# Patient Record
Sex: FEMALE | Race: WHITE | Employment: FULL TIME | URBAN - METROPOLITAN AREA
[De-identification: names, ages, dates, MRNs, and addresses within clinical notes are randomized per-mention and may not be internally consistent; named-entity substitution may affect disease eponyms.]

---

## 2017-05-15 RX ORDER — OXAPROZIN 600 MG/1
600 TABLET, FILM COATED ORAL DAILY
Qty: 90 TAB | Refills: 2 | Status: SHIPPED | OUTPATIENT
Start: 2017-05-15 | End: 2020-10-12 | Stop reason: ALTCHOICE

## 2017-05-15 NOTE — TELEPHONE ENCOUNTER
Insurance requires a minimum fill for 90 days. If appropriate, please sign pended medication order. If not, please notify me. Last Visit: 11/28/2016 with MD Margie Fiore    Next Appointment: noted to f/u in one month   Previous Refill Encounters: 09/29/2016 per MD Margie Fiore #[id number] with 3 refills     Requested Prescriptions     Pending Prescriptions Disp Refills    oxaprozin (DAYPRO) 600 mg tablet 90 Tab 2     Sig: Take 1 Tab by mouth daily.

## 2017-06-01 ENCOUNTER — TELEPHONE (OUTPATIENT)
Dept: ORTHOPEDIC SURGERY | Facility: CLINIC | Age: 52
End: 2017-06-01

## 2017-06-01 DIAGNOSIS — M17.0 BILATERAL PRIMARY OSTEOARTHRITIS OF KNEE: Primary | ICD-10-CM

## 2017-06-14 ENCOUNTER — OFFICE VISIT (OUTPATIENT)
Dept: ORTHOPEDIC SURGERY | Facility: CLINIC | Age: 52
End: 2017-06-14

## 2017-06-14 VITALS
DIASTOLIC BLOOD PRESSURE: 73 MMHG | SYSTOLIC BLOOD PRESSURE: 131 MMHG | WEIGHT: 234 LBS | BODY MASS INDEX: 40.17 KG/M2 | TEMPERATURE: 98 F | HEART RATE: 70 BPM

## 2017-06-14 DIAGNOSIS — M17.0 PRIMARY OSTEOARTHRITIS OF BOTH KNEES: Primary | ICD-10-CM

## 2017-06-14 RX ORDER — HYALURONATE SODIUM 10 MG/ML
2 SYRINGE (ML) INTRAARTICULAR ONCE
Qty: 2 ML | Refills: 0
Start: 2017-06-14 | End: 2017-06-14

## 2017-06-14 NOTE — PROGRESS NOTES
Patient: Stalin Patel                MRN: [id number]       SSN: [id number]  YOB: 1965        AGE: 45 y.o. SEX: female  There is no height or weight on file to calculate BMI. PCP: Marquis Daniel MD  06/14/17    Chief Complaint   Patient presents with    Knee Pain     Hima       HISTORY:  Stalin Patel is a 45 y.o. female who is seen for bilateral knee pain and bilateral Euflexxa injections. PROCEDURE: After timeout and under sterile conditions, patients bilateral knees were injected with 2 cc of Euflexxa. [facility] - [facility]  OFFICE PROCEDURE PROGRESS NOTE        Chart reviewed for the following:   Rossy Bell MD, have reviewed the History, Physical and updated the Allergic reactions for [address] [place] [address] performed immediately prior to start of procedure:   Rossy Bell MD, have performed the following reviews on Stalin Patel prior to the start of the procedure:            * Patient was identified by name and date of birth   * Agreement on procedure being performed was verified  * Risks and Benefits explained to the patient  * Procedure site verified and marked as necessary  * Patient was positioned for comfort  * Consent was signed and verified     Time: 10:19 AM        Date of procedure: 6/14/2017    Procedure performed by: Easton Zaidi MD    Provider assisted by: None     Patient assisted by: self    How tolerated by patient: tolerated the procedure well with no complications    Comments: none    PHYSICAL EXAM: Knees appear normal. No swelling, redness, or increased warmth. Sensation, circulation, ROM, and motor strength are normal and equal for B/L knees. PLAN: Pt has previously x-ray documented arthritis of the bilateral knees I will see her back in one week for her next bilateral knees Euflexxa injection.       Scribed by Richard Robles, as dictated by Easton Zaidi MD.

## 2017-06-21 ENCOUNTER — OFFICE VISIT (OUTPATIENT)
Dept: ORTHOPEDIC SURGERY | Facility: CLINIC | Age: 52
End: 2017-06-21

## 2017-06-21 VITALS
BODY MASS INDEX: 40.26 KG/M2 | HEIGHT: 64 IN | HEART RATE: 66 BPM | TEMPERATURE: 98 F | DIASTOLIC BLOOD PRESSURE: 60 MMHG | SYSTOLIC BLOOD PRESSURE: 135 MMHG | WEIGHT: 235.8 LBS

## 2017-06-21 DIAGNOSIS — M17.0 PRIMARY OSTEOARTHRITIS OF BOTH KNEES: Primary | ICD-10-CM

## 2017-06-21 RX ORDER — HYALURONATE SODIUM 10 MG/ML
2 SYRINGE (ML) INTRAARTICULAR ONCE
Qty: 2 ML | Refills: 0
Start: 2017-06-21 | End: 2017-06-21

## 2017-06-21 RX ORDER — TRAMADOL HYDROCHLORIDE 50 MG/1
50 TABLET ORAL
Qty: 60 TAB | Refills: 3 | Status: SHIPPED | OUTPATIENT
Start: 2017-06-21 | End: 2020-01-13

## 2017-06-21 RX ORDER — OXAPROZIN 600 MG/1
600 TABLET, FILM COATED ORAL 2 TIMES DAILY WITH MEALS
Qty: 60 TAB | Refills: 3 | Status: SHIPPED | OUTPATIENT
Start: 2017-06-21 | End: 2017-10-30 | Stop reason: SDUPTHER

## 2017-06-21 NOTE — PROGRESS NOTES
Patient: Sulaiman Gomes                MRN: [id number]       SSN: [id number]  YOB: 1965        AGE: 45 y.o. SEX: female  Body mass index is 40.47 kg/(m^2). PCP: Sam Akhtar MD  06/21/17    Chief Complaint   Patient presents with    Knee Pain     Hima       HISTORY:  Sulaiman Gomes is a 45 y.o. female who is seen for bilateral knee pain and her second Euflexxa injection. PROCEDURE: After timeout and under sterile conditions, patients bilateral knees were injected with 2 cc of Euflexxa. [facility] - [facility]  OFFICE PROCEDURE PROGRESS NOTE        Chart reviewed for the following:   Carlo Hernandez MD, have reviewed the History, Physical and updated the Allergic reactions for [address] [place] [address] performed immediately prior to start of procedure:   Carlo Hernandez MD, have performed the following reviews on Sulaiman Gomes prior to the start of the procedure:            * Patient was identified by name and date of birth   * Agreement on procedure being performed was verified  * Risks and Benefits explained to the patient  * Procedure site verified and marked as necessary  * Patient was positioned for comfort  * Consent was signed and verified     Time: 7:39 AM        Date of procedure: 6/21/2017    Procedure performed by: Haven Miller MD    Provider assisted by: None     Patient assisted by: self    How tolerated by patient: tolerated the procedure well with no complications    Comments: none    PHYSICAL EXAM: Knees appear normal. No swelling, redness, or increased warmth. Sensation, circulation, ROM, and motor strength are normal and equal for B/L knees. PLAN: Pt has previously x-ray documented arthritis of the bilateral knees. I will see her back in one week for her next bilateral knee Euflexxa injection.       Scribed by Poornima Camarena, as dictated by Haven Miller MD.

## 2017-06-21 NOTE — MR AVS SNAPSHOT
Visit Information Date & Time Provider Department Dept. Phone Encounter #  
 6/21/2017  7:30 AM Emeka Bhatt MD [facility] - [place] [id number] Your Appointments 6/28/2017  8:30 AM  
Follow Up with Emeka Bhatt MD  
[address] [facility], [address] [facility] [place]  [address] [facility]) Appt Note: Euflexxa-sulma knees-1/3  
 [address] [facility], [address] [facility] [address]  
[phone]  
  
   
 [address] [facility], [address] [place] [address] [address] [address] Upcoming Health Maintenance Date Due Hepatitis C Screening 1965 DTaP/Tdap/Td series (1 - Tdap) 9/12/1985 PAP AKA CERVICAL CYTOLOGY 9/12/1985 BREAST CANCER SCRN MAMMOGRAM 9/12/2014 FOBT Q 1 YEAR AGE 50-75 9/12/2014 INFLUENZA AGE 9 TO ADULT 8/1/2017 Allergies as of 6/21/2017  Review Complete On: 6/21/2017 By: Emeka Bhatt MD  
  
 Severity Noted Reaction Type Reactions Calcium  09/29/2016    Other (comments) Egg  06/21/2017    Swelling Milk Containing Products  09/29/2016    Other (comments) Red Dye  06/21/2017    Swelling Sulfa (Sulfonamide Antibiotics)  09/29/2016    Other (comments) Current Immunizations  Never Reviewed No immunizations on file. Not reviewed this visit You Were Diagnosed With   
  
 Codes Comments Primary osteoarthritis of both knees    -  Primary ICD-10-CM: M17.0 ICD-9-CM: 715.16 Vitals BP Pulse Temp Height(growth percentile) Weight(growth percentile) BMI  
 135/60 66 98 °F (36.7 °C) (Oral) 5' 4\" (1.626 m) 235 lb 12.8 oz (107 kg) 40.47 kg/m2 Smoking Status Never Smoker BMI and BSA Data Body Mass Index Body Surface Area 40.47 kg/m 2 2.2 m 2 Preferred Pharmacy Pharmacy Name Phone [facility] [address] [facility], [facility] AT [facility] [phone] Your Updated Medication List  
  
   
 This list is accurate as of: 6/21/17  7:50 AM.  Always use your most recent med list.  
  
  
  
  
 azelastine 137 mcg (0.1 %) nasal spray Commonly known as:  ASTELIN  
INT 2 SPRAYS IEN BID  
  
 budesonide 0.5 mg/2 mL Nbsp Commonly known as:  PULMICORT  
500 mcg by Nebulization route.  
  
 ergocalciferol 50,000 unit capsule Commonly known as:  ERGOCALCIFEROL TK 1 C PO Q WEEK LEXAPRO 20 mg tablet Generic drug:  escitalopram oxalate Take 20 mg by mouth daily. loratadine 10 mg tablet Commonly known as:  Mac Maria A Take 10 mg by mouth two (2) times a day. MUCINEX 600 mg ER tablet Generic drug:  guaiFENesin ER Take 600 mg by mouth daily. NexIUM 20 mg capsule Generic drug:  esomeprazole Take 40 mg by mouth two (2) times a day. OTHER [address] * oxaprozin 600 mg tablet Commonly known as:  Revonda Joseph Take 1 Tab by mouth daily. * oxaprozin 600 mg tablet Commonly known as:  Revonda Joseph Take 1 Tab by mouth two (2) times daily (with meals). QNASL 80 mcg/actuation Hfaa Generic drug:  beclomethasone dipropionate INHALE 1 PUFF IEN BID  
  
 SINGULAIR 10 mg tablet Generic drug:  montelukast  
Take 10 mg by mouth daily. sodium hyaluronate 10 mg/mL Syrg injection Commonly known as:  SUPARTZ FX/HYALGAN/GENIVSC 2 mL by Intra artICUlar route once for 1 dose. SYMBYAX 3-25 mg per capsule Generic drug:  OLANZapine-FLUoxetine Take 1 Cap by mouth every evening. * traMADol 50 mg tablet Commonly known as:  ULTRAM  
Take 1 Tab by mouth every six (6) hours as needed for Pain. Max Daily Amount: 200 mg.  
  
 * traMADol 50 mg tablet Commonly known as:  ULTRAM  
Take 1 Tab by mouth every six (6) hours as needed for Pain. Max Daily Amount: 200 mg.  
  
 * Notice: This list has 4 medication(s) that are the same as other medications prescribed for you.  Read the directions carefully, and ask your doctor or other care provider to review them with you. Prescriptions Printed Refills  
 oxaprozin (DAYPRO) 600 mg tablet 3 Sig: Take 1 Tab by mouth two (2) times daily (with meals). Class: Print Route: Oral  
 traMADol (ULTRAM) 50 mg tablet 3 Sig: Take 1 Tab by mouth every six (6) hours as needed for Pain. Max Daily Amount: 200 mg. Class: Print Route: Oral  
  
We Performed the Following EUFLEXXA INJECTION PER DOSE [ [facility]] SD DRAIN/INJECT LARGE JOINT/BURSA [id number] CPT(R)] Introducing [facility]! Jazlyn Lovell introduces [employer] patient portal. Now you can access parts of your medical record, email your doctor's office, and request medication refills online. 1. In your internet browser, go to [email or web address] 2. Click on the First Time User? Click Here link in the Sign In box. You will see the New Member Sign Up page. 3. Enter your [employer] Access Code exactly as it appears below. You will not need to use this code after youve completed the sign-up process. If you do not sign up before the expiration date, you must request a new code. · [employer] Access Code: [email or web address] Expires: 9/19/2017  7:50 AM 
 
4. Enter the last four digits of your Social Security Number (xxxx) and Date of Birth (mm/dd/yyyy) as indicated and click Submit. You will be taken to the next sign-up page. 5. Create a PictureMenut ID. This will be your [employer] login ID and cannot be changed, so think of one that is secure and easy to remember. 6. Create a [employer] password. You can change your password at any time. 7. Enter your Password Reset Question and Answer. This can be used at a later time if you forget your password. 8. Enter your e-mail address. You will receive e-mail notification when new information is available in [address]. 9. Click Sign Up. You can now view and download portions of your medical record. 10. Click the Download Summary menu link to download a portable copy of your medical information. If you have questions, please visit the Frequently Asked Questions section of the DecideQuick website. Remember, [employer] is NOT to be used for urgent needs. For medical emergencies, dial 911. Now available from your iPhone and Android! Please provide this summary of care documentation to your next provider. Your primary care clinician is listed as [employer]. If you have any questions after today's visit, please call [phone].

## 2017-06-28 ENCOUNTER — OFFICE VISIT (OUTPATIENT)
Dept: ORTHOPEDIC SURGERY | Facility: CLINIC | Age: 52
End: 2017-06-28

## 2017-06-28 VITALS
TEMPERATURE: 97.5 F | WEIGHT: 233 LBS | HEART RATE: 65 BPM | DIASTOLIC BLOOD PRESSURE: 74 MMHG | BODY MASS INDEX: 41.29 KG/M2 | SYSTOLIC BLOOD PRESSURE: 123 MMHG | HEIGHT: 63 IN

## 2017-06-28 DIAGNOSIS — M17.0 PRIMARY OSTEOARTHRITIS OF BOTH KNEES: Primary | ICD-10-CM

## 2017-06-28 RX ORDER — HYALURONATE SODIUM 10 MG/ML
2 SYRINGE (ML) INTRAARTICULAR ONCE
Qty: 2 ML | Refills: 0
Start: 2017-06-28 | End: 2017-06-28

## 2017-06-28 NOTE — PROGRESS NOTES
Patient: Bernardino Villalpando                MRN: [id number]       SSN: [id number]  YOB: 1965        AGE: 45 y.o. SEX: female  There is no height or weight on file to calculate BMI. PCP: Genet Velasco MD  06/28/17    No chief complaint on file. HISTORY:  Bernardino Villalpando is a 45 y.o. female who is seen for bilateral knee pain and her third Euflexxa injection. Ada Malik PROCEDURE: After timeout and under sterile conditions, patients bilateral knees were injected with 2 cc of Euflexxa. [facility]  OFFICE PROCEDURE PROGRESS NOTE        Chart reviewed for the following:   Saint Sparrow, MD, have reviewed the History, Physical and updated the Allergic reactions for [facility] performed immediately prior to start of procedure:   Saint Sparrow, MD, have performed the following reviews on Bernardino Villalpando prior to the start of the procedure:            * Patient was identified by name and date of birth   * Agreement on procedure being performed was verified  * Risks and Benefits explained to the patient  * Procedure site verified and marked as necessary  * Patient was positioned for comfort  * Consent was signed and verified     Time: 8:20 AM        Date of procedure: 6/28/2017    Procedure performed by: Tamara Mclaughlin MD    Provider assisted by: None     Patient assisted by: self    How tolerated by patient: tolerated the procedure well with no complications    Comments: none    PHYSICAL EXAM: Knees appear normal. No swelling, redness, or increased warmth. Sensation, circulation, ROM, and motor strength are normal and equal for B/L knees. PLAN: Pt has previously x-ray documented arthritis of the bilateral knees. I will see her back prn.       Scribed by Jyoti Sargent, as dictated by Tamara Mclaughlin MD.

## 2017-10-31 RX ORDER — OXAPROZIN 600 MG/1
600 TABLET, FILM COATED ORAL 2 TIMES DAILY WITH MEALS
Qty: 60 TAB | Refills: 3 | Status: SHIPPED | OUTPATIENT
Start: 2017-10-31 | End: 2017-12-06 | Stop reason: SDUPTHER

## 2017-12-06 ENCOUNTER — OFFICE VISIT (OUTPATIENT)
Dept: ORTHOPEDIC SURGERY | Age: 52
End: 2017-12-06

## 2017-12-06 VITALS
DIASTOLIC BLOOD PRESSURE: 80 MMHG | SYSTOLIC BLOOD PRESSURE: 138 MMHG | HEART RATE: 65 BPM | TEMPERATURE: 96.5 F | OXYGEN SATURATION: 98 % | BODY MASS INDEX: 41.82 KG/M2 | HEIGHT: 63 IN | WEIGHT: 236 LBS

## 2017-12-06 DIAGNOSIS — M79.672 LEFT FOOT PAIN: ICD-10-CM

## 2017-12-06 DIAGNOSIS — M72.2 PLANTAR FASCIITIS OF LEFT FOOT: ICD-10-CM

## 2017-12-06 DIAGNOSIS — M72.2 PLANTAR FASCIITIS OF LEFT FOOT: Primary | ICD-10-CM

## 2017-12-06 PROBLEM — E66.01 OBESITY, MORBID (HCC): Status: ACTIVE | Noted: 2017-12-06

## 2017-12-06 RX ORDER — OXAPROZIN 600 MG/1
600 TABLET, FILM COATED ORAL 2 TIMES DAILY WITH MEALS
Qty: 60 TAB | Refills: 3 | Status: SHIPPED | OUTPATIENT
Start: 2017-12-06 | End: 2017-12-06 | Stop reason: SDUPTHER

## 2017-12-06 NOTE — PROCEDURES
DIAGNOSTIC STUDIES:  X-rays of the left foot, three views, AP, lateral, and oblique, [address], today:  These films reveal:    1. Calcific bone spur, mild, at the insertion point of the Achilles tendon. 2. The ankle, subtalar, transverse tarsal joints, and navicular cuneiform joints are well maintained. 3. There is no acute fracture, subluxation, or dislocation. 4. No osteolytic or osteoblastic lesions are seen on these three views of the left foot.

## 2017-12-06 NOTE — MR AVS SNAPSHOT
Visit Information Date & Time Provider Department Dept. Phone Encounter #  
 12/6/2017  2:40 PM Tamir Gagnon, [facility] [phone] [id number] Upcoming Health Maintenance Date Due Hepatitis C Screening 1965 DTaP/Tdap/Td series (1 - Tdap) 9/12/1985 PAP AKA CERVICAL CYTOLOGY 9/12/1985 BREAST CANCER SCRN MAMMOGRAM 9/12/2014 FOBT Q 1 YEAR AGE 50-75 9/12/2014 Influenza Age 5 to Adult 8/1/2017 Allergies as of 12/6/2017  Review Complete On: 12/6/2017 By: Tamir Gagnon MD  
  
 Severity Noted Reaction Type Reactions Calcium  09/29/2016    Other (comments) Egg  06/21/2017    Swelling Milk Containing Products  09/29/2016    Other (comments) Red Dye  06/21/2017    Swelling Sulfa (Sulfonamide Antibiotics)  09/29/2016    Other (comments) Current Immunizations  Never Reviewed No immunizations on file. Not reviewed this visit You Were Diagnosed With   
  
 Codes Comments Plantar fasciitis of left foot    -  Primary ICD-10-CM: M72.2 ICD-9-CM: 728.71 Left foot pain     ICD-10-CM: A23.233 ICD-9-CM: 729.5 Vitals BP Pulse Temp Height(growth percentile) Weight(growth percentile) SpO2  
 138/80 65 96.5 °F (35.8 °C) (Oral) 5' 3\" (1.6 m) 236 lb (107 kg) 98% BMI Smoking Status 41.81 kg/m2 Never Smoker BMI and BSA Data Body Mass Index Body Surface Area  
 41.81 kg/m 2 2.18 m 2 Preferred Pharmacy Pharmacy Name Phone [facility] [address] [facility] [phone] Your Updated Medication List  
  
   
This list is accurate as of: 12/6/17  3:50 PM.  Always use your most recent med list.  
  
  
  
  
 azelastine 137 mcg (0.1 %) nasal spray Commonly known as:  ASTELIN  
INT 2 SPRAYS IEN BID  
  
 budesonide 0.5 mg/2 mL Nbsp Commonly known as:  PULMICORT  
 500 mcg by Nebulization route.  
  
 ergocalciferol 50,000 unit capsule Commonly known as:  ERGOCALCIFEROL TK 1 C PO Q WEEK LEXAPRO 20 mg tablet Generic drug:  escitalopram oxalate Take 20 mg by mouth daily. loratadine 10 mg tablet Commonly known as:  Rosan Norman Take 10 mg by mouth two (2) times a day. MUCINEX 600 mg ER tablet Generic drug:  guaiFENesin ER Take 600 mg by mouth daily. NexIUM 20 mg capsule Generic drug:  esomeprazole Take 40 mg by mouth two (2) times a day. OTHER [facility] * oxaprozin 600 mg tablet Commonly known as:  Carson Rumble Take 1 Tab by mouth daily. * oxaprozin 600 mg tablet Commonly known as:  Carson Rumble Take 1 Tab by mouth two (2) times daily (with meals). QNASL 80 mcg/actuation Hfaa Generic drug:  beclomethasone dipropionate INHALE 1 PUFF IEN BID  
  
 SINGULAIR 10 mg tablet Generic drug:  montelukast  
Take 10 mg by mouth daily. SYMBYAX 3-25 mg per capsule Generic drug:  OLANZapine-FLUoxetine Take 1 Cap by mouth every evening. * traMADol 50 mg tablet Commonly known as:  ULTRAM  
Take 1 Tab by mouth every six (6) hours as needed for Pain. Max Daily Amount: 200 mg.  
  
 * traMADol 50 mg tablet Commonly known as:  ULTRAM  
Take 1 Tab by mouth every six (6) hours as needed for Pain. Max Daily Amount: 200 mg.  
  
 * Notice: This list has 4 medication(s) that are the same as other medications prescribed for you. Read the directions carefully, and ask your doctor or other care provider to review them with you. Prescriptions Sent to Pharmacy Refills  
 oxaprozin (DAYPRO) 600 mg tablet 3 Sig: Take 1 Tab by mouth two (2) times daily (with meals). Class: Normal  
 Pharmacy: [facility] [address] [place] [address] [address]  #: [phone] Route: Oral  
  
We Performed the Following AMB POC XRAY, FOOT; COMPLETE, 3+ VIEW [12757 CPT(R)] AMB SUPPLY ORDER [[id number] Custom] Comments:  
 Viscoelastic Spenco Medial Arch Support REFERRAL TO PHYSICAL THERAPY [AEC55 Custom] Comments:  
 Evaluation and treatment of left plantar fasciitis. Please treat two to three times a week for four weeks. Please utilize the following: low frequency US, massages, graston technique, and stretching. Referral Information Referral ID Referred By Referred To  
  
 [id number] LIVHILDA QUACH [address] [facility] VIEW   
   [address] [facility] [address] [place], [address] [place] [address] Phone: [phone] Fax: [phone] Visits Status Start Date End Date 1 New Request 12/6/17 12/6/18 If your referral has a status of pending review or denied, additional information will be sent to support the outcome of this decision. Patient Instructions Please follow up in 4 weeks. You are advised to contact us if your condition worsens. You have been provided with an order for durable medical equipment that you may  at an outside facility as our office does not carry the equipment you need. You may pick it up at any medical supply company you like. Listed below are a few different locations for your convenience: [facility] [address] [facility], [address] [place] [address] Street Phone: [phone] [address] [place], [address] [place] [address] Phone: [phone] [place] [address] Phone: [phone] [place]:  
[address] [place], [address] [place]: 
[address] Jacque Friedman [address] [place], [address] [place]: 
[address] [facility], [address] Foot Pain: Care Instructions Your Care Instructions Foot injuries that cause pain and swelling are fairly common.  Almost all sports or home repair projects can cause a misstep that ends up as foot pain. Normal wear and tear, especially as you get older, also can cause foot pain. Most minor foot injuries will heal on their own, and home treatment is usually all you need to do. If you have a severe injury, you may need tests and treatment. Follow-up care is a key part of your treatment and safety. Be sure to make and go to all appointments, and call your doctor if you are having problems. It's also a good idea to know your test results and keep a list of the medicines you take. How can you care for yourself at home? · Take pain medicines exactly as directed. ¨ If the doctor gave you a prescription medicine for pain, take it as prescribed. ¨ If you are not taking a prescription pain medicine, ask your doctor if you can take an over-the-counter medicine. · Rest and protect your foot. Take a break from any activity that may cause pain. · Put ice or a cold pack on your foot for 10 to 20 minutes at a time. Put a thin cloth between the ice and your skin. · Prop up the sore foot on a pillow when you ice it or anytime you sit or lie down during the next 3 days. Try to keep it above the level of your heart. This will help reduce swelling. · Your doctor may recommend that you wrap your foot with an elastic bandage. Keep your foot wrapped for as long as your doctor advises. · If your doctor recommends crutches, use them as directed. · Wear roomy footwear. · As soon as pain and swelling end, begin gentle exercises of your foot. Your doctor can tell you which exercises will help. When should you call for help? Call 911 anytime you think you may need emergency care. For example, call if: 
? · Your foot turns pale, white, blue, or cold. ?Call your doctor now or seek immediate medical care if: 
? · You cannot move or stand on your foot. ? · Your foot looks twisted or out of its normal position. ? · Your foot is not stable when you step down. ? · You have signs of infection, such as: ¨ Increased pain, swelling, warmth, or redness. ¨ Red streaks leading from the sore area. ¨ Pus draining from a place on your foot. ¨ A fever. ? · Your foot is numb or tingly. ? Watch closely for changes in your health, and be sure to contact your doctor if: 
? · You do not get better as expected. ? · You have bruises from an injury that last longer than 2 weeks. Where can you learn more? Go to http://damián-solitario.info/. Enter S626 in the search box to learn more about \"Foot Pain: Care Instructions. \" Current as of: March 21, 2017 Content Version: 11.4 © 9820-2044 [employer]uck[employer]. Care instructions adapted under license by [employer] (which disclaims liability or warranty for this information). If you have questions about a medical condition or this instruction, always ask your healthcare professional. [place] [address] any warranty or liability for your use of this information. Plantar Fasciitis: Exercises Your Care Instructions Here are some examples of typical rehabilitation exercises for your condition. Start each exercise slowly. Ease off the exercise if you start to have pain. Your doctor or physical therapist will tell you when you can start these exercises and which ones will work best for you. How to do the exercises Towel stretch 1. Sit with your legs extended and knees straight. 2. Place a towel around your foot just under the toes. 3. Hold each end of the towel in each hand, with your hands above your knees. 4. Pull back with the towel so that your foot stretches toward you. 5. Hold the position for at least 15 to 30 seconds. 6. Repeat 2 to 4 times a session, up to 5 sessions a day. Calf stretch This exercise stretches the muscles at the back of the lower leg (the calf) and the Achilles tendon. Do this exercise 3 or 4 times a day, 5 days a week. 1. Stand facing a wall with your hands on the wall at about eye level. Put the leg you want to stretch about a step behind your other leg. 2. Keeping your back heel on the floor, bend your front knee until you feel a stretch in the back leg. 3. Hold the stretch for 15 to 30 seconds. Repeat 2 to 4 times. Plantar fascia and calf stretch Stretching the plantar fascia and calf muscles can increase flexibility and decrease heel pain. You can do this exercise several times each day and before and after activity. 1. Stand on a step as shown above. Be sure to hold on to the banister. 2. Slowly let your heels down over the edge of the step as you relax your calf muscles. You should feel a gentle stretch across the bottom of your foot and up the back of your leg to your knee. 3. Hold the stretch about 15 to 30 seconds, and then tighten your calf muscle a little to bring your heel back up to the level of the step. Repeat 2 to 4 times. Towel curls Make this exercise more challenging by placing a weighted object, such as a soup can, on the other end of the towel. 1. While sitting, place your foot on a towel on the floor and scrunch the towel toward you with your toes. 2. Then, also using your toes, push the towel away from you. [place] pickups 1. Put marbles on the floor next to a cup. 
2. Using your toes, try to lift the marbles up from the floor and put them in the cup. Follow-up care is a key part of your treatment and safety. Be sure to make and go to all appointments, and call your doctor if you are having problems. It's also a good idea to know your test results and keep a list of the medicines you take. Where can you learn more? Go to http://damián-solitario.info/. Annette Currie in the search box to learn more about \"Plantar Fasciitis: Exercises. \" Current as of: March 21, 2017 Content Version: 11.4 © 5817-1341 Healthwise, Incorporated.  Care instructions adapted under license by [address] (which disclaims liability or warranty for this information). If you have questions about a medical condition or this instruction, always ask your healthcare professional. [address] any warranty or liability for your use of this information. Introducing [facility] & HEALTH SERVICES! [facility] introduces Adsame patient portal. Now you can access parts of your medical record, email your doctor's office, and request medication refills online. 1. In your internet browser, go to [email or web address] 2. Click on the First Time User? Click Here link in the Sign In box. You will see the New Member Sign Up page. 3. Enter your Adsame Access Code exactly as it appears below. You will not need to use this code after youve completed the sign-up process. If you do not sign up before the expiration date, you must request a new code. · Adsame Access Code: [email or web address] Expires: 3/6/2018  3:48 PM 
 
4. Enter the last four digits of your Social Security Number (xxxx) and Date of Birth (mm/dd/yyyy) as indicated and click Submit. You will be taken to the next sign-up page. 5. Create a Adsame ID. This will be your Adsame login ID and cannot be changed, so think of one that is secure and easy to remember. 6. Create a Adsame password. You can change your password at any time. 7. Enter your Password Reset Question and Answer. This can be used at a later time if you forget your password. 8. Enter your e-mail address. You will receive e-mail notification when new information is available in [address] 9. Click Sign Up. You can now view and download portions of your medical record. 10. Click the Download Summary menu link to download a portable copy of your medical information. If you have questions, please visit the Frequently Asked Questions section of the Adsame website.  Remember, Adsame is NOT to be used for urgent needs. For medical emergencies, dial 911. Now available from your iPhone and Android! Please provide this summary of care documentation to your next provider. Your primary care clinician is listed as Gutierrez Ochoa. If you have any questions after today's visit, please call [phone].

## 2017-12-06 NOTE — PROGRESS NOTES
AMBULATORY PROGRESS NOTE      Patient: Sidra Gutierrez             MRN: [id number]     SSN: [id number] Body mass index is 41.81 kg/(m^2). YOB: 1965     AGE: 47 y.o. EX: female    PCP: More Tubbs MD    IMPRESSION/DIAGNOSIS AND TREATMENT PLAN     DIAGNOSES  1. Plantar fasciitis of left foot    2. Left foot pain        Orders Placed This Encounter    AMB SUPPLY ORDER    [07652] Foot Min 3V    REFERRAL TO PHYSICAL THERAPY    oxaprozin (DAYPRO) 600 mg tablet      Sidra Gutierrez understands her diagnoses and the proposed plan. Plan:    1) DME Order: Viscoelastic Spenco Medial Arch Support  2) Referral for Physical Therapy: Low frequency US, massages, Graston technique, and stretching. 3) Daypro 600 m PO BID PRN; 60 tablets, 3 refills. RTO - 4 weeks    HPI AND EXAMINATION     Sidra Gutierrez IS A 47 y.o. female who presents to my outpatient office complaining of left foot pain. Patient states that 2 months ago she started to wear a plastic arch support and since then she has noticed pain along her left heel. She notes having start-up pain that does not subside and the pain worsens throughout the day. Denies falls, twists, or trauma. She typically wears TXU [place]ow on a daily basis. Additionally, the reports having plantar fasciitis on her right foot that she notes is a dissimilar pain in comparison to what she has experienced on her left foot. She had a right foot fracture that was treated with physical therapy for ROM exercises and she notes it helped considerably. Sidra Gutierrez is alert/oriented (name, location, time) and follows commands well. she  is in no acute distress and her affect and mood are appropriate. Left ANKLE and FOOT       Gait: normal  Tenderness: mild tenderness to medial cord of plantar fascia. Cutaneous: No rashes, skin patches, wounds, or abrasions to the lower legs           Warm and Normal color.  No regions of expressible drainage. Medial Border of Tibia Region: absent           Skin color, texture, turgor normal. Normal.  Joint Motion: ROM Ankle:Normal , Hindfoot: (ST,TN,CC Normal}, Forefoot toes:Normal  Neurologic Exam: Neuro: Motor: normal 5/5 strength in all tested muscle groups and Sensory : no sensory deficits noted. Contractures: Gastrocnemius or Achilles Contractures absent  Joint / Tendon Stability: No Ankle or Subtalar instability or joint laxity. No peroneal sublux ability or dislocation  Alignment:  Normal Foot Alignment and  Flexible  Vascular: Normal Pulses/ NL Capillary refill, No evidence of DVT seen on physical exam.   No calf swelling, no tenderness to calf muscles. Lymphatic:  No Evidence of Lymphedema. CHART REVIEW     Past Medical History:   Diagnosis Date    Asthma      Current Outpatient Prescriptions   Medication Sig    oxaprozin (DAYPRO) 600 mg tablet Take 1 Tab by mouth two (2) times daily (with meals).  oxaprozin (DAYPRO) 600 mg tablet Take 1 Tab by mouth daily.  OTHER Jacob Rhino Salt Packets    azelastine (ASTELIN) 137 mcg (0.1 %) nasal spray INT 2 SPRAYS IEN BID    ergocalciferol (ERGOCALCIFEROL) 50,000 unit capsule TK 1 C PO Q WEEK    guaiFENesin ER (MUCINEX) 600 mg ER tablet Take 600 mg by mouth daily.  budesonide (PULMICORT) 0.5 mg/2 mL nbsp 500 mcg by Nebulization route.  loratadine (CLARITIN) 10 mg tablet Take 10 mg by mouth two (2) times a day.  esomeprazole (NEXIUM) 20 mg capsule Take 40 mg by mouth two (2) times a day.  escitalopram oxalate (LEXAPRO) 20 mg tablet Take 20 mg by mouth daily.  montelukast (SINGULAIR) 10 mg tablet Take 10 mg by mouth daily.  traMADol (ULTRAM) 50 mg tablet Take 1 Tab by mouth every six (6) hours as needed for Pain. Max Daily Amount: 200 mg.    QNASL 80 mcg/actuation HFAA INHALE 1 PUFF IEN BID    traMADol (ULTRAM) 50 mg tablet Take 1 Tab by mouth every six (6) hours as needed for Pain.  Max Daily Amount: 200 mg.    OLANZapine-FLUoxetine (SYMBYAX) 3-25 mg per capsule Take 1 Cap by mouth every evening. No current facility-administered medications for this visit. Allergies   Allergen Reactions    Calcium Other (comments)    Egg Swelling    Milk Containing Products Other (comments)    Red Dye Swelling    Sulfa (Sulfonamide Antibiotics) Other (comments)     Past Surgical History:   Procedure Laterality Date    HX HYSTERECTOMY      HX PELVIC LAPAROSCOPY      HX TONSILLECTOMY       Social History     Occupational History    Not on file. Social History Main Topics    Smoking status: Never Smoker    Smokeless tobacco: Never Used    Alcohol use No    Drug use: No    Sexual activity: Not on file     Family History   Problem Relation Age of Onset    Family history unknown: Yes    Hypertension Mother     Hypertension Father     Heart Disease Father     Stroke Father        REVIEW OF SYSTEMS : 12/6/2017  ALL BELOW ARE Negative except : SEE HPI       Constitutional: Negative for fever, chills and weight loss. Neg Weigh Loss  Cardiovascular: Negative for chest pain, claudication and leg swelling. SOB, TALAVERA   Gastrointestinal: Negative for  pain, N/V/D/C, Blood in stool or urine,dysuria, hematuria,        Incontinence, pelvic pain  Musculoskeletal: see HPI. Neurological: Negative for dizziness and weakness. Negative for headaches,Visual Changes, Confusion, Seizures,   Psychiatric/Behavioral: Negative for depression, memory loss and substance abuse. Extremities:  Negative for  hair changes, rash or skin lesion changes. Hematologic: Negative for Bleeding problems, bruising, pallor or swollen lymph nodes.   Peripheral Vascular: No calf pain, vascular vein tenderness to calf pain              No calf throbbing, posterior knee throbbing pain    DIAGNOSTIC IMAGING      Dictation on: 12/06/2017  3:21 PM by: Blair Stone [[id number]]       Written by Jaquelin Centeno, as dictated by Trey Molina MD. I, , Trey Molina MD, confirm that all documentation is accurate.

## 2017-12-06 NOTE — PATIENT INSTRUCTIONS
Please follow up in 4 weeks. You are advised to contact us if your condition worsens. You have been provided with an order for durable medical equipment that you may  at an outside facility as our office does not carry the equipment you need. You may pick it up at any medical supply company you like. Listed below are a few different locations for your convenience:    [address]  [facility], [address]  Phone: [phone]    [address] [place], [address] [facility]  Phone: [phone] [facility]st[facility]  Phone: [phone]  [place]:   [address] [facility]  [place], [address] [facility]:  [address], [facility]  [place]:  [facility],[address] [place], [address]      Foot Pain: Care Instructions  Your Care Instructions  Foot injuries that cause pain and swelling are fairly common. Almost all sports or home repair projects can cause a misstep that ends up as foot pain. Normal wear and tear, especially as you get older, also can cause foot pain. Most minor foot injuries will heal on their own, and home treatment is usually all you need to do. If you have a severe injury, you may need tests and treatment. Follow-up care is a key part of your treatment and safety. Be sure to make and go to all appointments, and call your doctor if you are having problems. It's also a good idea to know your test results and keep a list of the medicines you take. How can you care for yourself at home? · Take pain medicines exactly as directed. ¨ If the doctor gave you a prescription medicine for pain, take it as prescribed. ¨ If you are not taking a prescription pain medicine, ask your doctor if you can take an over-the-counter medicine. · Rest and protect your foot. Take a break from any activity that may cause pain. · Put ice or a cold pack on your foot for 10 to 20 minutes at a time.  Put a thin cloth between the ice and your skin. · Prop up the sore foot on a pillow when you ice it or anytime you sit or lie down during the next 3 days. Try to keep it above the level of your heart. This will help reduce swelling. · Your doctor may recommend that you wrap your foot with an elastic bandage. Keep your foot wrapped for as long as your doctor advises. · If your doctor recommends crutches, use them as directed. · Wear roomy footwear. · As soon as pain and swelling end, begin gentle exercises of your foot. Your doctor can tell you which exercises will help. When should you call for help? Call 911 anytime you think you may need emergency care. For example, call if:  ? · Your foot turns pale, white, blue, or cold. ?Call your doctor now or seek immediate medical care if:  ? · You cannot move or stand on your foot. ? · Your foot looks twisted or out of its normal position. ? · Your foot is not stable when you step down. ? · You have signs of infection, such as:  ¨ Increased pain, swelling, warmth, or redness. ¨ Red streaks leading from the sore area. ¨ Pus draining from a place on your foot. ¨ A fever. ? · Your foot is numb or tingly. ? Watch closely for changes in your health, and be sure to contact your doctor if:  ? · You do not get better as expected. ? · You have bruises from an injury that last longer than 2 weeks. Where can you learn more? Go to http://damián-solitario.info/. Enter D571 in the search box to learn more about \"Foot Pain: Care Instructions. \"  Current as of: March 21, 2017  Content Version: 11.4  © 1171-5561 [employer]. Care instructions adapted under license by [employer] (which disclaims liability or warranty for this information).  If you have questions about a medical condition or this instruction, always ask your healthcare professional. [address] any warranty or liability for your use of this information. Plantar Fasciitis: Exercises  Your Care Instructions  Here are some examples of typical rehabilitation exercises for your condition. Start each exercise slowly. Ease off the exercise if you start to have pain. Your doctor or physical therapist will tell you when you can start these exercises and which ones will work best for you. How to do the exercises  Towel stretch    1. Sit with your legs extended and knees straight. 2. Place a towel around your foot just under the toes. 3. Hold each end of the towel in each hand, with your hands above your knees. 4. Pull back with the towel so that your foot stretches toward you. 5. Hold the position for at least 15 to 30 seconds. 6. Repeat 2 to 4 times a session, up to 5 sessions a day. Calf stretch    This exercise stretches the muscles at the back of the lower leg (the calf) and the Achilles tendon. Do this exercise 3 or 4 times a day, 5 days a week. 1. Stand facing a wall with your hands on the wall at about eye level. Put the leg you want to stretch about a step behind your other leg. 2. Keeping your back heel on the floor, bend your front knee until you feel a stretch in the back leg. 3. Hold the stretch for 15 to 30 seconds. Repeat 2 to 4 times. Plantar fascia and calf stretch    Stretching the plantar fascia and calf muscles can increase flexibility and decrease heel pain. You can do this exercise several times each day and before and after activity. 1. Stand on a step as shown above. Be sure to hold on to the banister. 2. Slowly let your heels down over the edge of the step as you relax your calf muscles. You should feel a gentle stretch across the bottom of your foot and up the back of your leg to your knee. 3. Hold the stretch about 15 to 30 seconds, and then tighten your calf muscle a little to bring your heel back up to the level of the step. Repeat 2 to 4 times.   Towel curls    Make this exercise more challenging by placing a weighted object, such as a soup can, on the other end of the towel. 1. While sitting, place your foot on a towel on the floor and scrunch the towel toward you with your toes. 2. Then, also using your toes, push the towel away from you. [place] pickups    1. Put marbles on the floor next to a cup.  2. Using your toes, try to lift the marbles up from the floor and put them in the cup. Follow-up care is a key part of your treatment and safety. Be sure to make and go to all appointments, and call your doctor if you are having problems. It's also a good idea to know your test results and keep a list of the medicines you take. Where can you learn more? Go to http://damián-solitario.info/. Anatoly Mcgraw in the search box to learn more about \"Plantar Fasciitis: Exercises. \"  Current as of: March 21, 2017  Content Version: 11.4  © 2233-2360 [employer]. Care instructions adapted under license by [employer] (which disclaims liability or warranty for this information). If you have questions about a medical condition or this instruction, always ask your healthcare professional. [address] any warranty or liability for your use of this information.

## 2017-12-07 RX ORDER — OXAPROZIN 600 MG/1
TABLET, FILM COATED ORAL
Qty: 180 TAB | Refills: 3 | Status: SHIPPED | OUTPATIENT
Start: 2017-12-07 | End: 2020-03-02 | Stop reason: SDUPTHER

## 2017-12-08 ENCOUNTER — TELEPHONE (OUTPATIENT)
Dept: ORTHOPEDIC SURGERY | Age: 52
End: 2017-12-08

## 2017-12-08 NOTE — LETTER
NOTIFICATION RETURN TO WORK / SCHOOL 
 
12/8/2017 1:43 PM 
 
Ms. Sidra Gutierrez [facility] [phone] [id number] To Whom It May Concern: 
 
Sidra Gutierrez is currently under the care of [address] [place] [address] Cam Perez. She will return to work on 12-7-17 with the following restrictions:   
 Please allow patient to wear tennis shoes while at work. If there are questions or concerns please have the patient contact our office.  
 
 
 
Sincerely, 
 
 
Bella Martinez MD

## 2017-12-08 NOTE — TELEPHONE ENCOUNTER
Patient saw Dr. Gilford Jesus 12-06-17. She forgot to ask for a note stating that she needs to wear her tennis shoes at work. This can be faxed at her employer at   [phone].   Patient can be reached at [phone]

## 2017-12-08 NOTE — TELEPHONE ENCOUNTER
Okay to provide work note for patient to wear sneakers at work.     Clement Brewster PA-C  12/8/2017   1:37 PM

## 2017-12-11 ENCOUNTER — HOSPITAL ENCOUNTER (OUTPATIENT)
Dept: PHYSICAL THERAPY | Age: 52
Discharge: HOME | End: 2017-12-11
Payer: COMMERCIAL

## 2017-12-11 PROCEDURE — 97162 PT EVAL MOD COMPLEX 30 MIN: CPT

## 2017-12-11 PROCEDURE — 97110 THERAPEUTIC EXERCISES: CPT

## 2017-12-11 NOTE — PROGRESS NOTES
[facility]  [address]  [place], [address]  [phone] fax    Plan of Care/ Statement of Necessity for Physical Therapy Services    Patient name: Indra Syed Start of Care: 2017   Referral source: Anuel Lopez MD : 1965    Medical Diagnosis: Plantar fascial fibromatosis [M72.2]   Onset Date:3 months    Treatment Diagnosis: L plantarfasciitis   Prior Hospitalization: see medical history Provider#: [id number]   Medications: Verified on Patient summary List    Comorbidities: Asthma, arthritis, osteoporosis   Prior Level of Function: Pt I with ADLs void of foot pain     The Plan of Care and following information is based on the information from the initial evaluation. Assessment/ key information: Pt is a 46 y/o F presenting with L posteromedial arch pain consistent with plantarfasciitis. She reports starting a daily walking program with her co-workers, ~1 mile/day, in September seemingly coinciding with onset of heel pain. No TTP noted today but pain is reproduced with standing following table examination. Slight calcaneal eversion noted on L in standing, good static balance noted overall. Pt does express gastroc tightness with HR today. She would benefit from PT to improve flexibility and mechanics of L foot progressing to stability work to improve ADL performance.     Evaluation Complexity History HIGH Complexity :3+ comorbidities / personal factors will impact the outcome/ POC ; Examination MEDIUM Complexity : 3 Standardized tests and measures addressing body structure, function, activity limitation and / or participation in recreation  ;Presentation LOW Complexity : Stable, uncomplicated  ;Clinical Decision Making MEDIUM Complexity : FOTO score of 26-74  Overall Complexity Rating: MEDIUM  Problem List: pain affecting function, decrease strength, impaired gait/ balance, decrease ADL/ functional abilitiies, decrease activity tolerance and decrease flexibility/ joint mobility   Treatment Plan may include any combination of the following: Therapeutic exercise, Therapeutic activities, Neuromuscular re-education, Physical agent/modality, Gait/balance training, Manual therapy, Patient education, Self Care training and Functional mobility training  Patient / Family readiness to learn indicated by: asking questions and trying to perform skills  Persons(s) to be included in education: patient (P)  Barriers to Learning/Limitations: None  Patient Goal (s): Take the plantarfasciitis away  Patient Self Reported Health Status: good  Rehabilitation Potential: good    Short Term Goals: To be accomplished in 1 weeks:  1. Pt will be I and compliant with HEP to promote self management of condition. Long Term Goals: To be accomplished in 4 weeks:  1. Pt will perform 15 SL HR on L without pain or gastroc cramping to improve mobility. 2. Pt will demonstrate ability to attain closed chain subtalar neutral to improve future stress with functional tasks. 3. Pt will report no morning heel pain to improve ease of daily tasks and quality of life. 4. Pt will initiate regimented walking program to safely return to exercise without overuse strain. Frequency / Duration: Patient to be seen 2 times per week for 4 weeks. Patient/ Caregiver education and instruction: Diagnosis, prognosis, self care, activity modification and exercises   [x]  Plan of care has been reviewed with PTA    Katie Fatima DPT, CMTPT 12/11/2017 5:35 PM    ________________________________________________________________________    I certify that the above Therapy Services are being furnished while the patient is under my care. I agree with the treatment plan and certify that this therapy is necessary.     [de-identified] Signature:____________________  Date:____________Time: _________    Please sign and return to [facility] [address] [place] [address] & [facility]  [address] [place] [address] [address]  [phone] fax

## 2017-12-11 NOTE — PROGRESS NOTES
PT DAILY TREATMENT NOTE     Patient Name: Amparo Males  Date:2017  : 1965  [x]  Patient  Verified  Payor: Trinity Lugo / [facility] [place] [facility] Product Type: Commerical /    In time:4:08  Out time:4:49  Total Treatment Time (min): 41  Visit #: 1 of 8    Treatment Area: Plantar fascial fibromatosis [M72.2]    SUBJECTIVE  Pain Level (0-10 scale): 3  Any medication changes, allergies to medications, adverse drug reactions, diagnosis change, or new procedure performed?: [x] No    [] Yes (see summary sheet for update)  Subjective functional status/changes:   [] No changes reported  Pt reports pain upon standing in the morning and after sitting for periods    OBJECTIVE    23 min [x]Eval                  []Re-Eval       18 min Therapeutic Exercise:  [] See flow sheet :   Rationale: increase strength, improve coordination and flexibility to improve the patients ability to perform daily tasks and ADLs        With   [] TE   [] TA   [] neuro   [] other: Patient Education: [x] Review HEP    [] Progressed/Changed HEP based on:   [] positioning   [] body mechanics   [] transfers   [] heat/ice application    [] other:      Other Objective/Functional Measures: slight calcaneal EV on L     Pain Level (0-10 scale) post treatment: 1    ASSESSMENT/Changes in Function: see POC    Patient will continue to benefit from skilled PT services to modify and progress therapeutic interventions, address functional mobility deficits, address strength deficits, analyze and address soft tissue restrictions, analyze and cue movement patterns, analyze and modify body mechanics/ergonomics and address imbalance/dizziness to attain remaining goals. []  See Plan of Care  []  See progress note/recertification  []  See Discharge Summary         Progress towards goals / Updated goals:  Short Term Goals: To be accomplished in 1 weeks:  1. Pt will be I and compliant with HEP to promote self management of condition.   Long Term Goals: To be accomplished in 4 weeks:  1. Pt will perform 15 SL HR on L without pain or gastroc cramping to improve mobility. 2. Pt will demonstrate ability to attain closed chain subtalar neutral to improve future stress with functional tasks. 3. Pt will report no morning heel pain to improve ease of daily tasks and quality of life. 4. Pt will initiate regimented walking program to safely return to exercise without overuse strain.     PLAN  []  Upgrade activities as tolerated     [x]  Continue plan of care  []  Update interventions per flow sheet       []  Discharge due to:_  []  Other:_      Seda Prescott, DPT, CMTPT 12/11/2017  5:59 PM    Future Appointments  Date Time Provider Alpesh Romo   12/19/2017 4:30 PM Galo Mendoza PTA [facility]   12/20/2017 4:00 PM Galo Mendoza, PTA [facility]   12/27/2017 4:30 PM Galo Mendoza, PTA [facility]   12/29/2017 5:00 PM Leny Baig, PT [facility]   1/2/2018 4:30 PM Galo Mendoza, PTA [facility]   1/4/2018 4:30 PM Rosibel Patel, PT [facility]   1/9/2018 4:30 PM Galo Mendoza PTA [facility]   1/11/2018 4:30 PM Rosibel Patel, PT [facility]   1/15/2018 1:00 PM MD Omaira Fisher [address]

## 2017-12-19 ENCOUNTER — HOSPITAL ENCOUNTER (OUTPATIENT)
Dept: PHYSICAL THERAPY | Age: 52
Discharge: HOME | End: 2017-12-19
Payer: COMMERCIAL

## 2017-12-19 PROCEDURE — 97140 MANUAL THERAPY 1/> REGIONS: CPT

## 2017-12-19 PROCEDURE — 97112 NEUROMUSCULAR REEDUCATION: CPT

## 2017-12-19 PROCEDURE — 97110 THERAPEUTIC EXERCISES: CPT

## 2017-12-19 NOTE — PROGRESS NOTES
PT DAILY TREATMENT NOTE     Patient Name: Clifton Milan  Date:2017  : 1965  [x]  Patient  Verified  Payor: Angelia Soto / Plan: [facility] PT / Product Type: Commerical /    In time:4:30  Out time:5:10  Total Treatment Time (min): 40  Visit #: 2 of 8    Treatment Area: Plantar fascial fibromatosis [M72.2]    SUBJECTIVE  Pain Level (0-10 scale): 3/10  Any medication changes, allergies to medications, adverse drug reactions, diagnosis change, or new procedure performed?: [x] No    [] Yes (see summary sheet for update)  Subjective functional status/changes:   [x] No changes reported    OBJECTIVE    22 min Therapeutic Exercise:  [x] See flow sheet :   Rationale: increase ROM and increase strength to improve the patients ability to perform ADL's. 10 min Neuromuscular Re-education:  [x]  See flow sheet :   Rationale: increase strength, improve balance and increase proprioception  to improve the patients ability to perform functional activities. 8 min Manual Therapy:  Graston technique to (L) gastroc, soleus, achilles tendon and plantar fascia. Rationale: decrease pain, increase ROM, increase tissue extensibility and decrease trigger points to improve activity tolerance. With   [x] TE   [] TA   [] neuro   [] other: Patient Education: [x] Review HEP    [] Progressed/Changed HEP based on:   [] positioning   [] body mechanics   [] transfers   [] heat/ice application    [] other:      Other Objective/Functional Measures: Initiated exercises per flow sheet. Pain Level (0-10 scale) post treatment: 0/10    ASSESSMENT/Changes in Function: First F/U visit. Pt reported a decrease in tension and pain post-treatment. Pt given orange t-band for home use.     Patient will continue to benefit from skilled PT services to modify and progress therapeutic interventions, address functional mobility deficits, address ROM deficits, address strength deficits, analyze and address soft tissue restrictions and analyze and modify body mechanics/ergonomics to attain remaining goals. []  See Plan of Care  []  See progress note/recertification  []  See Discharge Summary         Progress towards goals / Updated goals:  Short Term Goals: To be accomplished in 1 weeks:  1. Pt will be I and compliant with HEP to promote self management of condition. - Pt reports HEP compliance. 12/19/2017  Long Term Goals: To be accomplished in 4 weeks:  1. Pt will perform 15 SL HR on L without pain or gastroc cramping to improve mobility. 2. Pt will demonstrate ability to attain closed chain subtalar neutral to improve future stress with functional tasks. 3. Pt will report no morning heel pain to improve ease of daily tasks and quality of life. 4. Pt will initiate regimented walking program to safely return to exercise without overuse strain.     PLAN  []  Upgrade activities as tolerated     [x]  Continue plan of care  []  Update interventions per flow sheet       []  Discharge due to:_  []  Other:_      Alysia Glance, PTA 12/19/2017  4:43 PM    Future Appointments  Date Time Provider Alpesh Romo   12/20/2017 4:00 PM [facility]   12/27/2017 4:30 PM Papillion Glance[facility]   12/29/2017 5:00 PM [facility]e Sierra[facility]   1/2/2018 4:30 PM Papillion Glance, [facility]   1/4/2018 4:30 PM [facility] Push[facility]   1/9/2018 4:30 PM Papillion Glance[facility]   1/11/2018 4:30 PM [facility] Push[facility]   1/15/2018 1:00 PM MD Gina Jackson

## 2017-12-20 ENCOUNTER — HOSPITAL ENCOUNTER (OUTPATIENT)
Dept: PHYSICAL THERAPY | Age: 52
Discharge: HOME | End: 2017-12-20
Payer: COMMERCIAL

## 2017-12-20 PROCEDURE — 97112 NEUROMUSCULAR REEDUCATION: CPT

## 2017-12-20 PROCEDURE — 97110 THERAPEUTIC EXERCISES: CPT

## 2017-12-20 PROCEDURE — 97140 MANUAL THERAPY 1/> REGIONS: CPT

## 2017-12-20 NOTE — PROGRESS NOTES
PT DAILY TREATMENT NOTE     Patient Name: Viry Mc  Date:2017  : 1965  [x]  Patient  Verified   Payor: Katie Maurer / [place]: [place] [employer] PT / Product Type: Commerical /    In time:4:03  Out time:4:32  Total Treatment Time (min): 29  Visit #: 3 of 8    Treatment Area: Plantar fascial fibromatosis [M72.2]    SUBJECTIVE  Pain Level (0-10 scale): 3/10  Any medication changes, allergies to medications, adverse drug reactions, diagnosis change, or new procedure performed?: [x] No    [] Yes (see summary sheet for update)  Subjective functional status/changes:   [] No changes reported  \"Sore last night but I felt better this morning. \"    OBJECTIVE    13 min Therapeutic Exercise:  [x] See flow sheet :   Rationale: increase ROM and increase strength to improve the patients ability to perform ADL's.    8 min Neuromuscular Re-education:  [x]  See flow sheet :   Rationale: improve balance and increase proprioception  to improve the patients ability to perform functional activities. 8 min Manual Therapy:  (L) Talocrural joint mobs, ankle PROM, gastroc and soleus stretch. Metatarsal mobs, DTM (L) plantar fascia. Rationale: decrease pain, increase ROM, increase tissue extensibility and decrease trigger points to improve activity tolerance. With   [x] TE   [] TA   [] neuro   [] other: Patient Education: [x] Review HEP    [] Progressed/Changed HEP based on:   [] positioning   [] body mechanics   [] transfers   [] heat/ice application    [] other:      Other Objective/Functional Measures: Able to perform (L) SLS without LOB. Pain Level (0-10 scale) post treatment: 0/10    ASSESSMENT/Changes in Function: TrP's noted in (L) plantar fascia, decreased pressure applied during manual secondary to hypersensitivity. Denied pain post-treatment. Encouraged pt to perform tennis ball rolls underneath plantar fascia.     Patient will continue to benefit from skilled PT services to modify and progress therapeutic interventions, address functional mobility deficits, address ROM deficits, address strength deficits, analyze and address soft tissue restrictions and analyze and modify body mechanics/ergonomics to attain remaining goals. [x]  See Plan of Care  []  See progress note/recertification  []  See Discharge Summary         Progress towards goals / Updated goals:  Short Term Goals: To be accomplished in 1 weeks:  1. Pt will be I and compliant with HEP to promote self management of condition. - Pt reports HEP compliance. 12/19/2017  Long Term Goals: To be accomplished in 4 weeks:  1. Pt will perform 15 SL HR on L without pain or gastroc cramping to improve mobility. 2. Pt will demonstrate ability to attain closed chain subtalar neutral to improve future stress with functional tasks. 3. Pt will report no morning heel pain to improve ease of daily tasks and quality of life. 4. Pt will initiate regimented walking program to safely return to exercise without overuse strain.     PLAN  []  Upgrade activities as tolerated     [x]  Continue plan of care  []  Update interventions per flow sheet       []  Discharge due to:_  []  Other:_      Andrae Hung PTA 12/20/2017  3:59 PM    Future Appointments  Date Time Provider Alpesh Romo   12/20/2017 4:00 PM Andrae Hung PTA [facility]   12/27/2017 4:30 PM Andrae Hung, PTA [facility]   12/29/2017 5:00 PM Abdi Moralez, PT [facility]   1/2/2018 4:30 PM Andrae Hung PTA [facility]   1/4/2018 4:30 PM Ana Maria Magaña, PT [facility]   1/9/2018 4:30 PM Andrae Hung PTA [facility]   1/11/2018 4:30 PM Ana Maria Magaña, PT [facility]   1/15/2018 1:00 PM Chapo Snow MD [place] [id number]

## 2017-12-27 ENCOUNTER — HOSPITAL ENCOUNTER (OUTPATIENT)
Dept: PHYSICAL THERAPY | Age: 52
Discharge: HOME | End: 2017-12-27
Payer: COMMERCIAL

## 2017-12-27 PROCEDURE — 97112 NEUROMUSCULAR REEDUCATION: CPT

## 2017-12-27 PROCEDURE — 97110 THERAPEUTIC EXERCISES: CPT

## 2017-12-27 PROCEDURE — 97140 MANUAL THERAPY 1/> REGIONS: CPT

## 2017-12-27 NOTE — PROGRESS NOTES
PT DAILY TREATMENT NOTE     Patient Name: Hanh Comes  Date:2017  : 1965  [x]  Patient  Verified  Payor: Jerry Matthews / [facility] [place] [facility] Product Type: Commerical /    In time:4:30  Out time:5:10  Total Treatment Time (min): 40  Visit #: 4 of 8    Treatment Area: Plantar fascial fibromatosis [M72.2]    SUBJECTIVE  Pain Level (0-10 scale): 2/10  Any medication changes, allergies to medications, adverse drug reactions, diagnosis change, or new procedure performed?: [x] No    [] Yes (see summary sheet for update)  Subjective functional status/changes:   [] No changes reported  \"I still feel tight in the morning but it looses up and I can tell a difference during the day. \"    OBJECTIVE    24 min Therapeutic Exercise:  [x] See flow sheet :   Rationale: increase ROM and increase strength to improve the patients ability to perform ADL's.     8 min Neuromuscular Re-education:  [x]  See flow sheet :   Rationale: increase strength and increase proprioception  to improve the patients ability to perform functional activities. 8 min Manual Therapy:  Graston technique to (L) Gastroc, soleus, achilles tendon, plantar fascia. Manual gastroc and soleus stretch. Rationale: decrease pain, increase ROM, increase tissue extensibility and decrease trigger points to improve activity tolerance. With   [x] TE   [] TA   [] neuro   [] other: Patient Education: [x] Review HEP    [] Progressed/Changed HEP based on:   [] positioning   [] body mechanics   [] transfers   [] heat/ice application    [] other:      Other Objective/Functional Measures: No changes in there ex. Pain Level (0-10 scale) post treatment: 0/10    ASSESSMENT/Changes in Function: Pt reported a decrease in pain level post-treatment.  TrP's in (L) gastroc however pt expressed less discomfort associated with manual.    Patient will continue to benefit from skilled PT services to modify and progress therapeutic interventions, address functional mobility deficits, address ROM deficits, address strength deficits, analyze and address soft tissue restrictions and analyze and modify body mechanics/ergonomics to attain remaining goals. [x]  See Plan of Care  []  See progress note/recertification  []  See Discharge Summary         Progress towards goals / Updated goals:  Short Term Goals: To be accomplished in 1 weeks:  1. Pt will be I and compliant with HEP to promote self management of condition. - Pt reports HEP compliance. 12/19/2017  Long Term Goals: To be accomplished in 4 weeks:  1. Pt will perform 15 SL HR on L without pain or gastroc cramping to improve mobility. 2. Pt will demonstrate ability to attain closed chain subtalar neutral to improve future stress with functional tasks. 3. Pt will report no morning heel pain to improve ease of daily tasks and quality of life. - Continues to have tightness and pain however pt reports symptoms are improving. 12/27/2017  4. Pt will initiate regimented walking program to safely return to exercise without overuse strain.     PLAN  []  Upgrade activities as tolerated     [x]  Continue plan of care  []  Update interventions per flow sheet       []  Discharge due to:_  []  Other:_      Marco Antonio Part, PTA 12/27/2017  4:11 PM    Future Appointments  Date Time Provider Alpesh Romo   12/27/2017 4:30 PM [facility]   12/29/2017 5:00 PM [facility]   1/2/2018 4:30 PM Marco Antonio Part, PTA [facility]   1/4/2018 4:30 PM Palma Taylor, PT [facility]   1/9/2018 4:30 PM Marco Antonio Part, PTA [facility]   1/11/2018 4:30 PM Palma aTylor, PT [facility]   1/15/2018 1:00 PM MD Omaira Dick 68

## 2017-12-29 ENCOUNTER — DOCUMENTATION ONLY (OUTPATIENT)
Dept: ORTHOPEDIC SURGERY | Age: 52
End: 2017-12-29

## 2017-12-29 ENCOUNTER — HOSPITAL ENCOUNTER (OUTPATIENT)
Dept: PHYSICAL THERAPY | Age: 52
Discharge: HOME | End: 2017-12-29
Payer: COMMERCIAL

## 2017-12-29 PROCEDURE — 97110 THERAPEUTIC EXERCISES: CPT

## 2017-12-29 PROCEDURE — 97112 NEUROMUSCULAR REEDUCATION: CPT

## 2017-12-29 PROCEDURE — 97140 MANUAL THERAPY 1/> REGIONS: CPT

## 2017-12-29 NOTE — PROGRESS NOTES
Patient dropped off paperwork regarding her restriction of wearing tennis shoes at work. Please call for  when complete.

## 2017-12-29 NOTE — PROGRESS NOTES
PT DAILY TREATMENT NOTE 3-16    Patient Name: Milla Torres  Date:2017  : 1965  [x]  Patient  Verified  Payor: Brooks Plasencia / Plan: [place] [employer]  [facility] / Product Type: Commerical /    In time:4:30  Out time:5:03  Total Treatment Time (min): 33  Visit #: 5 of 8    Treatment Area: Plantar fascial fibromatosis [M72.2]    SUBJECTIVE  Pain Level (0-10 scale): 2  Any medication changes, allergies to medications, adverse drug reactions, diagnosis change, or new procedure performed?: [x] No    [] Yes (see summary sheet for update)  Subjective functional status/changes:   [] No changes reported  Patient reports 99% improvement since [facility]. \"It doesn't hurt the way it used to. It feels a lot better. \"    OBJECTIVE  17 min Therapeutic Exercise:  [x] See flow sheet :   Rationale: increase ROM, increase strength and improve coordination to improve the patients ability to increase ease with ADLs    8 min Neuromuscular Re-education:  [x]  See flow sheet :   Rationale: increase strength, improve coordination and increase proprioception  to improve the patients ability to perform functional activities with less pain     8 min Manual Therapy:  DTM to left gastroc and along Achilles tendon   Rationale: decrease pain, increase ROM and increase tissue extensibility to ease ADL tolerance           With   [] TE   [] TA   [] neuro   [] other: Patient Education: [x] Review HEP    [] Progressed/Changed HEP based on:   [] positioning   [] body mechanics   [] transfers   [] heat/ice application    [] other:      Other Objective/Functional Measures:   Tendency to pronate with SLS     Pain Level (0-10 scale) post treatment: 0    ASSESSMENT/Changes in Function:   Patient requests to be D/C next visit secondary to high co-pay.      Patient will continue to benefit from skilled PT services to modify and progress therapeutic interventions, address functional mobility deficits, address ROM deficits, address strength deficits, analyze and address soft tissue restrictions, analyze and cue movement patterns, analyze and modify body mechanics/ergonomics and assess and modify postural abnormalities to attain remaining goals. []  See Plan of Care  []  See progress note/recertification  []  See Discharge Summary         Progress towards goals / Updated goals:  Short Term Goals: To be accomplished in 1 weeks:  1. Pt will be I and compliant with HEP to promote self management of condition. - Pt reports HEP compliance. 12/19/2017  Long Term Goals: To be accomplished in 4 weeks:  1. Pt will perform 15 SL HR on L without pain or gastroc cramping to improve mobility. -progressing, able to perform x12 SL HR void of pain and denies gastroc cramping (12/29/2017)  2. Pt will demonstrate ability to attain closed chain subtalar neutral to improve future stress with functional tasks. not met, tendency to pronate (12/29/2017)  3. Pt will report no morning heel pain to improve ease of daily tasks and quality of life. - Continues to have tightness and pain however pt reports symptoms are improving. 12/27/2017  4.  Pt will initiate regimented walking program to safely return to exercise without overuse strain.-not yet initiated (12/29/2017)    PLAN  []  Upgrade activities as tolerated     []  Continue plan of care  []  Update interventions per flow sheet       [x]  Discharge due to: patient request secondary to copay significantly increasing at start of new year  []  Other:Carlos Gonzalez 12/29/2017  4:42 PM    Future Appointments  Date Time Provider Alpesh Romo   1/2/2018 4:30 PM [facility]   [facility]/4/2018 4:30 PM [facility]   1/9/2018 4:30 PM Dandy Bain PTA [facility]   1/11/2018 4:30 PM Diana Sanchez, PT [facility]   1/15/2018 1:00 PM MD Gina Tse

## 2018-01-02 ENCOUNTER — APPOINTMENT (OUTPATIENT)
Dept: PHYSICAL THERAPY | Age: 53
End: 2018-01-02
Payer: COMMERCIAL

## 2018-01-04 ENCOUNTER — HOSPITAL ENCOUNTER (OUTPATIENT)
Dept: PHYSICAL THERAPY | Age: 53
End: 2018-01-04
Payer: COMMERCIAL

## 2018-01-09 ENCOUNTER — HOSPITAL ENCOUNTER (OUTPATIENT)
Dept: PHYSICAL THERAPY | Age: 53
Discharge: HOME | End: 2018-01-09
Payer: COMMERCIAL

## 2018-01-09 PROCEDURE — 97110 THERAPEUTIC EXERCISES: CPT

## 2018-01-09 PROCEDURE — 97140 MANUAL THERAPY 1/> REGIONS: CPT
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
## 2018-01-11 ENCOUNTER — APPOINTMENT (OUTPATIENT)
Dept: PHYSICAL THERAPY | Age: 53
End: 2018-01-11
Payer: COMMERCIAL

## 2018-01-15 ENCOUNTER — OFFICE VISIT (OUTPATIENT)
Dept: ORTHOPEDIC SURGERY | Age: 53
End: 2018-01-15

## 2018-01-15 ENCOUNTER — HOSPITAL ENCOUNTER (OUTPATIENT)
Dept: LAB | Age: 53
Discharge: HOME | End: 2018-01-15

## 2018-01-15 VITALS
HEIGHT: 63 IN | OXYGEN SATURATION: 97 % | TEMPERATURE: 96.2 F | SYSTOLIC BLOOD PRESSURE: 144 MMHG | WEIGHT: 236 LBS | DIASTOLIC BLOOD PRESSURE: 84 MMHG | HEART RATE: 52 BPM | BODY MASS INDEX: 41.82 KG/M2

## 2018-01-15 DIAGNOSIS — M17.0 PRIMARY OSTEOARTHRITIS OF BOTH KNEES: ICD-10-CM

## 2018-01-15 DIAGNOSIS — M72.2 PLANTAR FASCIITIS OF LEFT FOOT: ICD-10-CM

## 2018-01-15 DIAGNOSIS — M17.0 PRIMARY OSTEOARTHRITIS OF BOTH KNEES: Primary | ICD-10-CM

## 2018-01-15 LAB — SENTARA SPECIMEN COL,SENBCF: NORMAL

## 2018-01-15 PROCEDURE — 99001 SPECIMEN HANDLING PT-LAB: CPT | Performed by: ORTHOPAEDIC SURGERY

## 2018-01-15 RX ORDER — ERGOCALCIFEROL 1.25 MG/1
50000 CAPSULE ORAL
Qty: 8 CAP | Refills: 1 | Status: SHIPPED | OUTPATIENT
Start: 2018-01-15 | End: 2018-04-24 | Stop reason: SDUPTHER

## 2018-01-15 NOTE — PROGRESS NOTES
AMBULATORY PROGRESS NOTE      Patient: Alyssa Morillo             MRN: [id number]     SSN: [id number] Body mass index is 41.81 kg/(m^2). YOB: 1965     AGE: 47 y.o. EX: female    PCP: Melody Vee MD       IMPRESSION/DIAGNOSIS AND TREATMENT PLAN      DIAGNOSES  1. Primary osteoarthritis of both knees    2. Plantar fasciitis of left foot        Orders Placed This Encounter    PROCEDURE AUTHORIZATION TO     [50596] Knee 4V    [77771] Knee 4V    VITAMIN D, 25 HYROXY PANEL    ergocalciferol (ERGOCALCIFEROL) 50,000 unit capsule      Alyssa Morillo understands her diagnoses and the proposed plan. My plan is to see her once we get the Euflexxa injections approved for each knee. PLAN //  HPI AND EXAMINATION      Alyssa Morillo IS A 47 y.o. female who presents to my outpatient office for evaluation of:     My impression is:    Bilateral knee OA, right greater than left, medial compartment OA, and bilateral patellofemoral joint OA. Recommendation is for a Euflexxa injection to each knee. We will seek to get approval for this from her insurance company. Plantar fasciitis left hindfoot, improved, doing much better. She is currently doing a home exercise program.     INTERVAL HISTORY:  The patient is a 67-year-old female who has a history of left plantar fascia. She has been doing a home exercise stretching program, as well as formal physical therapy using Graston technique, and she has improved quite significantly. She only reports having some mild discomfort to the left plantar medial fascia origin at times. Otherwise, she is doing much better. She has seen Dr. Caterina Keller in the past for Euflexxa injections in her knees. She states she had been receiving Euflexxa injections in her knees since 2013, every six months on average, and it has been helping her standing longer, walking longer, and function better.   She had x-rays, today, confirming bilateral medial compartment knee OA, right worse than left, with bilateral patellofemoral joint OA as well. Rob Nolen is alert/oriented (name, location, time) and follows commands well. she  is in no acute distress and her affect and mood are appropriate. Left ANKLE and FOOT       Gait: normal  Tenderness: no LEFT PLANTAR FASCIA REGION   Cutaneous: No rashes, skin patches, wounds, or abrasions to the lower legs           Warm and Normal color. No regions of expressible drainage. Medial Border of Tibia Region: absent           Skin color, texture, turgor normal. Normal.  Joint Motion: ROM Ankle:Normal , Hindfoot: (ST,TN,CC Normal}, Forefoot toes:Normal  Neurologic Exam: Neuro: Motor: normal 5/5 strength in all tested muscle groups and no muscle wasting or atrophy and Sensory : normal light touch sensation. Neuro: Negative bilateral Straight leg raise (seated position)   no Tinel's at PM NV Bundle Tarsal Canal   no Proximal Tarsal Tunnel Tenderness    no Distal Tarsal Tunnel Tenderness    See Musculoskeletal section for pertinent individual extremity examination    No abnormal hand/wrist, foot/ankle, or facial/neck tremors. Contractures: Gastrocnemius or Achilles Contractures absent  Joint / Tendon Stability: No Ankle or Subtalar instability or joint laxity. No peroneal sublux ability or dislocation  Alignment:  Normal Foot Alignment   and  Flexible  Vascular: Normal Pulses/ NL Capillary refill, No evidence of DVT seen on physical exam.   No calf swelling, no tenderness to calf muscles. Lymphatic:  No Evidence of Lymphedema. OBJECTIVE EXAMINATION     Visit Vitals    /84    Pulse (!) 52    Temp 96.2 °F (35.7 °C) (Oral)    Ht 5' 3\" (1.6 m)    Wt 236 lb (107 kg)    SpO2 97%    BMI 41.81 kg/m2       Appearance: Alert, well appearing and pleasant patient who is in no distress, oriented to person, place/time, and who follows commands.  This patient is accompanied in the       office by her  self. Psychiatric: Affect and mood are appropriate. Cardiovascular/Peripheral Vascular: Normal Pulses to each hand and foot  Knees:  both            Gait: normal        Knees:  right         Cutaneous: Skin intact, no abrasions, blisters, wounds, erythema       Effusion: Is not present       Crepitus:  mild PF joint crepitus     Tenderness:Medial joint line        Alignment of Knee: mild VARUS KNEE when standing       ROM: diminished range with pain       Fullness or swelling: None to popliteal fossa region,         Stability: No instability to anterior, posterior, varus, valgus stress testing       Thrust:   No varus thrust with gait       Contractures: No Achilles or Gastrocnemius Contractures. Calf tenderness: Absent for calf or gastrocnemius muscle regions            Soft, supple, non tender, non taut lower extremity compartments  Extremities:   No embolic phenomena to the toes          No significant edema to the foot and or toes. Edema is not present to distal 1/3 tib/fib or ankle regions. Lower extremities are warm and appear well perfused    DVT: No evidence of DVT seen on examination at this time     No calf swelling, no tenderness to calf muscles  Lymphatic:  No Evidence of Lymphedema  Vascular: Medial Border of Tibia Region: Edema is not present        Pulses: Dorsalis Pedis &  Posterior Tibial Pulses : Palpable yes        Varicosities Lower Limbs :   None noted . Neuro: Negative bilateral Straight leg raise (seated position)    See Musculoskeletal section for pertinent individual extremity examination    No abnormal hand/wrist, foot/ankle, or facial/neck tremors. CHART REVIEW      Past Medical History:   Diagnosis Date    Asthma      Current Outpatient Prescriptions   Medication Sig    ergocalciferol (ERGOCALCIFEROL) 50,000 unit capsule Take 1 Cap by mouth every seven (7) days.  oxaprozin (DAYPRO) 600 mg tablet Take 1 Tab by mouth daily.     OTHER Jacob Rhino Salt Packets    azelastine (ASTELIN) 137 mcg (0.1 %) nasal spray INT 2 SPRAYS IEN BID    QNASL 80 mcg/actuation HFAA INHALE 1 PUFF IEN BID    ergocalciferol (ERGOCALCIFEROL) 50,000 unit capsule TK 1 C PO Q WEEK    guaiFENesin ER (MUCINEX) 600 mg ER tablet Take 600 mg by mouth daily.  budesonide (PULMICORT) 0.5 mg/2 mL nbsp 500 mcg by Nebulization route.  OLANZapine-FLUoxetine (SYMBYAX) 3-25 mg per capsule Take 1 Cap by mouth every evening.  loratadine (CLARITIN) 10 mg tablet Take 10 mg by mouth two (2) times a day.  esomeprazole (NEXIUM) 20 mg capsule Take 40 mg by mouth two (2) times a day.  escitalopram oxalate (LEXAPRO) 20 mg tablet Take 20 mg by mouth daily.  montelukast (SINGULAIR) 10 mg tablet Take 10 mg by mouth daily.  oxaprozin (DAYPRO) 600 mg tablet TAKE 1 TABLET BY MOUTH TWICE DAILY WITH MEALS    traMADol (ULTRAM) 50 mg tablet Take 1 Tab by mouth every six (6) hours as needed for Pain. Max Daily Amount: 200 mg.  traMADol (ULTRAM) 50 mg tablet Take 1 Tab by mouth every six (6) hours as needed for Pain. Max Daily Amount: 200 mg. No current facility-administered medications for this visit. Allergies   Allergen Reactions    Calcium Other (comments)    Egg Swelling    Milk Containing Products Other (comments)    Red Dye Swelling    Sulfa (Sulfonamide Antibiotics) Other (comments)     Past Surgical History:   Procedure Laterality Date    HX HYSTERECTOMY      HX PELVIC LAPAROSCOPY      HX TONSILLECTOMY       Social History     Occupational History    Not on file. Social History Main Topics    Smoking status: Never Smoker    Smokeless tobacco: Never Used    Alcohol use No    Drug use: No    Sexual activity: Not on file     Family History   Problem Relation Age of Onset    Family history unknown:  Yes    Hypertension Mother     Hypertension Father     Heart Disease Father     Stroke Father         REVIEW OF SYSTEMS : 1/15/2018  ALL BELOW ARE Negative except : SEE HPI      Constitutional: Negative for fever, chills and weight loss. Neg Weight Loss  Cardiovascular: Negative for chest pain, claudication and leg swelling. SOB, TALAVERA   Gastrointestinal/Urological: Negative for pain, N/V/D/C, Blood in stool or urine,dysuria,  Hematuria, Incontinence  Endocrine: See HPI  Skin: see HPI  Musculoskeletal: see HPI. Neurological: Negative for dizziness and weakness, headaches,Visual Changes or   Confusion, or Seizures,   Psychiatric/Behavioral: Negative for depression, memory loss and substance abuse. Extremities:  Negative for hair changes, rash or skin lesion changes. Hematologic: Negative for Bleeding problems, bruising, pallor or swollen lymph nodes. Peripheral Vascular: No calf pain, vascular vein tenderness to calf pain              No calf throbbing, posterior knee throbbing pain     DIAGNOSTIC IMAGING        Dictation on: 01/15/2018  1:49 PM by: Buster López [[id number]]          Please see above section of this report. I have personally reviewed the results of the above study. The interpretation of this study is my professional opinion.       Mary Anne Pavon MD  1/15/2018  2:00 PM

## 2018-01-15 NOTE — LETTER
NOTIFICATION RETURN TO WORK / SCHOOL 
 
1/15/2018 1:59 PM 
 
Ms. Tatum Reza [facility] [id number] To Whom It May Concern: 
 
Tatum Reza is currently under the care of [address] [place] [address] Cam Perez. Please allow patient to wear tennis shoes as needed. This will be a permanent restrictions, she is under our care for bilat knee OA and left plantar fasciitis. If there are questions or concerns please have the patient contact our office.  
 
 
 
Sincerely, 
 
 
Radha Brooks MD

## 2018-01-15 NOTE — MR AVS SNAPSHOT
[address] [place] [address], [address] 706 [facility] 
[phone] Patient: Sachin Munguia MRN: [id number] :1965 Visit Information Date & Time Provider Department Dept. Phone Encounter #  
 1/15/2018  1:00 PM Susy Henning, 27 [facility] [phone] Upcoming Health Maintenance Date Due Hepatitis C Screening 1965 DTaP/Tdap/Td series (1 - Tdap) 1985 PAP AKA CERVICAL CYTOLOGY 1985 BREAST CANCER SCRN MAMMOGRAM 2014 FOBT Q 1 YEAR AGE 50-75 2014 Influenza Age 5 to Adult 2017 Allergies as of 1/15/2018  Review Complete On: 1/15/2018 By: Susy Henning MD  
  
 Severity Noted Reaction Type Reactions Calcium  2016    Other (comments) Egg  2017    Swelling Milk Containing Products  2016    Other (comments) Red Dye  2017    Swelling Sulfa (Sulfonamide Antibiotics)  2016    Other (comments) Current Immunizations  Never Reviewed No immunizations on file. Not reviewed this visit You Were Diagnosed With   
  
 Codes Comments Primary osteoarthritis of both knees    -  Primary ICD-10-CM: M17.0 ICD-9-CM: 715.16 Plantar fasciitis of left foot     ICD-10-CM: M72.2 ICD-9-CM: 728.71 Vitals BP Pulse Temp Height(growth percentile) Weight(growth percentile) SpO2  
 144/84 (!) 52 96.2 °F (35.7 °C) (Oral) 5' 3\" (1.6 m) 236 lb (107 kg) 97% BMI Smoking Status 41.81 kg/m2 Never Smoker BMI and BSA Data Body Mass Index Body Surface Area  
 41.81 kg/m 2 2.18 m 2 Preferred Pharmacy Pharmacy Name Phone [facility] [address] [facility] [phone] Your Updated Medication List  
  
   
This list is accurate as of: 1/15/18  1:55 PM.  Always use your most recent med list.  
  
 azelastine 137 mcg (0.1 %) nasal spray Commonly known as:  ASTELIN  
INT 2 SPRAYS IEN BID  
  
 budesonide 0.5 mg/2 mL Nbsp Commonly known as:  PULMICORT  
500 mcg by Nebulization route.  
  
 ergocalciferol 50,000 unit capsule Commonly known as:  ERGOCALCIFEROL TK 1 C PO Q WEEK LEXAPRO 20 mg tablet Generic drug:  escitalopram oxalate Take 20 mg by mouth daily. loratadine 10 mg tablet Commonly known as:  Penelope Basilio Take 10 mg by mouth two (2) times a day. MUCINEX 600 mg ER tablet Generic drug:  guaiFENesin ER Take 600 mg by mouth daily. NexIUM 20 mg capsule Generic drug:  esomeprazole Take 40 mg by mouth two (2) times a day. OTHER [address] * oxaprozin 600 mg tablet Commonly known as:  Yarelis Heys Take 1 Tab by mouth daily. * oxaprozin 600 mg tablet Commonly known as:  Yarelis Heys TAKE 1 TABLET BY MOUTH TWICE DAILY WITH MEALS QNASL 80 mcg/actuation [facility]faa Generic drug:  beclomethasone dipropionate INHALE 1 PUFF IEN BID  
  
 SINGULAIR 10 mg tablet Generic drug:  montelukast  
Take 10 mg by mouth daily. SYMBYAX 3-25 mg per capsule Generic drug:  OLANZapine-FLUoxetine Take 1 Cap by mouth every evening. * traMADol 50 mg tablet Commonly known as:  ULTRAM  
Take 1 Tab by mouth every six (6) hours as needed for Pain. Max Daily Amount: 200 mg.  
  
 * traMADol 50 mg tablet Commonly known as:  ULTRAM  
Take 1 Tab by mouth every six (6) hours as needed for Pain. Max Daily Amount: 200 mg.  
  
 * Notice: This list has 4 medication(s) that are the same as other medications prescribed for you. Read the directions carefully, and ask your doctor or other care provider to review them with you. We Performed the Following AMB POC XRAY, KNEE; COMPLETE, 4+ VIEW [67740 CPT(R)] AMB POC XRAY, KNEE; COMPLETE, 4+ VIEW [10683 CPT(R)] Patient Instructions Authorization for bilat knee Euflexxa has been entered OA bilat knee (moderate) right worse than left Follow up with Dr. Heaven Poole once authorization is approved for Euflexxa injections X-rays have been reviewed and discussed with patient today Hyaluronic Acid (By injection) Hyaluronic Acid (hic-rq-sfl-ON-ate AS-id) Treats severe pain in your knee due to osteoarthritis. Brand Name(s): Euflexxa, Gel-One, GelSyn-3, GenVisc 850, Hyalgan, Hymovis, Monovisc, Orthovisc, Supartz FX, Visco-3 There may be other brand names for this medicine. When This Medicine Should Not Be Used: This medicine is not right for everyone. You should not receive it if you had an allergic reaction to hyaluronic acid or if you have a bleeding disorder. How to Use This Medicine:  
Injectable · Your doctor will tell you how many injections you will need. This medicine is injected into your knee joint. · A nurse or other health provider will give you this medicine. Drugs and Foods to Avoid: Ask your doctor or pharmacist before using any other medicine, including over-the-counter medicines, vitamins, and herbal products. Warnings While Using This Medicine: · Tell your doctor if you are pregnant or breastfeeding, or if you have any allergies, including to birds, feathers, or eggs. · Rest your knee for 48 hours after you receive an injection. Do not do strenuous, weightbearing activities, such as jogging or tennis. Avoid activities that keep you standing for longer than 1 hour. Possible Side Effects While Using This Medicine:  
Call your doctor right away if you notice any of these side effects: · Allergic reaction: Itching or hives, swelling in your face or hands, swelling or tingling in your mouth or throat, chest tightness, trouble breathing If you notice these less serious side effects, talk with your doctor: · Mild increase in pain or swelling in your knee · Pain, redness, or swelling where the medicine is injected If you notice other side effects that you think are caused by this medicine, tell your doctor. Call your doctor for medical advice about side effects. You may report side effects to FDA at 1-605-NKP-8017 © 2017 [facility] Information is for End User's use only and may not be sold, redistributed or otherwise used for commercial purposes. The above information is an  only. It is not intended as medical advice for individual conditions or treatments. Talk to your doctor, nurse or pharmacist before following any medical regimen to see if it is safe and effective for you. Joint Injections: Care Instructions Your Care Instructions Joint injections are shots into a joint, such as the knee. They may be used to put in medicines, such as pain relievers. Or they can be used to take out fluid. Sometimes the fluid is tested in a lab. This can help find the cause of a joint problem. A corticosteroid, or steroid, shot is used to reduce inflammation in tendons or joints. It is often used to treat problems such as arthritis, tendinitis, and bursitis. Steroids can be injected directly into a painful, inflamed joint. They can also help reduce inflammation of a bursa. A bursa is a sac of fluid. It cushions and lubricates areas where tendons, ligaments, skin, muscles, or bones rub against each other. A steroid shot can sometimes help with short-term pain relief when other treatments haven't worked. If steroid shots help, pain may improve for weeks or months. Follow-up care is a key part of your treatment and safety. Be sure to make and go to all appointments, and call your doctor if you are having problems. It's also a good idea to know your test results and keep a list of the medicines you take. How can you care for yourself at home? · Put ice or a cold pack on the area for 10 to 20 minutes at a time.  Put a thin cloth between the ice and your skin. · Take anti-inflammatory medicines to reduce pain, swelling, or inflammation. These include ibuprofen (Advil, Motrin) and naproxen (Aleve). Read and follow all instructions on the label. · Avoid strenuous activities for several days, especially those that put stress on the area where you got the shot. · If you have dressings over the area, keep them clean and dry. You may remove them when your doctor tells you to. When should you call for help? Call your doctor now or seek immediate medical care if: 
? · You have signs of infection, such as: 
¨ Increased pain, swelling, warmth, or redness. ¨ Red streaks leading from the site. ¨ Pus draining from the site. ¨ A fever. ? Watch closely for changes in your health, and be sure to contact your doctor if you have any problems. Where can you learn more? Go to http://damián-solitario.info/. Enter N616 in the search box to learn more about \"Joint Injections: Care Instructions. \" Current as of: March 21, 2017 Content Version: 11.4 © 1593-5594 [employer]. Care instructions adapted under license by [employer] (which disclaims liability or warranty for this information). If you have questions about a medical condition or this instruction, always ask your healthcare professional. [address] any warranty or liability for your use of this information. Introducing \[facility]\"" & HEALTH SERVICES! [facility] introduces Joppel patient portal. Now you can access parts of your medical record, email your doctor's office, and request medication refills online. 1. In your internet browser, go to [email or web address] 2. Click on the First Time User? Click Here link in the Sign In box. You will see the New Member Sign Up page. 3. Enter your Joppel Access Code exactly as it appears below.  You will not need to use this code after youve completed the sign-up process. If you do not sign up before the expiration date, you must request a new code. · YoungCurrent Access Code: [email or web address] Expires: 3/6/2018  3:48 PM 
 
4. Enter the last four digits of your Social Security Number (xxxx) and Date of Birth (mm/dd/yyyy) as indicated and click Submit. You will be taken to the next sign-up page. 5. Create a YoungCurrent ID. This will be your YoungCurrent login ID and cannot be changed, so think of one that is secure and easy to remember. 6. Create a YoungCurrent password. You can change your password at any time. 7. Enter your Password Reset Question and Answer. This can be used at a later time if you forget your password. 8. Enter your e-mail address. You will receive e-mail notification when new information is available in [address] 9. Click Sign Up. You can now view and download portions of your medical record. 10. Click the Download Summary menu link to download a portable copy of your medical information. If you have questions, please visit the Frequently Asked Questions section of the YoungCurrent website. Remember, YoungCurrent is NOT to be used for urgent needs. For medical emergencies, dial 911. Now available from your iPhone and Android! Please provide this summary of care documentation to your next provider. Your primary care clinician is listed as Maricel Linda. If you have any questions after today's visit, please call [phone].

## 2018-01-15 NOTE — PATIENT INSTRUCTIONS
Authorization for bilat knee Euflexxa has been entered  OA bilat knee (moderate) right worse than left  Follow up with Dr. Regis Sigala once authorization is approved for Euflexxa injections  X-rays have been reviewed and discussed with patient today  Hyaluronic Acid (By injection)   Hyaluronic Acid (fkc-ss-wkd-ON-ate AS-id)  Treats severe pain in your knee due to osteoarthritis. Brand Name(s): Euflexxa, Gel-One, GelSyn-3, GenVisc 850, Hyalgan, Hymovis, Monovisc, Orthovisc, Supartz FX, Visco-3   There may be other brand names for this medicine. When This Medicine Should Not Be Used: This medicine is not right for everyone. You should not receive it if you had an allergic reaction to hyaluronic acid or if you have a bleeding disorder. How to Use This Medicine:   Injectable  · Your doctor will tell you how many injections you will need. This medicine is injected into your knee joint. · A nurse or other health provider will give you this medicine. Drugs and Foods to Avoid:      Ask your doctor or pharmacist before using any other medicine, including over-the-counter medicines, vitamins, and herbal products. Warnings While Using This Medicine:   · Tell your doctor if you are pregnant or breastfeeding, or if you have any allergies, including to birds, feathers, or eggs. · Rest your knee for 48 hours after you receive an injection. Do not do strenuous, weightbearing activities, such as jogging or tennis. Avoid activities that keep you standing for longer than 1 hour.   Possible Side Effects While Using This Medicine:   Call your doctor right away if you notice any of these side effects:  · Allergic reaction: Itching or hives, swelling in your face or hands, swelling or tingling in your mouth or throat, chest tightness, trouble breathing  If you notice these less serious side effects, talk with your doctor:   · Mild increase in pain or swelling in your knee  · Pain, redness, or swelling where the medicine is injected  If you notice other side effects that you think are caused by this medicine, tell your doctor. Call your doctor for medical advice about side effects. You may report side effects to FDA at 2-264-QOM-8576  © 2017 [facility] Information is for End User's use only and may not be sold, redistributed or otherwise used for commercial purposes. The above information is an  only. It is not intended as medical advice for individual conditions or treatments. Talk to your doctor, nurse or pharmacist before following any medical regimen to see if it is safe and effective for you. Joint Injections: Care Instructions  Your Care Instructions  Joint injections are shots into a joint, such as the knee. They may be used to put in medicines, such as pain relievers. Or they can be used to take out fluid. Sometimes the fluid is tested in a lab. This can help find the cause of a joint problem. A corticosteroid, or steroid, shot is used to reduce inflammation in tendons or joints. It is often used to treat problems such as arthritis, tendinitis, and bursitis. Steroids can be injected directly into a painful, inflamed joint. They can also help reduce inflammation of a bursa. A bursa is a sac of fluid. It cushions and lubricates areas where tendons, ligaments, skin, muscles, or bones rub against each other. A steroid shot can sometimes help with short-term pain relief when other treatments haven't worked. If steroid shots help, pain may improve for weeks or months. Follow-up care is a key part of your treatment and safety. Be sure to make and go to all appointments, and call your doctor if you are having problems. It's also a good idea to know your test results and keep a list of the medicines you take. How can you care for yourself at home? · Put ice or a cold pack on the area for 10 to 20 minutes at a time. Put a thin cloth between the ice and your skin.   · Take anti-inflammatory medicines to reduce pain, swelling, or inflammation. These include ibuprofen (Advil, Motrin) and naproxen (Aleve). Read and follow all instructions on the label. · Avoid strenuous activities for several days, especially those that put stress on the area where you got the shot. · If you have dressings over the area, keep them clean and dry. You may remove them when your doctor tells you to. When should you call for help? Call your doctor now or seek immediate medical care if:  ? · You have signs of infection, such as:  ¨ Increased pain, swelling, warmth, or redness. ¨ Red streaks leading from the site. ¨ Pus draining from the site. ¨ A fever. ? Watch closely for changes in your health, and be sure to contact your doctor if you have any problems. Where can you learn more? Go to http://damián-solitario.info/. Enter N616 in the search box to learn more about \"Joint Injections: Care Instructions. \"  Current as of: March 21, 2017  Content Version: 11.4  © 3286-9894 [employer]. Care instructions adapted under license by [employer] (which disclaims liability or warranty for this information). If you have questions about a medical condition or this instruction, always ask your healthcare professional. [address] any warranty or liability for your use of this information.

## 2018-01-15 NOTE — PROCEDURES
DIAGNOSTIC STUDIES:  X-rays of the right knees, today, three views, AP, lateral, and patellofemoral views with comparison left knee films, standing, weightbearing films. These films reveal there is some narrowing of the medial compartment of the right knee. Otherwise, I see no osteolytic or osteoblastic lesions seen to the right knee. The tunnel view shows medial joint space narrowing. The sunrise view shows some osteoarthritic changes to the lateral facet of the patellofemoral joint articulation. The left knee films show some OA to the patellofemoral joint articulation. There is a small, metal artifact seen to the dorsal superior part of the left knee, extraarticular to the left knee. There is medial joint space narrowing to the left knee. Otherwise, I see no osteolytic or osteoblastic lesions to the left knee. There is some lateral patellofemoral joint space narrowing. ASSESSMENT: Bilateral medial compartment OA, right and left, with bilateral patellofemoral joint OA changes, mild joint space narrowing, and possible artifact to the dorsal external surface the left knee to the superior portion of the patella.

## 2018-01-16 LAB — 25(OH)D3 SERPL-MCNC: 21.2 NG/ML (ref 32–100)

## 2018-01-16 RX ORDER — ERGOCALCIFEROL 1.25 MG/1
CAPSULE ORAL
Qty: 12 CAP | Refills: 1 | Status: SHIPPED | OUTPATIENT
Start: 2018-01-16 | End: 2020-10-12

## 2018-01-22 ENCOUNTER — TELEPHONE (OUTPATIENT)
Dept: ORTHOPEDIC SURGERY | Age: 53
End: 2018-01-22

## 2018-01-22 ENCOUNTER — DOCUMENTATION ONLY (OUTPATIENT)
Dept: ORTHOPEDIC SURGERY | Age: 53
End: 2018-01-22

## 2018-01-22 NOTE — TELEPHONE ENCOUNTER
PATIENT CALLED FOR DR. PECK OR A NURSE. PATIENT SAID SHE HAD DROPPED OFF SOME PAPER WORK FOR DR. PECK TO FILL OUT ON 12/29/2018 AT THE [facility] LOCATION. THE PAPERWORK WAS IN REGARDS TO THE RESTRICTIONS OF WEARING TENNIS SHOES TO WORK. PATIENT SAID THAT IF THE PAPERWORK IS NOT FILLED OUT AND FAXED TO HER JOB THAT SHE WILL GET IN TROUBLE. PATIENT SAID THE FAX NUMBER IS ON THE PAPER WORK. PATIENT IS REQUESTING A CALL BACK AS SOON AS POSSIBLE IN REGARDS TO THIS. PATIENT CELL# [phone]. [facility] [phone].

## 2018-01-31 ENCOUNTER — OFFICE VISIT (OUTPATIENT)
Dept: ORTHOPEDIC SURGERY | Age: 53
End: 2018-01-31

## 2018-01-31 VITALS
OXYGEN SATURATION: 97 % | DIASTOLIC BLOOD PRESSURE: 77 MMHG | HEART RATE: 60 BPM | SYSTOLIC BLOOD PRESSURE: 135 MMHG | WEIGHT: 238 LBS | TEMPERATURE: 97.4 F | HEIGHT: 63 IN | BODY MASS INDEX: 42.17 KG/M2

## 2018-01-31 DIAGNOSIS — M17.0 BILATERAL PRIMARY OSTEOARTHRITIS OF KNEE: Primary | ICD-10-CM

## 2018-01-31 RX ORDER — HYALURONATE SODIUM 10 MG/ML
2 SYRINGE (ML) INTRAARTICULAR ONCE
Qty: 2 ML | Refills: 0
Start: 2018-01-31 | End: 2018-01-31

## 2018-01-31 NOTE — PROGRESS NOTES
AMBULATORY PROGRESS NOTE      Patient: Alyssa Morillo             MRN: [id number]     SSN: [id number] Body mass index is 42.16 kg/(m^2). YOB: 1965     AGE: 47 y.o. EX: female    PCP: Melody Vee MD    IMPRESSION/DIAGNOSIS AND TREATMENT PLAN     DIAGNOSES  1. Bilateral primary osteoarthritis of knee        Orders Placed This Encounter    SD DRAIN/INJECT LARGE JOINT/BURSA - 92936    EUFLEXXA INJECTION PER DOSE    SD DRAIN/INJECT LARGE JOINT/BURSA - 20385    EUFLEXXA INJECTION PER DOSE    sodium hyaluronate (SUPARTZ FX/HYALGAN/GENIVSC) 10 mg/mL syrg injection    sodium hyaluronate (SUPARTZ FX/HYALGAN/GENIVSC) 10 mg/mL syrg injection      Alyssa Morillo understands her diagnoses and the proposed plan. Plan:    1) Euflexxa #2 for Bilateral Knees upon next OV. RTO - Follow up in 1 week for Euflexxa #2     HPI AND EXAMINATION     Alyssa Morillo IS A 47 y.o. female who presents to my outpatient office for follow up of primary OA of both knees. At last visit, I processed the authorization for Euflexxa injections, ordered lab tests for Vitamin D, and prescribed Vitamin D capsules. Patient arrives at the office for bilateral knee Euflexxa injections. She reports pain and discomfort on ambulation and prolonged standing. She will return to the office in 1 week for the second bilateral knee Euflexxa injections. Left ANKLE and FOOT        Gait: normal  Tenderness: no LEFT PLANTAR FASCIA REGION   Cutaneous: No rashes, skin patches, wounds, or abrasions to the lower legs                                Warm and Normal color. No regions of expressible drainage. Medial Border of Tibia Region: absent                                Skin color, texture, turgor normal. Normal.  Joint Motion: ROM Ankle:Normal , Hindfoot: (ST,TN,CC Normal}, Forefoot toes:Normal  Neurologic Exam: Neuro:  Motor: normal 5/5 strength in all tested muscle groups and no muscle wasting or atrophy and Sensory : normal light touch sensation. Neuro: Negative bilateral Straight leg raise (seated position)                        no Tinel's at PM NV Bundle Tarsal Canal                        no Proximal Tarsal Tunnel Tenderness                        no Distal Tarsal Tunnel Tenderness                         See Musculoskeletal section for pertinent individual extremity examination                         No abnormal hand/wrist, foot/ankle, or facial/neck tremors.     Contractures: Gastrocnemius or Achilles Contractures absent  Joint / Tendon Stability: No Ankle or Subtalar instability or joint laxity. No peroneal sublux ability or dislocation  Alignment:  Normal Foot Alignment   and  Flexible  Vascular: Normal Pulses/ NL Capillary refill, No evidence of DVT seen on physical exam.                        No calf swelling, no tenderness to calf muscles. Lymphatic:  No Evidence of Lymphedema. Knees:  Bilateral       Cutaneous: Skin intact, no abrasions, blisters, wounds, erythema       Effusion: Is not present       Crepitus:  mild PF joint crepitus       Tenderness: Tenderness: Medial joint line        Alignment of Knee: neutral when standing       ROM: diminished range of motion       Fullness or swelling: None to popliteal fossa region,         Stability: No instability to anterior, posterior, varus, valgus stress testing       Thrust:   No varus thrust with gait       Neuro:  yes, Extensor mechanism is intact    CHART REVIEW     Past Medical History:   Diagnosis Date    Asthma      Current Outpatient Prescriptions   Medication Sig    sodium hyaluronate (SUPARTZ FX/HYALGAN/GENIVSC) 10 mg/mL syrg injection 2 mL by Intra artICUlar route once for 1 dose.  sodium hyaluronate (SUPARTZ FX/HYALGAN/GENIVSC) 10 mg/mL syrg injection 2 mL by Intra artICUlar route once for 1 dose.     ergocalciferol (ERGOCALCIFEROL) 50,000 unit capsule TAKE 1 CAPSULE BY MOUTH EVERY 7 DAYS    oxaprozin (DAYPRO) 600 mg tablet TAKE 1 TABLET BY MOUTH TWICE DAILY WITH MEALS    traMADol (ULTRAM) 50 mg tablet Take 1 Tab by mouth every six (6) hours as needed for Pain. Max Daily Amount: 200 mg.    oxaprozin (DAYPRO) 600 mg tablet Take 1 Tab by mouth daily.  OTHER Jacob Rhino Salt Packets    azelastine (ASTELIN) 137 mcg (0.1 %) nasal spray INT 2 SPRAYS IEN BID    QNASL 80 mcg/actuation HFAA INHALE 1 PUFF IEN BID    ergocalciferol (ERGOCALCIFEROL) 50,000 unit capsule TK 1 C PO Q WEEK    guaiFENesin ER (MUCINEX) 600 mg ER tablet Take 600 mg by mouth daily.  traMADol (ULTRAM) 50 mg tablet Take 1 Tab by mouth every six (6) hours as needed for Pain. Max Daily Amount: 200 mg.  budesonide (PULMICORT) 0.5 mg/2 mL nbsp 500 mcg by Nebulization route.  OLANZapine-FLUoxetine (SYMBYAX) 3-25 mg per capsule Take 1 Cap by mouth every evening.  loratadine (CLARITIN) 10 mg tablet Take 10 mg by mouth two (2) times a day.  esomeprazole (NEXIUM) 20 mg capsule Take 40 mg by mouth two (2) times a day.  escitalopram oxalate (LEXAPRO) 20 mg tablet Take 20 mg by mouth daily.  montelukast (SINGULAIR) 10 mg tablet Take 10 mg by mouth daily. No current facility-administered medications for this visit. Allergies   Allergen Reactions    Calcium Other (comments)    Egg Swelling    Milk Containing Products Other (comments)    Red Dye Swelling    Sulfa (Sulfonamide Antibiotics) Other (comments)     Past Surgical History:   Procedure Laterality Date    HX HYSTERECTOMY      HX PELVIC LAPAROSCOPY      HX TONSILLECTOMY       Social History     Occupational History    Not on file. Social History Main Topics    Smoking status: Never Smoker    Smokeless tobacco: Never Used    Alcohol use No    Drug use: No    Sexual activity: Not on file     Family History   Problem Relation Age of Onset    Family history unknown:  Yes    Hypertension Mother     Hypertension Father     Heart Disease Father     Stroke Father        REVIEW OF SYSTEMS : 1/31/2018  ALL BELOW ARE Negative except : SEE HPI       Constitutional: Negative for fever, chills and weight loss. Neg Weigh Loss  Cardiovascular: Negative for chest pain, claudication and leg swelling. SOB, TALAVERA   Gastrointestinal: Negative for  pain, N/V/D/C, Blood in stool or urine,dysuria, hematuria,        Incontinence, pelvic pain  Musculoskeletal: see HPI. Neurological: Negative for dizziness and weakness. Negative for headaches,Visual Changes, Confusion, Seizures,   Psychiatric/Behavioral: Negative for depression, memory loss and substance abuse. Extremities:  Negative for  hair changes, rash or skin lesion changes. Hematologic: Negative for Bleeding problems, bruising, pallor or swollen lymph nodes. Peripheral Vascular: No calf pain, vascular vein tenderness to calf pain              No calf throbbing, posterior knee throbbing pain    DIAGNOSTIC IMAGING      No notes on file        [address] [place], THE FOLLOWING IS TRUE:    Nazanin Guthrie has a Diagnosis of osteoarthritis and has documentation that the pain interferes with functional activities. Nazanin Guthrie has documentation of failure to respond  adequately to at least 3 months of conservative therapy:which (Activity modification, home exercise, protective weight bearing, and various analgesics. NAME is unable to tolerate conservative therapy (prolonged NSAID use) because of adverse effects of NSAID's (GI irritation, gastric/colonic irritation. Thus visco supplementation is requested as an alternative treatment for knee OA. Nazanin Guthrie , is here for the 1st Euflexxa injection into both knee. Nazanin Guthrie denies any redness, fevers, shakes, chills, or any reaction from the prior Euflexxa injection. This patient is accompanied in the office by her uncle and her self.  A formal time out was conducted by me informing Kanwal Carroll of the risks and benefits of the infection. The injection was performed 1/31/2018 3:51 PM with sterile technique. The both knee is cleansed with alcohol and then sterilized with Betadine, the medial aspect of the both knee. both  knee was then injected with Euflexxa. she tolerated the procedure well. Band-Aids were applied. The risks of Euflexxa include bleeding, infection, and reactive synovitis. The pain assessment score PRIOR/AFTER to the injection: 4 /10 // 4 /10 pain severity, mild intensity, throbbing Pain quality. Written by Clifford Saenz, as dictated by Luis Eduardo Vaughn MD. IDr., Luis Eduardo Vaughn MD, confirm that all documentation is accurate.

## 2018-01-31 NOTE — PATIENT INSTRUCTIONS
Please follow up in 1 week for Euflexxa #2. You are advised to contact us if your condition worsens. Knee Arthritis: Exercises  Your Care Instructions  Here are some examples of exercises for knee arthritis. Start each exercise slowly. Ease off the exercise if you start to have pain. Your doctor or physical therapist will tell you when you can start these exercises and which ones will work best for you. How to do the exercises  Knee flexion with heel slide    1. Lie on your back with your knees bent. 2. Slide your heel back by bending your affected knee as far as you can. Then hook your other foot around your ankle to help pull your heel even farther back. 3. Hold for about 6 seconds, then rest for up to 10 seconds. 4. Repeat 8 to 12 times. 5. Switch legs and repeat steps 1 through 4, even if only one knee is sore. Quad sets    1. Sit with your affected leg straight and supported on the floor or a firm bed. Place a small, rolled-up towel under your knee. Your other leg should be bent, with that foot flat on the floor. 2. Tighten the thigh muscles of your affected leg by pressing the back of your knee down into the towel. 3. Hold for about 6 seconds, then rest for up to 10 seconds. 4. Repeat 8 to 12 times. 5. Switch legs and repeat steps 1 through 4, even if only one knee is sore. Straight-leg raises to the front    1. Lie on your back with your good knee bent so that your foot rests flat on the floor. Your affected leg should be straight. Make sure that your low back has a normal curve. You should be able to slip your hand in between the floor and the small of your back, with your palm touching the floor and your back touching the back of your hand. 2. Tighten the thigh muscles in your affected leg by pressing the back of your knee flat down to the floor. Hold your knee straight.   3. Keeping the thigh muscles tight and your leg straight, lift your affected leg up so that your heel is about 12 inches off the floor. Hold for about 6 seconds, then lower slowly. 4. Relax for up to 10 seconds between repetitions. 5. Repeat 8 to 12 times. 6. Switch legs and repeat steps 1 through 5, even if only one knee is sore. Active knee flexion    1. Lie on your stomach with your knees straight. If your kneecap is uncomfortable, roll up a washcloth and put it under your leg just above your kneecap. 2. Lift the foot of your affected leg by bending the knee so that you bring the foot up toward your buttock. If this motion hurts, try it without bending your knee quite as far. This may help you avoid any painful motion. 3. Slowly move your leg up and down. 4. Repeat 8 to 12 times. 5. Switch legs and repeat steps 1 through 4, even if only one knee is sore. Quadriceps stretch (facedown)    1. Lie flat on your stomach, and rest your face on the floor. 2. Wrap a towel or belt strap around the lower part of your affected leg. Then use the towel or belt strap to slowly pull your heel toward your buttock until you feel a stretch. 3. Hold for about 15 to 30 seconds, then relax your leg against the towel or belt strap. 4. Repeat 2 to 4 times. 5. Switch legs and repeat steps 1 through 4, even if only one knee is sore. Stationary exercise bike    1. If you do not have a stationary exercise bike at home, you can find one to ride at your local health club or community center. 2. Adjust the height of the bike seat so that your knee is slightly bent when your leg is extended downward. If your knee hurts when the pedal reaches the top, you can raise the seat so that your knee does not bend as much. 3. Start slowly. At first, try to do 5 to 10 minutes of cycling with little to no resistance. Then increase your time and the resistance bit by bit until you can do 20 to 30 minutes without pain. 4. If you start to have pain, rest your knee until your pain gets back to the level that is normal for you.  Or cycle for less time or with less effort. Follow-up care is a key part of your treatment and safety. Be sure to make and go to all appointments, and call your doctor if you are having problems. It's also a good idea to know your test results and keep a list of the medicines you take. Where can you learn more? Go to http://damián-solitario.info/. Enter C159 in the search box to learn more about \"Knee Arthritis: Exercises. \"  Current as of: March 21, 2017  Content Version: 11.4  © 4258-0516 [employer]. Care instructions adapted under license by [email or web address] (which disclaims liability or warranty for this information). If you have questions about a medical condition or this instruction, always ask your healthcare professional. [address] any warranty or liability for your use of this information.

## 2018-01-31 NOTE — MR AVS SNAPSHOT
[address], [address] 706 [facility] 
[phone] Patient: Mauri Corado MRN: [id number] :1965 Visit Information Date & Time Provider Department Dept. Phone Encounter #  
 2018  4:00 PM Dayana Zelaya, 27 [facility] [phone] [id number] Your Appointments 2018  4:00 PM  
Follow Up with Dayana Zelaya MD  
[address] [place] [address], [address] [facility] ([facility]) Appt Note: EUFLEXXA # 1, [address], [address] 706 [facility]  
[phone] 27 Idania Burnette  
  
    
 2018  4:00 PM  
Follow Up with Dayana Zelaya MD  
[facility] ([facility]) Appt Note: EUFLEXXA # 2, [address], [address] 706 [facility]  
[phone] 2018  4:00 PM  
Follow Up with Dayana Zelaya MD  
[address] [place] [address], [address] and [facility] ([facility]) Appt Note: EUFLEXXA # 3, [address], [address] 706 [facility]  
[phone] Upcoming Health Maintenance Date Due Hepatitis C Screening 1965 DTaP/Tdap/Td series (1 - Tdap) 1985 PAP AKA CERVICAL CYTOLOGY 1985 BREAST CANCER SCRN MAMMOGRAM 2014 FOBT Q 1 YEAR AGE 50-75 2014 Influenza Age 5 to Adult 2017 Allergies as of 2018  Review Complete On: 2018 By: Peterson Elizabeth Severity Noted Reaction Type Reactions Calcium  2016    Other (comments) Egg  2017    Swelling Milk Containing Products  2016    Other (comments) Red Dye  2017    Swelling Sulfa (Sulfonamide Antibiotics)  2016    Other (comments) Current Immunizations  Never Reviewed No immunizations on file. Not reviewed this visit You Were Diagnosed With   
  
 Codes Comments Bilateral primary osteoarthritis of knee    -  Primary ICD-10-CM: M17.0 ICD-9-CM: 715.16 Vitals BP Pulse Temp Height(growth percentile) Weight(growth percentile) SpO2  
 135/77 (BP 1 Location: Left arm, BP Patient Position: Sitting) 60 97.4 °F (36.3 °C) 5' 3\" (1.6 m) 238 lb (108 kg) 97% BMI Smoking Status 42.16 kg/m2 Never Smoker BMI and BSA Data Body Mass Index Body Surface Area  
 42.16 kg/m 2 2.19 m 2 Preferred Pharmacy Pharmacy Name Phone [facility] [address] [place] [address], [facility] [phone] Your Updated Medication List  
  
   
This list is accurate as of: 1/31/18  3:40 PM.  Always use your most recent med list.  
  
  
  
  
 azelastine 137 mcg (0.1 %) nasal spray Commonly known as:  ASTELIN  
INT 2 SPRAYS IEN BID  
  
 budesonide 0.5 mg/2 mL Nbsp Commonly known as:  PULMICORT  
500 mcg by Nebulization route. * ergocalciferol 50,000 unit capsule Commonly known as:  ERGOCALCIFEROL TK 1 C PO Q WEEK * ergocalciferol 50,000 unit capsule Commonly known as:  ERGOCALCIFEROL  
TAKE 1 CAPSULE BY MOUTH EVERY 7 DAYS  
  
 LEXAPRO 20 mg tablet Generic drug:  escitalopram oxalate Take 20 mg by mouth daily. loratadine 10 mg tablet Commonly known as:  Love Silveira Take 10 mg by mouth two (2) times a day. MUCINEX 600 mg ER tablet Generic drug:  guaiFENesin ER Take 600 mg by mouth daily. NexIUM 20 mg capsule Generic drug:  esomeprazole Take 40 mg by mouth two (2) times a day. OTHER [address] * oxaprozin 600 mg tablet Commonly known as:  Ressie Dinning Take 1 Tab by mouth daily. * oxaprozin 600 mg tablet Commonly known as:  Ressie Dinning TAKE 1 TABLET BY MOUTH TWICE DAILY WITH MEALS QNASL 80 mcg/actuation Hfaa Generic drug:  beclomethasone dipropionate INHALE 1 PUFF IEN BID  
  
 SINGULAIR 10 mg tablet Generic drug:  montelukast  
Take 10 mg by mouth daily. SYMBYAX 3-25 mg per capsule Generic drug:  OLANZapine-FLUoxetine Take 1 Cap by mouth every evening. * traMADol 50 mg tablet Commonly known as:  ULTRAM  
Take 1 Tab by mouth every six (6) hours as needed for Pain. Max Daily Amount: 200 mg.  
  
 * traMADol 50 mg tablet Commonly known as:  ULTRAM  
Take 1 Tab by mouth every six (6) hours as needed for Pain. Max Daily Amount: 200 mg.  
  
 * Notice: This list has 6 medication(s) that are the same as other medications prescribed for you. Read the directions carefully, and ask your doctor or other care provider to review them with you. Patient Instructions Please follow up in 1 week for Euflexxa #2. You are advised to contact us if your condition worsens. Knee Arthritis: Exercises Your Care Instructions Here are some examples of exercises for knee arthritis. Start each exercise slowly. Ease off the exercise if you start to have pain. Your doctor or physical therapist will tell you when you can start these exercises and which ones will work best for you. How to do the exercises Knee flexion with heel slide 1. Lie on your back with your knees bent. 2. Slide your heel back by bending your affected knee as far as you can. Then hook your other foot around your ankle to help pull your heel even farther back. 3. Hold for about 6 seconds, then rest for up to 10 seconds. 4. Repeat 8 to 12 times. 5. Switch legs and repeat steps 1 through 4, even if only one knee is sore. [facility] 1. Sit with your affected leg straight and supported on the floor or a firm bed. Place a small, rolled-up towel under your knee. Your other leg should be bent, with that foot flat on the floor. 2. Tighten the thigh muscles of your affected leg by pressing the back of your knee down into the towel. 3. Hold for about 6 seconds, then rest for up to 10 seconds. 4. Repeat 8 to 12 times. 5. Switch legs and repeat steps 1 through 4, even if only one knee is sore. Straight-leg raises to the front 1. Lie on your back with your good knee bent so that your foot rests flat on the floor. Your affected leg should be straight. Make sure that your low back has a normal curve. You should be able to slip your hand in between the floor and the small of your back, with your palm touching the floor and your back touching the back of your hand. 2. Tighten the thigh muscles in your affected leg by pressing the back of your knee flat down to the floor. Hold your knee straight. 3. Keeping the thigh muscles tight and your leg straight, lift your affected leg up so that your heel is about 12 inches off the floor. Hold for about 6 seconds, then lower slowly. 4. Relax for up to 10 seconds between repetitions. 5. Repeat 8 to 12 times. 6. Switch legs and repeat steps 1 through 5, even if only one knee is sore. Active knee flexion 1. Lie on your stomach with your knees straight. If your kneecap is uncomfortable, roll up a washcloth and put it under your leg just above your kneecap. 2. Lift the foot of your affected leg by bending the knee so that you bring the foot up toward your buttock. If this motion hurts, try it without bending your knee quite as far. This may help you avoid any painful motion. 3. Slowly move your leg up and down. 4. Repeat 8 to 12 times. 5. Switch legs and repeat steps 1 through 4, even if only one knee is sore. Quadriceps stretch (facedown) 1. Lie flat on your stomach, and rest your face on the floor. 2. Wrap a towel or belt strap around the lower part of your affected leg. Then use the towel or belt strap to slowly pull your heel toward your buttock until you feel a stretch. 3. Hold for about 15 to 30 seconds, then relax your leg against the towel or belt strap. 4. Repeat 2 to 4 times. 5. Switch legs and repeat steps 1 through 4, even if only one knee is sore. Stationary exercise bike 1. If you do not have a stationary exercise bike at home, you can find one to ride at your local health club or community center. 2. Adjust the height of the bike seat so that your knee is slightly bent when your leg is extended downward. If your knee hurts when the pedal reaches the top, you can raise the seat so that your knee does not bend as much. 3. Start slowly. At first, try to do 5 to 10 minutes of cycling with little to no resistance. Then increase your time and the resistance bit by bit until you can do 20 to 30 minutes without pain. 4. If you start to have pain, rest your knee until your pain gets back to the level that is normal for you. Or cycle for less time or with less effort. Follow-up care is a key part of your treatment and safety. Be sure to make and go to all appointments, and call your doctor if you are having problems. It's also a good idea to know your test results and keep a list of the medicines you take. Where can you learn more? Go to http://damián-solitario.info/. Enter C159 in the search box to learn more about \"Knee Arthritis: Exercises. \" Current as of: March 21, 2017 Content Version: 11.4 © 1412-8075 Healthwise, Incorporated. Care instructions adapted under license by [employer] (which disclaims liability or warranty for this information). If you have questions about a medical condition or this instruction, always ask your healthcare professional. [place] [address] any warranty or liability for your use of this information. Introducing [facility]! Monalisa Sullivan introduces The [employer]ux patient portal. Now you can access parts of your medical record, email your doctor's office, and request medication refills online. 1. In your internet browser, go to [email or web address] 2. Click on the First Time User? Click Here link in the Sign In box. You will see the New Member Sign Up page. 3. Enter your Perf[employer]ix Access Code exactly as it appears below. You will not need to use this code after youve completed the sign-up process. If you do not sign up before the expiration date, you must request a new code. · Per[employer] Access Code: [email or web address] Expires: 3/6/2018  3:48 PM 
 
4. Enter the last four digits of your Social Security Number (xxxx) and Date of Birth (mm/dd/yyyy) as indicated and click Submit. You will be taken to the next sign-up page. 5. Create a Perfusix ID. This will be your Perfusix login ID and cannot be changed, so think of one that is secure and easy to remember. 6. Create a Perf[employer]ix password. You can change your password at any time. 7. Enter your Password Reset Question and Answer. This can be used at a later time if you forget your password. 8. Enter your e-mail address. You will receive e-mail notification when new information is available in [address] 9. Click Sign Up. You can now view and download portions of your medical record. 10. Click the Download Summary menu link to download a portable copy of your medical information. If you have questions, please visit the Frequently Asked Questions section of the Per[employer]ix website. Remember, Per[employer]ix is NOT to be used for urgent needs. For medical emergencies, dial 911. Now available from your iPhone and Android! Please provide this summary of care documentation to your next provider. Your primary care clinician is listed as Jim Ryan. If you have any questions after today's visit, please call [phone].

## 2018-02-07 ENCOUNTER — OFFICE VISIT (OUTPATIENT)
Dept: ORTHOPEDIC SURGERY | Age: 53
End: 2018-02-07

## 2018-02-07 VITALS
OXYGEN SATURATION: 97 % | HEART RATE: 72 BPM | TEMPERATURE: 96.7 F | HEIGHT: 63 IN | SYSTOLIC BLOOD PRESSURE: 139 MMHG | DIASTOLIC BLOOD PRESSURE: 73 MMHG | WEIGHT: 237 LBS | BODY MASS INDEX: 41.99 KG/M2

## 2018-02-07 DIAGNOSIS — M17.0 PRIMARY OSTEOARTHRITIS OF BOTH KNEES: Primary | ICD-10-CM

## 2018-02-07 RX ORDER — HYALURONATE SODIUM 10 MG/ML
2 SYRINGE (ML) INTRAARTICULAR ONCE
Qty: 2 ML | Refills: 0
Start: 2018-02-07 | End: 2018-02-07

## 2018-02-07 NOTE — PROGRESS NOTES
AMBULATORY PROGRESS NOTE      Patient: Jose Hurst             MRN: [id number]     SSN: [id number] Body mass index is 41.98 kg/(m^2). YOB: 1965     AGE: 47 y.o. EX: female    PCP: Roland Greco MD    IMPRESSION/DIAGNOSIS AND TREATMENT PLAN     DIAGNOSES  1. Primary osteoarthritis of both knees        Orders Placed This Encounter    AK DRAIN/INJECT LARGE JOINT/BURSA - 20610    EUFLEXXA INJECTION PER DOSE    sodium hyaluronate (SUPARTZ FX/HYALGAN/GENIVSC) 10 mg/mL syrg injection      Jose Hurst understands her diagnoses and the proposed plan. My plan is to see her in one week for her third and final Euflexxa injection to each knee in this series. Plan:    1) Euflexxa #2 Injection    RTO - 1 week for Euflexxa #3. HPI AND EXAMINATION     Jose Hurst IS A 47 y.o. female who presents to my outpatient office for follow up of primary OA of both knees. At last visit, I injected Euflexxa #1 for bilateral knees. Patient reports that she has been doing well since the first Euflexxa injection. She arrives today for the second Euflexxa injection. She notes the knees were sore but otherwise she has not been in pain. Denies any warmth, reddened hue, or swelling to the bilateral knees.      Knees:  Bilateral       Cutaneous: Skin intact, no abrasions, blisters, wounds, erythema       Effusion: Is not present       Crepitus:  mild PF joint crepitus       Tenderness: Tenderness: Medial joint line        Alignment of Knee: neutral when standing       ROM: diminished range of motion       Fullness or swelling: None to popliteal fossa region,         Stability: No instability to anterior, posterior, varus, valgus stress testing       Thrust:   No varus thrust with gait       Neuro:  yes, Extensor mechanism is intact    CHART REVIEW     Past Medical History:   Diagnosis Date    Asthma      Current Outpatient Prescriptions   Medication Sig    sodium hyaluronate (SUPARTZ FX/HYALGAN/GENIVSC) 10 mg/mL syrg injection 2 mL by Intra artICUlar route once for 1 dose.  ergocalciferol (ERGOCALCIFEROL) 50,000 unit capsule TAKE 1 CAPSULE BY MOUTH EVERY 7 DAYS    oxaprozin (DAYPRO) 600 mg tablet Take 1 Tab by mouth daily.  OTHER Jacob Rhino Salt Packets    azelastine (ASTELIN) 137 mcg (0.1 %) nasal spray INT 2 SPRAYS IEN BID    QNASL 80 mcg/actuation HFAA INHALE 1 PUFF IEN BID    guaiFENesin ER (MUCINEX) 600 mg ER tablet Take 600 mg by mouth daily.  traMADol (ULTRAM) 50 mg tablet Take 1 Tab by mouth every six (6) hours as needed for Pain. Max Daily Amount: 200 mg.  budesonide (PULMICORT) 0.5 mg/2 mL nbsp 500 mcg by Nebulization route.  OLANZapine-FLUoxetine (SYMBYAX) 3-25 mg per capsule Take 1 Cap by mouth every evening.  loratadine (CLARITIN) 10 mg tablet Take 10 mg by mouth two (2) times a day.  esomeprazole (NEXIUM) 20 mg capsule Take 40 mg by mouth two (2) times a day.  escitalopram oxalate (LEXAPRO) 20 mg tablet Take 20 mg by mouth daily.  montelukast (SINGULAIR) 10 mg tablet Take 10 mg by mouth daily.  oxaprozin (DAYPRO) 600 mg tablet TAKE 1 TABLET BY MOUTH TWICE DAILY WITH MEALS    traMADol (ULTRAM) 50 mg tablet Take 1 Tab by mouth every six (6) hours as needed for Pain. Max Daily Amount: 200 mg.  ergocalciferol (ERGOCALCIFEROL) 50,000 unit capsule TK 1 C PO Q WEEK     No current facility-administered medications for this visit. Allergies   Allergen Reactions    Calcium Other (comments)    Egg Swelling    Milk Containing Products Other (comments)    Red Dye Swelling    Sulfa (Sulfonamide Antibiotics) Other (comments)     Past Surgical History:   Procedure Laterality Date    HX HYSTERECTOMY      HX PELVIC LAPAROSCOPY      HX TONSILLECTOMY       Social History     Occupational History    Not on file.      Social History Main Topics    Smoking status: Never Smoker    Smokeless tobacco: Never Used    Alcohol use No    Drug use: No    Sexual activity: Not on file     Family History   Problem Relation Age of Onset    Family history unknown: Yes    Hypertension Mother     Hypertension Father     Heart Disease Father     Stroke Father        REVIEW OF SYSTEMS : 2/7/2018  ALL BELOW ARE Negative except : SEE HPI       Constitutional: Negative for fever, chills and weight loss. Neg Weigh Loss  Cardiovascular: Negative for chest pain, claudication and leg swelling. SOB, TALAVERA   Gastrointestinal: Negative for  pain, N/V/D/C, Blood in stool or urine,dysuria, hematuria,        Incontinence, pelvic pain  Musculoskeletal: see HPI. Neurological: Negative for dizziness and weakness. Negative for headaches,Visual Changes, Confusion, Seizures,   Psychiatric/Behavioral: Negative for depression, memory loss and substance abuse. Extremities:  Negative for  hair changes, rash or skin lesion changes. Hematologic: Negative for Bleeding problems, bruising, pallor or swollen lymph nodes. Peripheral Vascular: No calf pain, vascular vein tenderness to calf pain              No calf throbbing, posterior knee throbbing pain    DIAGNOSTIC IMAGING        PROCEDURE     FOR Estephanie Craig, THE FOLLOWING IS TRUE:    Estephanie Craig has a Diagnosis of osteoarthritis and has documentation that the pain interferes with functional activities. Estephanie Craig has documentation of failure to respond  adequately to at least 3 months of conservative therapy:which (Activity modification, home exercise, protective weight bearing, and various analgesics. NAME is unable to tolerate conservative therapy (prolonged NSAID use) because of adverse effects of NSAIDs (GI irritation, gastric/colonic irritation. Thus visco supplementation is requested as an alternative treatment for knee OA. Estephanie Craig , is here for the 2nd Euflexxa injection into both knee. Estephanie Craig denies any redness, fevers, shakes, chills, or any reaction from the prior Euflexxa injection.  This patient is accompanied in the office by herself  . A formal time out was conducted by me informing Rojas Howard of the risks and benefits of the infection. The injection was performed 2/7/2018 8:14 AM with sterile technique. The both knee is cleansed with alcohol and then sterilized with Betadine, the medial aspect of the both knee. both  knee was then injected with Euflexxa. she tolerated the procedure well. Band-Aids were applied. The risks of Euflexxa include bleeding, infection, and reactive synovitis. After the Euflexxa was placed first in her left knee, she did well with this. The Euflexxa was then placed in her right knee. Upon placing the Euflexxa in her right knee, she had experienced quite a bit of pain with a single injection of the right knee. The medicine was instilled easily into the right knee. After the injection, I did have her lie flat on her back to rest to relax. She states she felt a little queasy. She did not throw up and did not pass out. The queasy sensation lasted about one minute or so. Otherwise, she is doing well and lying flat on her back at this current time. The plan will be to sit her up for another 10 minutes and make sure she is safe to drive and safe to go to work. The pain assessment score PRIOR/AFTER to the injection: 0 /10 // 0 /10 pain severity,   intensity, no  Pain quality. Written by Romi Carrera, as dictated by Tarik Mirza MD. IDr. Reagan Beam, MD, confirm that all documentation is accurate.

## 2018-02-07 NOTE — PATIENT INSTRUCTIONS
Please follow up in 1 week for Euflexxa #3. You are advised to contact us if your condition worsens. Knee Arthritis: Care Instructions  Your Care Instructions    Knee arthritis is a breakdown of the cartilage that cushions your knee joint. When the cartilage wears down, your bones rub against each other. This causes pain and stiffness. Knee arthritis tends to get worse with time. Treatment for knee arthritis involves reducing pain, making the leg muscles stronger, and staying at a healthy body weight. The treatment usually does not improve the health of the cartilage, but it can reduce pain and improve how well your knee works. You can take simple measures to protect your knee joints, ease your pain, and help you stay active. Follow-up care is a key part of your treatment and safety. Be sure to make and go to all appointments, and call your doctor if you are having problems. It's also a good idea to know your test results and keep a list of the medicines you take. How can you care for yourself at home? · Know that knee arthritis will cause more pain on some days than on others. · Stay at a healthy weight. Lose weight if you are overweight. When you stand up, the pressure on your knees from every pound of body weight is multiplied four times. So if you lose 10 pounds, you will reduce the pressure on your knees by 40 pounds. · Talk to your doctor or physical therapist about exercises that will help ease joint pain. ¨ Stretch to help prevent stiffness and to prevent injury before you exercise. You may enjoy gentle forms of yoga to help keep your knee joints and muscles flexible. ¨ Walk instead of jog. ¨ Ride a bike. This makes your thigh muscles stronger and takes pressure off your knee. ¨ Wear well-fitting and comfortable shoes. ¨ Exercise in chest-deep water. This can help you exercise longer with less pain. ¨ Avoid exercises that include squatting or kneeling.  They can put a lot of strain on your knees.  ¨ Talk to your doctor to make sure that the exercise you do is not making the arthritis worse. · Do not sit for long periods of time. Try to walk once in a while to keep your knee from getting stiff. · Ask your doctor or physical therapist whether shoe inserts may reduce your arthritis pain. · If you can afford it, get new athletic shoes at least every year. This can help reduce the strain on your knees. · Use a device to help you do everyday activities. ¨ A cane or walking stick can help you keep your balance when you walk. Hold the cane or walking stick in the hand opposite the painful knee. ¨ If you feel like you may fall when you walk, try using crutches or a front-wheeled walker. These can prevent falls that could cause more damage to your knee. ¨ A knee brace may help keep your knee stable and prevent pain. ¨ You also can use other things to make life easier, such as a higher toilet seat and handrails in the bathtub or shower. · Take pain medicines exactly as directed. ¨ Do not wait until you are in severe pain. You will get better results if you take it sooner. ¨ If you are not taking a prescription pain medicine, take an over-the-counter medicine such as acetaminophen (Tylenol), ibuprofen (Advil, Motrin), or naproxen (Aleve). Read and follow all instructions on the label. ¨ Do not take two or more pain medicines at the same time unless the doctor told you to. Many pain medicines have acetaminophen, which is Tylenol. Too much acetaminophen (Tylenol) can be harmful. ¨ Tell your doctor if you take a blood thinner, have diabetes, or have allergies to shellfish. · Ask your doctor if you might benefit from a shot of steroid medicine into your knee. This may provide pain relief for several months. · Many people take the supplements glucosamine and chondroitin for osteoarthritis. Some people feel they help, but the medical research does not show that they work.  Talk to your doctor before you take these supplements. When should you call for help? Call your doctor now or seek immediate medical care if:  ? · You have sudden swelling, warmth, or pain in your knee. ? · You have knee pain and a fever or rash. ? · You have such bad pain that you cannot use your knee. ? Watch closely for changes in your health, and be sure to contact your doctor if you have any problems. Where can you learn more? Go to http://damián-solitario.info/. Enter F457 in the search box to learn more about \"Knee Arthritis: Care Instructions. \"  Current as of: October 31, 2016  Content Version: 11.4  © 6179-8974 [employer]. Care instructions adapted under license by [employer] (which disclaims liability or warranty for this information). If you have questions about a medical condition or this instruction, always ask your healthcare professional. [address] any warranty or liability for your use of this information.

## 2018-02-07 NOTE — MR AVS SNAPSHOT
[address], [address] [facility] 
[phone] Patient: Pietro Huerta MRN: [id number] :1965 Visit Information Date & Time Provider Department Dept. Phone Encounter #  
 2018  8:10 AM Guy Jalloh MD [facility] [phone] [id number] Your Appointments 2018  4:00 PM  
Follow Up with Guy Jalloh MD  
[address] [facility], [address] and [facility] [place] ([address]) Appt Note: EUFLEXXA # 3, [address], [address] [facility]  
[phone] [address] [facility], [facility] [address] Upcoming Health Maintenance Date Due Hepatitis C Screening 1965 DTaP/Tdap/Td series (1 - Tdap) 1985 PAP AKA CERVICAL CYTOLOGY 1985 BREAST CANCER SCRN MAMMOGRAM 2014 FOBT Q 1 YEAR AGE 50-75 2014 Influenza Age 5 to Adult 2017 Allergies as of 2018  Review Complete On: 2018 By: Maura Davies LPN Severity Noted Reaction Type Reactions Calcium  2016    Other (comments) Egg  2017    Swelling Milk Containing Products  2016    Other (comments) Red Dye  2017    Swelling Sulfa (Sulfonamide Antibiotics)  2016    Other (comments) Current Immunizations  Never Reviewed No immunizations on file. Not reviewed this visit You Were Diagnosed With   
  
 Codes Comments Primary osteoarthritis of both knees    -  Primary ICD-10-CM: M17.0 ICD-9-CM: 715.16 Vitals BP Pulse Temp Height(growth percentile) Weight(growth percentile) SpO2  
 139/73 72 96.7 °F (35.9 °C) (Oral) 5' 3\" (1.6 m) 237 lb (107.5 kg) 97% BMI Smoking Status 41.98 kg/m2 Never Smoker BMI and BSA Data Body Mass Index Body Surface Area 41.98 kg/m 2 2.19 m 2 Preferred Pharmacy Pharmacy Name Phone [facility] [address] [facility], [facility] AT [facility] [phone] Your Updated Medication List  
  
   
This list is accurate as of: 2/7/18  8:17 AM.  Always use your most recent med list.  
  
  
  
  
 azelastine 137 mcg (0.1 %) nasal spray Commonly known as:  ASTELIN  
INT 2 SPRAYS IEN BID  
  
 budesonide 0.5 mg/2 mL Nbsp Commonly known as:  PULMICORT  
500 mcg by Nebulization route. * ergocalciferol 50,000 unit capsule Commonly known as:  ERGOCALCIFEROL TK 1 C PO Q WEEK * ergocalciferol 50,000 unit capsule Commonly known as:  ERGOCALCIFEROL  
TAKE 1 CAPSULE BY MOUTH EVERY 7 DAYS  
  
 LEXAPRO 20 mg tablet Generic drug:  escitalopram oxalate Take 20 mg by mouth daily. loratadine 10 mg tablet Commonly known as:  Adilson Jorge Take 10 mg by mouth two (2) times a day. MUCINEX 600 mg ER tablet Generic drug:  guaiFENesin ER Take 600 mg by mouth daily. NexIUM 20 mg capsule Generic drug:  esomeprazole Take 40 mg by mouth two (2) times a day. OTHER [address] * oxaprozin 600 mg tablet Commonly known as:  Earlyne Chris Take 1 Tab by mouth daily. * oxaprozin 600 mg tablet Commonly known as:  Earlyne Chris TAKE 1 TABLET BY MOUTH TWICE DAILY WITH MEALS QNASL 80 mcg/actuation [facility] Generic drug:  beclomethasone dipropionate INHALE 1 PUFF IEN BID  
  
 SINGULAIR 10 mg tablet Generic drug:  montelukast  
Take 10 mg by mouth daily. sodium hyaluronate 10 mg/mL Syrg injection Commonly known as:  SUPARTZ FX/HYALGAN/GENIVSC 2 mL by Intra artICUlar route once for 1 dose. SYMBYAX 3-25 mg per capsule Generic drug:  OLANZapine-FLUoxetine Take 1 Cap by mouth every evening. * traMADol 50 mg tablet Commonly known as:  ULTRAM  
Take 1 Tab by mouth every six (6) hours as needed for Pain. Max Daily Amount: 200 mg.  
  
 * traMADol 50 mg tablet Commonly known as:  ULTRAM  
Take 1 Tab by mouth every six (6) hours as needed for Pain. Max Daily Amount: 200 mg.  
  
 * Notice: This list has 6 medication(s) that are the same as other medications prescribed for you. Read the directions carefully, and ask your doctor or other care provider to review them with you. We Performed the Following EUFLEXXA INJECTION PER DOSE [ HCPCS] OK DRAIN/INJECT LARGE JOINT/BURSA [id number] CPT(R)] Introducing [facility]! [facility] introduces [employer] patient portal. Now you can access parts of your medical record, email your doctor's office, and request medication refills online. 1. In your internet browser, go to [email or web address] 2. Click on the First Time User? Click Here link in the Sign In box. You will see the New Member Sign Up page. 3. Enter your [employer] Access Code exactly as it appears below. You will not need to use this code after youve completed the sign-up process. If you do not sign up before the expiration date, you must request a new code. · [employer] Access Code: [email or web address] Expires: 3/6/2018  3:48 PM 
 
4. Enter the last four digits of your Social Security Number (xxxx) and Date of Birth (mm/dd/yyyy) as indicated and click Submit. You will be taken to the next sign-up page. 5. Create a [employer] ID. This will be your [employer] login ID and cannot be changed, so think of one that is secure and easy to remember. 6. Create a [employer] password. You can change your password at any time. 7. Enter your Password Reset Question and Answer. This can be used at a later time if you forget your password. 8. Enter your e-mail address. You will receive e-mail notification when new information is available in [address] 9. Click Sign Up. You can now view and download portions of your medical record. 10. Click the Download Summary menu link to download a portable copy of your medical information. If you have questions, please visit the Frequently Asked Questions section of the [employer] website. Remember, [employer] is NOT to be used for urgent needs. For medical emergencies, dial 911. Now available from your iPhone and Android! Please provide this summary of care documentation to your next provider. Your primary care clinician is listed as Christine Davenport. If you have any questions after today's visit, please call [phone].

## 2018-02-14 ENCOUNTER — OFFICE VISIT (OUTPATIENT)
Dept: ORTHOPEDIC SURGERY | Age: 53
End: 2018-02-14

## 2018-02-14 VITALS
BODY MASS INDEX: 41.99 KG/M2 | RESPIRATION RATE: 16 BRPM | DIASTOLIC BLOOD PRESSURE: 66 MMHG | WEIGHT: 237 LBS | OXYGEN SATURATION: 95 % | TEMPERATURE: 95 F | HEART RATE: 64 BPM | HEIGHT: 63 IN | SYSTOLIC BLOOD PRESSURE: 132 MMHG

## 2018-02-14 DIAGNOSIS — M17.0 PRIMARY OSTEOARTHRITIS OF BOTH KNEES: Primary | ICD-10-CM

## 2018-02-14 RX ORDER — HYALURONATE SODIUM 10 MG/ML
2 SYRINGE (ML) INTRAARTICULAR ONCE
Qty: 2 ML | Refills: 0
Start: 2018-02-14 | End: 2018-02-19

## 2018-02-14 NOTE — PATIENT INSTRUCTIONS
Please follow up in 2 months. You are advised to contact us if your condition worsens. Knee Arthritis: Exercises  Your Care Instructions  Here are some examples of exercises for knee arthritis. Start each exercise slowly. Ease off the exercise if you start to have pain. Your doctor or physical therapist will tell you when you can start these exercises and which ones will work best for you. How to do the exercises  Knee flexion with heel slide    1. Lie on your back with your knees bent. 2. Slide your heel back by bending your affected knee as far as you can. Then hook your other foot around your ankle to help pull your heel even farther back. 3. Hold for about 6 seconds, then rest for up to 10 seconds. 4. Repeat 8 to 12 times. 5. Switch legs and repeat steps 1 through 4, even if only one knee is sore. Quad sets    1. Sit with your affected leg straight and supported on the floor or a firm bed. Place a small, rolled-up towel under your knee. Your other leg should be bent, with that foot flat on the floor. 2. Tighten the thigh muscles of your affected leg by pressing the back of your knee down into the towel. 3. Hold for about 6 seconds, then rest for up to 10 seconds. 4. Repeat 8 to 12 times. 5. Switch legs and repeat steps 1 through 4, even if only one knee is sore. Straight-leg raises to the front    1. Lie on your back with your good knee bent so that your foot rests flat on the floor. Your affected leg should be straight. Make sure that your low back has a normal curve. You should be able to slip your hand in between the floor and the small of your back, with your palm touching the floor and your back touching the back of your hand. 2. Tighten the thigh muscles in your affected leg by pressing the back of your knee flat down to the floor. Hold your knee straight.   3. Keeping the thigh muscles tight and your leg straight, lift your affected leg up so that your heel is about 12 inches off the floor. Hold for about 6 seconds, then lower slowly. 4. Relax for up to 10 seconds between repetitions. 5. Repeat 8 to 12 times. 6. Switch legs and repeat steps 1 through 5, even if only one knee is sore. Active knee flexion    1. Lie on your stomach with your knees straight. If your kneecap is uncomfortable, roll up a washcloth and put it under your leg just above your kneecap. 2. Lift the foot of your affected leg by bending the knee so that you bring the foot up toward your buttock. If this motion hurts, try it without bending your knee quite as far. This may help you avoid any painful motion. 3. Slowly move your leg up and down. 4. Repeat 8 to 12 times. 5. Switch legs and repeat steps 1 through 4, even if only one knee is sore. Quadriceps stretch (facedown)    1. Lie flat on your stomach, and rest your face on the floor. 2. Wrap a towel or belt strap around the lower part of your affected leg. Then use the towel or belt strap to slowly pull your heel toward your buttock until you feel a stretch. 3. Hold for about 15 to 30 seconds, then relax your leg against the towel or belt strap. 4. Repeat 2 to 4 times. 5. Switch legs and repeat steps 1 through 4, even if only one knee is sore. Stationary exercise bike    1. If you do not have a stationary exercise bike at home, you can find one to ride at your local health club or community center. 2. Adjust the height of the bike seat so that your knee is slightly bent when your leg is extended downward. If your knee hurts when the pedal reaches the top, you can raise the seat so that your knee does not bend as much. 3. Start slowly. At first, try to do 5 to 10 minutes of cycling with little to no resistance. Then increase your time and the resistance bit by bit until you can do 20 to 30 minutes without pain. 4. If you start to have pain, rest your knee until your pain gets back to the level that is normal for you.  Or cycle for less time or with less effort. Follow-up care is a key part of your treatment and safety. Be sure to make and go to all appointments, and call your doctor if you are having problems. It's also a good idea to know your test results and keep a list of the medicines you take. Where can you learn more? Go to http://damián-solitario.info/. Enter C159 in the search box to learn more about \"Knee Arthritis: Exercises. \"  Current as of: March 21, 2017  Content Version: 11.4  © 2391-1394 [email or web address]. Care instructions adapted under license by [employer] (which disclaims liability or warranty for this information). If you have questions about a medical condition or this instruction, always ask your healthcare professional. [address] any warranty or liability for your use of this information.

## 2018-02-14 NOTE — PROGRESS NOTES
[facility]  [address]  [place], [address]  [phone] fax    Physical Therapy Discharge Summary  Patient name: Darien Valerio Start of Care: 2017   Referral source: Cecilia Youngblood MD : 1965                          Medical Diagnosis: Plantar fascial fibromatosis [M72.2] Onset Date:3 months                          Treatment Diagnosis: L plantarfasciitis   Prior Hospitalization: see medical history Provider#: [id number]   Medications: Verified on Patient summary List    Comorbidities: Asthma, arthritis, osteoporosis   Prior Level of Function: Pt I with ADLs void of foot pain  Visits from Start of Care: 6    Missed Visits: 1  Reporting Period : 2017 to 2018      Summary of Care:  Short Term Goals: To be accomplished in 1 weeks:  1. Pt will be I and compliant with HEP to promote self management of condition. - Pt reports HEP compliance. 2017  Long Term Goals: To be accomplished in 4 weeks:  1. Pt will perform 15 SL HR on L without pain or gastroc cramping to improve mobility. -progressing, able to perform x12 SL HR void of pain and denies gastroc cramping (2017); Met,  Demonstrated SL HR 15 reps without pain. 2018  2. Pt will demonstrate ability to attain closed chain subtalar neutral to improve future stress with functional tasks. not met, tendency to pronate (2017)  3. Pt will report no morning heel pain to improve ease of daily tasks and quality of life. - Continues to have tightness and pain however pt reports symptoms are improving. 2017; Very slight morning stiffness/pain per pt. 2018  4. Pt will initiate regimented walking program to safely return to exercise without overuse strain.-not yet initiated (2017)      ASSESSMENT/RECOMMENDATIONS: Pt reports 98% improvement in symptoms since [facility].  Improvement in morning stiffness noted and pt plans to initiate walking regiment after snow ervin.  Will D/C to self management HEP    [x]Discontinue therapy: [x]Patient has reached or is progressing toward set goals      []Patient is non-compliant or has abdicated      []Due to lack of appreciable progress towards set goals    Izabela Dorsey DPT, CMTPT 2/14/2018 11:05 AM

## 2018-02-14 NOTE — PROGRESS NOTES
[facility]  [address]  [place], [address]  [phone] fax    Physical Therapy Discharge Summary  Patient name: Nadia Palacios Start of Care: 2017   Referral source: Malachi Duane, MD : 1965                          Medical Diagnosis: Plantar fascial fibromatosis [M72.2] Onset Date:3 months                          Treatment Diagnosis: L plantarfasciitis   Prior Hospitalization: see medical history Provider#: [id number]   Medications: Verified on Patient summary List    Comorbidities: Asthma, arthritis, osteoporosis   Prior Level of Function: Pt I with ADLs void of foot pain  Visits from Start of Care: 6    Missed Visits: 1  Reporting Period : 2017 to 2018      Summary of Care:  Short Term Goals: To be accomplished in 1 weeks:  1. Pt will be I and compliant with HEP to promote self management of condition. - Pt reports HEP compliance. 2017  Long Term Goals: To be accomplished in 4 weeks:  1. Pt will perform 15 SL HR on L without pain or gastroc cramping to improve mobility. -progressing, able to perform x12 SL HR void of pain and denies gastroc cramping (2017); Met,  Demonstrated SL HR 15 reps without pain. 2018  2. Pt will demonstrate ability to attain closed chain subtalar neutral to improve future stress with functional tasks. not met, tendency to pronate (2017)  3. Pt will report no morning heel pain to improve ease of daily tasks and quality of life. - Continues to have tightness and pain however pt reports symptoms are improving. 2017; Very slight morning stiffness/pain per pt. 2018  4. Pt will initiate regimented walking program to safely return to exercise without overuse strain.-not yet initiated (2017)      ASSESSMENT/RECOMMENDATIONS: Pt reports 98% improvement in symptoms since [facility].  Improvement in morning stiffness noted and pt plans to initiate walking regiment after snow ervin.  Will D/C to self management HEP    [x]Discontinue therapy: [x]Patient has reached or is progressing toward set goals      []Patient is non-compliant or has abdicated      []Due to lack of appreciable progress towards set goals    Rachelle Wells DPT, CMTPT 2/14/2018 11:10 AM

## 2018-02-14 NOTE — MR AVS SNAPSHOT
[address], [address] 200 [facility] [address] 
[phone] Patient: Nadia Palacios MRN: [id number] :1965 Visit Information Date & Time Provider Department Dept. Phone Encounter #  
 2018  8:00 AM Kristen Jensen, [address] [facility] [phone] [id number] Your Appointments 2018  4:00 PM  
Follow Up with Kristen Jensen MD  
[address] [facility], [address] [facility] ([address]) Appt Note: sulma knee 2 mo fu  
 [address], [address] 200 [facility]  
[phone] [address] [facility], [facility] [address] Upcoming Health Maintenance Date Due Hepatitis C Screening 1965 DTaP/Tdap/Td series (1 - Tdap) 1985 PAP AKA CERVICAL CYTOLOGY 1985 BREAST CANCER SCRN MAMMOGRAM 2014 FOBT Q 1 YEAR AGE 50-75 2014 Influenza Age 5 to Adult 2017 Allergies as of 2018  Review Complete On: 2018 By: Tiffany Schulte Severity Noted Reaction Type Reactions Calcium  2016    Other (comments) Egg  2017    Swelling Gluten  2018    Shortness of Breath Milk Containing Products  2016    Other (comments) Red Dye  2017    Swelling Sulfa (Sulfonamide Antibiotics)  2016    Other (comments) Current Immunizations  Never Reviewed No immunizations on file. Not reviewed this visit You Were Diagnosed With   
  
 Codes Comments Primary osteoarthritis of both knees    -  Primary ICD-10-CM: M17.0 ICD-9-CM: 715.16 Vitals BP Pulse Temp Resp Height(growth percentile) Weight(growth percentile) 132/66 64 95 °F (35 °C) (Oral) 16 5' 3\" (1.6 m) 237 lb (107.5 kg) SpO2 BMI OB Status Smoking Status 95% 41.98 kg/m2 Unknown Never Smoker BMI and BSA Data Body Mass Index Body Surface Area 41.98 kg/m 2 2.19 m 2 Preferred Pharmacy Pharmacy Name Phone [facility] [address] [facility], [facility] AT [place] [phone] Your Updated Medication List  
  
   
This list is accurate as of: 2/14/18  8:18 AM.  Always use your most recent med list.  
  
  
  
  
 azelastine 137 mcg (0.1 %) nasal spray Commonly known as:  ASTELIN  
INT 2 SPRAYS IEN BID  
  
 budesonide 0.5 mg/2 mL Nbsp Commonly known as:  PULMICORT  
500 mcg by Nebulization route. * ergocalciferol 50,000 unit capsule Commonly known as:  ERGOCALCIFEROL TK 1 C PO Q WEEK * ergocalciferol 50,000 unit capsule Commonly known as:  ERGOCALCIFEROL  
TAKE 1 CAPSULE BY MOUTH EVERY 7 DAYS  
  
 LEXAPRO 20 mg tablet Generic drug:  escitalopram oxalate Take 20 mg by mouth daily. loratadine 10 mg tablet Commonly known as:  Dawson Elyssa Take 10 mg by mouth two (2) times a day. MUCINEX 600 mg ER tablet Generic drug:  guaiFENesin ER Take 600 mg by mouth daily. NexIUM 20 mg capsule Generic drug:  esomeprazole Take 40 mg by mouth two (2) times a day. OTHER [address] * oxaprozin 600 mg tablet Commonly known as:  Sheri Croak Take 1 Tab by mouth daily. * oxaprozin 600 mg tablet Commonly known as:  Sheri Croak TAKE 1 TABLET BY MOUTH TWICE DAILY WITH MEALS QNASL 80 mcg/actuation Hfaa Generic drug:  beclomethasone dipropionate INHALE 1 PUFF IEN BID  
  
 SINGULAIR 10 mg tablet Generic drug:  montelukast  
Take 10 mg by mouth daily. sodium hyaluronate 10 mg/mL Syrg injection Commonly known as:  SUPARTZ FX/HYALGAN/GENIVSC 2 mL by Intra artICUlar route once for 1 dose. SYMBYAX 3-25 mg per capsule Generic drug:  OLANZapine-FLUoxetine Take 1 Cap by mouth every evening. * traMADol 50 mg tablet Commonly known as:  Saeid Cano  
 Take 1 Tab by mouth every six (6) hours as needed for Pain. Max Daily Amount: 200 mg.  
  
 * traMADol 50 mg tablet Commonly known as:  ULTRAM  
Take 1 Tab by mouth every six (6) hours as needed for Pain. Max Daily Amount: 200 mg.  
  
 * Notice: This list has 6 medication(s) that are the same as other medications prescribed for you. Read the directions carefully, and ask your doctor or other care provider to review them with you. We Performed the Following EUFLEXXA INJECTION PER DOSE [ [facility]] [place] DRAIN/INJECT LARGE JOINT/BURSA [id number] CPT(R)] Patient Instructions Please follow up in 2 months. You are advised to contact us if your condition worsens. Knee Arthritis: Exercises Your Care Instructions Here are some examples of exercises for knee arthritis. Start each exercise slowly. Ease off the exercise if you start to have pain. Your doctor or physical therapist will tell you when you can start these exercises and which ones will work best for you. How to do the exercises Knee flexion with heel slide 1. Lie on your back with your knees bent. 2. Slide your heel back by bending your affected knee as far as you can. Then hook your other foot around your ankle to help pull your heel even farther back. 3. Hold for about 6 seconds, then rest for up to 10 seconds. 4. Repeat 8 to 12 times. 5. Switch legs and repeat steps 1 through 4, even if only one knee is sore. [facility] [employer] 1. Sit with your affected leg straight and supported on the floor or a firm bed. Place a small, rolled-up towel under your knee. Your other leg should be bent, with that foot flat on the floor. 2. Tighten the thigh muscles of your affected leg by pressing the back of your knee down into the towel. 3. Hold for about 6 seconds, then rest for up to 10 seconds. 4. Repeat 8 to 12 times. 5. Switch legs and repeat steps 1 through 4, even if only one knee is sore. Straight-leg raises to the front 1. Lie on your back with your good knee bent so that your foot rests flat on the floor. Your affected leg should be straight. Make sure that your low back has a normal curve. You should be able to slip your hand in between the floor and the small of your back, with your palm touching the floor and your back touching the back of your hand. 2. Tighten the thigh muscles in your affected leg by pressing the back of your knee flat down to the floor. Hold your knee straight. 3. Keeping the thigh muscles tight and your leg straight, lift your affected leg up so that your heel is about 12 inches off the floor. Hold for about 6 seconds, then lower slowly. 4. Relax for up to 10 seconds between repetitions. 5. Repeat 8 to 12 times. 6. Switch legs and repeat steps 1 through 5, even if only one knee is sore. Active knee flexion 1. Lie on your stomach with your knees straight. If your kneecap is uncomfortable, roll up a washcloth and put it under your leg just above your kneecap. 2. Lift the foot of your affected leg by bending the knee so that you bring the foot up toward your buttock. If this motion hurts, try it without bending your knee quite as far. This may help you avoid any painful motion. 3. Slowly move your leg up and down. 4. Repeat 8 to 12 times. 5. Switch legs and repeat steps 1 through 4, even if only one knee is sore. Quadriceps stretch (facedown) 1. Lie flat on your stomach, and rest your face on the floor. 2. Wrap a towel or belt strap around the lower part of your affected leg. Then use the towel or belt strap to slowly pull your heel toward your buttock until you feel a stretch. 3. Hold for about 15 to 30 seconds, then relax your leg against the towel or belt strap. 4. Repeat 2 to 4 times. 5. Switch legs and repeat steps 1 through 4, even if only one knee is sore. Stationary exercise bike 1.  If you do not have a stationary exercise bike at home, you can find one to ride at your local health club or community center. 2. Adjust the height of the bike seat so that your knee is slightly bent when your leg is extended downward. If your knee hurts when the pedal reaches the top, you can raise the seat so that your knee does not bend as much. 3. Start slowly. At first, try to do 5 to 10 minutes of cycling with little to no resistance. Then increase your time and the resistance bit by bit until you can do 20 to 30 minutes without pain. 4. If you start to have pain, rest your knee until your pain gets back to the level that is normal for you. Or cycle for less time or with less effort. Follow-up care is a key part of your treatment and safety. Be sure to make and go to all appointments, and call your doctor if you are having problems. It's also a good idea to know your test results and keep a list of the medicines you take. Where can you learn more? Go to http://damián-solitario.info/. Enter C159 in the search box to learn more about \"Knee Arthritis: Exercises. \" Current as of: March 21, 2017 Content Version: 11.4 © 5457-8276 [employer]. Care instructions adapted under license by [employer] (which disclaims liability or warranty for this information). If you have questions about a medical condition or this instruction, always ask your healthcare professional. [address] any warranty or liability for your use of this information. Introducing [facility] & HE[facility] SERVICES! [employer] introduces Teranode patient portal. Now you can access parts of your medical record, email your doctor's office, and request medication refills online. 1. In your internet browser, go to [email or web address] 2. Click on the First Time User? Click Here link in the Sign In box. You will see the New Member Sign Up page. 3. Enter your Teranode Access Code exactly as it appears below.  You will not need to use this code after youve completed the sign-up process. If you do not sign up before the expiration date, you must request a new code. · Healthiest You Access Code: [email or web address] Expires: 3/6/2018  3:48 PM 
 
4. Enter the last four digits of your Social Security Number (xxxx) and Date of Birth (mm/dd/yyyy) as indicated and click Submit. You will be taken to the next sign-up page. 5. Create a Healthiest You ID. This will be your Healthiest You login ID and cannot be changed, so think of one that is secure and easy to remember. 6. Create a Healthiest You password. You can change your password at any time. 7. Enter your Password Reset Question and Answer. This can be used at a later time if you forget your password. 8. Enter your e-mail address. You will receive e-mail notification when new information is available in [address]. 9. Click Sign Up. You can now view and download portions of your medical record. 10. Click the Download Summary menu link to download a portable copy of your medical information. If you have questions, please visit the Frequently Asked Questions section of the Healthiest You website. Remember, Healthiest You is NOT to be used for urgent needs. For medical emergencies, dial 911. Now available from your iPhone and Android! Please provide this summary of care documentation to your next provider. Your primary care clinician is listed as Meron Rdz. If you have any questions after today's visit, please call [phone].

## 2018-02-14 NOTE — PROGRESS NOTES
AMBULATORY PROGRESS NOTE      Patient: Yoel Wilder             MRN: [id number]     SSN: [id number] Body mass index is 41.98 kg/(m^2). YOB: 1965     AGE: 47 y.o. EX: female    PCP: Caio Gregorio MD    IMPRESSION/DIAGNOSIS AND TREATMENT PLAN     DIAGNOSES  1. Primary osteoarthritis of both knees        Orders Placed This Encounter    [place] DRAIN/INJECT LARGE JOINT/BURSA - 20610    EUFLEXXA INJECTION PER DOSE    sodium hyaluronate (SUPARTZ FX/HYALGAN/GENIVSC) 10 mg/mL syrg injection      Yoel Wilder understands her diagnoses and the proposed plan. Plan:    1) Continue activity as tolerated. RTO - 2 months    HPI AND EXAMINATION     Yoel Wilder IS A 47 y.o. female who presents to my outpatient office for follow up of primary OA of both knees. At last visit, I injected Euflexxa #2 to the bilateral knees. Patient reports that she has noticed significant improvement since her second Euflexxa injection. She has been able to ambulate and stand for longer periods of time without noticing discomfort. Otherwise, patient is doing well. Knees:  Bilateral       Cutaneous: Skin intact, no abrasions, blisters, wounds, erythema       Effusion: Is not present       Crepitus:  mild PF joint crepitus       Tenderness: Tenderness: Medial joint line        Alignment of Knee: neutral when standing       ROM: diminished range of motion       Fullness or swelling: None to popliteal fossa region,         Stability: No instability to anterior, posterior, varus, valgus stress testing       Thrust:   No varus thrust with gait       Neuro:  yes, Extensor mechanism is intact    CHART REVIEW     Past Medical History:   Diagnosis Date    Asthma      Current Outpatient Prescriptions   Medication Sig    sodium hyaluronate (SUPARTZ FX/HYALGAN/GENIVSC) 10 mg/mL syrg injection 2 mL by Intra artICUlar route once for 1 dose.     ergocalciferol (ERGOCALCIFEROL) 50,000 unit capsule TAKE 1 CAPSULE BY MOUTH EVERY 7 DAYS    oxaprozin (DAYPRO) 600 mg tablet TAKE 1 TABLET BY MOUTH TWICE DAILY WITH MEALS    oxaprozin (DAYPRO) 600 mg tablet Take 1 Tab by mouth daily.  OTHER Jacob Rhino Salt Packets    azelastine (ASTELIN) 137 mcg (0.1 %) nasal spray INT 2 SPRAYS IEN BID    QNASL 80 mcg/actuation HFAA INHALE 1 PUFF IEN BID    ergocalciferol (ERGOCALCIFEROL) 50,000 unit capsule TK 1 C PO Q WEEK    guaiFENesin ER (MUCINEX) 600 mg ER tablet Take 600 mg by mouth daily.  budesonide (PULMICORT) 0.5 mg/2 mL nbsp 500 mcg by Nebulization route.  OLANZapine-FLUoxetine (SYMBYAX) 3-25 mg per capsule Take 1 Cap by mouth every evening.  loratadine (CLARITIN) 10 mg tablet Take 10 mg by mouth two (2) times a day.  esomeprazole (NEXIUM) 20 mg capsule Take 40 mg by mouth two (2) times a day.  escitalopram oxalate (LEXAPRO) 20 mg tablet Take 20 mg by mouth daily.  montelukast (SINGULAIR) 10 mg tablet Take 10 mg by mouth daily.  traMADol (ULTRAM) 50 mg tablet Take 1 Tab by mouth every six (6) hours as needed for Pain. Max Daily Amount: 200 mg.  traMADol (ULTRAM) 50 mg tablet Take 1 Tab by mouth every six (6) hours as needed for Pain. Max Daily Amount: 200 mg. No current facility-administered medications for this visit. Allergies   Allergen Reactions    Calcium Other (comments)    Egg Swelling    Gluten Shortness of Breath    Milk Containing Products Other (comments)    Red Dye Swelling    Sulfa (Sulfonamide Antibiotics) Other (comments)     Past Surgical History:   Procedure Laterality Date    HX HYSTERECTOMY      HX PELVIC LAPAROSCOPY      HX TONSILLECTOMY       Social History     Occupational History    Not on file. Social History Main Topics    Smoking status: Never Smoker    Smokeless tobacco: Never Used    Alcohol use No    Drug use: No    Sexual activity: Not on file     Family History   Problem Relation Age of Onset    Family history unknown:  Yes    Hypertension Mother    [address] [facility] Hypertension Father     Heart Disease Father     Stroke Father        REVIEW OF SYSTEMS : 2/14/2018  ALL BELOW ARE Negative except : SEE HPI       Constitutional: Negative for fever, chills and weight loss. Neg Weigh Loss  Cardiovascular: Negative for chest pain, claudication and leg swelling. SOB, TALAVERA   Gastrointestinal: Negative for  pain, N/V/D/C, Blood in stool or urine,dysuria, hematuria,        Incontinence, pelvic pain  Musculoskeletal: see HPI. Neurological: Negative for dizziness and weakness. Negative for headaches,Visual Changes, Confusion, Seizures,   Psychiatric/Behavioral: Negative for depression, memory loss and substance abuse. Extremities:  Negative for  hair changes, rash or skin lesion changes. Hematologic: Negative for Bleeding problems, bruising, pallor or swollen lymph nodes. Peripheral Vascular: No calf pain, vascular vein tenderness to calf pain              No calf throbbing, posterior knee throbbing pain    DIAGNOSTIC IMAGING     No notes on file        [address], THE FOLLOWING IS TRUE:    Sandra He has a Diagnosis of osteoarthritis and has documentation that the pain interferes with functional activities. Sandra He has documentation of failure to respond  adequately to at least 3 months of conservative therapy:which (Activity modification, home exercise, protective weight bearing, and various analgesics. NAME is unable to tolerate conservative therapy (prolonged NSAID use) because of adverse effects of NSAIDs (GI irritation, gastric/colonic irritation. Thus visco supplementation is requested as an alternative treatment for knee OA. Sandra He , is here for the 3rd Euflexxa injection into both knee. Sandra He denies any redness, fevers, shakes, chills, or any reaction from the prior Euflexxa injection. This patient is accompanied in the office by her self.  A formal time out was conducted by me informing Berkley Franco Manuel of the risks and benefits of the infection. The injection was performed 2/14/2018 8:08 AM with sterile technique. The right knee is cleansed with alcohol and then sterilized with Betadine, the medial aspect of the right knee. right  knee was then injected with Euflexxa. she tolerated the procedure well. Band-Aids were applied. The risks of Euflexxa include bleeding, infection, and reactive synovitis. The pain assessment score PRIOR/AFTER to the injection: 4 /10 // 4 /10 pain severity, mild intensity, aching Pain quality. Written by Celine Raygoza, as dictated by Laura Koroma MD. Dr. OLIVIA, Laura Koroma MD, confirm that all documentation is accurate.

## 2018-02-19 RX ORDER — HYALURONATE SODIUM 10 MG/ML
2 SYRINGE (ML) INTRAARTICULAR ONCE
Qty: 2 ML | Refills: 0
Start: 2018-02-19 | End: 2018-02-19

## 2018-04-16 ENCOUNTER — OFFICE VISIT (OUTPATIENT)
Dept: ORTHOPEDIC SURGERY | Age: 53
End: 2018-04-16

## 2018-04-16 VITALS
WEIGHT: 237 LBS | BODY MASS INDEX: 41.99 KG/M2 | HEART RATE: 62 BPM | SYSTOLIC BLOOD PRESSURE: 123 MMHG | OXYGEN SATURATION: 97 % | DIASTOLIC BLOOD PRESSURE: 69 MMHG | TEMPERATURE: 97.5 F | HEIGHT: 63 IN

## 2018-04-16 DIAGNOSIS — M17.0 PRIMARY OSTEOARTHRITIS OF BOTH KNEES: Primary | ICD-10-CM

## 2018-04-16 RX ORDER — FEXOFENADINE HCL AND PSEUDOEPHEDRINE HCI 60; 120 MG/1; MG/1
1 TABLET, EXTENDED RELEASE ORAL EVERY 12 HOURS
COMMUNITY

## 2018-04-16 NOTE — PROGRESS NOTES
AMBULATORY PROGRESS NOTE      Patient: Angella Mcnally             MRN: [id number]     SSN: [id number] Body mass index is 41.98 kg/(m^2). YOB: 1965     AGE: 47 y.o. EX: female    PCP: Josue Corey MD    IMPRESSION/DIAGNOSIS AND TREATMENT PLAN     DIAGNOSES  1. Primary osteoarthritis of both knees        No orders of the defined types were placed in this encounter. Angella Mcnally understands her diagnoses and the proposed plan. Plan:    1) Increase Daypro dosage to BID. 2) Continue activity modification as directed. RTO - 4 weeks / PLEASE OBTAIN X-RAYS OF: Thoracic and Lumbar spine 3 VIEWS    HPI AND EXAMINATION     Angella Mcnally IS A 47 y.o. female who presents to my outpatient office for follow up of primary OA of both knees. At last visit, I injected Euflexxa #3 out of 3 and recommended to continue activity as tolerated. Since we saw her last, Ms. Tadeo Boles reports she continues to experience pain along her bilateral knees after a series of Euflexxa injections. She notes the previous course of injections provided more relief. She notes having some stiffness, and aching pain along her bilateral knees. She rates her pain at 3/10 today. Patient continues to take 1 x tablet of Daypro daily. She was previously prescribed Tramadol 50 mg by Dr. Laverne Ames but she never took the medication and notes she would like to avoid pain medication as much as possible.     Knees:  Bilateral       Cutaneous: Skin intact, no abrasions, blisters, wounds, erythema       Effusion: Is not present       Crepitus:  mild PF joint crepitus       Tenderness: Tenderness: Medial joint line        Alignment of Knee: neutral when standing       ROM: diminished range of motion       Fullness or swelling: None to popliteal fossa region,         Stability: No instability to anterior, posterior, varus, valgus stress testing       Thrust:   No varus thrust with gait       Neuro:  yes, Extensor mechanism is intact    CHART REVIEW     Past Medical History:   Diagnosis Date    Asthma      Current Outpatient Prescriptions   Medication Sig    fexofenadine-pseudoephedrine (ALLEGRA-D 12 HOUR)  mg Tb12 Take 1 Tab by mouth every twelve (12) hours.  ergocalciferol (ERGOCALCIFEROL) 50,000 unit capsule TAKE 1 CAPSULE BY MOUTH EVERY 7 DAYS    oxaprozin (DAYPRO) 600 mg tablet Take 1 Tab by mouth daily.  OTHER Jacob Rhino Salt Packets    azelastine (ASTELIN) 137 mcg (0.1 %) nasal spray INT 2 SPRAYS IEN BID    QNASL 80 mcg/actuation HFAA INHALE 1 PUFF IEN BID    ergocalciferol (ERGOCALCIFEROL) 50,000 unit capsule TK 1 C PO Q WEEK    guaiFENesin ER (MUCINEX) 600 mg ER tablet Take 600 mg by mouth daily.  budesonide (PULMICORT) 0.5 mg/2 mL nbsp 500 mcg by Nebulization route.  OLANZapine-FLUoxetine (SYMBYAX) 3-25 mg per capsule Take 1 Cap by mouth every evening.  esomeprazole (NEXIUM) 20 mg capsule Take 40 mg by mouth two (2) times a day.  escitalopram oxalate (LEXAPRO) 20 mg tablet Take 20 mg by mouth daily.  montelukast (SINGULAIR) 10 mg tablet Take 10 mg by mouth daily.  oxaprozin (DAYPRO) 600 mg tablet TAKE 1 TABLET BY MOUTH TWICE DAILY WITH MEALS    traMADol (ULTRAM) 50 mg tablet Take 1 Tab by mouth every six (6) hours as needed for Pain. Max Daily Amount: 200 mg.  traMADol (ULTRAM) 50 mg tablet Take 1 Tab by mouth every six (6) hours as needed for Pain. Max Daily Amount: 200 mg.  loratadine (CLARITIN) 10 mg tablet Take 10 mg by mouth two (2) times a day. No current facility-administered medications for this visit.       Allergies   Allergen Reactions    Calcium Other (comments)    Egg Swelling    Gluten Shortness of Breath    Milk Containing Products Other (comments)    Red Dye Swelling    Sulfa (Sulfonamide Antibiotics) Other (comments)     Past Surgical History:   Procedure Laterality Date    HX HYSTERECTOMY      HX PELVIC LAPAROSCOPY      HX TONSILLECTOMY       Social History Occupational History    Not on file. Social History Main Topics    Smoking status: Never Smoker    Smokeless tobacco: Never Used    Alcohol use No    Drug use: No    Sexual activity: Not on file     Family History   Problem Relation Age of Onset    Family history unknown: Yes    Hypertension Mother     Hypertension Father     Heart Disease Father     Stroke Father        REVIEW OF SYSTEMS : 4/16/2018  ALL BELOW ARE Negative except : SEE HPI       Constitutional: Negative for fever, chills and weight loss. Neg Weigh Loss  Cardiovascular: Negative for chest pain, claudication and leg swelling. SOB, TALAVERA   Gastrointestinal: Negative for  pain, N/V/D/C, Blood in stool or urine,dysuria, hematuria,        Incontinence, pelvic pain  Musculoskeletal: see HPI. Neurological: Negative for dizziness and weakness. Negative for headaches,Visual Changes, Confusion, Seizures,   Psychiatric/Behavioral: Negative for depression, memory loss and substance abuse. Extremities:  Negative for  hair changes, rash or skin lesion changes. Hematologic: Negative for Bleeding problems, bruising, pallor or swollen lymph nodes. Peripheral Vascular: No calf pain, vascular vein tenderness to calf pain              No calf throbbing, posterior knee throbbing pain    DIAGNOSTIC IMAGING     No notes on file    Written by Kushal Felix, as dictated by Vinh Mak MD. IDr., Vinh Mak MD, confirm that all documentation is accurate.

## 2018-04-16 NOTE — PATIENT INSTRUCTIONS
Please follow up in 4 weeks. You are advised to contact us if your condition worsens. Knee Arthritis: Exercises  Your Care Instructions  Here are some examples of exercises for knee arthritis. Start each exercise slowly. Ease off the exercise if you start to have pain. Your doctor or physical therapist will tell you when you can start these exercises and which ones will work best for you. How to do the exercises  Knee flexion with heel slide    1. Lie on your back with your knees bent. 2. Slide your heel back by bending your affected knee as far as you can. Then hook your other foot around your ankle to help pull your heel even farther back. 3. Hold for about 6 seconds, then rest for up to 10 seconds. 4. Repeat 8 to 12 times. 5. Switch legs and repeat steps 1 through 4, even if only one knee is sore. Quad sets    1. Sit with your affected leg straight and supported on the floor or a firm bed. Place a small, rolled-up towel under your knee. Your other leg should be bent, with that foot flat on the floor. 2. Tighten the thigh muscles of your affected leg by pressing the back of your knee down into the towel. 3. Hold for about 6 seconds, then rest for up to 10 seconds. 4. Repeat 8 to 12 times. 5. Switch legs and repeat steps 1 through 4, even if only one knee is sore. Straight-leg raises to the front    1. Lie on your back with your good knee bent so that your foot rests flat on the floor. Your affected leg should be straight. Make sure that your low back has a normal curve. You should be able to slip your hand in between the floor and the small of your back, with your palm touching the floor and your back touching the back of your hand. 2. Tighten the thigh muscles in your affected leg by pressing the back of your knee flat down to the floor. Hold your knee straight. 3. Keeping the thigh muscles tight and your leg straight, lift your affected leg up so that your heel is about 12 inches off the floor. Hold for about 6 seconds, then lower slowly. 4. Relax for up to 10 seconds between repetitions. 5. Repeat 8 to 12 times. 6. Switch legs and repeat steps 1 through 5, even if only one knee is sore. Active knee flexion    1. Lie on your stomach with your knees straight. If your kneecap is uncomfortable, roll up a washcloth and put it under your leg just above your kneecap. 2. Lift the foot of your affected leg by bending the knee so that you bring the foot up toward your buttock. If this motion hurts, try it without bending your knee quite as far. This may help you avoid any painful motion. 3. Slowly move your leg up and down. 4. Repeat 8 to 12 times. 5. Switch legs and repeat steps 1 through 4, even if only one knee is sore. Quadriceps stretch (facedown)    1. Lie flat on your stomach, and rest your face on the floor. 2. Wrap a towel or belt strap around the lower part of your affected leg. Then use the towel or belt strap to slowly pull your heel toward your buttock until you feel a stretch. 3. Hold for about 15 to 30 seconds, then relax your leg against the towel or belt strap. 4. Repeat 2 to 4 times. 5. Switch legs and repeat steps 1 through 4, even if only one knee is sore. Stationary exercise bike    1. If you do not have a stationary exercise bike at home, you can find one to ride at your local health club or community center. 2. Adjust the height of the bike seat so that your knee is slightly bent when your leg is extended downward. If your knee hurts when the pedal reaches the top, you can raise the seat so that your knee does not bend as much. 3. Start slowly. At first, try to do 5 to 10 minutes of cycling with little to no resistance. Then increase your time and the resistance bit by bit until you can do 20 to 30 minutes without pain. 4. If you start to have pain, rest your knee until your pain gets back to the level that is normal for you.  Or cycle for less time or with less effort. Follow-up care is a key part of your treatment and safety. Be sure to make and go to all appointments, and call your doctor if you are having problems. It's also a good idea to know your test results and keep a list of the medicines you take. Where can you learn more? Go to http://damián-solitario.info/. Enter C159 in the search box to learn more about \"Knee Arthritis: Exercises. \"  Current as of: March 21, 2017  Content Version: 11.4  © 5703-7487 [employer]. Care instructions adapted under license by [employer] (which disclaims liability or warranty for this information). If you have questions about a medical condition or this instruction, always ask your healthcare professional. [address] any warranty or liability for your use of this information.

## 2018-04-16 NOTE — MR AVS SNAPSHOT
[address] [place] [address], [address] [facility] 
[phone] Patient: Teto Muhammad MRN: [id number] :1965 Visit Information Date & Time Provider Department Dept. Phone Encounter #  
 2018  4:00 PM Esthela Alonso, [facility] [id number] Upcoming Health Maintenance Date Due Hepatitis C Screening 1965 DTaP/Tdap/Td series (1 - Tdap) 1985 PAP AKA CERVICAL CYTOLOGY 1985 BREAST CANCER SCRN MAMMOGRAM 2014 FOBT Q 1 YEAR AGE 50-75 2014 Influenza Age 5 to Adult 2017 Allergies as of 2018  Review Complete On: 2018 By: Delta Delgado Severity Noted Reaction Type Reactions Calcium  2016    Other (comments) Egg  2017    Swelling Gluten  2018    Shortness of Breath Milk Containing Products  2016    Other (comments) Red Dye  2017    Swelling Sulfa (Sulfonamide Antibiotics)  2016    Other (comments) Current Immunizations  Never Reviewed No immunizations on file. Not reviewed this visit You Were Diagnosed With   
  
 Codes Comments Primary osteoarthritis of both knees    -  Primary ICD-10-CM: M17.0 ICD-9-CM: 715.16 Vitals BP Pulse Temp Height(growth percentile) Weight(growth percentile) SpO2  
 123/69 62 97.5 °F (36.4 °C) (Oral) 5' 3\" (1.6 m) 237 lb (107.5 kg) 97% BMI OB Status Smoking Status 41.98 kg/m2 Unknown Never Smoker BMI and BSA Data Body Mass Index Body Surface Area 41.98 kg/m 2 2.19 m 2 Preferred Pharmacy Pharmacy Name Phone [facility] [address] [place] [address], [facility] AT [facility] [phone] Your Updated Medication List  
  
   
This list is accurate as of 18  5:56 PM.  Always use your most recent med list.  
  
  
  
  
 ALLEGRA-D 12 HOUR  mg Tb12 Generic drug:  fexofenadine-pseudoephedrine Take 1 Tab by mouth every twelve (12) hours. azelastine 137 mcg (0.1 %) nasal spray Commonly known as:  ASTELIN  
INT 2 SPRAYS IEN BID  
  
 budesonide 0.5 mg/2 mL Nbsp Commonly known as:  PULMICORT  
500 mcg by Nebulization route. * ergocalciferol 50,000 unit capsule Commonly known as:  ERGOCALCIFEROL TK 1 C PO Q WEEK * ergocalciferol 50,000 unit capsule Commonly known as:  ERGOCALCIFEROL  
TAKE 1 CAPSULE BY MOUTH EVERY 7 DAYS  
  
 LEXAPRO 20 mg tablet Generic drug:  escitalopram oxalate Take 20 mg by mouth daily. loratadine 10 mg tablet Commonly known as:  Malini Schlossman Take 10 mg by mouth two (2) times a day. MUCINEX 600 mg ER tablet Generic drug:  guaiFENesin ER Take 600 mg by mouth daily. NexIUM 20 mg capsule Generic drug:  esomeprazole Take 40 mg by mouth two (2) times a day. OTHER [address] * oxaprozin 600 mg tablet Commonly known as:  Jose Sage Take 1 Tab by mouth daily. * oxaprozin 600 mg tablet Commonly known as:  Jose Sage TAKE 1 TABLET BY MOUTH TWICE DAILY WITH MEALS QNASL 80 mcg/actuation [facility] Generic drug:  beclomethasone dipropionate INHALE 1 PUFF IEN BID  
  
 SINGULAIR 10 mg tablet Generic drug:  montelukast  
Take 10 mg by mouth daily. SYMBYAX 3-25 mg per capsule Generic drug:  OLANZapine-FLUoxetine Take 1 Cap by mouth every evening. * traMADol 50 mg tablet Commonly known as:  ULTRAM  
Take 1 Tab by mouth every six (6) hours as needed for Pain. Max Daily Amount: 200 mg.  
  
 * traMADol 50 mg tablet Commonly known as:  ULTRAM  
Take 1 Tab by mouth every six (6) hours as needed for Pain. Max Daily Amount: 200 mg.  
  
 * Notice: This list has 6 medication(s) that are the same as other medications prescribed for you.  Read the directions carefully, and ask your doctor or other care provider to review them with you. Patient Instructions Please follow up in 4 weeks. You are advised to contact us if your condition worsens. Knee Arthritis: Exercises Your Care Instructions Here are some examples of exercises for knee arthritis. Start each exercise slowly. Ease off the exercise if you start to have pain. Your doctor or physical therapist will tell you when you can start these exercises and which ones will work best for you. How to do the exercises Knee flexion with heel slide 1. Lie on your back with your knees bent. 2. Slide your heel back by bending your affected knee as far as you can. Then hook your other foot around your ankle to help pull your heel even farther back. 3. Hold for about 6 seconds, then rest for up to 10 seconds. 4. Repeat 8 to 12 times. 5. Switch legs and repeat steps 1 through 4, even if only one knee is sore. Radiance 1. Sit with your affected leg straight and supported on the floor or a firm bed. Place a small, rolled-up towel under your knee. Your other leg should be bent, with that foot flat on the floor. 2. Tighten the thigh muscles of your affected leg by pressing the back of your knee down into the towel. 3. Hold for about 6 seconds, then rest for up to 10 seconds. 4. Repeat 8 to 12 times. 5. Switch legs and repeat steps 1 through 4, even if only one knee is sore. Straight-leg raises to the front 1. Lie on your back with your good knee bent so that your foot rests flat on the floor. Your affected leg should be straight. Make sure that your low back has a normal curve. You should be able to slip your hand in between the floor and the small of your back, with your palm touching the floor and your back touching the back of your hand. 2. Tighten the thigh muscles in your affected leg by pressing the back of your knee flat down to the floor. Hold your knee straight. 3. Keeping the thigh muscles tight and your leg straight, lift your affected leg up so that your heel is about 12 inches off the floor. Hold for about 6 seconds, then lower slowly. 4. Relax for up to 10 seconds between repetitions. 5. Repeat 8 to 12 times. 6. Switch legs and repeat steps 1 through 5, even if only one knee is sore. Active knee flexion 1. Lie on your stomach with your knees straight. If your kneecap is uncomfortable, roll up a washcloth and put it under your leg just above your kneecap. 2. Lift the foot of your affected leg by bending the knee so that you bring the foot up toward your buttock. If this motion hurts, try it without bending your knee quite as far. This may help you avoid any painful motion. 3. Slowly move your leg up and down. 4. Repeat 8 to 12 times. 5. Switch legs and repeat steps 1 through 4, even if only one knee is sore. Quadriceps stretch (facedown) 1. Lie flat on your stomach, and rest your face on the floor. 2. Wrap a towel or belt strap around the lower part of your affected leg. Then use the towel or belt strap to slowly pull your heel toward your buttock until you feel a stretch. 3. Hold for about 15 to 30 seconds, then relax your leg against the towel or belt strap. 4. Repeat 2 to 4 times. 5. Switch legs and repeat steps 1 through 4, even if only one knee is sore. Stationary exercise bike 1. If you do not have a stationary exercise bike at home, you can find one to ride at your local health club or community center. 2. Adjust the height of the bike seat so that your knee is slightly bent when your leg is extended downward. If your knee hurts when the pedal reaches the top, you can raise the seat so that your knee does not bend as much. 3. Start slowly. At first, try to do 5 to 10 minutes of cycling with little to no resistance. Then increase your time and the resistance bit by bit until you can do 20 to 30 minutes without pain. 4. If you start to have pain, rest your knee until your pain gets back to the level that is normal for you. Or cycle for less time or with less effort. Follow-up care is a key part of your treatment and safety. Be sure to make and go to all appointments, and call your doctor if you are having problems. It's also a good idea to know your test results and keep a list of the medicines you take. Where can you learn more? Go to http://damián-solitario.info/. Enter C159 in the search box to learn more about \"Knee Arthritis: Exercises. \" Current as of: March 21, 2017 Content Version: 11.4 © 1522-6408 [employer]. Care instructions adapted under license by [employer] (which disclaims liability or warranty for this information). If you have questions about a medical condition or this instruction, always ask your healthcare professional. [place] [address] any warranty or liability for your use of this information. Introducing [facility]! [employer] introduces LearnUp patient portal. Now you can access parts of your medical record, email your doctor's office, and request medication refills online. 1. In your internet browser, go to [email or web address] 2. Click on the First Time User? Click Here link in the Sign In box. You will see the New Member Sign Up page. 3. Enter your LearnUp Access Code exactly as it appears below. You will not need to use this code after youve completed the sign-up process. If you do not sign up before the expiration date, you must request a new code. · LearnUp Access Code: [email or web address] Expires: 5/30/2018  3:34 PM 
 
4. Enter the last four digits of your Social Security Number (xxxx) and Date of Birth (mm/dd/yyyy) as indicated and click Submit. You will be taken to the next sign-up page. 5. Create a [employer] Scien[employer] ID.  This will be your [employer] login ID and cannot be changed, so think of one that is secure and easy to remember. 6. Create a [employer] password. You can change your password at any time. 7. Enter your Password Reset Question and Answer. This can be used at a later time if you forget your password. 8. Enter your e-mail address. You will receive e-mail notification when new information is available in [address] 9. Click Sign Up. You can now view and download portions of your medical record. 10. Click the Download Summary menu link to download a portable copy of your medical information. If you have questions, please visit the Frequently Asked Questions section of the [employer] website. Remember, [employer] is NOT to be used for urgent needs. For medical emergencies, dial 911. Now available from your iPhone and Android! Please provide this summary of care documentation to your next provider. Your primary care clinician is listed as Liam Dumont. If you have any questions after today's visit, please call [phone].

## 2018-04-24 RX ORDER — ERGOCALCIFEROL 1.25 MG/1
CAPSULE ORAL
Qty: 8 CAP | Refills: 0 | Status: SHIPPED | COMMUNITY
Start: 2018-04-24 | End: 2018-06-16 | Stop reason: SDUPTHER

## 2018-05-29 ENCOUNTER — OFFICE VISIT (OUTPATIENT)
Dept: ORTHOPEDIC SURGERY | Age: 53
End: 2018-05-29

## 2018-05-29 VITALS — HEIGHT: 63 IN | WEIGHT: 237 LBS | BODY MASS INDEX: 41.99 KG/M2

## 2018-05-29 DIAGNOSIS — M54.10 RADICULOPATHY WITH LOWER EXTREMITY SYMPTOMS: ICD-10-CM

## 2018-05-29 DIAGNOSIS — M17.0 PRIMARY OSTEOARTHRITIS OF BOTH KNEES: Primary | ICD-10-CM

## 2018-05-29 NOTE — PROGRESS NOTES
AMBULATORY PROGRESS NOTE      Patient: Elia Ren             MRN: [id number]     SSN: [id number] Body mass index is 41.98 kg/(m^2). YOB: 1965     AGE: 47 y.o. EX: female    PCP: Charlotte Ellison MD    IMPRESSION/DIAGNOSIS AND TREATMENT PLAN     DIAGNOSES  1. Primary osteoarthritis of both knees    2. Radiculopathy with lower extremity symptoms        Orders Placed This Encounter    [28768] L/S 2-3 views    [[id number]] T Spine 2V    CT SPINE [facility] WO CONT    CT SPINE LUMB WO CONT      Elia Ren understands her diagnoses and the proposed plan. In this individual who has reported having pain and discomfort on the right side near her bra line, lower ribs, that is intermittent but at times can be excruciating, recommendation is going to be for a CT scan of her thoracic and lumbar spine assessing the lower segments of the right side vertebral bodies in the costochondral junctions, as on my plain x-ray, it is hard to see the rib articulation, the right T-11 rib to vertebral body articulation. It is hard to see with clarity. Indeed, this study is an item of medical necessity to make sure there are no other deleterious occult lesions that are not visualized well. It is to be noted she had compression fractures in her spine at two levels, for which she had an injury in 1983. Plan:    1) Continue HEP. 2) Continue activity modification as directed. 3) Follow up with Charlotte Ellison MD.   4) CT scan without IV Contrast of the Thoracic and Lumbar Spine. RTO - 3 months    HPI AND EXAMINATION     Elia Ren IS A 47 y.o. female who presents to my outpatient office for follow up of primary OA of both knees. At last visit, I increased the Daypro dosage to BID, and recommended to continue activity as directed. Since we saw her last, Ms. Vedia Dance went to the beach over the Memorial day weekend and notes her bilateral knees did well.  She only experienced some soreness along her bilateral lower legs. As it regards to her knees, she experiences aching pain at night. Otherwise, she denies any major pain or discomfort. She has been instructed to follow a HEP as instructed in the office today. Patient continues to take Daypro which provides relief. She denies any GI discomfort or reflux symptoms. She will follow up in August for another round of Euflexxa injections as it has helped her. As it regards to her back, she has experienced a pulling and \"pinching\" sensation when she reaches for objects that are over her head. She notes there is soreness to her thoracic spine, but most of her discomfort arises from the right side of her lower lumbar paraspinal muscles. She denies any recent falls or trauma. In the past, she reports falling from a tree and suffered multiple vertebral compression fractures that occurred in 1983. Recently, she experienced gallbladder pain and was evaluated at [facility]. She currently awaits results of tests, but denies any pain now. No severe abdominal pain. No changes in her BM. Further denies any urinary symptoms. Knees:  Bilateral       Cutaneous: Skin intact, no abrasions, blisters, wounds, erythema       Effusion: Is not present       Crepitus:  slight PF joint crepitus       Tenderness: No TTP at this time       Alignment of Knee: neutral when standing       ROM: full ROM        Can form figure 4       Fullness or swelling: None to popliteal fossa region,         Stability: No instability to anterior, posterior, varus, valgus stress testing       Thrust:   No varus thrust with gait       Neuro:  yes, Extensor mechanism is intact    NEURO EXAM :    Negative bilateral Straight leg raise (seated position)   See Musculoskeletal section for pertinent individual extremity examination   No abnormal hand/wrist, foot/ankle, or facial/neck tremors. alert, oriented x3    REFLEX EXAM: reflexes are normal and symmetric.     MOTOR: EXTREMITIES   normal 5/5 strength in all tested muscle groups    SENSORY:  WNL    ROM SPINE:  back Normal    TENDERNESS: Right lower paraspinal muscles, lumbar region. No tenderness to left side. CHART REVIEW     Past Medical History:   Diagnosis Date    Asthma      Current Outpatient Prescriptions   Medication Sig    ergocalciferol (ERGOCALCIFEROL) 50,000 unit capsule TAKE 1 CAPSULE BY MOUTH EVERY 7 DAYS    fexofenadine-pseudoephedrine (ALLEGRA-D 12 HOUR)  mg Tb12 Take 1 Tab by mouth every twelve (12) hours.  ergocalciferol (ERGOCALCIFEROL) 50,000 unit capsule TAKE 1 CAPSULE BY MOUTH EVERY 7 DAYS    oxaprozin (DAYPRO) 600 mg tablet TAKE 1 TABLET BY MOUTH TWICE DAILY WITH MEALS    traMADol (ULTRAM) 50 mg tablet Take 1 Tab by mouth every six (6) hours as needed for Pain. Max Daily Amount: 200 mg.    oxaprozin (DAYPRO) 600 mg tablet Take 1 Tab by mouth daily.  OTHER Jacob Rhino Salt Packets    azelastine (ASTELIN) 137 mcg (0.1 %) nasal spray INT 2 SPRAYS IEN BID    QNASL 80 mcg/actuation HFAA INHALE 1 PUFF IEN BID    ergocalciferol (ERGOCALCIFEROL) 50,000 unit capsule TK 1 C PO Q WEEK    guaiFENesin ER (MUCINEX) 600 mg ER tablet Take 600 mg by mouth daily.  traMADol (ULTRAM) 50 mg tablet Take 1 Tab by mouth every six (6) hours as needed for Pain. Max Daily Amount: 200 mg.  budesonide (PULMICORT) 0.5 mg/2 mL nbsp 500 mcg by Nebulization route.  OLANZapine-FLUoxetine (SYMBYAX) 3-25 mg per capsule Take 1 Cap by mouth every evening.  loratadine (CLARITIN) 10 mg tablet Take 10 mg by mouth two (2) times a day.  esomeprazole (NEXIUM) 20 mg capsule Take 40 mg by mouth two (2) times a day.  escitalopram oxalate (LEXAPRO) 20 mg tablet Take 20 mg by mouth daily.  montelukast (SINGULAIR) 10 mg tablet Take 10 mg by mouth daily. No current facility-administered medications for this visit.       Allergies   Allergen Reactions    Calcium Other (comments)    Egg Swelling    Gluten Shortness of Breath    Milk Containing Products Other (comments)    Red Dye Swelling    Sulfa (Sulfonamide Antibiotics) Other (comments)     Past Surgical History:   Procedure Laterality Date    HX HYSTERECTOMY      HX PELVIC LAPAROSCOPY      HX TONSILLECTOMY       Social History     Occupational History    Not on file. Social History Main Topics    Smoking status: Never Smoker    Smokeless tobacco: Never Used    Alcohol use No    Drug use: No    Sexual activity: Not on file     Family History   Problem Relation Age of Onset    Family history unknown: Yes    Hypertension Mother     Hypertension Father     Heart Disease Father     Stroke Father        REVIEW OF SYSTEMS : 5/29/2018  ALL BELOW ARE Negative except : SEE HPI       Constitutional: Negative for fever, chills and weight loss. Neg Weigh Loss  Cardiovascular: Negative for chest pain, claudication and leg swelling. SOB, TALAVERA   Gastrointestinal: Negative for  pain, N/V/D/C, Blood in stool or urine,dysuria, hematuria,        Incontinence, pelvic pain  Musculoskeletal: see HPI. Neurological: Negative for dizziness and weakness. Negative for headaches,Visual Changes, Confusion, Seizures,   Psychiatric/Behavioral: Negative for depression, memory loss and substance abuse. Extremities:  Negative for  hair changes, rash or skin lesion changes. Hematologic: Negative for Bleeding problems, bruising, pallor or swollen lymph nodes. Peripheral Vascular: No calf pain, vascular vein tenderness to calf pain              No calf throbbing, posterior knee throbbing pain    DIAGNOSTIC IMAGING      Dictation on: 05/29/2018  8:29 AM by: Leila Lott [[id number]]         Written by Claudia Carpenter, as dictated by Kathryn Ireland MD. IDr., Kathryn Ireland MD, confirm that all documentation is accurate.

## 2018-05-29 NOTE — PROCEDURES
DIAGNOSTIC STUDIES:  X-rays of the lumbar spine, two views, AP and lateral projection views of the lumbar spine to include the lower thoracic spine, today at [facility], show:     1. L4-5 and L5-S1 lumbar facet degenerative osteoarthritic changes, moderate to severe. 2. L1 vertebral body compression fracture. 3. Generalized thoracic kyphosis. AP of the thoracic spine:  Alignment appears to be within normal limits other than having what looks like a gentle lumbar degenerative scoliosis, as well as a thoracic kyphosis. At the T11 thoracic spine area in the AP view, the right pedicle is a little bit hard to visualize with the articulating rib.

## 2018-05-29 NOTE — MR AVS SNAPSHOT
[address] [place] [address], [address] 200 [facility] 
[phone] Patient: Alyssa Rojas MRN: [id number] :1965 Visit Information Date & Time Provider Department Dept. Phone Encounter #  
 2018  7:35 AM Florentino Seip, [address] [facility] [phone] [id number] Upcoming Health Maintenance Date Due Hepatitis C Screening 1965 DTaP/Tdap/Td series (1 - Tdap) 1985 PAP AKA CERVICAL CYTOLOGY 1985 BREAST CANCER SCRN MAMMOGRAM 2014 FOBT Q 1 YEAR AGE 50-75 2014 Influenza Age 5 to Adult 2018 Allergies as of 2018  Review Complete On: 2018 By: Florentino Seip, MD  
  
 Severity Noted Reaction Type Reactions Calcium  2016    Other (comments) Egg  2017    Swelling Gluten  2018    Shortness of Breath Milk Containing Products  2016    Other (comments) Red Dye  2017    Swelling Sulfa (Sulfonamide Antibiotics)  2016    Other (comments) Current Immunizations  Never Reviewed No immunizations on file. Not reviewed this visit You Were Diagnosed With   
  
 Codes Comments Primary osteoarthritis of both knees    -  Primary ICD-10-CM: M17.0 ICD-9-CM: 715.16 Radiculopathy with lower extremity symptoms     ICD-10-CM: M54.10 ICD-9-CM: 724.4 Vitals Height(growth percentile) Weight(growth percentile) BMI OB Status Smoking Status 5' 3\" (1.6 m) 237 lb (107.5 kg) 41.98 kg/m2 Unknown Never Smoker BMI and BSA Data Body Mass Index Body Surface Area 41.98 kg/m 2 2.19 m 2 Preferred Pharmacy Pharmacy Name Phone [facility] [address] [facility] [phone] Your Updated Medication List  
  
   
This list is accurate as of 18  8:36 AM.  Always use your most recent med list.  
  
  
 ALLEGRA-D 12 HOUR  mg Tb12 Generic drug:  fexofenadine-pseudoephedrine Take 1 Tab by mouth every twelve (12) hours. azelastine 137 mcg (0.1 %) nasal spray Commonly known as:  ASTELIN  
INT 2 SPRAYS IEN BID  
  
 budesonide 0.5 mg/2 mL Nbsp Commonly known as:  PULMICORT  
500 mcg by Nebulization route. * ergocalciferol 50,000 unit capsule Commonly known as:  ERGOCALCIFEROL TK 1 C PO Q WEEK * ergocalciferol 50,000 unit capsule Commonly known as:  ERGOCALCIFEROL  
TAKE 1 CAPSULE BY MOUTH EVERY 7 DAYS * ergocalciferol 50,000 unit capsule Commonly known as:  ERGOCALCIFEROL  
TAKE 1 CAPSULE BY MOUTH EVERY 7 DAYS  
  
 LEXAPRO 20 mg tablet Generic drug:  escitalopram oxalate Take 20 mg by mouth daily. loratadine 10 mg tablet Commonly known as:  Curlee Arms Take 10 mg by mouth two (2) times a day. MUCINEX 600 mg ER tablet Generic drug:  guaiFENesin ER Take 600 mg by mouth daily. NexIUM 20 mg capsule Generic drug:  esomeprazole Take 40 mg by mouth two (2) times a day. OTHER [address] * oxaprozin 600 mg tablet Commonly known as:  Loc Azeem Take 1 Tab by mouth daily. * oxaprozin 600 mg tablet Commonly known as:  Loc Azeem TAKE 1 TABLET BY MOUTH TWICE DAILY WITH MEALS QNASL 80 mcg/actuation Hfaa Generic drug:  beclomethasone dipropionate INHALE 1 PUFF IEN BID  
  
 SINGULAIR 10 mg tablet Generic drug:  montelukast  
Take 10 mg by mouth daily. SYMBYAX 3-25 mg per capsule Generic drug:  OLANZapine-FLUoxetine Take 1 Cap by mouth every evening. * traMADol 50 mg tablet Commonly known as:  ULTRAM  
Take 1 Tab by mouth every six (6) hours as needed for Pain. Max Daily Amount: 200 mg.  
  
 * traMADol 50 mg tablet Commonly known as:  ULTRAM  
Take 1 Tab by mouth every six (6) hours as needed for Pain. Max Daily Amount: 200 mg. * Notice: This list has 7 medication(s) that are the same as other medications prescribed for you. Read the directions carefully, and ask your doctor or other care provider to review them with you. We Performed the Following AMB POC XRAY, SPINE, LUMBOSACRAL; 2 O [94598 CPT(R)] AMB POC XRAY, SPINE; THORACIC, 2 VIEW [98131 CPT(R)] To-Do List   
 05/29/2018 Imaging:  CT SPINE LUMB WO CONT   
  
 05/29/2018 Imaging:  CT SPINE [facility] WO CONT Patient Instructions Please follow up in 3 months. You are advised to contact us if your condition worsens. Knee Arthritis: Exercises Your Care Instructions Here are some examples of exercises for knee arthritis. Start each exercise slowly. Ease off the exercise if you start to have pain. Your doctor or physical therapist will tell you when you can start these exercises and which ones will work best for you. How to do the exercises Knee flexion with heel slide 1. Lie on your back with your knees bent. 2. Slide your heel back by bending your affected knee as far as you can. Then hook your other foot around your ankle to help pull your heel even farther back. 3. Hold for about 6 seconds, then rest for up to 10 seconds. 4. Repeat 8 to 12 times. 5. Switch legs and repeat steps 1 through 4, even if only one knee is sore. [employer] 1. Sit with your affected leg straight and supported on the floor or a firm bed. Place a small, rolled-up towel under your knee. Your other leg should be bent, with that foot flat on the floor. 2. Tighten the thigh muscles of your affected leg by pressing the back of your knee down into the towel. 3. Hold for about 6 seconds, then rest for up to 10 seconds. 4. Repeat 8 to 12 times. 5. Switch legs and repeat steps 1 through 4, even if only one knee is sore. Straight-leg raises to the front 1.  Lie on your back with your good knee bent so that your foot rests flat on the floor. Your affected leg should be straight. Make sure that your low back has a normal curve. You should be able to slip your hand in between the floor and the small of your back, with your palm touching the floor and your back touching the back of your hand. 2. Tighten the thigh muscles in your affected leg by pressing the back of your knee flat down to the floor. Hold your knee straight. 3. Keeping the thigh muscles tight and your leg straight, lift your affected leg up so that your heel is about 12 inches off the floor. Hold for about 6 seconds, then lower slowly. 4. Relax for up to 10 seconds between repetitions. 5. Repeat 8 to 12 times. 6. Switch legs and repeat steps 1 through 5, even if only one knee is sore. Active knee flexion 1. Lie on your stomach with your knees straight. If your kneecap is uncomfortable, roll up a washcloth and put it under your leg just above your kneecap. 2. Lift the foot of your affected leg by bending the knee so that you bring the foot up toward your buttock. If this motion hurts, try it without bending your knee quite as far. This may help you avoid any painful motion. 3. Slowly move your leg up and down. 4. Repeat 8 to 12 times. 5. Switch legs and repeat steps 1 through 4, even if only one knee is sore. Quadriceps stretch (facedown) 1. Lie flat on your stomach, and rest your face on the floor. 2. Wrap a towel or belt strap around the lower part of your affected leg. Then use the towel or belt strap to slowly pull your heel toward your buttock until you feel a stretch. 3. Hold for about 15 to 30 seconds, then relax your leg against the towel or belt strap. 4. Repeat 2 to 4 times. 5. Switch legs and repeat steps 1 through 4, even if only one knee is sore. Stationary exercise bike 1. If you do not have a stationary exercise bike at home, you can find one to ride at your local health club or community center. 2. Adjust the height of the bike seat so that your knee is slightly bent when your leg is extended downward. If your knee hurts when the pedal reaches the top, you can raise the seat so that your knee does not bend as much. 3. Start slowly. At first, try to do 5 to 10 minutes of cycling with little to no resistance. Then increase your time and the resistance bit by bit until you can do 20 to 30 minutes without pain. 4. If you start to have pain, rest your knee until your pain gets back to the level that is normal for you. Or cycle for less time or with less effort. Follow-up care is a key part of your treatment and safety. Be sure to make and go to all appointments, and call your doctor if you are having problems. It's also a good idea to know your test results and keep a list of the medicines you take. Where can you learn more? Go to http://damián-solitario.info/. Enter C159 in the search box to learn more about \"Knee Arthritis: Exercises. \" Current as of: March 21, 2017 Content Version: 11.4 © 4695-7933 [employer]. Care instructions adapted under license by [employer] (which disclaims liability or warranty for this information). If you have questions about a medical condition or this instruction, always ask your healthcare professional. [address] any warranty or liability for your use of this information. Introducing [facility]! Jose Eduardo Garcia introduces Compiere patient portal. Now you can access parts of your medical record, email your doctor's office, and request medication refills online. 1. In your internet browser, go to [email or web address] 2. Click on the First Time User? Click Here link in the Sign In box. You will see the New Member Sign Up page. 3. Enter your Compiere Access Code exactly as it appears below. You will not need to use this code after youve completed the sign-up process.  If you do not sign up before the expiration date, you must request a new code. · [email or web address] Access Code: [email or web address] Expires: 5/30/2018  3:34 PM 
 
4. Enter the last four digits of your Social Security Number (xxxx) and Date of Birth (mm/dd/yyyy) as indicated and click Submit. You will be taken to the next sign-up page. 5. Create a [email or web address] ID. This will be your [email or web address] login ID and cannot be changed, so think of one that is secure and easy to remember. 6. Create a [email or web address] password. You can change your password at any time. 7. Enter your Password Reset Question and Answer. This can be used at a later time if you forget your password. 8. Enter your e-mail address. You will receive e-mail notification when new information is available in [address] 9. Click Sign Up. You can now view and download portions of your medical record. 10. Click the Download Summary menu link to download a portable copy of your medical information. If you have questions, please visit the Frequently Asked Questions section of the [email or web address] website. Remember, [email or web address] is NOT to be used for urgent needs. For medical emergencies, dial 911. Now available from your iPhone and Android! Please provide this summary of care documentation to your next provider. Your primary care clinician is listed as Earle Florence. If you have any questions after today's visit, please call [phone].

## 2018-05-29 NOTE — PATIENT INSTRUCTIONS
Please follow up in 3 months. You are advised to contact us if your condition worsens. Knee Arthritis: Exercises  Your Care Instructions  Here are some examples of exercises for knee arthritis. Start each exercise slowly. Ease off the exercise if you start to have pain. Your doctor or physical therapist will tell you when you can start these exercises and which ones will work best for you. How to do the exercises  Knee flexion with heel slide    1. Lie on your back with your knees bent. 2. Slide your heel back by bending your affected knee as far as you can. Then hook your other foot around your ankle to help pull your heel even farther back. 3. Hold for about 6 seconds, then rest for up to 10 seconds. 4. Repeat 8 to 12 times. 5. Switch legs and repeat steps 1 through 4, even if only one knee is sore. Quad sets    1. Sit with your affected leg straight and supported on the floor or a firm bed. Place a small, rolled-up towel under your knee. Your other leg should be bent, with that foot flat on the floor. 2. Tighten the thigh muscles of your affected leg by pressing the back of your knee down into the towel. 3. Hold for about 6 seconds, then rest for up to 10 seconds. 4. Repeat 8 to 12 times. 5. Switch legs and repeat steps 1 through 4, even if only one knee is sore. Straight-leg raises to the front    1. Lie on your back with your good knee bent so that your foot rests flat on the floor. Your affected leg should be straight. Make sure that your low back has a normal curve. You should be able to slip your hand in between the floor and the small of your back, with your palm touching the floor and your back touching the back of your hand. 2. Tighten the thigh muscles in your affected leg by pressing the back of your knee flat down to the floor. Hold your knee straight.   3. Keeping the thigh muscles tight and your leg straight, lift your affected leg up so that your heel is about 12 inches off the floor. Hold for about 6 seconds, then lower slowly. 4. Relax for up to 10 seconds between repetitions. 5. Repeat 8 to 12 times. 6. Switch legs and repeat steps 1 through 5, even if only one knee is sore. Active knee flexion    1. Lie on your stomach with your knees straight. If your kneecap is uncomfortable, roll up a washcloth and put it under your leg just above your kneecap. 2. Lift the foot of your affected leg by bending the knee so that you bring the foot up toward your buttock. If this motion hurts, try it without bending your knee quite as far. This may help you avoid any painful motion. 3. Slowly move your leg up and down. 4. Repeat 8 to 12 times. 5. Switch legs and repeat steps 1 through 4, even if only one knee is sore. Quadriceps stretch (facedown)    1. Lie flat on your stomach, and rest your face on the floor. 2. Wrap a towel or belt strap around the lower part of your affected leg. Then use the towel or belt strap to slowly pull your heel toward your buttock until you feel a stretch. 3. Hold for about 15 to 30 seconds, then relax your leg against the towel or belt strap. 4. Repeat 2 to 4 times. 5. Switch legs and repeat steps 1 through 4, even if only one knee is sore. Stationary exercise bike    1. If you do not have a stationary exercise bike at home, you can find one to ride at your local health club or community center. 2. Adjust the height of the bike seat so that your knee is slightly bent when your leg is extended downward. If your knee hurts when the pedal reaches the top, you can raise the seat so that your knee does not bend as much. 3. Start slowly. At first, try to do 5 to 10 minutes of cycling with little to no resistance. Then increase your time and the resistance bit by bit until you can do 20 to 30 minutes without pain. 4. If you start to have pain, rest your knee until your pain gets back to the level that is normal for you.  Or cycle for less time or with less effort. Follow-up care is a key part of your treatment and safety. Be sure to make and go to all appointments, and call your doctor if you are having problems. It's also a good idea to know your test results and keep a list of the medicines you take. Where can you learn more? Go to http://damián-solitario.info/. Enter C159 in the search box to learn more about \"Knee Arthritis: Exercises. \"  Current as of: March 21, 2017  Content Version: 11.4  © 9016-5646 [employer]. Care instructions adapted under license by [employer] (which disclaims liability or warranty for this information). If you have questions about a medical condition or this instruction, always ask your healthcare professional. [address] any warranty or liability for your use of this information.

## 2018-06-06 ENCOUNTER — HOSPITAL ENCOUNTER (OUTPATIENT)
Dept: CT IMAGING | Age: 53
Discharge: HOME | End: 2018-06-06
Attending: ORTHOPAEDIC SURGERY
Payer: COMMERCIAL

## 2018-06-06 DIAGNOSIS — M54.10 RADICULOPATHY WITH LOWER EXTREMITY SYMPTOMS: ICD-10-CM

## 2018-06-06 DIAGNOSIS — M17.0 PRIMARY OSTEOARTHRITIS OF BOTH KNEES: ICD-10-CM

## 2018-06-06 PROCEDURE — 72128 CT CHEST SPINE W/O DYE: CPT

## 2018-06-06 PROCEDURE — 72131 CT LUMBAR SPINE W/O DYE: CPT

## 2018-06-13 ENCOUNTER — OFFICE VISIT (OUTPATIENT)
Dept: ORTHOPEDIC SURGERY | Age: 53
End: 2018-06-13

## 2018-06-13 VITALS
HEART RATE: 61 BPM | SYSTOLIC BLOOD PRESSURE: 148 MMHG | WEIGHT: 241 LBS | DIASTOLIC BLOOD PRESSURE: 82 MMHG | BODY MASS INDEX: 42.7 KG/M2 | TEMPERATURE: 98.2 F | HEIGHT: 63 IN | RESPIRATION RATE: 16 BRPM | OXYGEN SATURATION: 98 %

## 2018-06-13 DIAGNOSIS — M48.061 SPINAL STENOSIS OF LUMBAR REGION, UNSPECIFIED WHETHER NEUROGENIC CLAUDICATION PRESENT: Primary | ICD-10-CM

## 2018-06-13 DIAGNOSIS — M48.061 FORAMINAL STENOSIS OF LUMBAR REGION: ICD-10-CM

## 2018-06-13 NOTE — PROGRESS NOTES
AMBULATORY PROGRESS NOTE      Patient: Alita Cabot             MRN: [id number]     SSN: [id number] Body mass index is 42.69 kg/(m^2). YOB: 1965     AGE: 47 y.o. EX: female    PCP: Emely Lozoya MD    IMPRESSION/DIAGNOSIS AND TREATMENT PLAN     DIAGNOSES  1. Spinal stenosis of lumbar region, unspecified whether neurogenic claudication present    2. Foraminal stenosis of lumbar region        Orders Placed This Encounter    REFERRAL TO PHYSICAL THERAPY      Alita Cabot understands her diagnoses and the proposed plan. Plan:    1) Referral for Physical Therapy for x 2 sessions  2) Continue HEP as directed. 3) Continue activity modification as directed. 4) Follow up with Emely Lozoya MD to assess kidney/liver function. RTO - 5 weeks    HPI AND EXAMINATION     Kenneth Jackson IS A 47 y.o. female who presents to my outpatient office for follow up of primary OA of both knees. At last visit, I ordered a CT scan of the thoracic and lumbar spine. Since we saw her last, Ms. Moriah Gutierrez reports that continues to experience pain to her thoracic and lumbar spine. Patient denies weakness to her LE. She notes she cannot walk long distances due to her bilateral knee pain, and bilateral hip pain. Of note, patient reports she has some radiating pain to her bilateral lower legs from her lower back after cooking in the kitchen. CT scan results have been reviewed with the patient showing multiple levels of discogenic complications in addition to old compression fractures to T5, T8, and L1 vertebrates. At this time we will refer her to the Spine surgery team for evaluation. Patient denies SOB or CP at this time. Of note, patient reports she has a Vitamin D deficiency, and her kidney tests showed a decrease in function. She has taken Daypro for several years.        NEURO EXAM :                         Negative bilateral Straight leg raise (seated position) See Musculoskeletal section for pertinent individual extremity examination                        No abnormal hand/wrist, foot/ankle, or facial/neck tremors. alert, oriented x3    REFLEX EXAM: reflexes are normal and symmetric. MOTOR: EXTREMITIES                        normal 5/5 strength in all tested muscle groups    SENSORY:  WNL    ROM SPINE:  back Normal    TENDERNESS: Right lower paraspinal muscles, lumbar region. No tenderness to left side. CHART REVIEW     Past Medical History:   Diagnosis Date    Asthma      Current Outpatient Prescriptions   Medication Sig    ergocalciferol (ERGOCALCIFEROL) 50,000 unit capsule TAKE 1 CAPSULE BY MOUTH EVERY 7 DAYS    fexofenadine-pseudoephedrine (ALLEGRA-D 12 HOUR)  mg Tb12 Take 1 Tab by mouth every twelve (12) hours.  ergocalciferol (ERGOCALCIFEROL) 50,000 unit capsule TAKE 1 CAPSULE BY MOUTH EVERY 7 DAYS    oxaprozin (DAYPRO) 600 mg tablet TAKE 1 TABLET BY MOUTH TWICE DAILY WITH MEALS    oxaprozin (DAYPRO) 600 mg tablet Take 1 Tab by mouth daily.  OTHER Jacob Rhino Salt Packets    azelastine (ASTELIN) 137 mcg (0.1 %) nasal spray INT 2 SPRAYS IEN BID    QNASL 80 mcg/actuation HFAA INHALE 1 PUFF IEN BID    ergocalciferol (ERGOCALCIFEROL) 50,000 unit capsule TK 1 C PO Q WEEK    guaiFENesin ER (MUCINEX) 600 mg ER tablet Take 600 mg by mouth daily.  budesonide (PULMICORT) 0.5 mg/2 mL nbsp 500 mcg by Nebulization route.  esomeprazole (NEXIUM) 20 mg capsule Take 40 mg by mouth two (2) times a day.  escitalopram oxalate (LEXAPRO) 20 mg tablet Take 20 mg by mouth daily.  montelukast (SINGULAIR) 10 mg tablet Take 10 mg by mouth daily.  traMADol (ULTRAM) 50 mg tablet Take 1 Tab by mouth every six (6) hours as needed for Pain. Max Daily Amount: 200 mg.  traMADol (ULTRAM) 50 mg tablet Take 1 Tab by mouth every six (6) hours as needed for Pain.  Max Daily Amount: 200 mg.    OLANZapine-FLUoxetine (SYMBYAX) 3-25 mg per capsule Take 1 Cap by mouth every evening.  loratadine (CLARITIN) 10 mg tablet Take 10 mg by mouth two (2) times a day. No current facility-administered medications for this visit. Allergies   Allergen Reactions    Calcium Other (comments)    Egg Swelling    Gluten Shortness of Breath    Milk Containing Products Other (comments)    Red Dye Swelling    Sulfa (Sulfonamide Antibiotics) Other (comments)     Past Surgical History:   Procedure Laterality Date    HX HYSTERECTOMY      HX PELVIC LAPAROSCOPY      HX TONSILLECTOMY       Social History     Occupational History    Not on file. Social History Main Topics    Smoking status: Never Smoker    Smokeless tobacco: Never Used    Alcohol use No    Drug use: No    Sexual activity: Not on file     Family History   Problem Relation Age of Onset    Family history unknown: Yes    Hypertension Mother     Hypertension Father     Heart Disease Father     Stroke Father        REVIEW OF SYSTEMS : 6/13/2018  ALL BELOW ARE Negative except : SEE HPI       Constitutional: Negative for fever, chills and weight loss. Neg Weigh Loss  Cardiovascular: Negative for chest pain, claudication and leg swelling. SOB, TALAVERA   Gastrointestinal: Negative for  pain, N/V/D/C, Blood in stool or urine,dysuria, hematuria,        Incontinence, pelvic pain  Musculoskeletal: see HPI. Neurological: Negative for dizziness and weakness. Negative for headaches,Visual Changes, Confusion, Seizures,   Psychiatric/Behavioral: Negative for depression, memory loss and substance abuse. Extremities:  Negative for  hair changes, rash or skin lesion changes. Hematologic: Negative for Bleeding problems, bruising, pallor or swollen lymph nodes.   Peripheral Vascular: No calf pain, vascular vein tenderness to calf pain              No calf throbbing, posterior knee throbbing pain    DIAGNOSTIC IMAGING     No notes on file    CT Results (most recent):    Results from Hospital Encounter encounter on 06/06/18   CT SPINE LUMB WO CONT   Narrative CT scan of lumbar spine, without intravenous contrast:        INDICATION:    Lumbar radiculopathy with additional pain to lower extremities. History of previous compression fracture in spine. Primary osteoarthritis of both knee joints. TECHNIQUE:    Helical CT scan with 2.5 mm slice thickness, without intravenous contrast, from  T11 level through mid sacrum. Coronal and sagittal images are reformatted. All CT scans at this facility are performed using dose optimization technique as  appropriate to a  performed  examination, to include automated exposer control,  adjustment mA and / or  KV according to patient size (including appropriate  matching  for site specific examination), or use  of iterative  reconstruction  technique. COMPARISON STUDY: None. FINDINGS:        The study shows moderate hyperlordosis in lumbosacral spine. There is no  evidence of spondylolisthesis in lumbar spine. There is minimal levoscoliosis in  lumbar spine. The study shows moderate compression fracture of L1 vertebral body with  compression of upper aspect of L1 vertebral body. In the anterior portion of L1  vertebral body there is loss of height by 50%. In the right central upper aspect  of L1 vertebral body, there is evidence of prominent Schmorl's node with  protrusion of disc material deep to the upper endplate and surrounding  sclerosis. These are chronic changes. There is no definable fracture line in the  L1 vertebral body. The compression fracture is likely to be old. There is  minimal posterior bulging of the compressed L1 vertebral body without any  retrodisplaced fragment. In the SI joints bilaterally there are mild osteoarthritic changes. At L5-S1 level the disc space is well-preserved. There is mild generalized disc  bulge.  In the facet joint there are mild osteoarthritic changes. Moderate  ligamentum flavum hypertrophy bilaterally. Central canal is mildly stenosed. The  neural foramina of both sides are mildly stenosed. At L4/L5 level there are mild changes of degenerative disc disease. Disc space  is well-preserved. There is moderate generalized disc bulge. No obvious focal  disc herniation. In the facet joints bilaterally there are mild osteoarthritic  changes. Moderate ligamentum flavum hypertrophy. Central canal is moderately  stenosed with AP diameter of thecal sac at midline being about 9 mm. The lateral  recesses of both sides are also mild to moderately stenosed. Mild to moderate  stenosis of neural foramina bilaterally. At L3-L4 level there are findings of mild degenerative disc disease. Disc space  is fairly preserved. But there is moderate to marked generalized bulging of  disc. In the facet joints bilaterally there are mild osteoarthritic changes with  vacuum phenomenon within the facet joint spaces. The central canal is moderately  to severely stenosed. The diameter of thecal sac at midline is 7 to 8 mm. Lateral recesses of both sides are moderately stenosed. Neural foramina of both  sides are mild to moderately stenosed    At L2/L3 level there are findings of mild degenerative disc disease with mild to  moderate generalized bulging of disc. There is large broad-based asymmetric disc  bulge present left posterolaterally and left laterally, encroaching into the  left neural foramen, which is moderate to markedly compromised. In the facet  joints bilaterally there are minimal osteoarthritic changes. Central canal is  mild to moderately stenosed. Left lateral recess is moderately stenosed. Left  neural foramen is moderate to severely stenosed. The right lateral recess and  right neural foramen are mildly stenosed. At L1/L2 level, there are findings of  mild degenerative disc disease without disc protrusion or herniation.  There is  no significant stenosis at this level. At T12/L1 level there are findings of mild to moderate degenerative disc disease  in association with old compression of upper aspect of L1 vertebral body, as  described above. No focal disc protrusion or herniation. No obvious central or lateral stenosis. At T11-T12 level, no diagnostic finding.    ----------------------------------------    IMPRESSIONS:    Old moderate compression of L1 vertebral body as described. Moderate lumbosacral hyperlordosis and mild levoscoliosis. At L5-S1 level mild degenerative disc disease and mild facet osteoarthritis. Mild central stenosis and lateral stenosis. At L4/L5 level mild degenerative disc disease and mild-to-moderate facet  osteoarthritis. No obvious disc herniation. Moderate central stenosis. Mild to  moderate stenosis of neural foramina bilaterally. At L3-L4 level mild to moderate degenerative disc disease and mild facet  osteoarthritis. Moderate to severe central stenosis. Moderate stenosis of  lateral recesses of both sides. Mild to moderate stenosis of neural foramina of  both sides. At L2/L3 level, mild to moderate degenerative disc disease. Prominent  broad-based disc bulge left posterolaterally and left laterally. Mild-to-moderate central stenosis. Moderate stenosis of left lateral recess. Moderate to severe stenosis of left neural foramen. Additional findings, as above in body of report. Result Information   Status Provider Status      Final result (Exam End: 6/6/2018  4:40 PM) Reviewed    Study Result   CT scan of thoracic spine, without intravenous contrast:           INDICATION:     Primary osteoarthritis of both knee joints.     Radiculopathy with lower extremity symptoms. Sagittal pain.  Attention to costochondral/costotransverse junction at T11 level.           TECHNIQUE:     Helical CT scan with 2.5 mm slice thickness, without intravenous contrast,  including thoracic spine from C6 level through L2 level.     Coronal and sagittal images reformatted.     All CT scans at this facility are performed using dose optimization technique as  appropriate to a  performed  examination, to include automated exposer control,  adjustment mA and / or  KV according to patient size (including appropriate  matching  for site specific examination), or use  of iterative  reconstruction  technique.     COMPARISON STUDY: CT scan of lumbar spine performed the same day.           FINDINGS:           The study does not include anterior portion of chest, anterior portions of ribs,  or sternum or the costochondral junctions.     In the visualized posterior portions of both lungs there is no definable acute  processes or focal lesion.     The study shows minimal dextroscoliosis in thoracic spine. The study shows mild  compression of T8 vertebral body, likely to be old compression, without any  fracture line and without any retrodisplaced fragment. There is also minimal  compression of the T5 vertebral body, likely to be chronic.     The study shows findings of only minimal to mild osteoarthritic changes at the  costovertebral joints bilaterally, at medial lower thoracic spine levels. At  T10, T11 and T12 levels there is no significant hypertrophic changes at the  costotransverse and costovertebral joints on either side.     At C6-C7 level there are findings of moderate degenerative disc disease with  mild to moderate central protrusion of disc, with posterior central  calcifications osteophyte. Central canal appears to be mild to moderately  stenosed at this level.     There are findings of minimal degenerative disc disease at T4-T5 level and mild  to moderate degenerative disc disease at T7-T8 and T8-T9 levels. Mild  degenerative disc disease at T10-T11 and T12/L1 levels.     There is no evidence of any definable or identifiable disc protrusion at any  level.  Thoracic spinal canal is not compromised.     Evidence of only minimal to mild multilevel facet osteoarthritis bilaterally in  medial lower thoracic spine without any obvious foraminal stenosis on either  side.     No definable paravertebral soft tissue abnormality on either side of thoracic  spine.  --------------------------------------------------------------------  IMPRESSIONS:     Mild to moderate old compression of T8 vertebral body. Mild old compression of T5 vertebral body. Bones are moderately osteopenic throughout thoracic spine. Evidence of moderate, presumably old compression of L1 vertebral body, as  described with a CT scan of lumbar spine.     Mild-to-moderate multilevel degenerative disc disease in mid and lower thoracic  spine.     No obvious focal disc bulge or protrusion identified in thoracic spine.     No evidence of compromise of thoracic spinal canal.     No significant or hypertrophic osteoarthritic changes involving the  costotransverse or costovertebral joints on both sides of thoracic spine.     Evidence of moderate to marked degenerative disc disease at C6-C7 level with  central disc protrusion and mild-to-moderate central stenosis. Written by Suzi Zheng, as dictated by Hitesh Keating MD. IDr., Hitesh Keating MD, confirm that all documentation is accurate.

## 2018-06-13 NOTE — PATIENT INSTRUCTIONS
Please follow up in 5 weeks. You are advised to contact us if your condition worsens. Low Back Pain: Exercises  Your Care Instructions  Here are some examples of typical rehabilitation exercises for your condition. Start each exercise slowly. Ease off the exercise if you start to have pain. Your doctor or physical therapist will tell you when you can start these exercises and which ones will work best for you. How to do the exercises  Press-up    1. Lie on your stomach, supporting your body with your forearms. 2. Press your elbows down into the floor to raise your upper back. As you do this, relax your stomach muscles and allow your back to arch without using your back muscles. As your press up, do not let your hips or pelvis come off the floor. 3. Hold for 15 to 30 seconds, then relax. 4. Repeat 2 to 4 times. Alternate arm and leg (bird dog) exercise    Do this exercise slowly. Try to keep your body straight at all times, and do not let one hip drop lower than the other. 1. Start on the floor, on your hands and knees. 2. Tighten your belly muscles. 3. Raise one leg off the floor, and hold it straight out behind you. Be careful not to let your hip drop down, because that will twist your trunk. 4. Hold for about 6 seconds, then lower your leg and switch to the other leg. 5. Repeat 8 to 12 times on each leg. 6. Over time, work up to holding for 10 to 30 seconds each time. 7. If you feel stable and secure with your leg raised, try raising the opposite arm straight out in front of you at the same time. Knee-to-chest exercise    1. Lie on your back with your knees bent and your feet flat on the floor. 2. Bring one knee to your chest, keeping the other foot flat on the floor (or keeping the other leg straight, whichever feels better on your lower back). 3. Keep your lower back pressed to the floor. Hold for at least 15 to 30 seconds. 4. Relax, and lower the knee to the starting position.   5. Repeat with the other leg. Repeat 2 to 4 times with each leg. 6. To get more stretch, put your other leg flat on the floor while pulling your knee to your chest.  Curl-ups    1. Lie on the floor on your back with your knees bent at a 90-degree angle. Your feet should be flat on the floor, about 12 inches from your buttocks. 2. Cross your arms over your chest. If this bothers your neck, try putting your hands behind your neck (not your head), with your elbows spread apart. 3. Slowly tighten your belly muscles and raise your shoulder blades off the floor. 4. Keep your head in line with your body, and do not press your chin to your chest.  5. Hold this position for 1 or 2 seconds, then slowly lower yourself back down to the floor. 6. Repeat 8 to 12 times. Pelvic tilt exercise    1. Lie on your back with your knees bent. 2. \"Brace\" your stomach. This means to tighten your muscles by pulling in and imagining your belly button moving toward your spine. You should feel like your back is pressing to the floor and your hips and pelvis are rocking back. 3. Hold for about 6 seconds while you breathe smoothly. 4. Repeat 8 to 12 times. Heel dig bridging    1. Lie on your back with both knees bent and your ankles bent so that only your heels are digging into the floor. Your knees should be bent about 90 degrees. 2. Then push your heels into the floor, squeeze your buttocks, and lift your hips off the floor until your shoulders, hips, and knees are all in a straight line. 3. Hold for about 6 seconds as you continue to breathe normally, and then slowly lower your hips back down to the floor and rest for up to 10 seconds. 4. Do 8 to 12 repetitions. Hamstring stretch in doorway    1. Lie on your back in a doorway, with one leg through the open door. 2. Slide your leg up the wall to straighten your knee. You should feel a gentle stretch down the back of your leg. 3. Hold the stretch for at least 15 to 30 seconds.  Do not arch your back, point your toes, or bend either knee. Keep one heel touching the floor and the other heel touching the wall. 4. Repeat with your other leg. 5. Do 2 to 4 times for each leg. Hip flexor stretch    1. Kneel on the floor with one knee bent and one leg behind you. Place your forward knee over your foot. Keep your other knee touching the floor. 2. Slowly push your hips forward until you feel a stretch in the upper thigh of your rear leg. 3. Hold the stretch for at least 15 to 30 seconds. Repeat with your other leg. 4. Do 2 to 4 times on each side. Wall sit    1. Stand with your back 10 to 12 inches away from a wall. 2. Lean into the wall until your back is flat against it. 3. Slowly slide down until your knees are slightly bent, pressing your lower back into the wall. 4. Hold for about 6 seconds, then slide back up the wall. 5. Repeat 8 to 12 times. Follow-up care is a key part of your treatment and safety. Be sure to make and go to all appointments, and call your doctor if you are having problems. It's also a good idea to know your test results and keep a list of the medicines you take. Where can you learn more? Go to http://damián-solitario.info/. Enter B170 in the search box to learn more about \"Low Back Pain: Exercises. \"  Current as of: March 21, 2017  Content Version: 11.4  © 6668-3271 [employer]. Care instructions adapted under license by [employer] (which disclaims liability or warranty for this information). If you have questions about a medical condition or this instruction, always ask your healthcare professional. [place] [address] any warranty or liability for your use of this information.

## 2018-06-13 NOTE — MR AVS SNAPSHOT
[address] [place] [address], [address] 200 [facility] 
[phone] Patient: Manasa Grimes MRN: [id number] :1965 Visit Information Date & Time Provider Department Dept. Phone Encounter #  
 2018  3:40 PM Lady Mack, 27 [facility] [id number] [phone] Upcoming Health Maintenance Date Due Hepatitis C Screening 1965 DTaP/Tdap/Td series (1 - Tdap) 1985 PAP AKA CERVICAL CYTOLOGY 1985 BREAST CANCER SCRN MAMMOGRAM 2014 FOBT Q 1 YEAR AGE 50-75 2014 Influenza Age 5 to Adult 2018 Allergies as of 2018  Review Complete On: 2018 By: Lady Frederick MD  
  
 Severity Noted Reaction Type Reactions Calcium  2016    Other (comments) Egg  2017    Swelling Gluten  2018    Shortness of Breath Milk Containing Products  2016    Other (comments) Red Dye  2017    Swelling Sulfa (Sulfonamide Antibiotics)  2016    Other (comments) Current Immunizations  Never Reviewed No immunizations on file. Not reviewed this visit You Were Diagnosed With   
  
 Codes Comments Spinal stenosis of lumbar region, unspecified whether neurogenic claudication present    -  Primary ICD-10-CM: M48.061 
ICD-9-CM: 724.02 Foraminal stenosis of lumbar region     ICD-10-CM: M99.83 ICD-9-CM: 724.02 Vitals BP Pulse Temp Resp Height(growth percentile) Weight(growth percentile) 148/82 61 98.2 °F (36.8 °C) (Oral) 16 5' 3\" (1.6 m) 241 lb (109.3 kg) SpO2 BMI OB Status Smoking Status 98% 42.69 kg/m2 Unknown Never Smoker BMI and BSA Data Body Mass Index Body Surface Area  
 42.69 kg/m 2 2.2 m 2 Preferred Pharmacy Pharmacy Name Phone [facility] [address] [facility] AT [facility] [phone] Your Updated Medication List  
  
   
This list is accurate as of 6/13/18  4:31 PM.  Always use your most recent med list. ALLEGRA-D 12 HOUR  mg Tb12 Generic drug:  fexofenadine-pseudoephedrine Take 1 Tab by mouth every twelve (12) hours. azelastine 137 mcg (0.1 %) nasal spray Commonly known as:  ASTELIN  
INT 2 SPRAYS IEN BID  
  
 budesonide 0.5 mg/2 mL Nbsp Commonly known as:  PULMICORT  
500 mcg by Nebulization route. * ergocalciferol 50,000 unit capsule Commonly known as:  ERGOCALCIFEROL TK 1 C PO Q WEEK * ergocalciferol 50,000 unit capsule Commonly known as:  ERGOCALCIFEROL  
TAKE 1 CAPSULE BY MOUTH EVERY 7 DAYS * ergocalciferol 50,000 unit capsule Commonly known as:  ERGOCALCIFEROL  
TAKE 1 CAPSULE BY MOUTH EVERY 7 DAYS  
  
 LEXAPRO 20 mg tablet Generic drug:  escitalopram oxalate Take 20 mg by mouth daily. loratadine 10 mg tablet Commonly known as:  Abdulaziz Amsler Take 10 mg by mouth two (2) times a day. MUCINEX 600 mg ER tablet Generic drug:  guaiFENesin ER Take 600 mg by mouth daily. NexIUM 20 mg capsule Generic drug:  esomeprazole Take 40 mg by mouth two (2) times a day. OTHER [address] * oxaprozin 600 mg tablet Commonly known as:  Shawna Mitchell Take 1 Tab by mouth daily. * oxaprozin 600 mg tablet Commonly known as:  Shawna Mitchell TAKE 1 TABLET BY MOUTH TWICE DAILY WITH MEALS QNASL 80 mcg/actuation [facility] Generic drug:  beclomethasone dipropionate INHALE 1 PUFF IEN BID  
  
 SINGULAIR 10 mg tablet Generic drug:  montelukast  
Take 10 mg by mouth daily. SYMBYAX 3-25 mg per capsule Generic drug:  OLANZapine-FLUoxetine Take 1 Cap by mouth every evening. * traMADol 50 mg tablet Commonly known as:  ULTRAM  
Take 1 Tab by mouth every six (6) hours as needed for Pain. Max Daily Amount: 200 mg.  
  
 * traMADol 50 mg tablet Commonly known as:  Climmie Splinter  
 Take 1 Tab by mouth every six (6) hours as needed for Pain. Max Daily Amount: 200 mg.  
  
 * Notice: This list has 7 medication(s) that are the same as other medications prescribed for you. Read the directions carefully, and ask your doctor or other care provider to review them with you. We Performed the Following REFERRAL TO PHYSICAL THERAPY [MHQ92 Custom] Comments:  
 Evaluation and treatment of lower back and thoracic spine pain, h/o foraminal stenosis. Please treat two to three times a week for four weeks. Please utilize the following: stretches, US for paraspinal muscles, strengthening exercises. Referral Information Referral ID Referred By Referred To  
  
 [id number] HILDA PECK [address]   
   [address] [facility] [address], [address] [facility] Phone: [phone] Fax: [phone] Visits Status Start Date End Date 1 New Request 6/13/18 6/13/19 If your referral has a status of pending review or denied, additional information will be sent to support the outcome of this decision. Patient Instructions Please follow up in 5 weeks. You are advised to contact us if your condition worsens. Low Back Pain: Exercises Your Care Instructions Here are some examples of typical rehabilitation exercises for your condition. Start each exercise slowly. Ease off the exercise if you start to have pain. Your doctor or physical therapist will tell you when you can start these exercises and which ones will work best for you. How to do the exercises Press-up 1. Lie on your stomach, supporting your body with your forearms. 2. Press your elbows down into the floor to raise your upper back. As you do this, relax your stomach muscles and allow your back to arch without using your back muscles. As your press up, do not let your hips or pelvis come off the floor. 3. Hold for 15 to 30 seconds, then relax. 4. Repeat 2 to 4 times. Alternate arm and leg (bird dog) exercise Do this exercise slowly. Try to keep your body straight at all times, and do not let one hip drop lower than the other. 1. Start on the floor, on your hands and knees. 2. Tighten your belly muscles. 3. Raise one leg off the floor, and hold it straight out behind you. Be careful not to let your hip drop down, because that will twist your trunk. 4. Hold for about 6 seconds, then lower your leg and switch to the other leg. 5. Repeat 8 to 12 times on each leg. 6. Over time, work up to holding for 10 to 30 seconds each time. 7. If you feel stable and secure with your leg raised, try raising the opposite arm straight out in front of you at the same time. Knee-to-chest exercise 1. Lie on your back with your knees bent and your feet flat on the floor. 2. Bring one knee to your chest, keeping the other foot flat on the floor (or keeping the other leg straight, whichever feels better on your lower back). 3. Keep your lower back pressed to the floor. Hold for at least 15 to 30 seconds. 4. Relax, and lower the knee to the starting position. 5. Repeat with the other leg. Repeat 2 to 4 times with each leg. 6. To get more stretch, put your other leg flat on the floor while pulling your knee to your chest. 
Curl-ups 1. Lie on the floor on your back with your knees bent at a 90-degree angle. Your feet should be flat on the floor, about 12 inches from your buttocks. 2. Cross your arms over your chest. If this bothers your neck, try putting your hands behind your neck (not your head), with your elbows spread apart. 3. Slowly tighten your belly muscles and raise your shoulder blades off the floor. 4. Keep your head in line with your body, and do not press your chin to your chest. 
5. Hold this position for 1 or 2 seconds, then slowly lower yourself back down to the floor. 6. Repeat 8 to 12 times. Pelvic tilt exercise 1. Lie on your back with your knees bent. 2. \"Brace\" your stomach. This means to tighten your muscles by pulling in and imagining your belly button moving toward your spine. You should feel like your back is pressing to the floor and your hips and pelvis are rocking back. 3. Hold for about 6 seconds while you breathe smoothly. 4. Repeat 8 to 12 times. Heel dig bridging 1. Lie on your back with both knees bent and your ankles bent so that only your heels are digging into the floor. Your knees should be bent about 90 degrees. 2. Then push your heels into the floor, squeeze your buttocks, and lift your hips off the floor until your shoulders, hips, and knees are all in a straight line. 3. Hold for about 6 seconds as you continue to breathe normally, and then slowly lower your hips back down to the floor and rest for up to 10 seconds. 4. Do 8 to 12 repetitions. Hamstring stretch in doorway 1. Lie on your back in a doorway, with one leg through the open door. 2. Slide your leg up the wall to straighten your knee. You should feel a gentle stretch down the back of your leg. 3. Hold the stretch for at least 15 to 30 seconds. Do not arch your back, point your toes, or bend either knee. Keep one heel touching the floor and the other heel touching the wall. 4. Repeat with your other leg. 5. Do 2 to 4 times for each leg. Hip flexor stretch 1. Kneel on the floor with one knee bent and one leg behind you. Place your forward knee over your foot. Keep your other knee touching the floor. 2. Slowly push your hips forward until you feel a stretch in the upper thigh of your rear leg. 3. Hold the stretch for at least 15 to 30 seconds. Repeat with your other leg. 4. Do 2 to 4 times on each side. Wall sit 1. Stand with your back 10 to 12 inches away from a wall. 2. Lean into the wall until your back is flat against it. 3. Slowly slide down until your knees are slightly bent, pressing your lower back into the wall. 4. Hold for about 6 seconds, then slide back up the wall. 5. Repeat 8 to 12 times. Follow-up care is a key part of your treatment and safety. Be sure to make and go to all appointments, and call your doctor if you are having problems. It's also a good idea to know your test results and keep a list of the medicines you take. Where can you learn more? Go to http://damián-solitario.info/. Enter R429 in the search box to learn more about \"Low Back Pain: Exercises. \" Current as of: March 21, 2017 Content Version: 11.4 © 5596-0471 [employer]. Care instructions adapted under license by [employer] (which disclaims liability or warranty for this information). If you have questions about a medical condition or this instruction, always ask your healthcare professional. [address] any warranty or liability for your use of this information. Introducing [facility]! Krystle Ochoa introduces DriveHQ patient portal. Now you can access parts of your medical record, email your doctor's office, and request medication refills online. 1. In your internet browser, go to [email or web address] 2. Click on the First Time User? Click Here link in the Sign In box. You will see the New Member Sign Up page. 3. Enter your DriveHQ Access Code exactly as it appears below. You will not need to use this code after youve completed the sign-up process. If you do not sign up before the expiration date, you must request a new code. · DriveHQ Access Code: [email or web address] Expires: 8/29/2018  3:54 PM 
 
4. Enter the last four digits of your Social Security Number (xxxx) and Date of Birth (mm/dd/yyyy) as indicated and click Submit. You will be taken to the next sign-up page. 5. Create a Tungle.met ID. This will be your DriveHQ login ID and cannot be changed, so think of one that is secure and easy to remember. 6. Create a Opargo password. You can change your password at any time. 7. Enter your Password Reset Question and Answer. This can be used at a later time if you forget your password. 8. Enter your e-mail address. You will receive e-mail notification when new information is available in [address] 9. Click Sign Up. You can now view and download portions of your medical record. 10. Click the Download Summary menu link to download a portable copy of your medical information. If you have questions, please visit the Frequently Asked Questions section of the O[employer]go website. Remember, [employer]go is NOT to be used for urgent needs. For medical emergencies, dial 911. Now available from your iPhone and Android! Please provide this summary of care documentation to your next provider. Your primary care clinician is listed as Jocelyne Cogan. If you have any questions after today's visit, please call [phone].

## 2018-06-16 RX ORDER — ERGOCALCIFEROL 1.25 MG/1
CAPSULE ORAL
Qty: 8 CAP | Refills: 0 | Status: SHIPPED | COMMUNITY
Start: 2018-06-16 | End: 2018-07-17 | Stop reason: SDUPTHER

## 2018-06-26 ENCOUNTER — HOSPITAL ENCOUNTER (OUTPATIENT)
Dept: PHYSICAL THERAPY | Age: 53
End: 2018-06-26
Payer: COMMERCIAL

## 2018-06-27 ENCOUNTER — HOSPITAL ENCOUNTER (OUTPATIENT)
Dept: PHYSICAL THERAPY | Age: 53
Discharge: HOME | End: 2018-06-27
Payer: COMMERCIAL

## 2018-06-27 PROCEDURE — 97110 THERAPEUTIC EXERCISES: CPT

## 2018-06-27 PROCEDURE — 97530 THERAPEUTIC ACTIVITIES: CPT

## 2018-06-27 PROCEDURE — 97112 NEUROMUSCULAR REEDUCATION: CPT

## 2018-06-27 PROCEDURE — 97162 PT EVAL MOD COMPLEX 30 MIN: CPT

## 2018-06-27 NOTE — PROGRESS NOTES
[facility] - [facility]              [address] [facility]        [place], [address] [place]   [phone] fax    Plan of Care/ Statement of Necessity for Physical Therapy Services  Patient name: Nadira Avila Start of Care: 2018   Referral source: Maris Gomez MD : 1965    Medical Diagnosis: Spinal stenosis, lumbar region without neurogenic claudication [M48.061]   Onset Date:1 year prior to IE    Treatment Diagnosis: Lower Lumbar Functional Instability   Prior Hospitalization: see medical history Provider#: [id number]   Medications: Verified on Patient summary List    Comorbidities: Allergies, Anxiety, Arthritis, Asthma, Back Pain, BMI>30, HA, HTN, Osteoporosis   Prior Level of Function: Walking for exercise (0.5 miles, 3x/week), (I) Functional ADLs, (I) Ambulation without modification, Work full-time     The [address] [place] [address] and following information is based on the information from the initial evaluation. Assessment:    Patient presents with s/s consistent with lower lumbar functional instability with patient reporting chronic intermittent low back pain with recent insidious worsening of symptoms 1 year ago with pain primarily localized to central lumbosacral region with radiation horizontally (right>left) with occasional central mid-thoracic pain reported. Patient with PMH significant for mid-thoracic fracture as a teenager with conservative healing reported without chronic deficits nor pain reported. Patient denies any LE N/T nor weakness but does report chronic history of bilateral knee pain (right > left). Patient reports increase in central lumbosacral pain with prolonged standing >10 minutes and ambulation >1 mile with increase with right UE overhead reaching reported. Relief reported with assumption of supportive lumbar positioning (i.e. Recliner, supine lying). Patient denies sleep disturbances.  Patient demonstrates normal gait pattern with diminished SLS bilaterally and symmetrical squat mechanics demonstrated. Patient noted with an increased lumbar lordosis with poor reversal with completion of lumbar AROM and reproduction of \"pressure\" with extension with \"pull\" reported with flexion. No change in symptom presentation with repeated movements. Patient demonstrates a normal hip screen bilaterally but noted with weakness of bilateral hip abductors and external rotators (right>left). Patient demonstrates pain and hypermobility with assessment of L5 CPA with guarding with TTP to localized parapsinal musculature noted upon palpation. Patient demonstrates good pelvic proprioceptive awareness but noted with poor activation of the anterior abdominal musculature. Emphasis of within clinic treatment to be placed on improving lumbar functional stability through improved recruitment of the anterior abdominal musculature with progression into functional weightbearing to ensure tolerance with weightbearing activities. Patient with low-grade symptom presentation with patient presenting with a good prognosis.     Patient will continue to benefit from skilled PT services to modify and progress therapeutic interventions, address functional mobility deficits, address ROM deficits, address strength deficits, analyze and address soft tissue restrictions, analyze and cue movement patterns, analyze and modify body mechanics/ergonomics and assess and modify postural abnormalities to attain remaining goals. Key Information:    BP: 140/84 mmHg  Posture: Increased lumbar lordosis     Gait:        [x] Normal                     [] Abnormal:     Functional Tests:  1. SLS: Left 4 sec / Right 5 sec  2.  Bilateral Squat: Bilateral knee pain   Active Movements: [] N/A   [] Too acute   [] Other:  ROM % AROM Comments   Forward flexion 40-60 25% limited Decreased reversal of lordosis   Extension 20-30 60% limited     SB right 20-30 25% limited Dec reversal of lordosis   SB left 20-30 25% limited Dec reversal of lordosis   Rotation right 5-10 25% limited     Rotation left 5-10 25% limited     Repeated Extension x10: Increase in central lumbar pain   Repeated Flexion x10: No change in pain  **Ease in pain immediately with supine lying     Palpation: TTP L5 SP     Joint Mobility: Hypermobile, painful L5 with CPA     LE MMT      Left Right   Hip Flexion 5 5     Extension 5 5     Abduction 4+ 5     ER 3+ 5     IR 5 5   Knee Flexion 5 5     Extension 5 5   Ankle Dorsiflexion 5 5   *Assessed in supine     Special Tests       Hip:                      Cherlynn Oakland:                                  [x] R    [x] L    [] +    [x] -                                    Scour:                                  [x] R    [x] L    [] +    [x] -                                    FADDIR:                               [x] R    [x] L    [] +    [x] -           Deficits:               Prone Quadriceps: Left (minimal) / Right (minimal)                                    Hamstrings 90/90: Left (minimal) / Right (minimal)    Evaluation Complexity History MEDIUM  Complexity : 1-2 comorbidities / personal factors will impact the outcome/ POC ; Examination MEDIUM Complexity : 3 Standardized tests and measures addressing body structure, function, activity limitation and / or participation in recreation  ;Presentation MEDIUM Complexity : Evolving with changing characteristics  ; Clinical Decision Making MEDIUM Complexity : FOTO score of 26-74  Overall Complexity Rating: MEDIUM  Problem List: pain affecting function, decrease ROM, decrease strength, impaired gait/ balance, decrease ADL/ functional abilitiies, decrease activity tolerance, decrease flexibility/ joint mobility and decrease transfer abilities   Treatment Plan may include any combination of the following: Therapeutic exercise, Therapeutic activities, Neuromuscular re-education, Physical agent/modality, Gait/balance training, Manual therapy, Patient education, Self Care training, Functional mobility training, Home safety training and Stair training  Patient / Family readiness to learn indicated by: asking questions, trying to perform skills and interest  Persons(s) to be included in education: patient (P)  Barriers to Learning/Limitations: None  Patient Goal (s): \"I want to be able to learn things I can do so I do not need to take medicine\"  Patient Self Reported Health Status: fair  Rehabilitation Potential: good    Short Term Goals: To be accomplished in 3 weeks:  1. Patient will subjectively report full compliance with prescribed HEP. 2. Patient will demonstrate left hip abduction and ER MMT 5/5 to improve ease with ambulation on uneven surfaces. 3. Patient will demonstrate left/right SLS >/=25 seconds in order to improve ease with community ambulation. Long Term Goals: To be accomplished in 6 weeks:  1. Patient will demonstrate a significant functional improvement as demonstrated by a score of >/= 55 on FOTO. 2. Patient will report standing tolerance >/=30 minutes in order to improve ease with cooking. 3. Patient will report >/=70% improvement in symptoms to improve ease with progression of walking regime for exercise. Frequency / Duration: Patient to be seen 2 times per week for 6 weeks. Patient/ Caregiver education and instruction: Diagnosis, prognosis, self care, activity modification and exercises   [x]  Plan of care has been reviewed with DANNY Mcmahan, PT 6/27/2018 1:50 PM  ________________________________________________________________________    I certify that the above Therapy Services are being furnished while the patient is under my care. I agree with the treatment plan and certify that this therapy is necessary.     [de-identified] Signature:____________________  Date:____________Time: _________    Please sign and return to [facility] - [address], [address] [place]   [phone] fax

## 2018-06-27 NOTE — PROGRESS NOTES
PT DAILY TREATMENT NOTE     Patient Name: Yuliet Moreira  Date:2018  : 1965  [x]  Patient  Verified  Payor: Mason Medico / Plan: [facility]  [facility] / Product Type: Commerical /    In time:0500  Out time:0545  Total Treatment Time (min): 45  Visit #: 1 of 12    Treatment Area: Spinal stenosis, lumbar region without neurogenic claudication [M48.061]    Physical Therapy Evaluation - Lumbar    SUBJECTIVE      Any medication changes, allergies to medications, adverse drug reactions, diagnosis change, or new procedure performed?: [x] No    [] Yes (see summary sheet for update)    Subjective functional status/changes:     PLOF: Walking for exercise (0.5 miles, 3x/week), (I) Functional ADLs, (I) Ambulation without modification, Work full-time  Current Functional Status: Difficulty with overhead reaching, Difficulty with prolonged standing, Difficulty with prolonged walking (>1 year), No sleep disturbances  Work Hx: Medicaid Eligibility (Desk Work)  Living Situation: 1-story ([address])  Comorbidities: Allergies, Anxiety, Arthritis, Asthma, Back Pain, BMI>30, HA, HTN, Osteoporosis  Medications: No pain medications reported    Pain Intensity (0-10, VAS): Current 1, Worst 5, Best 0    Patient Goals: \"I want to be able to learn things I can do so I do not need to take medicine\"    OBJECTIVE EXAMINATION    OBJECTIVE    BP: 140/84 mmHg  Posture: Increased lumbar lordosis    Gait:  [x] Normal     [] Abnormal:    Functional Tests:  1. SLS: Left 4 sec / Right 5 sec  2. Bilateral Squat: Bilateral knee pain   Active Movements: [] N/A   [] Too acute   [] Other:  ROM % AROM Comments   Forward flexion 40-60 25% limited Decreased reversal of lordosis   Extension 20-30 60% limited    SB right 20-30 25% limited Dec reversal of lordosis   SB left 20-30 25% limited Dec reversal of lordosis   Rotation right 5-10 25% limited    Rotation left 5-10 25% limited    Repeated Extension x10:  Increase in central lumbar pain Repeated Flexion x10: No change in pain  **Ease in pain immediately with supine lying    Palpation: TTP L5 SP    Joint Mobility: Hypermobile, painful L5 with CPA    LE MMT    Left Right   Hip Flexion 5 5    Extension 5 5    Abduction 4+ 5    ER 3+ 5    IR 5 5   Knee Flexion 5 5    Extension 5 5   Ankle Dorsiflexion 5 5   *Assessed in supine    Special Tests       Hip: Karen Brain:  [x] R    [x] L    [] +    [x] -     Scour:  [x] R    [x] L    [] +    [x] -     FADDIR: [x] R    [x] L    [] +    [x] -          Deficits: Prone Quadriceps: Left (minimal) / Right (minimal)     Hamstrings 90/90: Left (minimal) / Right (minimal)    OBJECTIVE    21 min [x]Eval                  []Re-Eval     8 min Therapeutic Exercise:  [x] See flow sheet : Patient educated regarding completion of prescribed HEP and provided with written HEP instructions, Patient educated regarding diagnosis and PT PoC   Rationale: increase ROM and increase strength to improve the patients ability to improve ease with prolonged sitting. 8 min Therapeutic Activity:  [x]  See flow sheet : Patient educated regarding utilization of towel roll with completion of sitting to improve ease with driving and work, Patient educated regarding use of PPT with completion of ant/posterior pelvic tilts to improve prolonged sitting tolerance.    Rationale: increase ROM, increase strength and increase proprioception  to improve the patients ability to improve ease with driving     8 min Neuromuscular Re-education:  [x]  See flow sheet : Patient educated regarding correct activation and recruitment of the anterior abdominal musculature and improving pelvic proprioceptive awareness   Rationale: increase ROM, increase strength and increase proprioception  to improve the patients ability to improve ease with household ADLs     With   [] TE   [] TA   [] neuro   [] other: Patient Education: [x] Review HEP    [] Progressed/Changed HEP based on:   [] positioning   [] body mechanics [] transfers   [] heat/ice application    [] other:      Pain Level (0-10 scale) post treatment: 1    ASSESSMENT/Changes in Function: Patient presents with s/s consistent with lower lumbar functional instability with patient reporting chronic intermittent low back pain with recent insidious worsening of symptoms 1 year ago with pain primarily localized to central lumbosacral region with radiation horizontally (right>left) with occasional central mid-thoracic pain reported. Patient with PMH significant for mid-thoracic fracture as a teenager with conservative healing reported without chronic deficits nor pain reported. Patient denies any LE N/T nor weakness but does report chronic history of bilateral knee pain (right > left). Patient reports increase in central lumbosacral pain with prolonged standing >10 minutes and ambulation >1 mile with increase with right UE overhead reaching reported. Relief reported with assumption of supportive lumbar positioning (i.e. Recliner, supine lying). Patient denies sleep disturbances. Patient demonstrates normal gait pattern with diminished SLS bilaterally and symmetrical squat mechanics demonstrated. Patient noted with an increased lumbar lordosis with poor reversal with completion of lumbar AROM and reproduction of \"pressure\" with extension with \"pull\" reported with flexion. No change in symptom presentation with repeated movements. Patient demonstrates a normal hip screen bilaterally but noted with weakness of bilateral hip abductors and external rotators (right>left). Patient demonstrates pain and hypermobility with assessment of L5 CPA with guarding with TTP to localized parapsinal musculature noted upon palpation. Patient demonstrates good pelvic proprioceptive awareness but noted with poor activation of the anterior abdominal musculature.  Emphasis of within clinic treatment to be placed on improving lumbar functional stability through improved recruitment of the anterior abdominal musculature with progression into functional weightbearing to ensure tolerance with weightbearing activities. Patient with low-grade symptom presentation with patient presenting with a good prognosis. Patient will continue to benefit from skilled PT services to modify and progress therapeutic interventions, address functional mobility deficits, address ROM deficits, address strength deficits, analyze and address soft tissue restrictions, analyze and cue movement patterns, analyze and modify body mechanics/ergonomics and assess and modify postural abnormalities to attain remaining goals. [x]  See Plan of Care  []  See progress note/recertification  []  See Discharge Summary         Progress towards goals / Updated goals:    Short Term Goals: To be accomplished in 3 weeks:  1. Patient will subjectively report full compliance with prescribed HEP. Eval: HEP provided  2. Patient will demonstrate left hip abduction and ER MMT 5/5 to improve ease with ambulation on uneven surfaces. Eval: Left Hip Abduction 4+/5, Left Hip ER 3+/5  3. Patient will demonstrate left/right SLS >/=25 seconds in order to improve ease with community ambulation. Eval: Left SLS 4 sec / Right SLS 5 sec    Long Term Goals: To be accomplished in 6 weeks:  1. Patient will demonstrate a significant functional improvement as demonstrated by a score of >/= 55 on FOTO. Eval: FOTO = 41  2. Patient will report standing tolerance >/=30 minutes in order to improve ease with cooking. Eval: Standing Tolerance = 10 minutes  3. Patient will report >/=70% improvement in symptoms to improve ease with progression of walking regime for exercise.   Eval: 0%    PLAN  [x]  Upgrade activities as tolerated     []  Continue plan of care  []  Update interventions per flow sheet       []  Discharge due to:_  []  Other:_      Kiarra Barnett, PT 6/27/2018  1:49 PM    Future Appointments  Date Time Provider Alpesh Romo   6/27/2018 5:00 PM Melania GRAHAM Lalito Bernardo

## 2018-07-06 ENCOUNTER — APPOINTMENT (OUTPATIENT)
Dept: PHYSICAL THERAPY | Age: 53
End: 2018-07-06
Payer: COMMERCIAL

## 2018-07-11 ENCOUNTER — APPOINTMENT (OUTPATIENT)
Dept: PHYSICAL THERAPY | Age: 53
End: 2018-07-11
Payer: COMMERCIAL

## 2018-07-13 ENCOUNTER — HOSPITAL ENCOUNTER (OUTPATIENT)
Dept: PHYSICAL THERAPY | Age: 53
Discharge: HOME | End: 2018-07-13
Payer: COMMERCIAL

## 2018-07-13 PROCEDURE — 97112 NEUROMUSCULAR REEDUCATION: CPT

## 2018-07-13 PROCEDURE — 97110 THERAPEUTIC EXERCISES: CPT

## 2018-07-13 NOTE — PROGRESS NOTES
PT DAILY TREATMENT NOTE     Patient Name: Tu Aguilar  Date:2018  : 1965  [x]  Patient  Verified  Payor: [place] / Plan: [facility]  [facility] Type: Commer[facility] /    In time:408  Out AYFJ:230  Total Treatment Time (min): 45  Visit #: 2 of 12    Treatment Area: Spinal stenosis, lumbar region without neurogenic claudication [M48.061]    SUBJECTIVE  Pain Level (0-10 scale): 3  Any medication changes, allergies to medications, adverse drug reactions, diagnosis change, or new procedure performed?: [x] No    [] Yes (see summary sheet for update)  Subjective functional status/changes:   [] No changes reported  Patient reports overall significant improvement in symptoms since SoC with relief of pain with overhead lifting. OBJECTIVE    Modality rationale: decrease inflammation and decrease pain to improve the patients ability to improve ease with sleep   Min Type Additional Details   5 [x]  Ice     []  heat  []  Ice massage Position: Supine  Location: Lumbar, Post-Tx     15 min Therapeutic Exercise:  [x] See flow sheet : Emphasis placed on improving available lumbar AROM and LE strength   Rationale: increase ROM and increase strength to improve the patients ability to improve ease with household ADLs    25 min Neuromuscular Re-education:  [x]  See flow sheet : Emphasis placed on improving anterior abdominal activation and recruitment and improving pelvic proprioceptive awareness   Rationale: increase ROM, increase strength, improve balance and increase proprioception  to improve the patients ability to improve ease with functional lifting.     With   [] TE   [] TA   [] neuro   [] other: Patient Education: [x] Review HEP    [] Progressed/Changed HEP based on:   [] positioning   [] body mechanics   [] transfers   [] heat/ice application    [] other:      Pain Level (0-10 scale) post treatment: 0    ASSESSMENT/Changes in Function: Patient demonstrates improved pelvic proprioceptive awareness in comparison to initial evaluation. Educated patient in completion of standing PPT to improve tolerance to prolonged standing with patient benefiting from tactile cuing to promote correct mechanics. Patient provided with updated HEP. Patient will continue to benefit from skilled PT services to modify and progress therapeutic interventions, address functional mobility deficits, address ROM deficits, address strength deficits, analyze and address soft tissue restrictions, analyze and cue movement patterns, analyze and modify body mechanics/ergonomics and assess and modify postural abnormalities to attain remaining goals. []  See Plan of Care  []  See progress note/recertification  []  See Discharge Summary         Progress towards goals / Updated goals:    Short Term Goals: To be accomplished in 3 weeks:  1. Patient will subjectively report full compliance with prescribed HEP. Eval: HEP provided  Current: Met, HEP performance reported as prescribed, 7/13/2018  2. Patient will demonstrate left hip abduction and ER MMT 5/5 to improve ease with ambulation on uneven surfaces. Eval: Left Hip Abduction 4+/5, Left Hip ER 3+/5  3. Patient will demonstrate left/right SLS >/=25 seconds in order to improve ease with community ambulation. Eval: Left SLS 4 sec / Right SLS 5 sec     Long Term Goals: To be accomplished in 6 weeks:  1. Patient will demonstrate a significant functional improvement as demonstrated by a score of >/= 55 on FOTO. Eval: FOTO = 41  2. Patient will report standing tolerance >/=30 minutes in order to improve ease with cooking. Eval: Standing Tolerance = 10 minutes  Current: Progressing, Standing Tolerance = 10-15 minutes, 7/13/2018  3. Patient will report >/=70% improvement in symptoms to improve ease with progression of walking regime for exercise.   Eval: 0%    PLAN  [x]  Upgrade activities as tolerated     [x]  Continue plan of care  []  Update interventions per flow sheet       [] Discharge due to:_  []  Other:_      Johnathan Deutsch, PT 7/13/2018  4:13 PM    Future Appointments  Date Time Provider Alpesh Romo   7/13/2018 4:30 PM Sharron Moeller [facility]   7/18/2018 4:00 PM Norville Cowden, [place] [facility]   7/31/2018 4:30 PM Branden Hoskins [facility]   8/2/2018 4:30 PM Norville Cowden, PTA [facility]   8/7/2018 4:30 PM Johnathan Deutsch, PT [facility]   8/10/2018 4:30 PM Johnathan Deutsch, PT [facility]   8/15/2018 4:30 PM Norville Cowden, PTA [facility]

## 2018-07-17 ENCOUNTER — HOSPITAL ENCOUNTER (OUTPATIENT)
Dept: PHYSICAL THERAPY | Age: 53
Discharge: HOME | End: 2018-07-17
Payer: COMMERCIAL

## 2018-07-17 PROCEDURE — 97110 THERAPEUTIC EXERCISES: CPT

## 2018-07-17 PROCEDURE — 97112 NEUROMUSCULAR REEDUCATION: CPT

## 2018-07-17 RX ORDER — ERGOCALCIFEROL 1.25 MG/1
CAPSULE ORAL
Qty: 8 CAP | Refills: 0 | Status: SHIPPED | OUTPATIENT
Start: 2018-07-17 | End: 2019-11-04 | Stop reason: SDUPTHER

## 2018-07-17 NOTE — PROGRESS NOTES
PT DAILY TREATMENT NOTE     Patient Name: Cherie Hodge  Date:2018  : 1965  [x]  Patient  Verified  Payor: Berkley Villarreal / Plan: [facility] [employer]  [facility] PT / Product Type: Commerical /    In time:4;30  Out time:5:20  Total Treatment Time (min): 50  Visit #: 3 of 12    Treatment Area: Spinal stenosis, lumbar region without neurogenic claudication [M48.061]    SUBJECTIVE  Pain Level (0-10 scale): 0  Any medication changes, allergies to medications, adverse drug reactions, diagnosis change, or new procedure performed?: [x] No    [] Yes (see summary sheet for update)  Subjective functional status/changes:   [] No changes reported  Pt notes she is feeling well. Reports no pain today.     OBJECTIVE    Modality rationale: decrease pain to improve the patients ability to improve ease with sleep   Min Type Additional Details    [] Estim:  []Unatt       []IFC  []Premod                        []Other:  []w/ice   []w/heat  Position:  Location:    [] Estim: []Att    []TENS instruct  []NMES                    []Other:  []w/US   []w/ice   []w/heat  Position:  Location:    []  Traction: [] Cervical       []Lumbar                       [] Prone          []Supine                       []Intermittent   []Continuous Lbs:  [] before manual  [] after manual    []  Ultrasound: []Continuous   [] Pulsed                           []1MHz   []3MHz W/cm2:  Location:    []  Iontophoresis with dexamethasone         Location: [] Take home patch   [] In clinic   10 [x]  Ice     []  heat  []  Ice massage  []  Laser   []  Anodyne Position: supine   Location: lower back    []  Laser with stim  []  Other:  Position:  Location:    []  Vasopneumatic Device Pressure:       [] lo [] med [] hi   Temperature: [] lo [] med [] hi   [] Skin assessment post-treatment:  []intact []redness- no adverse reaction    []redness - adverse reaction:       25 min Therapeutic Exercise:  [x] See flow sheet :   Rationale: increase ROM and increase strength to improve the patients ability to increase ease     15 min Neuromuscular Re-education:  [x]  See flow sheet : activation of deep core stabilizers   Rationale: improve coordination, improve balance and increase proprioception  to improve the patients ability to perform functional activities           With   [] TE   [] TA   [] neuro   [] other: Patient Education: [x] Review HEP    [] Progressed/Changed HEP based on:   [] positioning   [] body mechanics   [] transfers   [] heat/ice application    [] other:          Pain Level (0-10 scale) post treatment: 0    ASSESSMENT/Changes in Function: Progressed pt in core stabilization exercises. Requires min cues to hold PPT with mini bridges. Reviwed HEP at beginning of session. Patient will continue to benefit from skilled PT services to modify and progress therapeutic interventions, address functional mobility deficits, address ROM deficits, address strength deficits, analyze and address soft tissue restrictions, analyze and cue movement patterns, analyze and modify body mechanics/ergonomics, assess and modify postural abnormalities and instruct in home and community integration to attain remaining goals. [x]  See Plan of Care  []  See progress note/recertification  []  See Discharge Summary         Progress towards goals / Updated goals:  Short Term Goals: To be accomplished in 3 weeks:  1. Patient will subjectively report full compliance with prescribed HEP. Eval: HEP provided  Current: Met, HEP performance reported as prescribed, 7/13/2018  2. Patient will demonstrate left hip abduction and ER MMT 5/5 to improve ease with ambulation on uneven surfaces. Eval: Left Hip Abduction 4+/5, Left Hip ER 3+/5  3. Patient will demonstrate left/right SLS >/=25 seconds in order to improve ease with community ambulation. Eval: Left SLS 4 sec / Right SLS 5 sec  Progressing Left SLS 8 sec, right 8 sec, 7/17/18      Long Term Goals: To be accomplished in 6 weeks:  1.  Patient will demonstrate a significant functional improvement as demonstrated by a score of >/= 55 on FOTO. Eval: FOTO = 41  2. Patient will report standing tolerance >/=30 minutes in order to improve ease with cooking. Eval: Standing Tolerance = 10 minutes  Current: Progressing, Standing Tolerance = 10-15 minutes, 7/13/2018  3. Patient will report >/=70% improvement in symptoms to improve ease with progression of walking regime for exercise.   Eval: 0%    PLAN  [x]  Upgrade activities as tolerated     []  Continue plan of care  []  Update interventions per flow sheet       []  Discharge due to:_  []  Other:_      Shadia Davis 7/17/2018  4:33 PM    Future Appointments  Date Time Provider Alpesh Romo   7/31/2018 4:30 PM Shadia Davis [facility]   8/2/2018 4:30 PM Saniya Louie PTA [facility]   8/7/2018 4:30 PM Shayna Pierce, PT [facility]   8/10/2018 4:30 PM Shayna Peirce, PT [facility]   8/13/2018 5:00 PM Shadia Davis [facility]

## 2018-07-18 ENCOUNTER — APPOINTMENT (OUTPATIENT)
Dept: PHYSICAL THERAPY | Age: 53
End: 2018-07-18
Payer: COMMERCIAL

## 2018-07-31 ENCOUNTER — APPOINTMENT (OUTPATIENT)
Dept: PHYSICAL THERAPY | Age: 53
End: 2018-07-31
Payer: COMMERCIAL

## 2018-08-02 ENCOUNTER — HOSPITAL ENCOUNTER (OUTPATIENT)
Dept: PHYSICAL THERAPY | Age: 53
Discharge: HOME | End: 2018-08-02
Payer: COMMERCIAL

## 2018-08-02 PROCEDURE — 97110 THERAPEUTIC EXERCISES: CPT

## 2018-08-02 PROCEDURE — 97112 NEUROMUSCULAR REEDUCATION: CPT

## 2018-08-02 NOTE — PROGRESS NOTES
[facility]              [address]        [place], [address]   [phone] fax    Progress Note  Patient name: Coty Mercer Start of Care: 2018   Referral source: Harshad Carter MD : 1965   Medical/Treatment Diagnosis: Spinal stenosis, lumbar region without neurogenic claudication [M48.061] Onset Date:1 year prior to IE     Prior Hospitalization: see medical history Provider#: [id number]   Medications: Verified on Patient Summary List    Comorbidities: Allergies, Anxiety, Arthritis, Asthma, Back Pain, BMI>30, HA, HTN, Osteoporosis   Prior Level of Function: Walking for exercise (0.5 miles, 3x/week), (I) Functional ADLs, (I) Ambulation without modification, Work full-time  Vis[facility] from [facility]: 4    Missed Visits: 1    Established Goals:      Short Term Goals: To be accomplished in 3 weeks:  1. Patient will subjectively report full compliance with prescribed HEP. Eval: HEP provided  At PN: Met, HEP performance reported as prescribed  2. Patient will demonstrate left hip abduction and ER MMT 5/5 to improve ease with ambulation on uneven surfaces. Eval: Left Hip Abduction 4+/5, Left Hip ER 3+/5  At PN: Progressing,  Left Hip Abduction 4+/5, Left Hip ER 5/5  3. Patient will demonstrate left/right SLS >/=25 seconds in order to improve ease with community ambulation. Eval: Left SLS 4 sec / Right SLS 5 sec  At PN: Progressing, Left SLS 30 sec / Right SLS 8 sec      Long Term Goals: To be accomplished in 6 weeks:  1. Patient will demonstrate a significant functional improvement as demonstrated by a score of >/= 55 on FOTO. Eval: FOTO = 41  At PN: Met, FOTO = 67  2. Patient will report standing tolerance >/=30 minutes in order to improve ease with cooking. Eval: Standing Tolerance = 10 minutes  At PN: Progressing, Standing Tolerance = 10-15 minutes  3.  Patient will report >/=70% improvement in symptoms to improve ease with progression of walking regime for exercise. Eval: 0%  At PN: Met, 80% improvement since [facility] reported    Key Functional Changes: See above goals. Updated Goals: Continue with unmet goals above. ASSESSMENT/RECOMMENDATIONS:    Since SoC patient has demonstrated a significant functional improvement with primary emphasis of treatment placed on patient education in the completion of exercises and postural adjustments to aid in independent symptom management. Patient has been limited in attendance secondary to personal time constraints with attendance to only 3 follow up treatment sessions since initial evaluation 6/27/2018 thus limiting extent of progression within clinic. Patient has subjectively reported 80% improvement in symptoms since [facility] with emphasis of remaining sessions to be placed on functional retraining of the abdominal musculature with functional weightbearing positions.     Patient will continue to benefit from skilled PT services to modify and progress therapeutic interventions, address functional mobility deficits, address ROM deficits, address strength deficits, analyze and address soft tissue restrictions, analyze and cue movement patterns, analyze and modify body mechanics/ergonomics and assess and modify postural abnormalities to attain remaining goals.     [x]Continue therapy per initial plan/protocol at a frequency of  2 x per week for 2 weeks  []Continue therapy with the following recommended changes:_____________________      _____________________________________________________________________  []Discontinue therapy progressing towards or have reached established goals  []Discontinue therapy due to lack of appreciable progress towards goals  []Discontinue therapy due to lack of attendance or compliance  []Await Physician's recommendations/decisions regarding therapy  []Other:________________________________________________________________    Thank you for this referral.    Maribel Naqvi Wayne, PT 8/2/2018 4:28 PM  NOTE TO PHYSICIAN:  PLEASE COMPLETE THE ORDERS BELOW AND   FAX TO [facility]: [phone]  If you are unable to process this request in 24 hours please contact our office: [phone]    []  I have read the above report and request that my patient continue as recommended. []  I have read the above report and request that my patient continue therapy with the following changes/special instructions:________________________________________  []I have read the above report and request that my patient be discharged from therapy.     Physicians signature: ________________________________Date: _____Time:_____

## 2018-08-02 NOTE — PROGRESS NOTES
PT DAILY TREATMENT NOTE     Patient Name: Babita Mercado  Date:2018  : 1965  [x]  Patient  Verified  Payor: Sienna Persaud / [place] [place]  [facility] Product Type: Commer[facility] /    In [id number]:[id number]  Out time:0510  Total Treatment Time (min): 55  Visit #: 4 of 12    Treatment Area: Spinal stenosis, lumbar region without neurogenic claudication [M48.061]    SUBJECTIVE  Pain Level (0-10 scale): 2  Any medication changes, allergies to medications, adverse drug reactions, diagnosis change, or new procedure performed?: [x] No    [] Yes (see summary sheet for update)  Subjective functional status/changes:   [] No changes reported  80% improvement since SoC reported. OBJECTIVE    Modality rationale: decrease inflammation and decrease pain to improve the patients ability to improve ease with sleep   Min Type Additional Details   10 [x]  Ice     []  heat  []  Ice massage Position: Supine  Location: Lumbar, Post-Tx      10 min Therapeutic Exercise:  [x] See flow sheet : Emphasis placed on improving available lumbar AROM and LE strength   Rationale: increase ROM and increase strength to improve the patients ability to improve ease with household ADLs     35 min Neuromuscular Re-education:  [x]  See flow sheet : Emphasis placed on improving anterior abdominal activation and recruitment and improving pelvic proprioceptive awareness   Rationale: increase ROM, increase strength, improve balance and increase proprioception  to improve the patients ability to improve ease with functional lifting.         With   [] TE   [] TA   [] neuro   [] other: Patient Education: [x] Review HEP    [] Progressed/Changed HEP based on:   [] positioning   [] body mechanics   [] transfers   [] heat/ice application    [] other:      Pain Level (0-10 scale) post treatment: 0    ASSESSMENT/Changes in Function: Since SoC patient has demonstrated a significant functional improvement with primary emphasis of treatment placed on patient education in the completion of exercises and postural adjustments to aid in independent symptom management. Patient has been limited in attendance secondary to personal time constraints with attendance to only 3 follow up treatment sessions since initial evaluation 6/27/2018 thus limiting extent of progression within clinic. Patient has subjectively reported 80% improvement in symptoms since [facility] with emphasis of remaining sessions to be placed on functional retraining of the abdominal musculature with functional weightbearing positions. Patient will continue to benefit from skilled PT services to modify and progress therapeutic interventions, address functional mobility deficits, address ROM deficits, address strength deficits, analyze and address soft tissue restrictions, analyze and cue movement patterns, analyze and modify body mechanics/ergonomics and assess and modify postural abnormalities to attain remaining goals. []  See Plan of Care  [x]  See progress note/recertification  []  See Discharge Summary         Progress towards goals / Updated goals:    Short Term Goals: To be accomplished in 3 weeks:  1. Patient will subjectively report full compliance with prescribed HEP. Eval: HEP provided  Current: Met, HEP performance reported as prescribed, 7/13/2018  2. Patient will demonstrate left hip abduction and ER MMT 5/5 to improve ease with ambulation on uneven surfaces. Eval: Left Hip Abduction 4+/5, Left Hip ER 3+/5  Current: Progressing,  Left Hip Abduction 4+/5, Left Hip ER 5/5, 8/2/2018  3. Patient will demonstrate left/right SLS >/=25 seconds in order to improve ease with community ambulation. Eval: Left SLS 4 sec / Right SLS 5 sec  Current: Progressing, Left SLS 30 sec / Right SLS 8 sec, 8/2/2018      Long Term Goals: To be accomplished in 6 weeks:  1. Patient will demonstrate a significant functional improvement as demonstrated by a score of >/= 55 on FOTO.   Eval: FOTO = 41  Current: Met, FOTO = 67, 8/2/2018  2. Patient will report standing tolerance >/=30 minutes in order to improve ease with cooking. Eval: Standing Tolerance = 10 minutes  Current: Progressing, Standing Tolerance = 10-15 minutes, 8/2/2018  3. Patient will report >/=70% improvement in symptoms to improve ease with progression of walking regime for exercise.   Eval: 0%  Current: Met, 80% improvement since SoC reported, 8/2/2018    PLAN  [x]  Upgrade activities as tolerated     [x]  Continue plan of care  []  Update interventions per flow sheet       []  Discharge due to:_  []  Other:_      Marko Weiner, PT 8/2/2018  4:08 PM    Future Appointments  Date Time Provider Alpesh Romo   8/2/2018 4:30 PM Marko Weiner, PT [facility]   8/7/2018 4:30 PM Marko Weiner PT [facility]   8/10/2018 4:30 PM Marko Weiner PT [facility]   8/13/2018 5:00 PM She Rashid [facility]

## 2018-08-07 ENCOUNTER — HOSPITAL ENCOUNTER (OUTPATIENT)
Dept: PHYSICAL THERAPY | Age: 53
End: 2018-08-07
Payer: COMMERCIAL

## 2018-08-10 ENCOUNTER — HOSPITAL ENCOUNTER (OUTPATIENT)
Dept: PHYSICAL THERAPY | Age: 53
Discharge: HOME | End: 2018-08-10
Payer: COMMERCIAL

## 2018-08-10 PROCEDURE — 97110 THERAPEUTIC EXERCISES: CPT

## 2018-08-10 PROCEDURE — 97112 NEUROMUSCULAR REEDUCATION: CPT

## 2018-08-10 NOTE — PROGRESS NOTES
PT DAILY TREATMENT NOTE     Patient Name: Janette Santos  Date:8/10/2018  : 1965  [x]  Patient  Verified  Payor: Daisy Yun / [place]: [place] [employer]  [facility] PT / Product Type: Commerical /    In JVZQ:7645  Out EXBL:0532  Total Treatment Time (min): 44  Visit #: 1 of 4 (signed PN)    Treatment Area: Spinal stenosis, lumbar region without neurogenic claudication [M48.061]    SUBJECTIVE  Pain Level (0-10 scale): 0  Any medication changes, allergies to medications, adverse drug reactions, diagnosis change, or new procedure performed?: [x] No    [] Yes (see summary sheet for update)  Subjective functional status/changes:   [] No changes reported  Patient in agreement to be placed on hold upon completion of this session. OBJECTIVE    Modality rationale: decrease inflammation and decrease pain to improve the patients ability to improve ease with sleep   Min Type Additional Details   10 [x]  Ice     []  heat  []  Ice massage Position: Supine  Location: Lumbar, Post-Tx       10 min Therapeutic Exercise:  [x] See flow sheet : Emphasis placed on improving available lumbar AROM and LE strength   Rationale: increase ROM and increase strength to improve the patients ability to improve ease with household ADLs      24 min Neuromuscular Re-education:  [x]  See flow sheet : Emphasis placed on improving anterior abdominal activation and recruitment and improving pelvic proprioceptive awareness   Rationale: increase ROM, increase strength, improve balance and increase proprioception  to improve the patients ability to improve ease with functional lifting. With   [] TE   [] TA   [] neuro   [] other: Patient Education: [x] Review HEP    [] Progressed/Changed HEP based on:   [] positioning   [] body mechanics   [] transfers   [] heat/ice application    [] other:      Pain Level (0-10 scale) post treatment: 0     ASSESSMENT/Changes in Function:  At this time patient to be placed on hold x30 days secondary to significant functional improvements having been demonstrated with patient in agreement to continue with completion of prescribed HEP to ensure continued maintenance of symptoms. Patient in agreement with plan of care with patient to be discharged if no contact made with clinic within 30 day period. Patient will continue to benefit from skilled PT services to modify and progress therapeutic interventions, address functional mobility deficits, address ROM deficits, address strength deficits, analyze and address soft tissue restrictions, analyze and cue movement patterns, analyze and modify body mechanics/ergonomics and assess and modify postural abnormalities to attain remaining goals. []  See Plan of Care  []  See progress note/recertification  []  See Discharge Summary         Progress towards goals / Updated goals:    Short Term Goals: To be accomplished in 3 weeks:  1. Patient will subjectively report full compliance with prescribed HEP. At PN: Met, HEP performance reported as prescribed, 7/13/2018  2. Patient will demonstrate left hip abduction and ER MMT 5/5 to improve ease with ambulation on uneven surfaces. At PN: Progressing,  Left Hip Abduction 4+/5, Left Hip ER 5/5, 8/2/2018  3. Patient will demonstrate left/right SLS >/=25 seconds in order to improve ease with community ambulation. At PN: Progressing, Left SLS 30 sec / Right SLS 8 sec, 8/2/2018      Long Term Goals: To be accomplished in 6 weeks:  1. Patient will demonstrate a significant functional improvement as demonstrated by a score of >/= 55 on FOTO. At PN: Met, FOTO = 67, 8/2/2018  2. Patient will report standing tolerance >/=30 minutes in order to improve ease with cooking. At PN: Progressing, Standing Tolerance = 10-15 minutes, 8/2/2018  Current: Remains, Standing Tolerance = 10-15 minutes, 8/10/2018  3. Patient will report >/=70% improvement in symptoms to improve ease with progression of walking regime for exercise.   At PN: Met, 80% improvement since SoC reported, 8/2/2018    PLAN  []  Upgrade activities as tolerated     []  Continue plan of care  []  Update interventions per flow sheet       []  Discharge due to:_  [x]  Other:_  Hold x30 days    Barrie Arianna, PT 8/10/2018  8:23 AM    Future Appointments  Date Time Provider Alpesh Romo   8/10/2018 4:30 PM Barrie Keller, PT [facility]   8/13/2018 5:00 PM Kimmie Quintero [facility]

## 2018-08-13 ENCOUNTER — APPOINTMENT (OUTPATIENT)
Dept: PHYSICAL THERAPY | Age: 53
End: 2018-08-13
Payer: COMMERCIAL

## 2018-08-15 ENCOUNTER — APPOINTMENT (OUTPATIENT)
Dept: PHYSICAL THERAPY | Age: 53
End: 2018-08-15
Payer: COMMERCIAL

## 2018-10-01 NOTE — PROGRESS NOTES
[facility]              [address]        [place], [address]   [phone] fax    Discharge Summary  Patient name: Paulett Olszewski Start of Care: 2018   Referral source: Flavia Zimmerman MD : 1965   Medical/Treatment Diagnosis: Spinal stenosis, lumbar region without neurogenic claudication [M48.061] Onset Date:1 year prior to IE     Prior Hospitalization: see medical history Provider#: [id number]   Medications: Verified on Patient Summary List    Comorbidities: Allergies, Anxiety, Arthritis, Asthma, Back Pain, BMI>30, HA, HTN, Osteoporosis   Prior Level of Function: Walking for exercise (0.5 miles, 3x/week), (I) Functional ADLs, (I) Ambulation without modification, Work full-time  Visits from Start of Care: 5    Missed Visits: 2  Reporting Period : 2018 to 8/10/2018    Summary of Care:    Short Term Goals: To be accomplished in 3 weeks:  1. Patient will subjectively report full compliance with prescribed HEP. At PN: Met, HEP performance reported as prescribed  2. Patient will demonstrate left hip abduction and ER MMT 5/5 to improve ease with ambulation on uneven surfaces. At PN: Progressing,  Left Hip Abduction 4+/5, Left Hip ER 5/5  At DC: Inability to assess secondary to patient self-discharge  3. Patient will demonstrate left/right SLS >/=25 seconds in order to improve ease with community ambulation. At PN: Progressing, Left SLS 30 sec / Right SLS 8 sec  At DC: Inability to assess secondary to patient [address] be accomplished in 6 weeks:  1. Patient will demonstrate a significant functional improvement as demonstrated by a score of >/= 55 on FOTO. At PN: Met, FOTO = 67  2. Patient will report standing tolerance >/=30 minutes in order to improve ease with cooking. At PN: Progressing, Standing Tolerance = 10-15 minutes  At DC: Remains, Standing Tolerance = 10-15 minutes  3.  Patient will report >/=70% improvement in symptoms to improve ease with progression of walking regime for exercise. At PN: Met, 80% improvement since SoC reported, 8/2/2018    ASSESSMENT/RECOMMENDATIONS:    At this time patient to be discharged in accordance with clinic 63-ING policy with patient having last been seen within clinic 8/10/2018 at which time patient was placed on a 30 day hold secondary to significant functional improvements having been demonstrated and patient agreement to continue with completion of prescribed HEP to ensure continued maintenance of symptoms. With patient without contact with clinic within 30-day period patient to be discharged accordingly.      [x]Discontinue therapy: [x]Patient has reached or is progressing toward set goals      []Patient is non-compliant or has abdicated      []Due to lack of appreciable progress towards set 55 Glendy Medel, PT 10/1/2018 7:35 AM

## 2019-02-08 ENCOUNTER — TELEPHONE (OUTPATIENT)
Dept: ORTHOPEDIC SURGERY | Age: 54
End: 2019-02-08

## 2019-02-08 DIAGNOSIS — M17.0 PRIMARY OSTEOARTHRITIS OF BOTH KNEES: Primary | ICD-10-CM

## 2019-02-08 NOTE — TELEPHONE ENCOUNTER
Patient called requesting to have authorization for euflexxa injections for both of her knees. The last time she had euflexxa injections was 01/31/18, 02/07/18, and 02/14/18. Please advise patient back regarding this at [phone].

## 2019-02-08 NOTE — TELEPHONE ENCOUNTER
Authorization request entered into patients chart for bilbenjy Lynnxa. [address] notifying patient.

## 2019-02-18 ENCOUNTER — DOCUMENTATION ONLY (OUTPATIENT)
Dept: ORTHOPEDIC SURGERY | Age: 54
End: 2019-02-18

## 2019-02-18 ENCOUNTER — OFFICE VISIT (OUTPATIENT)
Dept: ORTHOPEDIC SURGERY | Age: 54
End: 2019-02-18

## 2019-02-18 VITALS
HEIGHT: 63 IN | SYSTOLIC BLOOD PRESSURE: 131 MMHG | DIASTOLIC BLOOD PRESSURE: 76 MMHG | RESPIRATION RATE: 15 BRPM | BODY MASS INDEX: 42.7 KG/M2 | OXYGEN SATURATION: 96 % | HEART RATE: 68 BPM | WEIGHT: 241 LBS | TEMPERATURE: 96.6 F

## 2019-02-18 DIAGNOSIS — M17.0 PRIMARY OSTEOARTHRITIS OF BOTH KNEES: Primary | ICD-10-CM

## 2019-02-18 DIAGNOSIS — G89.29 CHRONIC PAIN OF RIGHT KNEE: ICD-10-CM

## 2019-02-18 DIAGNOSIS — G89.29 CHRONIC PAIN OF LEFT KNEE: ICD-10-CM

## 2019-02-18 DIAGNOSIS — M25.562 CHRONIC PAIN OF LEFT KNEE: ICD-10-CM

## 2019-02-18 DIAGNOSIS — M25.561 CHRONIC PAIN OF RIGHT KNEE: ICD-10-CM

## 2019-02-18 NOTE — PROGRESS NOTES
1. Have you been to the ER, urgent care clinic since your last visit? Hospitalized since your last visit? Yes When: [facility] ON DEC 17TH FOR A FALL 2. Have you seen or consulted any other health care providers outside of the [address] [place] [address] since your last visit? Include any pap smears or colon screening.  No

## 2019-02-18 NOTE — PATIENT INSTRUCTIONS
Please follow up after Euflexxa authorization. You are advised to contact us if your condition worsens. Patellofemoral Pain Syndrome: Exercises Complete at least 2 sessions of each exercise each day to get started (no days off). If beneficial and able to fit into your schedule, more sessions are recommended. Hamstring stretch 1. Place one leg on the table, couch, bed, or floor. 2. Tighten your quadriceps muscle (top of thigh) and keep you knee straight 3. Keeping your upper back straight(stick your chest out), lean forward from the hips until you feel tension in your hamstrings (back of thigh) 4. Hold the stretch for 20-30 seconds. 5. Repeat 3 times per leg. 6. Hold the stretch for 20 to 30 seconds. 7. Repeat 3 times per leg. [employer] 1. Sit with your affected leg straight and supported on the floor or a firm bed. Place a small, rolled-up towel (or your hand) under your affected knee. Your other leg should be bent, with that foot flat on the floor. 2. Tighten the top thigh muscles (quadriceps) of your affected leg by pressing the back of your knee down into the towel. 3. Hold for 10 seconds. 4. Repeat 10 times per leg. Straight-leg raises to the front 1. Lie on your back with one knee bent so that your foot rests flat on the floor. Your other leg should be straight. Make sure that your low back has a normal curve. You should be able to slip your hand in between the floor and the small of your back, with your palm touching the floor and your back touching the back of your hand. 2. Tighten the thigh muscles (quadriceps) in your affected leg by pressing the back of your knee flat down to the floor. Hold your knee straight. 3. Keeping the thigh muscles tight and your leg straight, lift your affected leg up so that your thighs are parallel. 4. Hold for about 2 seconds, then lower your leg slowly. 5. Repeat 10 times per leg.  Repeat for 3 sets in total.

## 2019-02-18 NOTE — PROGRESS NOTES
AMBULATORY PROGRESS NOTE Patient: Jonh Decker             MRN: [id number]     SSN: [id number] Body mass index is 42.69 kg/m². YOB: 1965     AGE: 46 y.o. EX: female PCP: Radha Ochoa MD 
 
 IMPRESSION/DIAGNOSIS AND TREATMENT PLAN  
 
DIAGNOSES 1. Primary osteoarthritis of both knees 2. Chronic pain of right knee 3. Chronic pain of left knee Orders Placed This Encounter  [77501] Knee 4V  [42821] Knee 4V Jonh Decker understands her diagnoses and the proposed plan. Plan: 
 
1) HEP - Alexander Geiger ATC, has educated the patient on knee specific exercises and/or stretches. 2) Obtain Euflexxa authorization. 3) Continue activity modification as directed. RTO - after Euflexxa authorization HPI AND EXAMINATION Jonh Decker IS A 46 y.o. female who presents to my outpatient office for follow up of primary osteoarthritis of the bilateral knees. At the last visit, I instructed the patient to continue activity modification as directed, to continue the HEP, to follow up with Dr. Wilfrid Sow, and ordered CT scans of the thoracic and lumbar spine. Since we saw her last, Ms. Anders Moreno states that her bilateral knee pain has returned. She reports that she has stopped taking oxaprozin (Day Pro) due to kidney/liver issues. She now uses a camphor wax for her pain, which seems to help. She reports that the other day, her right knee popped, which it has not done since 2013. Of note, the patient sustained a few fractures in her back a few months ago after falling on her left side in the shower. Visit Vitals /76 Pulse 68 Temp 96.6 °F (35.9 °C) (Oral) Resp 15 Ht 5' 3\" (1.6 m) Wt 241 lb (109.3 kg) SpO2 96% BMI 42.69 kg/m² Appearance: Alert, well appearing and pleasant patient who is in no distress, oriented to person, place/time, and who follows commands.  This patient is accompanied in the 
 office by her  self. Psychiatric: Affect and mood are appropriate. Cardiovascular/Peripheral Vascular: Normal Pulses to each hand and foot Knees:  Bilateral  
     Gait: normal   
     Cutaneous: Skin intact, no abrasions, blisters, wounds, erythema Effusion: Is not present Crepitus:  no PF joint crepitus Tenderness: None to palpation at this time. Alignment of Knee:  neutral when standing ROM: full range of motion Fullness or swelling: None to popliteal fossa region Stability: No instability to anterior, posterior, varus, valgus stress testing Thrust:  No varus thrust with gait Contractures: No Achilles or Gastrocnemius Contractures. Calf tenderness: Absent for calf or gastrocnemius muscle regions Soft, supple, non tender, non taut lower extremity compartments Extremities:   No embolic phenomena to the toes No significant edema to the foot and or toes. Edema is not present to distal 1/3 tib/fib or ankle regions. Lower extremities are warm and appear well perfused DVT: No evidence of DVT seen on examination at this time No calf swelling, no tenderness to calf muscles Lymphatic:  No Evidence of Lymphedema Vascular: Medial Border of Tibia Region: Edema is not present Pulses: Dorsalis Pedis &  Posterior Tibial Pulses : Palpable yes Varicosities Lower Limbs : None noted . Neuro: Negative bilateral Straight leg raise (seated position) See Musculoskeletal section for pertinent individual extremity examination No abnormal hand/wrist, foot/ankle, or facial/neck tremors. Extensor mechanism is intact CHART REVIEW Past Medical History:  
Diagnosis Date  Asthma Current Outpatient Medications Medication Sig  ergocalciferol (ERGOCALCIFEROL) 50,000 unit capsule TAKE ONE CAPSULE BY MOUTH EVERY 7 DAYS  fexofenadine-pseudoephedrine (ALLEGRA-D 12 HOUR)  mg Tb12 Take 1 Tab by mouth every twelve (12) hours.  ergocalciferol (ERGOCALCIFEROL) 50,000 unit capsule TAKE 1 CAPSULE BY MOUTH EVERY 7 DAYS  
 OTHER Jacob Rhino Salt Packets  azelastine (ASTELIN) 137 mcg (0.1 %) nasal spray INT 2 SPRAYS IEN BID  QNASL 80 mcg/actuation HFAA INHALE 1 PUFF IEN BID  ergocalciferol (ERGOCALCIFEROL) 50,000 unit capsule TK 1 C PO Q WEEK  
 guaiFENesin ER (MUCINEX) 600 mg ER tablet Take 600 mg by mouth daily.  budesonide (PULMICORT) 0.5 mg/2 mL nbsp 500 mcg by Nebulization route.  OLANZapine-FLUoxetine (SYMBYAX) 3-25 mg per capsule Take 1 Cap by mouth every evening.  loratadine (CLARITIN) 10 mg tablet Take 10 mg by mouth two (2) times a day.  esomeprazole (NEXIUM) 20 mg capsule Take 40 mg by mouth two (2) times a day.  escitalopram oxalate (LEXAPRO) 20 mg tablet Take 20 mg by mouth daily.  montelukast (SINGULAIR) 10 mg tablet Take 10 mg by mouth daily.  oxaprozin (DAYPRO) 600 mg tablet TAKE 1 TABLET BY MOUTH TWICE DAILY WITH MEALS  traMADol (ULTRAM) 50 mg tablet Take 1 Tab by mouth every six (6) hours as needed for Pain. Max Daily Amount: 200 mg.  
 oxaprozin (DAYPRO) 600 mg tablet Take 1 Tab by mouth daily.  traMADol (ULTRAM) 50 mg tablet Take 1 Tab by mouth every six (6) hours as needed for Pain. Max Daily Amount: 200 mg. No current facility-administered medications for this visit. Allergies Allergen Reactions  Calcium Other (comments)  Egg Swelling  Gluten Shortness of Breath  Milk Containing Products Other (comments)  Red Dye Swelling  Sulfa (Sulfonamide Antibiotics) Other (comments) Past Surgical History:  
Procedure Laterality Date  HX HYSTERECTOMY  HX PELVIC LAPAROSCOPY    
 HX TONSILLECTOMY Social History Occupational History  Not on file Tobacco Use  Smoking status: Never Smoker  Smokeless tobacco: Never Used Substance and Sexual Activity  Alcohol use: No  
 Drug use: No  
 Sexual activity: Not on file Family History Family history unknown: Yes  
Problem Relation Age of Onset  Hypertension Mother  Hypertension Father  Heart Disease Father  Stroke Father REVIEW OF SYSTEMS : 2/18/2019  ALL BELOW ARE Negative except : SEE HPI  
 
CONSTITUTIONAL: denies chills, fatigue, fever, weight change PSYCH: denies anxiety, depression, irritability or mood swings ENT: denies - headaches, hearing change, nasal congestion, oral lesions, or sore throat HEM/ONC denies - bleeding problems, bruising, pallor or swollen lymph nodes ENDO: denies hot flashes, polydipsia/polyuria or temperature intolerance RESP: denies - cough, shortness of breath or wheezing CV: denies - chest pain, edema or palpitations, TALAVERA 
GI: denies - abdominal pain, change in bowel habits, constipation, diarrhea or nausea/vomiting : denies - dysuria, hematuria, incontinence, pelvic pain MSK: denies  - See HPI. NEURO: denies - confusion, headaches, seizures or weakness DERM: denies - dry skin, hair changes, rash or skin lesion changes VASCULAR: Peripheral Vascular: No calf pain, vascular vein tenderness to calf pain No calf throbbing, posterior knee throbbing pain DIAGNOSTIC IMAGING  
 
X RAYS AT [address] [place] [address] 
2/18/2019 Bilateral KNEE:  
 
WEIGHT BEARING Bones: No fractures, subluxations, or dislocations Alignment: mild  varus Knee alignment Joint: Mild to Moderate medial joint compartment Knee OA present with moderate OA changes Soft Tissues: No swelling Mineralization: Mineralization: suggests Normal Bone I have personally reviewed the results of the above study. The interpretation of this study is my professional opinion. Written by Linda Cerda, as dictated by Dr. Radha Francois. I, Dr. Radha Francois, confirm that all documentation is accurate.

## 2019-02-25 ENCOUNTER — DOCUMENTATION ONLY (OUTPATIENT)
Dept: ORTHOPEDIC SURGERY | Age: 54
End: 2019-02-25

## 2019-02-25 NOTE — PROGRESS NOTES
[address] form completed, copy to scanning, faxed, and patient advised she may  at [facility] location.

## 2019-03-26 ENCOUNTER — OFFICE VISIT (OUTPATIENT)
Dept: ORTHOPEDIC SURGERY | Age: 54
End: 2019-03-26

## 2019-03-26 VITALS
WEIGHT: 231.4 LBS | BODY MASS INDEX: 41 KG/M2 | SYSTOLIC BLOOD PRESSURE: 139 MMHG | RESPIRATION RATE: 15 BRPM | TEMPERATURE: 97.8 F | HEIGHT: 63 IN | OXYGEN SATURATION: 96 % | HEART RATE: 60 BPM | DIASTOLIC BLOOD PRESSURE: 74 MMHG

## 2019-03-26 DIAGNOSIS — M17.0 BILATERAL PRIMARY OSTEOARTHRITIS OF KNEE: Primary | ICD-10-CM

## 2019-03-26 RX ORDER — HYALURONATE SODIUM 10 MG/ML
2 SYRINGE (ML) INTRAARTICULAR ONCE
Qty: 2 ML | Refills: 0
Start: 2019-03-26 | End: 2019-03-26

## 2019-03-26 NOTE — PROGRESS NOTES
Patient: Cody Garay                MRN: [id number]       SSN: [id number]  YOB: 1965                  AGE: 46 y.o. SEX: female    Asaf Durham MD      HPI:     Patient is a 46 y.o. female who presents today for follow up evaluation of bilateral knee osteoarthritis. Patient states that she has been having good success with Euflexxa injections since 2013. At last visit, patients was provided the  Euflexxa injection brochure. Patient presents today for Euflexxa injection #1 of 3 to the bilateral knees. PHYSICAL EXAM:       Visit Vitals  /74   Pulse 60   Temp 97.8 °F (36.6 °C) (Oral)   Resp 15   Ht 5' 3\" (1.6 m)   Wt 231 lb 6.4 oz (105 kg)   SpO2 96%   BMI 40.99 kg/m²     Pain Scale: 3/10        Appearance: Alert, well appearing and pleasant patient who is in no distress, oriented to person, place/time, and who follows commands. This patient is accompanied by self  Psychiatric: Affect and mood are appropriate.    Cardiovascular/Peripheral Vascular: Normal Pulses to each hand and foot  Knees: right    Gait: antalgic       Knees: bilateral  Cutaneous: Skin intact, no abrasions, blisters, wounds, erythema  Effusion: Is not present  Crepitus: moderate PF joint crepitus  Tenderness: lateral joint line    Alignment of Knee:   mild valgus when standing  ROM:WNL  Fullness or swelling: None to popliteal fossa region,    Stability: No gross instability noted to anterior, posterior, varus, valgus stress testing  Extremities:    Lower extremities are warm and appear well perfused              DVT: No evidence of DVT seen on examination at this time   No calf swelling, no tenderness to calf muscles  TENDERNESS:  mild tenderness to palpation   NEUROVASCULAR:  grossly intact. Positive distal pulses and capillary refill. DVT ASSESSMENT:  The calf is not tender to palpation.  No evidence of DVT seen on physical exam.    IMPRESSION:     Encounter Diagnoses     ICD-10-CM ICD-9-CM   1. Bilateral primary osteoarthritis of knee M17.0 715.16         PAST MEDICAL HISTORY:     Past Medical History:   Diagnosis Date    Asthma        MEDICATIONS:     Current Outpatient Medications   Medication Sig    sodium hyaluronate (SUPARTZ FX/HYALGAN/GENIVSC) 10 mg/mL syrg injection 2 mL by Intra artICUlar route once for 1 dose.  ergocalciferol (ERGOCALCIFEROL) 50,000 unit capsule TAKE ONE CAPSULE BY MOUTH EVERY 7 DAYS    fexofenadine-pseudoephedrine (ALLEGRA-D 12 HOUR)  mg Tb12 Take 1 Tab by mouth every twelve (12) hours.  ergocalciferol (ERGOCALCIFEROL) 50,000 unit capsule TAKE 1 CAPSULE BY MOUTH EVERY 7 DAYS    oxaprozin (DAYPRO) 600 mg tablet TAKE 1 TABLET BY MOUTH TWICE DAILY WITH MEALS    traMADol (ULTRAM) 50 mg tablet Take 1 Tab by mouth every six (6) hours as needed for Pain. Max Daily Amount: 200 mg.    oxaprozin (DAYPRO) 600 mg tablet Take 1 Tab by mouth daily.  OTHER Jacob Rhino Salt Packets    azelastine (ASTELIN) 137 mcg (0.1 %) nasal spray INT 2 SPRAYS IEN BID    QNASL 80 mcg/actuation HFAA INHALE 1 PUFF IEN BID    ergocalciferol (ERGOCALCIFEROL) 50,000 unit capsule TK 1 C PO Q WEEK    guaiFENesin ER (MUCINEX) 600 mg ER tablet Take 600 mg by mouth daily.  traMADol (ULTRAM) 50 mg tablet Take 1 Tab by mouth every six (6) hours as needed for Pain. Max Daily Amount: 200 mg.  budesonide (PULMICORT) 0.5 mg/2 mL nbsp 500 mcg by Nebulization route.  OLANZapine-FLUoxetine (SYMBYAX) 3-25 mg per capsule Take 1 Cap by mouth every evening.  loratadine (CLARITIN) 10 mg tablet Take 10 mg by mouth two (2) times a day.  esomeprazole (NEXIUM) 20 mg capsule Take 40 mg by mouth two (2) times a day.  escitalopram oxalate (LEXAPRO) 20 mg tablet Take 20 mg by mouth daily.  montelukast (SINGULAIR) 10 mg tablet Take 10 mg by mouth daily. No current facility-administered medications for this visit.           ALLERGIES:     Allergies   Allergen Reactions    Calcium Other (comments)    Egg Swelling    Gluten Shortness of Breath    Milk Containing Products Other (comments)    Red Dye Swelling    Sulfa (Sulfonamide Antibiotics) Other (comments)           EUFLEXXA INJECTION PROCEDURE:       Pre-procedure pain assessment:    3/10  Post procedure pain assessment:  3/10      TIME OUT performed immediately prior to start of procedure:   * Patient was identified by Lorenza Ferro and 1965  * Agreement on procedure being performed was verified:  Euflexxa Injection to bilaterally knees  Date of procedure: 3/26/2019  Time of consent: 3:45 p.m. Time of time out: 4:00 p.m. Procedure performed by: Lewis Zimmerman PA-C  Provider assisted by: Medical Staff    Lorenza Ferro, is here for the Euflexxa injection into bilateral knees. Patient denies any redness, fevers, shakes, chills, or any reaction from the prior Euflexxa injection. This patient is accompanied in the office by self. The procedure was explained to the patient and possible adverse reactions were discussed. The risks include, but are not limited to infection, bleeding and reactive synovitis. The patient expressed understanding and wishes to proceed with the procedure. After informed consent, and time out listed above, using sterile technique the bilaterally knee was cleansed with alcohol, sterilized with Betadine and anesthetized with Ethyl Chloride. Then the Euflexxa preparation was placed along the anterolateral  aspect of the bilaterally knee without difficulty. The patient tolerated the procedure well and has been instructed on post injection care. Band-Aid was applied. PLAN:       Modify activities today   Apply ice (protecting skin) if needed  Follow up in 1 week for Euflexxa #2 of 3 to the bilaterally knee    Patient understands treatment plan and has been provided with patient education.   Allergies   Allergen Reactions    Calcium Other (comments)    Egg Swelling    Gluten Shortness of Breath    Milk Containing Products Other (comments)    Red Dye Swelling    Sulfa (Sulfonamide Antibiotics) Other (comments)         PAST SURGICAL HISTORY:     Past Surgical History:   Procedure Laterality Date    HX HYSTERECTOMY      HX PELVIC LAPAROSCOPY      HX TONSILLECTOMY         SOCIAL HISTORY:     Social History     Socioeconomic History    Marital status:      Spouse name: Not on file    Number of children: Not on file    Years of education: Not on file    Highest education level: Not on file   Occupational History    Not on file   Social Needs    Financial resource strain: Not on file    Food insecurity:     Worry: Not on file     Inability: Not on file    Transportation needs:     Medical: Not on file     Non-medical: Not on file   Tobacco Use    Smoking status: Never Smoker    Smokeless tobacco: Never Used   Substance and Sexual Activity    Alcohol use: No    Drug use: No    Sexual activity: Not on file   Lifestyle    Physical activity:     Days per week: Not on file     Minutes per session: Not on file    Stress: Not on file   Relationships    Social connections:     Talks on phone: Not on file     Gets together: Not on file     Attends Hinduism service: Not on file     Active member of club or organization: Not on file     Attends meetings of clubs or organizations: Not on file     Relationship status: Not on file    Intimate partner violence:     Fear of current or ex partner: Not on file     Emotionally abused: Not on file     Physically abused: Not on file     Forced sexual activity: Not on file   Other Topics Concern    Not on file   Social History Narrative    Not on file       FAMILY HISTORY:     Family History   Family history unknown: Yes   Problem Relation Age of Onset    Family history unknown:  Yes    Hypertension Mother     Hypertension Father     Heart Disease Father     Stroke Father        REVIEW OF SYSTEMS:     Otherwise as noted in HPI RADIOGRAPHS & DIAGNOSTIC STUDIES     No results found for any visits on 03/26/19.         Dionna Logan PA-C  3/26/2019 4:08 PM

## 2019-03-26 NOTE — PATIENT INSTRUCTIONS
Modify activity today  Follow up in 1 week for Euflexa # 2 of 3     Joint Injections: Care Instructions  Your Care Instructions    Joint injections are shots into a joint, such as the knee. They may be used to put in medicines, such as pain relievers. A corticosteroid, or steroid, shot is used to reduce inflammation in tendons or joints. It is often used to treat problems such as arthritis, tendinitis, and bursitis. Steroids can be injected directly into a painful, inflamed joint. They can also help reduce inflammation of a bursa. A bursa is a sac of fluid. It cushions and lubricates areas where tendons, ligaments, skin, muscles, or bones rub against each other. A steroid shot can sometimes help with short-term pain relief when other treatments haven't worked. If steroid shots help, pain may improve for weeks or months. Follow-up care is a key part of your treatment and safety. Be sure to make and go to all appointments, and call your doctor if you are having problems. It's also a good idea to know your test results and keep a list of the medicines you take. How can you care for yourself at home? · Put ice or a cold pack on the area for 10 to 20 minutes at a time. Put a thin cloth between the ice and your skin. · Ask your doctor if you can take an over-the-counter pain medicine, such as acetaminophen (Tylenol), ibuprofen (Advil, Motrin), or naproxen (Aleve). Be safe with medicines. Read and follow all instructions on the label. · Avoid strenuous activities for several days. In particular, avoid ones that put stress on the area where you got the shot. · If you have dressings over the area, keep them clean and dry. You may remove them when your doctor tells you to. When should you call for help? Call your doctor now or seek immediate medical care if:    · You have signs of infection, such as:  ? Increased pain, swelling, warmth, or redness. ? Red streaks leading from the site.   ? Pus draining from the site. ? A fever.    Watch closely for changes in your health, and be sure to contact your doctor if you have any problems. Where can you learn more? Go to http://damián-solitario.info/. Enter N616 in the search box to learn more about \"Joint Injections: Care Instructions. \"  Current as of: September 20, 2018  Content Version: 11.9  © 0850-5587 [employer]. Care instructions adapted under license by [employer] (which disclaims liability or warranty for this information). If you have questions about a medical condition or this instruction, always ask your healthcare professional. [place] [address] any warranty or liability for your use of this information.

## 2019-03-26 NOTE — PROGRESS NOTES
1. Have you been to the ER, urgent care clinic since your last visit? Hospitalized since your last visit? No    2. Have you seen or consulted any other health care providers outside of the [address] [place] [address] since your last visit? Include any pap smears or colon screening.  No

## 2019-04-03 ENCOUNTER — OFFICE VISIT (OUTPATIENT)
Dept: ORTHOPEDIC SURGERY | Age: 54
End: 2019-04-03

## 2019-04-03 VITALS
OXYGEN SATURATION: 96 % | WEIGHT: 229.4 LBS | RESPIRATION RATE: 16 BRPM | BODY MASS INDEX: 40.64 KG/M2 | SYSTOLIC BLOOD PRESSURE: 136 MMHG | TEMPERATURE: 97.2 F | HEART RATE: 63 BPM | DIASTOLIC BLOOD PRESSURE: 73 MMHG | HEIGHT: 63 IN

## 2019-04-03 DIAGNOSIS — G89.29 CHRONIC PAIN OF LEFT KNEE: ICD-10-CM

## 2019-04-03 DIAGNOSIS — M25.561 CHRONIC PAIN OF RIGHT KNEE: ICD-10-CM

## 2019-04-03 DIAGNOSIS — M25.562 CHRONIC PAIN OF LEFT KNEE: ICD-10-CM

## 2019-04-03 DIAGNOSIS — M17.0 BILATERAL PRIMARY OSTEOARTHRITIS OF KNEE: Primary | ICD-10-CM

## 2019-04-03 DIAGNOSIS — G89.29 CHRONIC PAIN OF RIGHT KNEE: ICD-10-CM

## 2019-04-03 RX ORDER — HYALURONATE SODIUM 10 MG/ML
2 SYRINGE (ML) INTRAARTICULAR ONCE
Qty: 2 ML | Refills: 0
Start: 2019-04-03 | End: 2019-04-03

## 2019-04-03 NOTE — PROGRESS NOTES
AMBULATORY PROGRESS NOTE Patient: Bela Collazo             MRN: [id number]     SSN: [id number] There is no height or weight on file to calculate BMI. YOB: 1965     AGE: 46 y.o. EX: female PCP: Reymundo Howard MD 
 
 IMPRESSION/DIAGNOSIS AND TREATMENT PLAN  
 
DIAGNOSES 1. Bilateral primary osteoarthritis of knee No orders of the defined types were placed in this encounter. Bela Collazo understands her diagnoses and the proposed plan. Plan: 
 
1) Euflexxa #2 of 3 to the bilateral knees. 2) Use cryotherapy as directed after injection. 3) Continue activity modification as directed. RTO - 1 week HPI AND EXAMINATION Bela Collazo IS A 46 y.o. female who presents to my outpatient office for follow up of primary osteoarthritis of the bilateral knees. At the last visit, Anamaria Zuluaga PA-C, provided Euflexxa injection #1 of 3 to the bilateral knees. Since we saw her last, *** There were no vitals taken for this visit. Appearance: Alert, well appearing and pleasant patient who is in no distress, oriented to person, place/time, and who follows commands. This patient is accompanied in the 
     office by her  self. Psychiatric: Affect and mood are appropriate. Cardiovascular/Peripheral Vascular: Normal Pulses to each hand and foot Knees:  Bilateral  
            Gait: normal   
            Cutaneous: Skin intact, no abrasions, blisters, wounds, erythema Effusion: Is not present Crepitus:  no PF joint crepitus Tenderness: None to palpation at this time. Alignment of Knee:  neutral when standing ROM: full range of motion Fullness or swelling: None to popliteal fossa region Stability: No instability to anterior, posterior, varus, valgus stress testing Thrust:  No varus thrust with gait Contractures: No Achilles or Gastrocnemius Contractures. Calf tenderness: Absent for calf or gastrocnemius muscle regions Soft, supple, non tender, non taut lower extremity compartments Extremities:   No embolic phenomena to the toes No significant edema to the foot and or toes. Edema is not present to distal 1/3 tib/fib or ankle regions. Lower extremities are warm and appear well perfused DVT: No evidence of DVT seen on examination at this time No calf swelling, no tenderness to calf muscles Lymphatic:  No Evidence of Lymphedema Vascular: Medial Border of Tibia Region: Edema is not present Pulses: Dorsalis Pedis &  Posterior Tibial Pulses : Palpable yes Varicosities Lower Limbs : None noted . Neuro: Negative bilateral Straight leg raise (seated position) See Musculoskeletal section for pertinent individual extremity examination No abnormal hand/wrist, foot/ankle, or facial/neck tremors. Extensor mechanism is intact CHART REVIEW Past Medical History:  
Diagnosis Date  Asthma Current Outpatient Medications Medication Sig  ergocalciferol (ERGOCALCIFEROL) 50,000 unit capsule TAKE ONE CAPSULE BY MOUTH EVERY 7 DAYS  fexofenadine-pseudoephedrine (ALLEGRA-D 12 HOUR)  mg Tb12 Take 1 Tab by mouth every twelve (12) hours.  ergocalciferol (ERGOCALCIFEROL) 50,000 unit capsule TAKE 1 CAPSULE BY MOUTH EVERY 7 DAYS  oxaprozin (DAYPRO) 600 mg tablet TAKE 1 TABLET BY MOUTH TWICE DAILY WITH MEALS  traMADol (ULTRAM) 50 mg tablet Take 1 Tab by mouth every six (6) hours as needed for Pain. Max Daily Amount: 200 mg.  
 oxaprozin (DAYPRO) 600 mg tablet Take 1 Tab by mouth daily.  OTHER Jacob Rhino Salt Packets  azelastine (ASTELIN) 137 mcg (0.1 %) nasal spray INT 2 SPRAYS IEN BID  QNASL 80 mcg/actuation HFAA INHALE 1 PUFF IEN BID  ergocalciferol (ERGOCALCIFEROL) 50,000 unit capsule TK 1 C PO Q WEEK  
 guaiFENesin ER (MUCINEX) 600 mg ER tablet Take 600 mg by mouth daily.  traMADol (ULTRAM) 50 mg tablet Take 1 Tab by mouth every six (6) hours as needed for Pain. Max Daily Amount: 200 mg.  budesonide (PULMICORT) 0.5 mg/2 mL nbsp 500 mcg by Nebulization route.  OLANZapine-FLUoxetine (SYMBYAX) 3-25 mg per capsule Take 1 Cap by mouth every evening.  loratadine (CLARITIN) 10 mg tablet Take 10 mg by mouth two (2) times a day.  esomeprazole (NEXIUM) 20 mg capsule Take 40 mg by mouth two (2) times a day.  escitalopram oxalate (LEXAPRO) 20 mg tablet Take 20 mg by mouth daily.  montelukast (SINGULAIR) 10 mg tablet Take 10 mg by mouth daily. No current facility-administered medications for this visit. Allergies Allergen Reactions  Calcium Other (comments)  Egg Swelling  Gluten Shortness of Breath  Milk Containing Products Other (comments)  Red Dye Swelling  Sulfa (Sulfonamide Antibiotics) Other (comments) Past Surgical History:  
Procedure Laterality Date  HX HYSTERECTOMY  HX PELVIC LAPAROSCOPY    
 HX TONSILLECTOMY Social History Occupational History  Not on file Tobacco Use  Smoking status: Never Smoker  Smokeless tobacco: Never Used Substance and Sexual Activity  Alcohol use: No  
 Drug use: No  
 Sexual activity: Not on file Family History Family history unknown: Yes  
Problem Relation Age of Onset  Family history unknown: Yes  Hypertension Mother  Hypertension Father  Heart Disease Father  Stroke Father REVIEW OF SYSTEMS : 4/3/2019  ALL BELOW ARE Negative except : SEE HPI Constitutional: Negative for fever, chills and weight loss. Neg Weight Loss Cardiovascular: Negative for chest pain, claudication and leg swelling. SOB, TALAVERA Gastrointestinal/Urological: Negative for  pain, N/V/D/C, Blood in stool or urine,dysuria                         Hematuria, Incontinence, pelvic pain Musculoskeletal: see HPI. Neurological: Negative for dizziness and weakness, headaches,Visual Changes             Confusion,  Or Seizures, Psychiatric/Behavioral: Negative for depression, memory loss and substance abuse. Extremities:  Negative for hair changes, rash or skin lesion changes. Hematologic: Negative for Bleeding problems, bruising, pallor or swollen lymph nodes. Peripheral Vascular: No calf pain, vascular vein tenderness to calf pain No calf throbbing, posterior knee throbbing pain DIAGNOSTIC IMAGING No notes on file Please see above section of this report. I have personally reviewed the results of the above study. The interpretation of this study is my professional opinion. Written by Christina Brush, as dictated by Dr. Alicia Weiss. I, Dr. Alicia Weiss, confirm that all documentation is accurate.

## 2019-04-03 NOTE — PROGRESS NOTES
Patient: Maral Mac                MRN: [id number]       SSN: [id number]  YOB: 1965                  AGE: 46 y.o. SEX: female    Breanna Cheng MD      HPI:     Patient is a 46 y.o. female who presents today for follow up evaluation of bilateral knee osteoarthritis. Patient states that she has been having good success with Euflexxa injections since 2013. At last visit, patients was provided the first Euflexxa injection and states that she has noticed good relief. Patient presents today for Euflexxa injection #2 of 3 to the bilateral knees. PHYSICAL EXAM:       Visit Vitals  /73   Pulse 63   Temp 97.2 °F (36.2 °C) (Oral)   Resp 16   Ht 5' 3\" (1.6 m)   Wt 229 lb 6.4 oz (104.1 kg)   SpO2 96%   BMI 40.64 kg/m²     Pain Scale: 1/10        Appearance: Alert, well appearing and pleasant patient who is in no distress, oriented to person, place/time, and who follows commands. This patient is accompanied by self  Psychiatric: Affect and mood are appropriate.    Cardiovascular/Peripheral Vascular: Normal Pulses to each hand and foot  Knees: right    Gait: antalgic       Knees: bilateral  Cutaneous: Skin intact, no abrasions, blisters, wounds, erythema  Effusion: Is not present  Crepitus: moderate PF joint crepitus  Tenderness: lateral joint line    Alignment of Knee:   mild valgus when standing  ROM:WNL  Fullness or swelling: None to popliteal fossa region,    Stability: No gross instability noted to anterior, posterior, varus, valgus stress testing  Extremities:    Lower extremities are warm and appear well perfused              DVT: No evidence of DVT seen on examination at this time   No calf swelling, no tenderness to calf muscles  TENDERNESS:  mild tenderness to palpation   NEUROVASCULAR:  grossly intact. Positive distal pulses and capillary refill. DVT ASSESSMENT:  The calf is not tender to palpation.  No evidence of DVT seen on physical exam.    IMPRESSION: Encounter Diagnoses     ICD-10-CM ICD-9-CM   1. Bilateral primary osteoarthritis of knee M17.0 715.16   2. Chronic pain of right knee M25.561 719.46    G89.29 338.29   3. Chronic pain of left knee M25.562 719.46    G89.29 338.29         PAST MEDICAL HISTORY:     Past Medical History:   Diagnosis Date    Asthma        MEDICATIONS:     Current Outpatient Medications   Medication Sig    sodium hyaluronate (SUPARTZ FX/HYALGAN/GENIVSC) 10 mg/mL syrg injection 2 mL by Intra artICUlar route once for 1 dose.  ergocalciferol (ERGOCALCIFEROL) 50,000 unit capsule TAKE ONE CAPSULE BY MOUTH EVERY 7 DAYS    fexofenadine-pseudoephedrine (ALLEGRA-D 12 HOUR)  mg Tb12 Take 1 Tab by mouth every twelve (12) hours.  ergocalciferol (ERGOCALCIFEROL) 50,000 unit capsule TAKE 1 CAPSULE BY MOUTH EVERY 7 DAYS    oxaprozin (DAYPRO) 600 mg tablet TAKE 1 TABLET BY MOUTH TWICE DAILY WITH MEALS    traMADol (ULTRAM) 50 mg tablet Take 1 Tab by mouth every six (6) hours as needed for Pain. Max Daily Amount: 200 mg.    oxaprozin (DAYPRO) 600 mg tablet Take 1 Tab by mouth daily.  OTHER Jacob Rhino Salt Packets    azelastine (ASTELIN) 137 mcg (0.1 %) nasal spray INT 2 SPRAYS IEN BID    QNASL 80 mcg/actuation HFAA INHALE 1 PUFF IEN BID    ergocalciferol (ERGOCALCIFEROL) 50,000 unit capsule TK 1 C PO Q WEEK    guaiFENesin ER (MUCINEX) 600 mg ER tablet Take 600 mg by mouth daily.  traMADol (ULTRAM) 50 mg tablet Take 1 Tab by mouth every six (6) hours as needed for Pain. Max Daily Amount: 200 mg.  budesonide (PULMICORT) 0.5 mg/2 mL nbsp 500 mcg by Nebulization route.  OLANZapine-FLUoxetine (SYMBYAX) 3-25 mg per capsule Take 1 Cap by mouth every evening.  loratadine (CLARITIN) 10 mg tablet Take 10 mg by mouth two (2) times a day.  esomeprazole (NEXIUM) 20 mg capsule Take 40 mg by mouth two (2) times a day.  escitalopram oxalate (LEXAPRO) 20 mg tablet Take 20 mg by mouth daily.     montelukast (SINGULAIR) 10 mg tablet Take 10 mg by mouth daily. No current facility-administered medications for this visit. ALLERGIES:     Allergies   Allergen Reactions    Calcium Other (comments)    Egg Swelling    Gluten Shortness of Breath    Milk Containing Products Other (comments)    Red Dye Swelling    Sulfa (Sulfonamide Antibiotics) Other (comments)           EUFLEXXA INJECTION PROCEDURE:       Pre-procedure pain assessment:    3/10  Post procedure pain assessment:  3/10      TIME OUT performed immediately prior to start of procedure:   * Patient was identified by Paulett Olszewski and 1965  * Agreement on procedure being performed was verified:  Euflexxa Injection to bilaterally knees  Date of procedure: 4/3/2019  Time of consent: 3:45 p.m. Time of time out: 3:55 p.m. Procedure performed by: Lizandro Jacques PA-C  Provider assisted by: Medical Staff    Paulett Olszewski, is here for the Euflexxa injection into bilateral knees. Patient denies any redness, fevers, shakes, chills, or any reaction from the prior Euflexxa injection. This patient is accompanied in the office by self. The procedure was explained to the patient and possible adverse reactions were discussed. The risks include, but are not limited to infection, bleeding and reactive synovitis. The patient expressed understanding and wishes to proceed with the procedure. After informed consent, and time out listed above, using sterile technique the bilaterally knee was cleansed with alcohol, sterilized with Betadine and anesthetized with Ethyl Chloride. Then the Euflexxa preparation was placed along the anterolateral  aspect of the bilaterally knee without difficulty. The patient tolerated the procedure well and has been instructed on post injection care. Band-Aid was applied.        PLAN:       Modify activities today   Apply ice (protecting skin) if needed  Follow up in 1 week for Euflexxa #3 of 3 to the bilaterally knee    Patient understands treatment plan and has been provided with patient education. Allergies   Allergen Reactions    Calcium Other (comments)    Egg Swelling    Gluten Shortness of Breath    Milk Containing Products Other (comments)    Red Dye Swelling    Sulfa (Sulfonamide Antibiotics) Other (comments)         PAST SURGICAL HISTORY:     Past Surgical History:   Procedure Laterality Date    HX HYSTERECTOMY      HX PELVIC LAPAROSCOPY      HX TONSILLECTOMY         SOCIAL HISTORY:     Social History     Socioeconomic History    Marital status:      Spouse name: Not on file    Number of children: Not on file    Years of education: Not on file    Highest education level: Not on file   Occupational History    Not on file   Social Needs    Financial resource strain: Not on file    Food insecurity:     Worry: Not on file     Inability: Not on file    Transportation needs:     Medical: Not on file     Non-medical: Not on file   Tobacco Use    Smoking status: Never Smoker    Smokeless tobacco: Never Used   Substance and Sexual Activity    Alcohol use: No    Drug use: No    Sexual activity: Not on file   Lifestyle    Physical activity:     Days per week: Not on file     Minutes per session: Not on file    Stress: Not on file   Relationships    Social connections:     Talks on phone: Not on file     Gets together: Not on file     Attends [employer] service: Not on file     Active member of club or organization: Not on file     Attends meetings of clubs or organizations: Not on file     Relationship status: Not on file    Intimate partner violence:     Fear of current or ex partner: Not on file     Emotionally abused: Not on file     Physically abused: Not on file     Forced sexual activity: Not on file   Other Topics Concern    Not on file   Social History Narrative    Not on file       FAMILY HISTORY:     Family History   Family history unknown: Yes   Problem Relation Age of Onset    Family history unknown:  Yes  Hypertension Mother     Hypertension Father     Heart Disease Father     Stroke Father        REVIEW OF SYSTEMS:     Otherwise as noted in HPI       RADIOGRAPHS & DIAGNOSTIC STUDIES     No results found for any visits on 04/03/19.         Jessica Skinner PA-C  4/3/2019 4:08 PM

## 2019-04-03 NOTE — PROGRESS NOTES
1. Have you been to the ER, urgent care clinic since your last visit? Hospitalized since your last visit? No    2. Have you seen or consulted any other health care providers outside of the [address] [place] [address] since your last visit? Include any pap smears or colon screening.  No

## 2019-04-03 NOTE — PATIENT INSTRUCTIONS
Please follow up in 1 week. You are advised to contact us if your condition worsens. Joint Injections: Care Instructions  Your Care Instructions    Joint injections are shots into a joint, such as the knee. They may be used to put in medicines, such as pain relievers. A corticosteroid, or steroid, shot is used to reduce inflammation in tendons or joints. It is often used to treat problems such as arthritis, tendinitis, and bursitis. Steroids can be injected directly into a painful, inflamed joint. They can also help reduce inflammation of a bursa. A bursa is a sac of fluid. It cushions and lubricates areas where tendons, ligaments, skin, muscles, or bones rub against each other. A steroid shot can sometimes help with short-term pain relief when other treatments haven't worked. If steroid shots help, pain may improve for weeks or months. Follow-up care is a key part of your treatment and safety. Be sure to make and go to all appointments, and call your doctor if you are having problems. It's also a good idea to know your test results and keep a list of the medicines you take. How can you care for yourself at home? · Put ice or a cold pack on the area for 10 to 20 minutes at a time. Put a thin cloth between the ice and your skin. · Ask your doctor if you can take an over-the-counter pain medicine, such as acetaminophen (Tylenol), ibuprofen (Advil, Motrin), or naproxen (Aleve). Be safe with medicines. Read and follow all instructions on the label. · Avoid strenuous activities for several days. In particular, avoid ones that put stress on the area where you got the shot. · If you have dressings over the area, keep them clean and dry. You may remove them when your doctor tells you to. When should you call for help? Call your doctor now or seek immediate medical care if:    · You have signs of infection, such as:  ? Increased pain, swelling, warmth, or redness. ? Red streaks leading from the site. ?  Pus draining from the site. ? A fever.    Watch closely for changes in your health, and be sure to contact your doctor if you have any problems. Where can you learn more? Go to http://damián-solitario.info/. Enter N616 in the search box to learn more about \"Joint Injections: Care Instructions. \"  Current as of: September 20, 2018  Content Version: 11.9  © 9490-4277 [employer]. Care instructions adapted under license by [employer] (which disclaims liability or warranty for this information). If you have questions about a medical condition or this instruction, always ask your healthcare professional. [address] any warranty or liability for your use of this information.

## 2019-04-09 ENCOUNTER — OFFICE VISIT (OUTPATIENT)
Dept: ORTHOPEDIC SURGERY | Age: 54
End: 2019-04-09

## 2019-04-09 VITALS
TEMPERATURE: 97.6 F | OXYGEN SATURATION: 97 % | HEART RATE: 56 BPM | HEIGHT: 63 IN | WEIGHT: 228 LBS | RESPIRATION RATE: 16 BRPM | SYSTOLIC BLOOD PRESSURE: 155 MMHG | DIASTOLIC BLOOD PRESSURE: 87 MMHG | BODY MASS INDEX: 40.4 KG/M2

## 2019-04-09 DIAGNOSIS — M17.0 BILATERAL PRIMARY OSTEOARTHRITIS OF KNEE: Primary | ICD-10-CM

## 2019-04-09 RX ORDER — HYALURONATE SODIUM 10 MG/ML
2 SYRINGE (ML) INTRAARTICULAR ONCE
Qty: 2 ML | Refills: 0
Start: 2019-04-09 | End: 2019-04-09

## 2019-04-09 NOTE — PROGRESS NOTES
Patient: Arelis Jean Baptiste                MRN: [id number]       SSN: [id number]  YOB: 1965                  AGE: 46 y.o. SEX: female    Víctor Mcgill MD      HPI:     Patient is a 46 y.o. female who presents today for follow up evaluation of bilateral knee osteoarthritis. Patient states that she has been having good success with Euflexxa injections since 2013. At last visit, patients was provided the second Euflexxa injection and states that she has noticed good relief. Patient presents today for Euflexxa injection #3 of 3 to the bilateral knees. PHYSICAL EXAM:       Visit Vitals  /87   Pulse (!) 56   Temp 97.6 °F (36.4 °C) (Oral)   Resp 16   Ht 5' 3\" (1.6 m)   Wt 228 lb (103.4 kg)   SpO2 97%   BMI 40.39 kg/m²     Pain Scale: 3/10        Appearance: Alert, well appearing and pleasant patient who is in no distress, oriented to person, place/time, and who follows commands. This patient is accompanied by self  Psychiatric: Affect and mood are appropriate.    Cardiovascular/Peripheral Vascular: Normal Pulses to each hand and foot  Knees: right    Gait: antalgic       Knees: bilateral  Cutaneous: Skin intact, no abrasions, blisters, wounds, erythema  Effusion: Is not present  Crepitus: moderate PF joint crepitus  Tenderness: lateral joint line    Alignment of Knee:   mild valgus when standing  ROM:WNL  Fullness or swelling: None to popliteal fossa region,    Stability: No gross instability noted to anterior, posterior, varus, valgus stress testing  Extremities:    Lower extremities are warm and appear well perfused              DVT: No evidence of DVT seen on examination at this time   No calf swelling, no tenderness to calf muscles  TENDERNESS:  mild tenderness to palpation   NEUROVASCULAR:  grossly intact. Positive distal pulses and capillary refill. DVT ASSESSMENT:  The calf is not tender to palpation.  No evidence of DVT seen on physical exam.    IMPRESSION: Encounter Diagnoses     ICD-10-CM ICD-9-CM   1. Bilateral primary osteoarthritis of knee M17.0 715.16         PAST MEDICAL HISTORY:     Past Medical History:   Diagnosis Date    Asthma        MEDICATIONS:     Current Outpatient Medications   Medication Sig    sodium hyaluronate (SUPARTZ FX/HYALGAN/GENIVSC) 10 mg/mL syrg injection 2 mL by Intra artICUlar route once for 1 dose.  ergocalciferol (ERGOCALCIFEROL) 50,000 unit capsule TAKE ONE CAPSULE BY MOUTH EVERY 7 DAYS    fexofenadine-pseudoephedrine (ALLEGRA-D 12 HOUR)  mg Tb12 Take 1 Tab by mouth every twelve (12) hours.  ergocalciferol (ERGOCALCIFEROL) 50,000 unit capsule TAKE 1 CAPSULE BY MOUTH EVERY 7 DAYS    oxaprozin (DAYPRO) 600 mg tablet TAKE 1 TABLET BY MOUTH TWICE DAILY WITH MEALS    traMADol (ULTRAM) 50 mg tablet Take 1 Tab by mouth every six (6) hours as needed for Pain. Max Daily Amount: 200 mg.    oxaprozin (DAYPRO) 600 mg tablet Take 1 Tab by mouth daily.  OTHER Jacob Rhino Salt Packets    azelastine (ASTELIN) 137 mcg (0.1 %) nasal spray INT 2 SPRAYS IEN BID    QNASL 80 mcg/actuation HFAA INHALE 1 PUFF IEN BID    ergocalciferol (ERGOCALCIFEROL) 50,000 unit capsule TK 1 C PO Q WEEK    guaiFENesin ER (MUCINEX) 600 mg ER tablet Take 600 mg by mouth daily.  traMADol (ULTRAM) 50 mg tablet Take 1 Tab by mouth every six (6) hours as needed for Pain. Max Daily Amount: 200 mg.  budesonide (PULMICORT) 0.5 mg/2 mL nbsp 500 mcg by Nebulization route.  OLANZapine-FLUoxetine (SYMBYAX) 3-25 mg per capsule Take 1 Cap by mouth every evening.  loratadine (CLARITIN) 10 mg tablet Take 10 mg by mouth two (2) times a day.  esomeprazole (NEXIUM) 20 mg capsule Take 40 mg by mouth two (2) times a day.  escitalopram oxalate (LEXAPRO) 20 mg tablet Take 20 mg by mouth daily.  montelukast (SINGULAIR) 10 mg tablet Take 10 mg by mouth daily. No current facility-administered medications for this visit.           ALLERGIES: Allergies   Allergen Reactions    Calcium Other (comments)    Egg Swelling    Gluten Shortness of Breath    Milk Containing Products Other (comments)    Red Dye Swelling    Sulfa (Sulfonamide Antibiotics) Other (comments)           EUFLEXXA INJECTION PROCEDURE:       Pre-procedure pain assessment:    3/10  Post procedure pain assessment:  3/10      TIME OUT performed immediately prior to start of procedure:   * Patient was identified by uT Aguilar and 1965  * Agreement on procedure being performed was verified:  Euflexxa Injection to bilaterally knees  Date of procedure: 4/9/2019  Time of consent: 4:25 p.m. Time of time out: 4:30 p.m. Procedure performed by: GIOVANNI Ayon, is here for the Euflexxa injection into bilateral knees. Patient denies any redness, fevers, shakes, chills, or any reaction from the prior Euflexxa injection. This patient is accompanied in the office by self. The procedure was explained to the patient and possible adverse reactions were discussed. The risks include, but are not limited to infection, bleeding and reactive synovitis. The patient expressed understanding and wishes to proceed with the procedure. After informed consent, and time out listed above, using sterile technique the bilaterally knee was cleansed with alcohol, sterilized with Betadine and anesthetized with Ethyl Chloride. Then the Euflexxa preparation was placed along the anterolateral  aspect of the bilaterally knee without difficulty. The patient tolerated the procedure well and has been instructed on post injection care. Band-Aid was applied. PLAN:       Modify activities today   Apply ice (protecting skin) if needed  Follow up as needed    Patient understands treatment plan and has been provided with patient education.     Allergies   Allergen Reactions    Calcium Other (comments)    Egg Swelling    Gluten Shortness of Breath    Milk Containing Products Other (comments)    Red Dye Swelling    Sulfa (Sulfonamide Antibiotics) Other (comments)         PAST SURGICAL HISTORY:     Past Surgical History:   Procedure Laterality Date    HX HYSTERECTOMY      HX PELVIC LAPAROSCOPY      HX TONSILLECTOMY         SOCIAL HISTORY:     Social History     Socioeconomic History    Marital status:      Spouse name: Not on file    Number of children: Not on file    Years of education: Not on file    Highest education level: Not on file   Occupational History    Not on file   Social Needs    Financial resource strain: Not on file    Food insecurity:     Worry: Not on file     Inability: Not on file    Transportation needs:     Medical: Not on file     Non-medical: Not on file   Tobacco Use    Smoking status: Never Smoker    Smokeless tobacco: Never Used   Substance and Sexual Activity    Alcohol use: No    Drug use: No    Sexual activity: Not on file   Lifestyle    Physical activity:     Days per week: Not on file     Minutes per session: Not on file    Stress: Not on file   Relationships    Social connections:     Talks on phone: Not on file     Gets together: Not on file     Attends [employer] service: Not on file     Active member of club or organization: Not on file     Attends meetings of clubs or organizations: Not on file     Relationship status: Not on file    Intimate partner violence:     Fear of current or ex partner: Not on file     Emotionally abused: Not on file     Physically abused: Not on file     Forced sexual activity: Not on file   Other Topics Concern    Not on file   Social History Narrative    Not on file       FAMILY HISTORY:     Family History   Family history unknown: Yes   Problem Relation Age of Onset    Family history unknown:  Yes    Hypertension Mother     Hypertension Father     Heart Disease Father     Stroke Father        REVIEW OF SYSTEMS:     Otherwise as noted in HPI       RADIOGRAPHS & DIAGNOSTIC STUDIES     No results found for any visits on 04/09/19.         Hortensia Aguirre PA-C  4/9/2019   4:36 PM

## 2019-04-09 NOTE — PROGRESS NOTES
AMBULATORY PROGRESS NOTE Patient: Cherie Hodge             MRN: [id number]     SSN: [id number] Body mass index is 40.39 kg/m². YOB: 1965     AGE: 46 y.o. EX: female PCP: Krishna Patel MD 
 
 IMPRESSION/DIAGNOSIS AND TREATMENT PLAN  
 
DIAGNOSES 1. Bilateral primary osteoarthritis of knee No orders of the defined types were placed in this encounter. Cherie Hodge understands her diagnoses and the proposed plan. Plan: 
 
1) 
2) RTO - 
 HPI AND EXAMINATION Cherie Hodge IS A 46 y.o. female who presents to my outpatient office for follow up of osteoarthritis of the bilateral knees. At the last visit, Angel Rascon PA-C, provided Euflexxa injection #2 of 3 to the bilateral knees. Since we saw her last, *** Visit Vitals /87 Pulse (!) 56 Temp 97.6 °F (36.4 °C) (Oral) Resp 16 Ht 5' 3\" (1.6 m) Wt 228 lb (103.4 kg) SpO2 97% BMI 40.39 kg/m² Appearance: Alert, well appearing and pleasant patient who is in no distress, oriented to person, place/time, and who follows commands. This patient is accompanied in the 
     office by her  self. Psychiatric: Affect and mood are appropriate. Cardiovascular/Peripheral Vascular: Normal Pulses to each hand and foot Knees:  Bilateral  
            Gait: normal   
            Cutaneous: Skin intact, no abrasions, blisters, wounds, erythema Effusion: Is not present Crepitus:  no PF joint crepitus Tenderness: None to palpation at this time. Alignment of Knee:  neutral when standing ROM: full range of motion Fullness or swelling: None to popliteal fossa region Stability: No instability to anterior, posterior, varus, valgus stress testing Thrust:  No varus thrust with gait Contractures: No Achilles or Gastrocnemius Contractures. Calf tenderness: Absent for calf or gastrocnemius muscle regions Soft, supple, non tender, non taut lower extremity compartments Extremities:   No embolic phenomena to the toes No significant edema to the foot and or toes. Edema is not present to distal 1/3 tib/fib or ankle regions. Lower extremities are warm and appear well perfused DVT: No evidence of DVT seen on examination at this time No calf swelling, no tenderness to calf muscles Lymphatic:  No Evidence of Lymphedema Vascular: Medial Border of Tibia Region: Edema is not present Pulses: Dorsalis Pedis &  Posterior Tibial Pulses : Palpable yes Varicosities Lower Limbs : None noted . Neuro: Negative bilateral Straight leg raise (seated position) See Musculoskeletal section for pertinent individual extremity examination No abnormal hand/wrist, foot/ankle, or facial/neck tremors. Extensor mechanism is intact CHART REVIEW Past Medical History:  
Diagnosis Date  Asthma Current Outpatient Medications Medication Sig  ergocalciferol (ERGOCALCIFEROL) 50,000 unit capsule TAKE ONE CAPSULE BY MOUTH EVERY 7 DAYS  fexofenadine-pseudoephedrine (ALLEGRA-D 12 HOUR)  mg Tb12 Take 1 Tab by mouth every twelve (12) hours.  ergocalciferol (ERGOCALCIFEROL) 50,000 unit capsule TAKE 1 CAPSULE BY MOUTH EVERY 7 DAYS  oxaprozin (DAYPRO) 600 mg tablet TAKE 1 TABLET BY MOUTH TWICE DAILY WITH MEALS  traMADol (ULTRAM) 50 mg tablet Take 1 Tab by mouth every six (6) hours as needed for Pain. Max Daily Amount: 200 mg.  
 oxaprozin (DAYPRO) 600 mg tablet Take 1 Tab by mouth daily.  OTHER Jacob Rhino Salt Packets  azelastine (ASTELIN) 137 mcg (0.1 %) nasal spray INT 2 SPRAYS IEN BID  
  QNASL 80 mcg/actuation HFAA INHALE 1 PUFF IEN BID  ergocalciferol (ERGOCALCIFEROL) 50,000 unit capsule TK 1 C PO Q WEEK  
 guaiFENesin ER (MUCINEX) 600 mg ER tablet Take 600 mg by mouth daily.  traMADol (ULTRAM) 50 mg tablet Take 1 Tab by mouth every six (6) hours as needed for Pain. Max Daily Amount: 200 mg.  budesonide (PULMICORT) 0.5 mg/2 mL nbsp 500 mcg by Nebulization route.  OLANZapine-FLUoxetine (SYMBYAX) 3-25 mg per capsule Take 1 Cap by mouth every evening.  loratadine (CLARITIN) 10 mg tablet Take 10 mg by mouth two (2) times a day.  esomeprazole (NEXIUM) 20 mg capsule Take 40 mg by mouth two (2) times a day.  escitalopram oxalate (LEXAPRO) 20 mg tablet Take 20 mg by mouth daily.  montelukast (SINGULAIR) 10 mg tablet Take 10 mg by mouth daily. No current facility-administered medications for this visit. Allergies Allergen Reactions  Calcium Other (comments)  Egg Swelling  Gluten Shortness of Breath  Milk Containing Products Other (comments)  Red Dye Swelling  Sulfa (Sulfonamide Antibiotics) Other (comments) Past Surgical History:  
Procedure Laterality Date  HX HYSTERECTOMY  HX PELVIC LAPAROSCOPY    
 HX TONSILLECTOMY Social History Occupational History  Not on file Tobacco Use  Smoking status: Never Smoker  Smokeless tobacco: Never Used Substance and Sexual Activity  Alcohol use: No  
 Drug use: No  
 Sexual activity: Not on file Family History Family history unknown: Yes  
Problem Relation Age of Onset  Family history unknown: Yes  Hypertension Mother  Hypertension Father  Heart Disease Father  Stroke Father REVIEW OF SYSTEMS : 4/9/2019  ALL BELOW ARE Negative except : SEE HPI Constitutional: Negative for fever, chills and weight loss. Neg Weight Loss Cardiovascular: Negative for chest pain, claudication and leg swelling.  SOB, TALAVERA  
 Gastrointestinal/Urological: Negative for  pain, N/V/D/C, Blood in stool or urine,dysuria                         Hematuria, Incontinence, pelvic pain Musculoskeletal: see HPI. Neurological: Negative for dizziness and weakness, headaches,Visual Changes             Confusion,  Or Seizures, Psychiatric/Behavioral: Negative for depression, memory loss and substance abuse. Extremities:  Negative for hair changes, rash or skin lesion changes. Hematologic: Negative for Bleeding problems, bruising, pallor or swollen lymph nodes. Peripheral Vascular: No calf pain, vascular vein tenderness to calf pain No calf throbbing, posterior knee throbbing pain DIAGNOSTIC IMAGING No notes on file Please see above section of this report. I have personally reviewed the results of the above study. The interpretation of this study is my professional opinion. Written by Suzi Woods, as dictated by Dr. Joaquin Peterson. I, Dr. Joaquin Peterson, confirm that all documentation is accurate.

## 2019-11-04 RX ORDER — ERGOCALCIFEROL 1.25 MG/1
CAPSULE ORAL
Qty: 8 CAP | Refills: 0 | Status: SHIPPED | OUTPATIENT
Start: 2019-11-04 | End: 2020-03-02 | Stop reason: SDUPTHER

## 2019-11-05 ENCOUNTER — TELEPHONE (OUTPATIENT)
Dept: ORTHOPEDIC SURGERY | Age: 54
End: 2019-11-05

## 2019-11-05 DIAGNOSIS — M17.0 BILATERAL PRIMARY OSTEOARTHRITIS OF KNEE: Primary | ICD-10-CM

## 2019-11-05 NOTE — TELEPHONE ENCOUNTER
Patient called stating that she would like to get another round of Euflexxa injections. She asked if she could have this authorization started and to receive a call when she can schedule at [phone].

## 2020-01-13 ENCOUNTER — OFFICE VISIT (OUTPATIENT)
Dept: ORTHOPEDIC SURGERY | Age: 55
End: 2020-01-13

## 2020-01-13 VITALS
RESPIRATION RATE: 16 BRPM | DIASTOLIC BLOOD PRESSURE: 68 MMHG | WEIGHT: 227.8 LBS | HEIGHT: 63 IN | HEART RATE: 53 BPM | TEMPERATURE: 98.5 F | OXYGEN SATURATION: 96 % | BODY MASS INDEX: 40.36 KG/M2 | SYSTOLIC BLOOD PRESSURE: 130 MMHG

## 2020-01-13 DIAGNOSIS — M54.6 THORACIC SPINE PAIN: ICD-10-CM

## 2020-01-13 DIAGNOSIS — M54.9 MID BACK PAIN: ICD-10-CM

## 2020-01-13 DIAGNOSIS — M17.0 BILATERAL PRIMARY OSTEOARTHRITIS OF KNEE: Primary | ICD-10-CM

## 2020-01-13 RX ORDER — HYALURONATE SODIUM 10 MG/ML
2 SYRINGE (ML) INTRAARTICULAR ONCE
Qty: 2 ML | Refills: 0
Start: 2020-01-13 | End: 2020-01-13

## 2020-01-13 RX ORDER — CYCLOBENZAPRINE HCL 10 MG
10 TABLET ORAL
Qty: 30 TAB | Refills: 0 | Status: SHIPPED | OUTPATIENT
Start: 2020-01-13 | End: 2022-02-07 | Stop reason: SDUPTHER

## 2020-01-13 NOTE — PROGRESS NOTES
Patient: Yuliet Moreira                MRN: [id number]       SSN: [id number]  YOB: 1965                   AGE: 53 y.o. SEX: female    Олег Bassett MD      HPI:     Patient is a 53 y.o. female who presents today for follow up evaluation of bilateral knee osteoarthritis. At last visit, patients was provided with Euflexxa injections to the left knee and right knee. Patients knees were doing well until recently when the knee pain returned. Patient presents today for Euflexxa injection #1 of 3 to the bilateral knees. Patient states that she injured her thoracic spine many years ago and recently thinks she may have sleep the wrong way leading to return of her back pain. She denies any recent trauma or injury. She states she injured her back many years ago after jumping out of a tree. PHYSICAL EXAM:       Visit Vitals  /68   Pulse (!) 53   Temp 98.5 °F (36.9 °C) (Oral)   Resp 16   Ht 5' 3\" (1.6 m)   Wt 227 lb 12.8 oz (103.3 kg)   SpO2 96%   BMI 40.35 kg/m²     Body mass index is 40.35 kg/m². Pain Assessment  1/13/2020   Location of Pain Knee   Pain Location Comment -   Location Modifiers Right;Left   Severity of Pain 3   Quality of Pain Aching   Duration of Pain Persistent   Frequency of Pain Constant   Aggravating Factors Standing;Squatting   Aggravating Factors Comment Sitting for too long    Limiting Behavior -   Relieving Factors Rest;Elevation   Result of Injury No           Appearance: Alert, well appearing and pleasant patient who is in no distress, oriented to person, place/time, and who follows commands. This patient is accompanied by self  Psychiatric: Affect and mood are appropriate.     Cardiovascular/Peripheral Vascular: Normal Pulses to each hand and foot  Knees: right    Gait: antalgic       Knees: bilateral  Cutaneous: Skin intact, no abrasions, blisters, wounds, erythema  Effusion: Is not present  Crepitus: moderate PF joint crepitus  Tenderness: medial joint line. Mild TTP centrally to thoracic spine. Alignment of Knee:   mild valgus when standing  ROM: diminished range of motion secondary to pain  Fullness or swelling: None to popliteal fossa region,    Stability: No gross instability noted to anterior, posterior, varus, valgus stress testing  Extremities:    Lower extremities are warm and appear well perfused              DVT: No evidence of DVT seen on examination at this time   No calf swelling, no tenderness to calf muscles  TENDERNESS:  mild tenderness to palpation   NEUROVASCULAR:  grossly intact. Positive distal pulses and capillary refill. DVT ASSESSMENT:  The calf is not tender to palpation. No evidence of DVT seen on physical exam.    Thoracic Spine: mild TTP centrally    IMPRESSION:     Encounter Diagnoses     ICD-10-CM ICD-9-CM   1. Bilateral primary osteoarthritis of knee M17.0 715.16   2. Mid back pain M54.9 724.5   3. Thoracic spine pain M54.6 724.1       PAST MEDICAL HISTORY:     Past Medical History:   Diagnosis Date    Asthma        MEDICATIONS:     Current Outpatient Medications   Medication Sig    sodium hyaluronate (SUPARTZ FX/HYALGAN/GENIVSC) 10 mg/mL syrg injection 2 mL by Intra artICUlar route once for 1 dose.  cyclobenzaprine (FLEXERIL) 10 mg tablet Take 1 Tab by mouth three (3) times daily as needed for Muscle Spasm(s).  VITAMIN D2 50,000 unit capsule TAKE 1 CAPSULE BY MOUTH EVERY WEEK    fexofenadine-pseudoephedrine (ALLEGRA-D 12 HOUR)  mg Tb12 Take 1 Tab by mouth every twelve (12) hours.  ergocalciferol (ERGOCALCIFEROL) 50,000 unit capsule TAKE 1 CAPSULE BY MOUTH EVERY 7 DAYS    oxaprozin (DAYPRO) 600 mg tablet TAKE 1 TABLET BY MOUTH TWICE DAILY WITH MEALS    oxaprozin (DAYPRO) 600 mg tablet Take 1 Tab by mouth daily.     OTHER Jacob Rhino Salt Packets    azelastine (ASTELIN) 137 mcg (0.1 %) nasal spray INT 2 SPRAYS IEN BID    QNASL 80 mcg/actuation HFAA INHALE 1 PUFF IEN BID    ergocalciferol (ERGOCALCIFEROL) 50,000 unit capsule TK 1 C PO Q WEEK    budesonide (PULMICORT) 0.5 mg/2 mL nbsp 500 mcg by Nebulization route.  OLANZapine-FLUoxetine (SYMBYAX) 3-25 mg per capsule Take 1 Cap by mouth every evening.  loratadine (CLARITIN) 10 mg tablet Take 10 mg by mouth two (2) times a day.  esomeprazole (NEXIUM) 20 mg capsule Take 40 mg by mouth two (2) times a day.  escitalopram oxalate (LEXAPRO) 20 mg tablet Take 20 mg by mouth daily.  montelukast (SINGULAIR) 10 mg tablet Take 10 mg by mouth daily.  guaiFENesin ER (MUCINEX) 600 mg ER tablet Take 600 mg by mouth daily. No current facility-administered medications for this visit. ALLERGIES:     Allergies   Allergen Reactions    Calcium Other (comments)    Egg Swelling    Gluten Shortness of Breath    Milk Containing Products Other (comments)    Red Dye Swelling    Sulfa (Sulfonamide Antibiotics) Other (comments)         EUFLEXXA INJECTION PROCEDURE:       Pre-procedure pain assessment:    3/10  Post procedure pain assessment:  3/10      TIME OUT performed immediately prior to start of procedure:   * Patient was identified by Cherie Hodge and 1965  * Agreement on procedure being performed was verified:  Euflexxa Injection to bilaterally knees  Date of procedure: 1/13/2020  Time of consent: 4:00 pm  Time of time out: 4:10 pm  Procedure performed by: Shalini Natarajan PA-C  Provider assisted by: Medical Staff    Cherie Hodge, is here for the Euflexxa injection into bilateral knee. Patient denies any redness, fevers, shakes, chills, or any reaction from the prior Euflexxa injection. This patient is accompanied in the office by self. The procedure was explained to the patient and possible adverse reactions were discussed. The risks include, but are not limited to infection, bleeding and reactive synovitis. The patient expressed understanding and wishes to proceed with the procedure.  After informed consent, and time out listed above, using sterile technique the bilaterally knee was cleansed with alcohol, sterilized with Betadine and anesthetized with Ethyl Chloride. Then the Euflexxa preparation was placed along the anterolateral aspect of the bilaterally knee without difficulty. The patient tolerated the procedure well and has been instructed on post injection care. Band-Aid was applied. Modify activities today   Apply ice (protecting skin) if needed  Follow up in 1 week for Euflexxa #2 of 3 to the bilaterally knee      PLAN:     Orders Placed This Encounter    [19341] T Spine 2V     Order Specific Question:   Weight bearing? Answer: Yes    REFERRAL TO SPINE SURGERY     Referral Priority:   Routine     Referral Type:   Consultation     Referral Reason:   Specialty Services Required    [place] DRAIN/INJECT LARGE JOINT/BURSA -     EUFLEXXA INJECTION PER DOSE     Order Specific Question:   Dose     Answer:   4ml     Order Specific Question:   Site     Answer:   OTHER     Comments:   bilateral knee     Order Specific Question:   Expiration Date     Answer:   10/18/2020     Order Specific Question:   Lot#     Answer:   [id number]     Order Specific Question:        Answer:   ferring     Order Specific Question:   Charge Quantity? Answer:   2     Order Specific Question:   Perfomed by/Witnessed by: Answer:   Jose Maria Ramirez     Order Specific Question:   NDC#     Answer:   [address]-5 [[id number]]    sodium hyaluronate (SUPARTZ FX/HYALGAN/GENIVSC) 10 mg/mL syrg injection     Si mL by Intra artICUlar route once for 1 dose. Dispense:  2 mL     Refill:  0    cyclobenzaprine (FLEXERIL) 10 mg tablet     Sig: Take 1 Tab by mouth three (3) times daily as needed for Muscle Spasm(s).      Dispense:  30 Tab     Refill:  0        Patient has been referred to the [address] [place] [address] for evaluation and treatment of her Thoracic Spine   Modify activities today   Apply ice (protecting skin) if needed  Remove the band aid in 3 hours, wash site with clean soap, No further dressings there after. Call [phone] if any: pain, redness, drainage, fever, or any concerns/questions that patient may have regarding the injection. Patient was advised on the signs of infection and instructed to go to the ER if it is office after hours. Patient has been discussed with Dr. Sondra Santiago during this visit and he agrees with the assessment and plan. Patient understands treatment plan and has been provided with patient education.     Allergies   Allergen Reactions    Calcium Other (comments)    Egg Swelling    Gluten Shortness of Breath    Milk Containing Products Other (comments)    Red Dye Swelling    Sulfa (Sulfonamide Antibiotics) Other (comments)         PAST SURGICAL HISTORY:     Past Surgical History:   Procedure Laterality Date    HX HYSTERECTOMY      HX PELVIC LAPAROSCOPY      HX TONSILLECTOMY         SOCIAL HISTORY:     Social History     Socioeconomic History    Marital status:      Spouse name: Not on file    Number of children: Not on file    Years of education: Not on file    Highest education level: Not on file   Occupational History    Not on file   Social Needs    Financial resource strain: Not on file    Food insecurity:     Worry: Not on file     Inability: Not on file    Transportation needs:     Medical: Not on file     Non-medical: Not on file   Tobacco Use    Smoking status: Never Smoker    Smokeless tobacco: Never Used   Substance and Sexual Activity    Alcohol use: No    Drug use: No    Sexual activity: Not on file   Lifestyle    Physical activity:     Days per week: Not on file     Minutes per session: Not on file    Stress: Not on file   Relationships    Social connections:     Talks on phone: Not on file     Gets together: Not on file     Attends Yazdanism service: Not on file     Active member of club or organization: Not on file     Attends meetings of clubs or organizations: Not on file     Relationship status: Not on file    Intimate partner violence:     Fear of current or ex partner: Not on file     Emotionally abused: Not on file     Physically abused: Not on file     Forced sexual activity: Not on file   Other Topics Concern    Not on file   Social History Narrative    Not on file       FAMILY HISTORY:     Family History   Family history unknown: Yes   Problem Relation Age of Onset    Family history unknown: Yes    Hypertension Mother     Hypertension Father     Heart Disease Father     Stroke Father        REVIEW OF SYSTEMS:     Otherwise as noted in HPI       RADIOGRAPHS & DIAGNOSTIC STUDIES     X-rays of the Thoracic Spine take 1/13/2020 at [facility]. X-rays, 2 views of the Thoracic Spine reveals diffuse degeneration. No acute fracture noted.          Gerardo Fiore PA-C  1/13/2020 4:22 PM

## 2020-01-20 ENCOUNTER — OFFICE VISIT (OUTPATIENT)
Dept: ORTHOPEDIC SURGERY | Age: 55
End: 2020-01-20

## 2020-01-20 VITALS
RESPIRATION RATE: 16 BRPM | OXYGEN SATURATION: 96 % | HEART RATE: 60 BPM | DIASTOLIC BLOOD PRESSURE: 65 MMHG | HEIGHT: 63 IN | TEMPERATURE: 97.6 F | SYSTOLIC BLOOD PRESSURE: 133 MMHG | BODY MASS INDEX: 40.22 KG/M2 | WEIGHT: 227 LBS

## 2020-01-20 DIAGNOSIS — M17.0 BILATERAL PRIMARY OSTEOARTHRITIS OF KNEE: Primary | ICD-10-CM

## 2020-01-20 RX ORDER — HYALURONATE SODIUM 10 MG/ML
2 SYRINGE (ML) INTRAARTICULAR ONCE
Qty: 2 ML | Refills: 0
Start: 2020-01-20 | End: 2020-01-20

## 2020-01-20 NOTE — PROGRESS NOTES
AMBULATORY PROGRESS NOTE      Patient: Katharina Reyes             MRN: [id number]     SSN: [id number] Body mass index is 40.21 kg/m². YOB: 1965     AGE: 53 y.o.        EX: female    PCP: Joann Soares MD       See other note from 1/20/2020       Sarwat Cerrato MD  1/20/2020  6:30 PM

## 2020-01-20 NOTE — PROGRESS NOTES
AMBULATORY PROGRESS NOTE      Patient: Corey Webster             MRN: [id number]     SSN: [id number] Body mass index is 40.21 kg/m². YOB: 1965     AGE: 53 y.o. EX: female    PCP: Twin Humphries MD       IMPRESSION/DIAGNOSIS AND TREATMENT PLAN      DIAGNOSES  1. Bilateral primary osteoarthritis of knee        Orders Placed This Encounter    IN DRAIN/INJECT LARGE JOINT/BURSA - 74432    EUFLEXXA INJECTION PER DOSE    sodium hyaluronate (SUPARTZ FX/HYALGAN/GENIVSC) 10 mg/mL syrg injection      Aelia LA Mahmood understands her diagnoses and the proposed plan. PLAN //  HPI AND EXAMINATION      Corey Webster IS A 53 y.o. female who presents to my outpatient office for evaluation of:           Corey Webster is here for RTO assessment for an Ortho Dx of     ICD-10-CM ICD-9-CM    1. Bilateral primary osteoarthritis of knee M17.0 715.16 IN DRAIN/INJECT LARGE JOINT/BURSA      EUFLEXXA INJECTION PER DOSE      sodium hyaluronate (SUPARTZ FX/HYALGAN/GENIVSC) 10 mg/mL syrg injection   . Since last OV, Corey eWbster states that her is doing better overall, is having little to small amount of discomfort, is standing/walking less time duration or distances. Corey Webster REPORTS after the prior Visco Supplementation Injections: No knee redness or swelling    Kenneth TOVAR Porsche Mahmood is not taking medications for pain relief[de-identified]    none. OBJECTIVE EXAMINATION     Visit Vitals  /65   Pulse 60   Temp 97.6 °F (36.4 °C) (Oral)   Resp 16   Ht 5' 3\" (1.6 m)   Wt 227 lb (103 kg)   SpO2 96%   BMI 40.21 kg/m²       Appearance: On examination 1/20/2020 , the patient is alert, oriented (name, place, time) and follows commands. she is in no acute distress and her affect and mood are appropriate. Psychiatric: Affect and mood are appropriate.    Cardiovascular/Peripheral Vascular: Normal Pulses to each hand and foot           Knees:  Bilateral        Gait: slow Cutaneous: Skin intact, no abrasions, blisters, wounds, erythema        Effusion: Is not present         Crepitus:  mild PF joint crepitus       Tenderness:Medial joint line and Lateral joint line    Alignment of Knee:  mild varus when standing        ROM: diminished range of motion        Fullness or swelling: None to popliteal fossa region,          Stability: No instability to anterior, posterior, varus, valgus stress testing        Contractures: No Achilles or Gastrocnemius Contractures. Calf tenderness: Absent for calf or gastrocnemius muscle regions            Soft, supple, non tender, non taut lower extremity compartments  Extremities:   No embolic phenomena to the toes          No significant edema to the foot and or toes. Edema is not present to distal 1/3 tib/fib or ankle regions. Lower extremities are warm and appear well perfused    DVT: No evidence of DVT seen on examination at this time     No calf swelling, no tenderness to calf muscles  Lymphatic:  No Evidence of Lymphedema  Vascular: Medial Border of Tibia Region: Edema is not present        Pulses: Dorsalis Pedis &  Posterior Tibial Pulses : Palpable yes        Varicosities Lower Limbs :   None noted . Neuro: Negative bilateral Straight leg raise (seated position)    See Musculoskeletal section for pertinent individual extremity examination    No abnormal hand/wrist, foot/ankle, or facial/neck tremors. Extensor mechanism is yes intact    CHART REVIEW      Past Medical History:   Diagnosis Date    Asthma      Current Outpatient Medications   Medication Sig    sodium hyaluronate (SUPARTZ FX/HYALGAN/GENIVSC) 10 mg/mL syrg injection 2 mL by Intra artICUlar route once for 1 dose.  cyclobenzaprine (FLEXERIL) 10 mg tablet Take 1 Tab by mouth three (3) times daily as needed for Muscle Spasm(s).     VITAMIN D2 50,000 unit capsule TAKE 1 CAPSULE BY MOUTH EVERY WEEK    fexofenadine-pseudoephedrine (ALLEGRA-D 12 HOUR)  mg Tb12 Take 1 Tab by mouth every twelve (12) hours.  ergocalciferol (ERGOCALCIFEROL) 50,000 unit capsule TAKE 1 CAPSULE BY MOUTH EVERY 7 DAYS    oxaprozin (DAYPRO) 600 mg tablet TAKE 1 TABLET BY MOUTH TWICE DAILY WITH MEALS    oxaprozin (DAYPRO) 600 mg tablet Take 1 Tab by mouth daily.  OTHER Jacob Rhino Salt Packets    azelastine (ASTELIN) 137 mcg (0.1 %) nasal spray INT 2 SPRAYS IEN BID    QNASL 80 mcg/actuation HFAA INHALE 1 PUFF IEN BID    ergocalciferol (ERGOCALCIFEROL) 50,000 unit capsule TK 1 C PO Q WEEK    guaiFENesin ER (MUCINEX) 600 mg ER tablet Take 600 mg by mouth daily.  budesonide (PULMICORT) 0.5 mg/2 mL nbsp 500 mcg by Nebulization route.  OLANZapine-FLUoxetine (SYMBYAX) 3-25 mg per capsule Take 1 Cap by mouth every evening.  loratadine (CLARITIN) 10 mg tablet Take 10 mg by mouth two (2) times a day.  esomeprazole (NEXIUM) 20 mg capsule Take 40 mg by mouth two (2) times a day.  escitalopram oxalate (LEXAPRO) 20 mg tablet Take 20 mg by mouth daily.  montelukast (SINGULAIR) 10 mg tablet Take 10 mg by mouth daily. No current facility-administered medications for this visit. Allergies   Allergen Reactions    Calcium Other (comments)    Egg Swelling    Gluten Shortness of Breath    Milk Containing Products Other (comments)    Red Dye Swelling    Sulfa (Sulfonamide Antibiotics) Other (comments)     Past Surgical History:   Procedure Laterality Date    HX HYSTERECTOMY      HX PELVIC LAPAROSCOPY      HX TONSILLECTOMY       Social History     Occupational History    Not on file   Tobacco Use    Smoking status: Never Smoker    Smokeless tobacco: Never Used   Substance and Sexual Activity    Alcohol use: No    Drug use: No    Sexual activity: Not on file     Family History   Family history unknown: Yes   Problem Relation Age of Onset    Family history unknown:  Yes    Hypertension Mother     Hypertension Father     Heart Disease Father     Stroke Father REVIEW OF SYSTEMS : 1/20/2020  ALL BELOW ARE Negative except : SEE HPI      Constitutional: Negative for fever, chills and weight loss. Neg Weight Loss  Cardiovascular: Negative for chest pain, claudication and leg swelling. SOB, TALAVERA   Gastrointestinal/Urological: Negative for pain, N/V/D/C, Blood in stool or urine,dysuria,  Hematuria, Incontinence  Endocrine: See HPI  Skin: see HPI  Musculoskeletal: see HPI. Neurological: Negative for dizziness and weakness, headaches,Visual Changes or   Confusion, or Seizures,   Psychiatric/Behavioral: Negative for depression, memory loss and substance abuse. Extremities:  Negative for hair changes, rash or skin lesion changes. Hematologic: Negative for Bleeding problems, bruising, pallor or swollen lymph nodes. Peripheral Vascular: No calf pain, vascular vein tenderness to calf pain              No calf throbbing, posterior knee throbbing pain     DIAGNOSTIC IMAGING         No notes on file        [address] [facility] [place], THE FOLLOWING IS TRUE:    Nadira Mcmahon has a Diagnosis of osteoarthritis and has documentation that the pain interferes with functional activities. Nadira Mcmahon has documentation of failure to respond  adequately to at least 3 months of conservative therapy: which (Activity modification, home exercise, protective weight bearing, and various analgesics. NAME is unable to tolerate conservative therapy (prolonged NSAID use) because of adverse effects of NSAIDs (GI irritation, gastric/colonic irritation. Thus visco supplementation is requested as an alternative treatment for knee OA. Nadira Mcmahon , is here for the 2nd Euflexxa injection into both knee. Nadira Mcmahon denies any redness, fevers, shakes, chills, or any reaction from the prior Euflexxa injection. This patient is accompanied in the office by her self.  A formal time out was conducted by me informing Nadira Mcmahon of the risks and benefits of the infection. The injection was performed 1/20/2020 6:28 PM with sterile technique. The both knee is cleansed with alcohol and then sterilized with Betadine, the medial aspect of the both knee. both  knee was then injected with Euflexxa. she tolerated the procedure well. Band-Aids were applied. The risks of Euflexxa include bleeding, infection, and reactive synovitis. The pain assessment score PRIOR/AFTER to the injection: 4 /10 // 4 /10 pain severity, mild intensity, aching Pain quality. Please see above section of this report. I have personally reviewed the results of the above study. The interpretation of this study is my professional opinion.       Loc Valdez MD  1/20/2020  6:28 PM

## 2020-01-27 ENCOUNTER — OFFICE VISIT (OUTPATIENT)
Dept: ORTHOPEDIC SURGERY | Age: 55
End: 2020-01-27

## 2020-01-27 VITALS
BODY MASS INDEX: 40.57 KG/M2 | WEIGHT: 229 LBS | DIASTOLIC BLOOD PRESSURE: 77 MMHG | HEART RATE: 63 BPM | TEMPERATURE: 97 F | OXYGEN SATURATION: 90 % | RESPIRATION RATE: 16 BRPM | HEIGHT: 63 IN | SYSTOLIC BLOOD PRESSURE: 126 MMHG

## 2020-01-27 DIAGNOSIS — M17.0 BILATERAL PRIMARY OSTEOARTHRITIS OF KNEE: Primary | ICD-10-CM

## 2020-01-27 RX ORDER — HYALURONATE SODIUM 10 MG/ML
2 SYRINGE (ML) INTRAARTICULAR ONCE
Qty: 2 ML | Refills: 0
Start: 2020-01-27 | End: 2020-01-27

## 2020-01-27 NOTE — PROGRESS NOTES
1. Have you been to the ER, urgent care clinic since your last visit? Hospitalized since your last visit? No    2. Have you seen or consulted any other health care providers outside of the [address] [place] [address] since your last visit? Include any pap smears or colon screening.  No

## 2020-01-27 NOTE — PROGRESS NOTES
Patient: Phillips Cooks                MRN: [id number]       SSN: [id number]  YOB: 1965                            AGE: 53 y.o. SEX: female    Lb Barraza MD      HPI:     Patient is a 53 y.o. female who presents today for follow up evaluation of bilateral knee osteoarthritis. At last visit, patients was provided with second Euflexxa injections to the left knee and right knee. Patients knees were doing well. Patient presents today for Euflexxa injection #3 of 3 to the bilateral knees. There are no changes in patients medical, social or family history since last office visit and she has no new illness or injury to report. PHYSICAL EXAM:       Visit Vitals  /77   Pulse 63   Temp 97 °F (36.1 °C) (Oral)   Resp 16   Ht 5' 3\" (1.6 m)   Wt 229 lb (103.9 kg)   SpO2 90%   BMI 40.57 kg/m²     Body mass index is 40.57 kg/m². Pain Assessment  1/27/2020   Location of Pain Knee   Pain Location Comment -   Location Modifiers Right;Left   Severity of Pain 4   Quality of Pain Aching   Duration of Pain A few hours   Frequency of Pain Intermittent   Aggravating Factors Walking;Standing;Bending   Aggravating Factors Comment -   Limiting Behavior -   Relieving Factors Rest   Result of Injury No           Appearance: Alert, well appearing and pleasant patient who is in no distress, oriented to person, place/time, and who follows commands. This patient is accompanied by self  Psychiatric: Affect and mood are appropriate. Cardiovascular/Peripheral Vascular: Normal Pulses to each hand and foot  Knees: right    Gait: antalgic       Knees: bilateral  Cutaneous: Skin intact, no abrasions, blisters, wounds, erythema  Effusion: Is not present  Crepitus: moderate PF joint crepitus  Tenderness: medial joint line. Mild TTP centrally to thoracic spine.   Alignment of Knee:   mild valgus when standing  ROM: diminished range of motion secondary to pain  Fullness or swelling: None to popliteal fossa region,    Stability: No gross instability noted to anterior, posterior, varus, valgus stress testing  Extremities:    Lower extremities are warm and appear well perfused              DVT: No evidence of DVT seen on examination at this time   No calf swelling, no tenderness to calf muscles  TENDERNESS:  mild tenderness to palpation   NEUROVASCULAR:  grossly intact. Positive distal pulses and capillary refill. DVT ASSESSMENT:  The calf is not tender to palpation. No evidence of DVT seen on physical exam.    Thoracic Spine: mild TTP centrally    IMPRESSION:     Encounter Diagnoses     ICD-10-CM ICD-9-CM   1. Bilateral primary osteoarthritis of knee M17.0 715.16       PAST MEDICAL HISTORY:     Past Medical History:   Diagnosis Date    Asthma        MEDICATIONS:     Current Outpatient Medications   Medication Sig    sodium hyaluronate (SUPARTZ FX/HYALGAN/GENIVSC) 10 mg/mL syrg injection 2 mL by Intra artICUlar route once for 1 dose.  cyclobenzaprine (FLEXERIL) 10 mg tablet Take 1 Tab by mouth three (3) times daily as needed for Muscle Spasm(s).  VITAMIN D2 50,000 unit capsule TAKE 1 CAPSULE BY MOUTH EVERY WEEK    fexofenadine-pseudoephedrine (ALLEGRA-D 12 HOUR)  mg Tb12 Take 1 Tab by mouth every twelve (12) hours.  ergocalciferol (ERGOCALCIFEROL) 50,000 unit capsule TAKE 1 CAPSULE BY MOUTH EVERY 7 DAYS    oxaprozin (DAYPRO) 600 mg tablet TAKE 1 TABLET BY MOUTH TWICE DAILY WITH MEALS    oxaprozin (DAYPRO) 600 mg tablet Take 1 Tab by mouth daily.  OTHER Jacob Rhino Salt Packets    azelastine (ASTELIN) 137 mcg (0.1 %) nasal spray INT 2 SPRAYS IEN BID    QNASL 80 mcg/actuation HFAA INHALE 1 PUFF IEN BID    ergocalciferol (ERGOCALCIFEROL) 50,000 unit capsule TK 1 C PO Q WEEK    guaiFENesin ER (MUCINEX) 600 mg ER tablet Take 600 mg by mouth daily.  budesonide (PULMICORT) 0.5 mg/2 mL nbsp 500 mcg by Nebulization route.     OLANZapine-FLUoxetine (SYMBYAX) 3-25 mg per capsule Take 1 Cap by mouth every evening.  loratadine (CLARITIN) 10 mg tablet Take 10 mg by mouth two (2) times a day.  esomeprazole (NEXIUM) 20 mg capsule Take 40 mg by mouth two (2) times a day.  escitalopram oxalate (LEXAPRO) 20 mg tablet Take 20 mg by mouth daily.  montelukast (SINGULAIR) 10 mg tablet Take 10 mg by mouth daily. No current facility-administered medications for this visit. ALLERGIES:     Allergies   Allergen Reactions    Calcium Other (comments)    Egg Swelling    Gluten Shortness of Breath    Milk Containing Products Other (comments)    Red Dye Swelling    Sulfa (Sulfonamide Antibiotics) Other (comments)         EUFLEXXA INJECTION PROCEDURE:       Pre-procedure pain assessment:    4/10  Post procedure pain assessment:  4/10      TIME OUT performed immediately prior to start of procedure:   * Patient was identified by Nadira Avila and 1965  * Agreement on procedure being performed was verified:  Euflexxa Injection to bilaterally knees  Date of procedure: 1/27/2020  Time of consent: 4:24 pm  Time of time out: 4:34 pm  Procedure performed by: Gerardo Fiore PA-C  Provider assisted by: Medical Staff    Corey Rivas, is here for the Euflexxa injection into bilateral knee. Patient denies any redness, fevers, shakes, chills, or any reaction from the prior Euflexxa injection. This patient is accompanied in the office by self. The procedure was explained to the patient and possible adverse reactions were discussed. The risks include, but are not limited to infection, bleeding and reactive synovitis. The patient expressed understanding and wishes to proceed with the procedure. After informed consent, and time out listed above, using sterile technique the bilaterally knee was cleansed with alcohol, sterilized with Betadine and anesthetized with Ethyl Chloride.  Then the Euflexxa preparation was placed along the anterolateral aspect of the bilaterally knee without difficulty. The patient tolerated the procedure well and has been instructed on post injection care. Band-Aid was applied. Modify activities today   Apply ice (protecting skin) if needed  Follow up as needed      PLAN:     Orders Placed This Encounter    WI DRAIN/INJECT LARGE JOINT/BURSA -     EUFLEXXA INJECTION PER DOSE     Order Specific Question:   Dose     Answer:   4ml     Order Specific Question:   Site     Answer:   OTHER     Comments:   bilateral knee     Order Specific Question:   Expiration Date     Answer:   10/18/2020     Order Specific Question:   Lot#     Answer:   [id number]     Order Specific Question:        Answer:   ferring     Order Specific Question:   Charge Quantity? Answer:   2     Order Specific Question:   Perfomed by/Witnessed by: Answer:   Dr. Valencia Carpenter Specific Question:   NDC#     Answer:   [address]-4 [950457]    sodium hyaluronate (SUPARTZ FX/HYALGAN/GENIVSC) 10 mg/mL syrg injection     Si mL by Intra artICUlar route once for 1 dose. Dispense:  2 mL     Refill:  0          Modify activities today   Apply ice (protecting skin) if needed  Remove the band aid in 3 hours, wash site with clean soap, No further dressings there after. Call [phone] if any: pain, redness, drainage, fever, or any concerns/questions that patient may have regarding the injection. Patient was advised on the signs of infection and instructed to go to the ER if it is office after hours. Follow up as needed    Patient has been discussed with Dr. Lorelei Castanon during this visit and he agrees with the assessment and plan. Patient understands treatment plan and has been provided with patient education.     Allergies   Allergen Reactions    Calcium Other (comments)    Egg Swelling    Gluten Shortness of Breath    Milk Containing Products Other (comments)    Red Dye Swelling    Sulfa (Sulfonamide Antibiotics) Other (comments)         PAST SURGICAL HISTORY:     Past Surgical History:   Procedure Laterality Date    HX HYSTERECTOMY      HX PELVIC LAPAROSCOPY      HX TONSILLECTOMY         SOCIAL HISTORY:     Social History     Socioeconomic History    Marital status:      Spouse name: Not on file    Number of children: Not on file    Years of education: Not on file    Highest education level: Not on file   Occupational History    Not on file   Social Needs    Financial resource strain: Not on file    Food insecurity:     Worry: Not on file     Inability: Not on file    Transportation needs:     Medical: Not on file     Non-medical: Not on file   Tobacco Use    Smoking status: Never Smoker    Smokeless tobacco: Never Used   Substance and Sexual Activity    Alcohol use: No    Drug use: No    Sexual activity: Not on file   Lifestyle    Physical activity:     Days per week: Not on file     Minutes per session: Not on file    Stress: Not on file   Relationships    Social connections:     Talks on phone: Not on file     Gets together: Not on file     Attends [employer] service: Not on file     Active member of club or organization: Not on file     Attends meetings of clubs or organizations: Not on file     Relationship status: Not on file    Intimate partner violence:     Fear of current or ex partner: Not on file     Emotionally abused: Not on file     Physically abused: Not on file     Forced sexual activity: Not on file   Other Topics Concern    Not on file   Social History Narrative    Not on file       FAMILY HISTORY:     Family History   Family history unknown: Yes   Problem Relation Age of Onset    Family history unknown:  Yes    Hypertension Mother     Hypertension Father     Heart Disease Father     Stroke Father        REVIEW OF SYSTEMS:     Otherwise as noted in [facility]       Josiane 68     None        Cinthia Mi PA-C  1/27/2020 4:22 PM

## 2020-02-17 ENCOUNTER — OFFICE VISIT (OUTPATIENT)
Dept: ORTHOPEDIC SURGERY | Age: 55
End: 2020-02-17

## 2020-02-17 VITALS
WEIGHT: 227 LBS | SYSTOLIC BLOOD PRESSURE: 121 MMHG | HEIGHT: 63 IN | HEART RATE: 61 BPM | BODY MASS INDEX: 40.22 KG/M2 | DIASTOLIC BLOOD PRESSURE: 64 MMHG | OXYGEN SATURATION: 97 %

## 2020-02-17 DIAGNOSIS — M25.511 RIGHT SHOULDER PAIN, UNSPECIFIED CHRONICITY: ICD-10-CM

## 2020-02-17 DIAGNOSIS — M54.6 THORACIC SPINE PAIN: Primary | ICD-10-CM

## 2020-02-17 DIAGNOSIS — M54.2 CERVICAL PAIN: ICD-10-CM

## 2020-02-17 NOTE — LETTER
2/17/20 Patient: Jd Laguna YOB: 1965 Date of Visit: 2/17/2020 Barrie Neville MD 
[place] [address] [address] [address] [place] [id number] VIA Facsimile: [phone] Roland Griffith MD 
[address] [place] [address] [address] [facility] [address] VIA In Basket Dear MD Roland Ramirez MD, Thank you for referring Ms. Raymond Trammell to [address] for evaluation. My notes for this consultation are attached. If you have questions, please do not hesitate to call me. I look forward to following your patient along with you. Sincerely, Ynes Saucedo MD

## 2020-02-17 NOTE — PROGRESS NOTES
[facility]  [address], [address]  Phone: [phone]  Fax: [phone]        INITIAL CONSULTATION      HISTORY OF PRESENT ILLNESS:  Rojas Dodson is a 53 y.o. female whom is referred from Dr. Davey Abdi secondary to thoracic spine pain x 10/2019. She rates her pain 4-6/10. Additionally, she endorses pain in the LUE at the shoulder to the elbow since 10/2019  when she slept on her arm funny. She denies h/o of shoulder surgery. She denies pain with reaching behind and above. Her pain is exacerbated with lying on her left side or back. Patient denies previous spinal or shoulder surgery, injections, or recent physical therapy/chiropractic care. Pt denies dropping things or loss of balance. Pt denies change in bowel or bladder habits. Pt denies fever, weight loss, or skin changes. Patient denies h/o DM. Note from Dr. Davey Abdi dated 5/29/18 indicating he obtained thoracic XRs. Indicates she had PT and HEP. She was treated with Tramadol. Note from Dr. Perla Cleaning dated 6/13/18 indicating patient was seen with c/o lumbar spinal stenosis. XRs indicated old compression fractures at T5, T8 and L1. Patient was referred to PT. Note from Byron Ramirez dated 12/28/2018 indicating patient was seen with c/o upper back pain, told she has fractures of the thoracic and lumbar spine after falling coming out of the shower on 12/17/2018. She heard two cracks at the time. Pain is primarily in the thoracic spine. Admits she fractured her thoracic spine 30+ years ago. She is doing better since her fall. No neurological symptoms. T Spine CT dated 6/6/2018 revealed, per report: mild to moderate old compression of T8 vertebral body. Mild old compression of T5 vertebral body. Bones are moderately osteopenic throughout thoracic spine. Evidence of moderate, presumably old compression of L1 vertebral body, as described with a CT scan of lumbar spine.  Mild-to-moderate multilevel degenerative disc disease in mid and lower thoracic spine. No obvious focal disc bulge or protrusion identified in thoracic spine. No evidence of compromise of thoracic spinal canal. No significant or hypertrophic osteoarthritic changes involving the costotransverse or costovertebral joints on both sides of thoracic spine. Evidence of moderate to marked degenerative disc disease at C6-C7 level with central disc protrusion and mild-to-moderate central stenosis. Bone Density Study completed on 6/20/2018 revealed: bone mineral density sows evidence of osteopenia. Fracture risk is moderate. T Spine MRI dated 12/18/2018 revealed, per report: mild to moderate, old L1 compression fracture. Multilevel degenerative disc and degenerative facet disease with mild areas of canal stenosis and mild to moderate foraminal narrowing. No evidence of acute fracture.  reviewed. Body mass index is 40.21 kg/m². PCP: Jamaal Jackson MD    Past Medical History:   Diagnosis Date    Asthma           Past Surgical History:   Procedure Laterality Date    HX HYSTERECTOMY      HX PELVIC LAPAROSCOPY      HX TONSILLECTOMY           Social History     Tobacco Use    Smoking status: Never Smoker    Smokeless tobacco: Never Used   Substance Use Topics    Alcohol use: No       Current Outpatient Medications   Medication Sig Dispense Refill    VITAMIN D2 50,000 unit capsule TAKE 1 CAPSULE BY MOUTH EVERY WEEK 8 Cap 0    fexofenadine-pseudoephedrine (ALLEGRA-D 12 HOUR)  mg Tb12 Take 1 Tab by mouth every twelve (12) hours.  ergocalciferol (ERGOCALCIFEROL) 50,000 unit capsule TAKE 1 CAPSULE BY MOUTH EVERY 7 DAYS 12 Cap 1    OTHER Jacob Rhino Salt Packets  99    QNASL 80 mcg/actuation HFAA INHALE 1 PUFF IEN BID  5    ergocalciferol (ERGOCALCIFEROL) 50,000 unit capsule TK 1 C PO Q WEEK  0    budesonide (PULMICORT) 0.5 mg/2 mL nbsp 500 mcg by Nebulization route.       esomeprazole (NEXIUM) 20 mg capsule Take 40 mg by mouth two (2) times a day.  escitalopram oxalate (LEXAPRO) 20 mg tablet Take 20 mg by mouth daily.  montelukast (SINGULAIR) 10 mg tablet Take 10 mg by mouth daily.  cyclobenzaprine (FLEXERIL) 10 mg tablet Take 1 Tab by mouth three (3) times daily as needed for Muscle Spasm(s). 30 Tab 0    oxaprozin (DAYPRO) 600 mg tablet TAKE 1 TABLET BY MOUTH TWICE DAILY WITH MEALS 180 Tab 3    oxaprozin (DAYPRO) 600 mg tablet Take 1 Tab by mouth daily. 90 Tab 2    azelastine (ASTELIN) 137 mcg (0.1 %) nasal spray INT 2 SPRAYS IEN BID  1    guaiFENesin ER (MUCINEX) 600 mg ER tablet Take 600 mg by mouth daily.  OLANZapine-FLUoxetine (SYMBYAX) 3-25 mg per capsule Take 1 Cap by mouth every evening.  loratadine (CLARITIN) 10 mg tablet Take 10 mg by mouth two (2) times a day. Allergies   Allergen Reactions    Calcium Other (comments)    Egg Swelling    Gluten Shortness of Breath    Milk Containing Products Other (comments)    Red Dye Swelling    Sulfa (Sulfonamide Antibiotics) Other (comments)            Family History   Family history unknown: Yes   Problem Relation Age of Onset    Family history unknown: Yes    Hypertension Mother     Hypertension Father     Heart Disease Father     Stroke Father          REVIEW OF SYSTEMS  Constitutional symptoms: Negative  Eyes: Negative  Ears, Nose, Throat, and Mouth: Negative  Cardiovascular: Negative  Respiratory: Negative  Genitourinary: Negative  Integumentary (Skin and/or breast): Negative  Musculoskeletal: Positive for thoracic pain and left shoulder pain  Extremities: Negative for edema.   Endocrine/Rheumatologic: Negative  Hematologic/Lymphatic: Negative  Allergic/Immunologic: Negative  Psychiatric: Negative       PHYSICAL EXAMINATION  Visit Vitals  /64 (BP 1 Location: Left arm, BP Patient Position: Sitting)   Pulse 61   Ht 5' 3\" (1.6 m)   Wt 227 lb (103 kg)   SpO2 97%   BMI 40.21 kg/m²       CONSTITUTIONAL: NAD, A&O x 3  HEART: Regular rate and rhythm  ABDOMEN: Positive bowel sounds, soft, nontender, and nondistended  LUNGS: Clear to auscultation bilaterally. SKIN: Negative for rash. RANGE OF MOTION: The patient has full passive range of motion in all four extremities. SENSATION: Sensation is intact to light touch throughout. MOTOR:   Straight Leg Raise: Negative, bilateral  Reed: Negative, bilateral  Tandem Gait: Neg. Deep tendon reflexes are 1 at the biceps, triceps, and brachioradialis bilaterally. Deep tendon reflexes are 1 at the knees and ankles bilaterally. Increased pain with direct palpation of the bicipital tendon.      Shoulder AB/Flex Elbow Flex Wrist Ext Elbow Ext Wrist Flex Hand Intrin Tone   Right +4/5 +4/5 +4/5 +4/5 +4/5 +4/5 +4/5   Left +4/5 +4/5 +4/5 +4/5 +4/5 +4/5 +4/5              Hip Flex Knee Ext Knee Flex Ankle DF GTE Ankle PF Tone   Right +4/5 +4/5 +4/5 +4/5 +4/5 +4/5 +4/5   Left +4/5 +4/5 +4/5 +4/5 +4/5 +4/5 +4/5   [facility]  OFFICE PROCEDURE PROGRESS NOTE        Chart reviewed for the following:   I, Dr. Carla Moise, have reviewed the History, Physical and updated the Allergic reactions for [facility] [address] performed immediately prior to start of procedure:   IDr. Carla, have performed the following reviews on [facility] prior to the start of the procedure:            * Patient was identified by name and date of birth   * Agreement on procedure being performed was verified  * Risks and Benefits explained to the patient  * Procedure site verified and marked as necessary  * Patient was positioned for comfort  * Consent was signed and verified     Time: 5:20 PM      Date of procedure: 2/17/2020    Procedure performed by:  Dr. Carla Moise    Provider assisted by: none     Patient assisted by: self    How tolerated by patient: tolerated the procedure well with no complications    Comments:   A left shoulder injection was performed at the site of maximal 2 cc Celestone (6mg/mL), 2 cc Marcaine (0.25%), 2 cc Lidocaine (2%). This was well tolerated, and followed by relief of pain. ASSESSMENT   Diagnoses and all orders for this visit:    1. Thoracic spine pain    2. Cervical pain    3. Right shoulder pain, unspecified chronicity       IMPRESSIONS/RECOMMENDATIONS:  Patient presents today with c/o thoracic spine pain and pain in the LUE at the shoulder to the elbow since 10/2019 when she slept on her arm funny. Multiple treatment options were discussed. Her symptoms appear multifactorial in nature with some symptoms attributable to spinal pathology and some to shoulder pathology. I performed a diagnostic cortisone injection to the left shoulder in office today. I instructed her to apply ice this evening and to keep track of her symptoms until next office visit. Patient is neurologically intact. I will see the patient back in 2 week's or earlier if needed. Written by Shannon Hannah, as dictated by Betsy Kaur MD  I examined the patient, reviewed and agree with the note.

## 2020-02-27 NOTE — PROGRESS NOTES
[facility]  [address], [address]  Phone: [phone]  Fax: [phone]        PROGRESS NOTE      HISTORY OF PRESENT ILLNESS:  The patient is a 53 y.o. female and was seen today for follow up of neck and upper back pain x 10/2019. Additionally, she endorses pain in the LUE at the shoulder to the elbow since 10/2019  when she slept on her arm funny. She denies h/o of shoulder surgery. She denies pain with reaching behind and above. Her pain is exacerbated with lying on her left side or back. Patient denies previous spinal or shoulder surgery, injections, or recent physical therapy/chiropractic care. Pt denies dropping things or loss of balance. Pt denies change in bowel or bladder habits. Pt denies fever, weight loss, or skin changes. Patient denies h/o DM. Note from Dr. Lorelei Castanon dated 5/29/18 indicating he obtained thoracic XRs. She was treated with Tramadol. Note from Dr. Dhaval Erickson dated 6/13/18 indicating patient was seen with c/o lumbar spinal stenosis. XRs indicated old compression fractures at T5, T8 and L1. Note from Dionna Ricci, [address] dated 12/28/2018 indicating patient was seen with c/o upper back pain, told she has fractures of the thoracic and lumbar spine after falling coming out of the shower on 12/17/2018. She heard two cracks at the time. Pain is primarily in the thoracic spine. Admits she fractured her thoracic spine 30+ years ago. She is doing better since her fall. No neurological symptoms. T Spine CT dated 6/6/2018 revealed, per report: mild to moderate old compression of T8 vertebral body. Mild old compression of T5 vertebral body. Bones are moderately osteopenic throughout thoracic spine. Evidence of moderate, presumably old compression of L1 vertebral body, as described with a CT scan of lumbar spine. Mild-to-moderate multilevel degenerative disc disease in mid and lower thoracic spine.  No obvious focal disc bulge or protrusion identified in thoracic spine. No evidence of compromise of thoracic spinal canal. No significant or hypertrophic osteoarthritic changes involving the costotransverse or costovertebral joints on both sides of thoracic spine. Evidence of moderate to marked degenerative disc disease at C6-C7 level with central disc protrusion and mild-to-moderate central stenosis. Bone Density Study completed on 6/20/2018 revealed: bone mineral density sows evidence of osteopenia. Fracture risk is moderate. T Spine MRI dated 12/18/2018 revealed, per report: mild to moderate, old L1 compression fracture. Multilevel degenerative disc and degenerative facet disease with mild areas of canal stenosis and mild to moderate foraminal narrowing. No evidence of acute fracture. At her last clinical appointment,  her symptoms appeared multifactorial in nature with some symptoms attributable to spinal pathology and some to shoulder pathology. I performed a diagnostic cortisone injection to the left shoulder in office. The patient returns today with pain location and distribution remain unchanged. She rates her pain 5/10, previously 4-6/10. Patient reports recently straining the right side of her neck when lifting her mother. Pt denies dropping things or loss of balance. Patient received a cortisone injection at the left shoulder on 2/17/2020 with 75% improvement x 3 days. Patient denies h/o DM.  reviewed. Body mass index is 40.39 kg/m².     PCP: Donavan Wetzel MD      Past Medical History:   Diagnosis Date    Asthma         Social History     Socioeconomic History    Marital status:      Spouse name: Not on file    Number of children: Not on file    Years of education: Not on file    Highest education level: Not on file   Occupational History    Not on file   Social Needs    Financial resource strain: Not on file    Food insecurity:     Worry: Not on file     Inability: Not on file    Transportation needs:     Medical: Not on file     Non-medical: Not on file   Tobacco Use    Smoking status: Never Smoker    Smokeless tobacco: Never Used   Substance and Sexual Activity    Alcohol use: No    Drug use: No    Sexual activity: Not on file   Lifestyle    Physical activity:     Days per week: Not on file     Minutes per session: Not on file    Stress: Not on file   Relationships    Social connections:     Talks on phone: Not on file     Gets together: Not on file     Attends [employer] service: Not on file     Active member of club or organization: Not on file     Attends meetings of clubs or organizations: Not on file     Relationship status: Not on file    Intimate partner violence:     Fear of current or ex partner: Not on file     Emotionally abused: Not on file     Physically abused: Not on file     Forced sexual activity: Not on file   Other Topics Concern    Not on file   Social History Narrative    Not on file       Current Outpatient Medications   Medication Sig Dispense Refill    cyclobenzaprine (FLEXERIL) 10 mg tablet Take 1 Tab by mouth three (3) times daily as needed for Muscle Spasm(s). 30 Tab 0    fexofenadine-pseudoephedrine (ALLEGRA-D 12 HOUR)  mg Tb12 Take 1 Tab by mouth every twelve (12) hours.  ergocalciferol (ERGOCALCIFEROL) 50,000 unit capsule TAKE 1 CAPSULE BY MOUTH EVERY 7 DAYS 12 Cap 1    OTHER Jacob Rhino Salt Packets  99    QNASL 80 mcg/actuation HFAA INHALE 1 PUFF IEN BID  5    budesonide (PULMICORT) 0.5 mg/2 mL nbsp 500 mcg by Nebulization route.  OLANZapine-FLUoxetine (SYMBYAX) 3-25 mg per capsule Take 1 Cap by mouth every evening.  esomeprazole (NEXIUM) 20 mg capsule Take 40 mg by mouth two (2) times a day.  escitalopram oxalate (LEXAPRO) 20 mg tablet Take 20 mg by mouth daily.  montelukast (SINGULAIR) 10 mg tablet Take 10 mg by mouth daily.       SYMBICORT 160-4.5 mcg/actuation HFAA INHALE 2 PUFFS PO BID      oxaprozin (DAYPRO) 600 mg tablet Take 1 Tab by mouth daily. (Patient not taking: Reported on 3/2/2020) 90 Tab 2    azelastine (ASTELIN) 137 mcg (0.1 %) nasal spray INT 2 SPRAYS IEN BID  1    ergocalciferol (ERGOCALCIFEROL) 50,000 unit capsule TK 1 C PO Q WEEK  0    guaiFENesin ER (MUCINEX) 600 mg ER tablet Take 600 mg by mouth daily.  loratadine (CLARITIN) 10 mg tablet Take 10 mg by mouth two (2) times a day. Allergies   Allergen Reactions    Fluticasone Furoate-Vilanterol Anaphylaxis    Nutritional Supplement-Fiber Anaphylaxis    Sulfa (Sulfonamide Antibiotics) Other (comments) and Shortness of Breath     Other reaction(s): Other (See Comments)  Sinus swells up, wheezing states Pt.  Calcium Other (comments)    Egg Swelling    Gluten Shortness of Breath    Milk Containing Products Other (comments)    Red Dye Swelling          PHYSICAL EXAMINATION    Visit Vitals  BP (!) 131/93 (BP 1 Location: Left arm, BP Patient Position: Sitting)   Pulse 71   Temp 98.7 °F (37.1 °C) (Oral)   Resp 18   Ht 5' 3\" (1.6 m)   Wt 228 lb (103.4 kg)   SpO2 98%   BMI 40.39 kg/m²       CONSTITUTIONAL: NAD, A&O x 3  SENSATION: Intact to light touch throughout  NEURO: Yumiko's is negative bilaterally. RANGE OF MOTION: The patient has full passive range of motion in all four extremities. MOTOR:     Shoulder AB/Flex Elbow Flex Wrist Ext Elbow Ext Wrist Flex Hand Intrin Tone   Right +4/5 +4/5 +4/5 +4/5 +4/5 +4/5 +4/5   Left +4/5 +4/5 +4/5 +4/5 +4/5 +4/5 +4/5     ASSESSMENT   Diagnoses and all orders for this visit:    1. Cervical pain  -     REFERRAL TO ORTHOPEDICS  -     REFERRAL TO PHYSICAL THERAPY    2. Thoracic spine pain  -     REFERRAL TO ORTHOPEDICS  -     REFERRAL TO PHYSICAL THERAPY    3. Left shoulder pain, unspecified chronicity  -     REFERRAL TO ORTHOPEDICS  -     REFERRAL TO PHYSICAL THERAPY      IMPRESSION AND PLAN:  Patient returns to the office today with c/o neck and upper back pain x 10/2019.  Additionally, she endorses pain in the LUE at the shoulder to the elbows. Multiple treatment options were discussed. I suspect her  symptoms are multifactorial in nature with some attributable to spinal pathology and some to shoulder pathology. Based on the 75% improvement x 3 days following shoulder injection, I will refer her to Dr. Mallika Dickson for evaluation of her shoulder pain. I offered to start her on a round of steroids, pt declined. I will refer her to physical therapy with an emphasis on HEP. Patient is neurologically intact. I will see the patient back in 1 month's time or earlier if needed. Written by Sandra Magana, as dictated by Monica Meza MD  I examined the patient, reviewed and agree with the note.

## 2020-03-02 ENCOUNTER — OFFICE VISIT (OUTPATIENT)
Dept: ORTHOPEDIC SURGERY | Age: 55
End: 2020-03-02

## 2020-03-02 VITALS
HEART RATE: 71 BPM | TEMPERATURE: 98.7 F | HEIGHT: 63 IN | RESPIRATION RATE: 18 BRPM | BODY MASS INDEX: 40.4 KG/M2 | WEIGHT: 228 LBS | SYSTOLIC BLOOD PRESSURE: 131 MMHG | DIASTOLIC BLOOD PRESSURE: 93 MMHG | OXYGEN SATURATION: 98 %

## 2020-03-02 DIAGNOSIS — M25.512 LEFT SHOULDER PAIN, UNSPECIFIED CHRONICITY: ICD-10-CM

## 2020-03-02 DIAGNOSIS — M54.2 CERVICAL PAIN: Primary | ICD-10-CM

## 2020-03-02 DIAGNOSIS — M54.6 THORACIC SPINE PAIN: ICD-10-CM

## 2020-03-02 RX ORDER — BUDESONIDE AND FORMOTEROL FUMARATE DIHYDRATE 160; 4.5 UG/1; UG/1
AEROSOL RESPIRATORY (INHALATION)
COMMUNITY
Start: 2020-01-30

## 2020-03-02 NOTE — LETTER
3/2/20 Patient: Anisa Turner YOB: 1965 Date of Visit: 3/2/2020 Marleen Cooper MD 
[address] [place] [address] VIA Facsimile: [phone] Dear Marleen Cooper MD, Thank you for referring Ms. Pamela Galvan to [address] for evaluation. My notes for this consultation are attached. If you have questions, please do not hesitate to call me. I look forward to following your patient along with you. Sincerely, Lexy Amato MD

## 2020-03-19 ENCOUNTER — APPOINTMENT (OUTPATIENT)
Dept: PHYSICAL THERAPY | Age: 55
End: 2020-03-19

## 2020-04-01 ENCOUNTER — VIRTUAL VISIT (OUTPATIENT)
Dept: ORTHOPEDIC SURGERY | Age: 55
End: 2020-04-01

## 2020-04-01 DIAGNOSIS — M25.512 CHRONIC LEFT SHOULDER PAIN: Primary | ICD-10-CM

## 2020-04-01 DIAGNOSIS — G89.29 CHRONIC LEFT SHOULDER PAIN: Primary | ICD-10-CM

## 2020-04-01 RX ORDER — MELOXICAM 7.5 MG/1
7.5 TABLET ORAL DAILY
Qty: 120 TAB | Refills: 2 | Status: SHIPPED | OUTPATIENT
Start: 2020-04-01 | End: 2021-04-16

## 2020-04-01 RX ORDER — LIDOCAINE 50 MG/G
1 PATCH TOPICAL EVERY 12 HOURS
Qty: 1 EACH | Refills: 2 | Status: SHIPPED | OUTPATIENT
Start: 2020-04-01 | End: 2020-10-12 | Stop reason: ALTCHOICE

## 2020-04-01 NOTE — PROGRESS NOTES
[facility]  Telemedicine Visit Note      Adin Reddy is a 53 y.o. female who was seen by synchronous (real-time) audio-video technology on 4/1/2020. [employer] virtual visit platform was used for this virtual visit. Patient: Adin Reddy                MRN: [id number]       SSN: [id number]  YOB: 1965        AGE: 53 y.o. SEX: female  There is no height or weight on file to calculate BMI. PCP: Santo Szymanski MD  04/01/20    Telehealth Screening Questions  Since your last office visit have you had any    1. New medical diagnoses No  2. Changes in your medications  No  3. Surgical procedures No  4. Changes in your Allergies to medication No  5. What is your pain level today 5/10      CHIEF COMPLAINT[de-identified] Left shoulder pain    HPI: Adin Reddy is a 53 y.o. female patient who was seen today via a telehealth visit for her left shoulder. She is a patient of Dr Finesse Pierre referred for her shoulder pain. She has had pain for 6 months along with stiffness. She describes it as an achy pain. No history of trauma. She has pain when trying to raise her arm above her head. She has had a cortisone injection with several days of relief. She takes advil/tylenol for the pain. No PT. Past Medical History:   Diagnosis Date    Asthma        Family History   Family history unknown: Yes   Problem Relation Age of Onset    Family history unknown: Yes    Hypertension Mother     Hypertension Father     Heart Disease Father     Stroke Father        Current Outpatient Medications   Medication Sig Dispense Refill    SYMBICORT 160-4.5 mcg/actuation HFAA INHALE 2 PUFFS PO BID      cyclobenzaprine (FLEXERIL) 10 mg tablet Take 1 Tab by mouth three (3) times daily as needed for Muscle Spasm(s). 30 Tab 0    fexofenadine-pseudoephedrine (ALLEGRA-D 12 HOUR)  mg Tb12 Take 1 Tab by mouth every twelve (12) hours.       ergocalciferol (ERGOCALCIFEROL) 50,000 unit capsule TAKE 1 CAPSULE BY MOUTH EVERY 7 DAYS 12 Cap 1    OTHER Jacob Rhino Salt Packets  99    azelastine (ASTELIN) 137 mcg (0.1 %) nasal spray INT 2 SPRAYS IEN BID  1    QNASL 80 mcg/actuation HFAA INHALE 1 PUFF IEN BID  5    ergocalciferol (ERGOCALCIFEROL) 50,000 unit capsule TK 1 C PO Q WEEK  0    budesonide (PULMICORT) 0.5 mg/2 mL nbsp 500 mcg by Nebulization route.  OLANZapine-FLUoxetine (SYMBYAX) 3-25 mg per capsule Take 1 Cap by mouth every evening.  loratadine (CLARITIN) 10 mg tablet Take 10 mg by mouth two (2) times a day.  esomeprazole (NEXIUM) 20 mg capsule Take 40 mg by mouth two (2) times a day.  escitalopram oxalate (LEXAPRO) 20 mg tablet Take 20 mg by mouth daily.  montelukast (SINGULAIR) 10 mg tablet Take 10 mg by mouth daily.  oxaprozin (DAYPRO) 600 mg tablet Take 1 Tab by mouth daily. (Patient not taking: Reported on 3/2/2020) 90 Tab 2    guaiFENesin ER (MUCINEX) 600 mg ER tablet Take 600 mg by mouth daily. Allergies   Allergen Reactions    Fluticasone Furoate-Vilanterol Anaphylaxis    Nutritional Supplement-Fiber Anaphylaxis    Sulfa (Sulfonamide Antibiotics) Other (comments) and Shortness of Breath     Other reaction(s): Other (See Comments)  Sinus swells up, wheezing states Pt.     Calcium Other (comments)    Egg Swelling    Gluten Shortness of Breath    Milk Containing Products Other (comments)    Red Dye Swelling       Past Surgical History:   Procedure Laterality Date    HX HYSTERECTOMY      HX PELVIC LAPAROSCOPY      HX TONSILLECTOMY         Social History     Socioeconomic History    Marital status:      Spouse name: Not on file    Number of children: Not on file    Years of education: Not on file    Highest education level: Not on file   Occupational History    Not on file   Social Needs    Financial resource strain: Not on file    Food insecurity     Worry: Not on file Inability: Not on file    Transportation needs     Medical: Not on file     Non-medical: Not on file   Tobacco Use    Smoking status: Never Smoker    Smokeless tobacco: Never Used   Substance and Sexual Activity    Alcohol use: No    Drug use: No    Sexual activity: Not on file   Lifestyle    Physical activity     Days per week: Not on file     Minutes per session: Not on file    Stress: Not on file   Relationships    Social connections     Talks on phone: Not on file     Gets together: Not on file     Attends Jew service: Not on file     Active member of club or organization: Not on file     Attends meetings of clubs or organizations: Not on file     Relationship status: Not on file    Intimate partner violence     Fear of current or ex partner: Not on file     Emotionally abused: Not on file     Physically abused: Not on file     Forced sexual activity: Not on file   Other Topics Concern    Not on file   Social History Narrative    Not on file       REVIEW OF SYSTEMS:      CON: negative for recent weight loss/gain, fever, or chills  EYE: negative for double or blurry vision  ENT: negative for hoarseness  RS:   negative for cough, URI, SOB  CV:  negative for chest pain, palpitations  GI:    negative for blood in stool, nausea/vomiting  :  negative for blood in urine  MS: As per HPI  Other systems reviewed and noted below. PHYSICAL EXAMINATION:  There is no height or weight on file to calculate BMI. GENERAL: Alert and oriented x3, in no acute distress  HEENT: Normocephalic, atraumatic in appearance  RESP: Non labored breathing, able to communicate clearly and without difficulty  Left shoulder: A/PROM via exam , ER 30, IR Mid thoracic spine. Attempts at PROM on exam limited by pain. Strength unable to be tested. No gross motor deficits. Due to this being a TeleHealth evaluation, certain elements of the physical examination are unable to be assessed.      ASSESSMENT & PLAN  Diagnosis: Left shoulder pain, possible etiology rotator cuff, adhesive capsulitis, glenohumeral arthritis    1. Medications indicated at this visit: Yes, meloxicam rx sent to pharmacy  2. Injections indicated at this visit: No  3. Physical therapy or home exercise indicated at this visit: Yes: Comment: Rotator  cuff PT ordered with emphasis on HEP  4. Bracing/DME indicated at this visit: No  5. Surgery indicated at this visit: No    Plan for follow up in 6 weeks    When patient can be seen in the office will order x rays and possibly MRI if symptoms persist.    Pursuant to the emergency declaration under the [address] and the [facility] 1135 waiver authority and the [employer] and [employer] Act, this Virtual  Visit was conducted, with patient's consent, to reduce the patient's risk of exposure to COVID-19 and provide care for the patient. This service was provided through [employer]. me, both the patient located at Home and the provider located at [facility] participated. Also, consent for the visit being done via virtual (Telehealth, Telephone, or E-Visit) covers us for HIPPA that the patient consents to continue visit this way. Consent: The patient and/or the patient's healthcare decision maker is aware that this patient-initiated Telehealth encounter is a billable service, with coverage as determined by their insurance carrier. The patient is aware that they may receive a bill and has provided verbal consent to proceed. Please note: This visit was conducted in a virtual environment. Any judgements were made based on the history and virtual visual physical examination, not based on direct patient contact or direct examination.         Electronically signed by: Trae Boss MD

## 2020-04-07 ENCOUNTER — TELEPHONE (OUTPATIENT)
Dept: ORTHOPEDIC SURGERY | Facility: CLINIC | Age: 55
End: 2020-04-07

## 2020-04-07 NOTE — TELEPHONE ENCOUNTER
PA is required for Lidocaine patch    Cover My Meds [place]:  Den Martinez [place]: [id number]    I have attempted to submit this to Cover My Meds but get this error message: \"Patient Inactive\"    I contacted the pharmacy to verify insurance information was correct.     Bin # - [id number]  N # - [id number]  ID # - [id number]  Group # - [employer]

## 2020-04-22 ENCOUNTER — VIRTUAL VISIT (OUTPATIENT)
Dept: ORTHOPEDIC SURGERY | Age: 55
End: 2020-04-22

## 2020-04-22 DIAGNOSIS — M25.512 LEFT SHOULDER PAIN, UNSPECIFIED CHRONICITY: Primary | ICD-10-CM

## 2020-04-22 DIAGNOSIS — M50.30 DDD (DEGENERATIVE DISC DISEASE), CERVICAL: ICD-10-CM

## 2020-04-22 NOTE — PROGRESS NOTES
[facility]  [address], [address]  Phone: [phone]  Fax: [phone]        PROGRESS NOTE    CONSENT:  Pursuant to the emergency declaration under the [employer] and [facility], 1135 waiver authority and the [employer] and [employer] Act, this Virtual Visit was conducted, with patient's consent, to reduce the patient's risk of exposure to COVID-19 and provide continuity of care for an established patient. Services were provided through a video synchronous discussion virtually to substitute for in-person appointment. ENCOUNTER (minutes): 12    HISTORY OF PRESENT ILLNESS:  The patient is a 53 y.o. female and was seen via [employer] TeleVisit at the [facility] office for follow up of neck and upper back pain x 10/2019. Additionally, she endorses pain in the LUE at the shoulder to the elbow since 10/2019  when she slept on her arm funny. She denies h/o of shoulder surgery. She denies pain with reaching behind and above. Her pain is exacerbated with lying on her left side or back. Patient received a cortisone injection at the left shoulder on 2/17/2020 with 75% improvement x 3 days. Patient denies previous spinal or shoulder surgery, injections, or recent physical therapy/chiropractic care. Pt denies dropping things or loss of balance. Pt denies change in bowel or bladder habits.  Pt denies fever, weight loss, or skin changes. Patient denies h/o DM. Note from Dr. Lazaro Lindsey 5/29/18 indicating he obtained thoracic XRs. She was treated with Tramadol.  Note from Dr. Lazaro Lindsey 6/13/18 indicating patient was seen with c/o lumbar spinal stenosis. XRs indicated old compression fractures at T5, T8 and L1.  Note from Alfredo King 03/08/7138 [id number] patient was seen with c/o upper back pain, told she has fractures of the thoracic and lumbar spine after falling coming out of the shower on 12/17/2018. She heard two cracks at the time. Pain is primarily in the thoracic spine. Admits she fractured her thoracic spine 30+ years ago. She is doing better since her fall. No neurological symptoms. T Spine CT dated 6/6/2018 revealed, per report: mild to moderate old compression of T8 vertebral body. Mild old compression of T5 vertebral body. Bones are moderately osteopenic throughout thoracic spine. Evidence of moderate, presumably old compression of L1 vertebral body, as described with a CT scan of lumbar spine. Mild-to-moderate multilevel degenerative disc disease in mid and lower thoracic spine. No obvious focal disc bulge or protrusion identified in thoracic spine. No evidence of compromise of thoracic spinal canal. No significant or hypertrophic osteoarthritic changes involving the costotransverse or costovertebral joints on both sides of thoracic spine. Evidence of moderate to marked degenerative disc disease at C6-C7 level with central disc protrusion and mild-to-moderate central stenosis. Bone Density Study completed on 6/20/2018 revealed: bone mineral density sows evidence of osteopenia. Fracture risk is moderate. T Spine MRI dated 12/18/2018 revealed, per report: mild to moderate, old L1 compression fracture. Multilevel degenerative disc and degenerative facet disease with mild areas of canal stenosis and mild to moderate foraminal narrowing. No evidence of acute fracture. At her last clinical appointment,  I suspected her symptoms were multifactorial in nature with some attributable to spinal pathology and some to shoulder pathology. Based on the 75% improvement x 3 days following shoulder injection, I referred her to Dr. Afua Jovel for evaluation of her shoulder pain. I offered to start her on a round of steroids, pt declined. I referred her to physical therapy with an emphasis on HEP.     At today's video consultation, the patient primarily c/o posterolateral aspect of the left shoulder pain.   She rates her pain 1/10, previously 5/10. Since last visit, she was unable to attend PT secondary to [address]. Additionally, she followed up with Dr. Milena Hall and was provided a referral to PT for her shoulder and meloxicam. She reports good relief with the meloxicam. Her pain is exacerbated with increased activity. Pt denies dropping things or loss of balance. She denies any signs of weakness. Note from Dr. Milena Hall dated 4/1/20 indicating patient was seen via virtual visit for c/o left shoulder pain. Indicated she had pain x 6 months with stiffness. Patient reported pain with overhead activity. She experienced several days relief with cortisone injection. Indicated she had a Dx of left shoulder pain, possible etiology rotator cuff, adhesive capsulitis, glenohumeral arthritis. Provided her meloxicam and recommended PT, which is scheduled to start 4/23/20.  reviewed. There is no height or weight on file to calculate BMI.     PCP: Kevin Wang MD      Past Medical History:   Diagnosis Date    Asthma         Social History     Socioeconomic History    Marital status:      Spouse name: Not on file    Number of children: Not on file    Years of education: Not on file    Highest education level: Not on file   Occupational History    Not on file   Social Needs    Financial resource strain: Not on file    Food insecurity     Worry: Not on file     Inability: Not on file    Transportation needs     Medical: Not on file     Non-medical: Not on file   Tobacco Use    Smoking status: Never Smoker    Smokeless tobacco: Never Used   Substance and Sexual Activity    Alcohol use: No    Drug use: No    Sexual activity: Not on file   Lifestyle    Physical activity     Days per week: Not on file     Minutes per session: Not on file    Stress: Not on file   Relationships    Social connections     Talks on phone: Not on file     Gets together: Not on file     Attends Latter day service: Not on file Active member of club or organization: Not on file     Attends meetings of clubs or organizations: Not on file     Relationship status: Not on file    Intimate partner violence     Fear of current or ex partner: Not on file     Emotionally abused: Not on file     Physically abused: Not on file     Forced sexual activity: Not on file   Other Topics Concern    Not on file   Social History Narrative    Not on file       Current Outpatient Medications   Medication Sig Dispense Refill    meloxicam (MOBIC) 7.5 mg tablet Take 1 Tab by mouth daily. 120 Tab 2    lidocaine (LIDODERM) 5 % 1 Patch by TransDERmal route every twelve (12) hours. Apply patch to the affected area for 12 hours a day and remove for 12 hours a day. 1 Each 2    SYMBICORT 160-4.5 mcg/actuation HFAA INHALE 2 PUFFS PO BID      cyclobenzaprine (FLEXERIL) 10 mg tablet Take 1 Tab by mouth three (3) times daily as needed for Muscle Spasm(s). 30 Tab 0    fexofenadine-pseudoephedrine (ALLEGRA-D 12 HOUR)  mg Tb12 Take 1 Tab by mouth every twelve (12) hours.  OTHER Jacob Rhino Salt Packets  99    azelastine (ASTELIN) 137 mcg (0.1 %) nasal spray INT 2 SPRAYS IEN BID  1    QNASL 80 mcg/actuation HFAA INHALE 1 PUFF IEN BID  5    ergocalciferol (ERGOCALCIFEROL) 50,000 unit capsule TK 1 C PO Q WEEK  0    budesonide (PULMICORT) 0.5 mg/2 mL nbsp 500 mcg by Nebulization route.  OLANZapine-FLUoxetine (SYMBYAX) 3-25 mg per capsule Take 1 Cap by mouth every evening.  esomeprazole (NEXIUM) 20 mg capsule Take 40 mg by mouth two (2) times a day.  escitalopram oxalate (LEXAPRO) 20 mg tablet Take 20 mg by mouth daily.  montelukast (SINGULAIR) 10 mg tablet Take 10 mg by mouth daily.  ergocalciferol (ERGOCALCIFEROL) 50,000 unit capsule TAKE 1 CAPSULE BY MOUTH EVERY 7 DAYS 12 Cap 1    oxaprozin (DAYPRO) 600 mg tablet Take 1 Tab by mouth daily.  (Patient not taking: Reported on 3/2/2020) 90 Tab 2    guaiFENesin ER (MUCINEX) 600 mg ER tablet Take 600 mg by mouth daily.  loratadine (CLARITIN) 10 mg tablet Take 10 mg by mouth two (2) times a day. Allergies   Allergen Reactions    Fluticasone Furoate-Vilanterol Anaphylaxis    Nutritional Supplement-Fiber Anaphylaxis    Sulfa (Sulfonamide Antibiotics) Other (comments) and Shortness of Breath     Other reaction(s): Other (See Comments)  Sinus swells up, wheezing states Pt.  Calcium Other (comments)    Egg Swelling    Gluten Shortness of Breath    Milk Containing Products Other (comments)    Red Dye Swelling          PHYSICAL EXAMINATION  Unable to perform examination secondary to COVID-19. CONSTITUTIONAL: NAD, A&O x 3    ASSESSMENT   Diagnoses and all orders for this visit:    1. Left shoulder pain, unspecified chronicity    2. DDD (degenerative disc disease), cervical      IMPRESSION AND PLAN:  The patient consented to the tele health visit and was aware that there would be a charge. During today's [employer] TeleVisit patient had c/o pain primarily localized to the posterolateral aspect of the left shoulder. Multiple treatment options were discussed, which are limited at this point due to COVID-19. Pt denies any weakness. At this time, she is responding well to meloxicam, and her PT is yet to start. I continue to suspect her symptoms are mostly related to shoulder pathology. I recommend she follow through with her PT as prescribed and continue to follow up with Dr. Cricket Keating as planned. I will see the patient back in 3 month's time or earlier if needed. Written by Cady Nye, as dictated by Samy Gordillo MD  I examined the patient, reviewed and agree with the note.

## 2020-04-23 ENCOUNTER — APPOINTMENT (OUTPATIENT)
Dept: PHYSICAL THERAPY | Age: 55
End: 2020-04-23

## 2020-05-07 ENCOUNTER — HOSPITAL ENCOUNTER (OUTPATIENT)
Dept: PHYSICAL THERAPY | Age: 55
End: 2020-05-07
Payer: COMMERCIAL

## 2020-05-08 ENCOUNTER — APPOINTMENT (OUTPATIENT)
Dept: PHYSICAL THERAPY | Age: 55
End: 2020-05-08
Payer: COMMERCIAL

## 2020-05-13 ENCOUNTER — VIRTUAL VISIT (OUTPATIENT)
Dept: ORTHOPEDIC SURGERY | Age: 55
End: 2020-05-13

## 2020-05-13 DIAGNOSIS — M25.512 CHRONIC LEFT SHOULDER PAIN: Primary | ICD-10-CM

## 2020-05-13 DIAGNOSIS — G89.29 CHRONIC LEFT SHOULDER PAIN: Primary | ICD-10-CM

## 2020-05-13 NOTE — PROGRESS NOTES
[facility]  Telemedicine Visit Note      Bethany Del Castillo is a 53 y.o. female who was seen by synchronous (real-time) audio-video technology on 5/13/2020. HidIn[employer] virtual visit platform was used for this virtual visit. Patient: Bethany Del Castillo                MRN: [id number]       SSN: [id number]  YOB: 1965        AGE: 53 y.o. SEX: female  There is no height or weight on file to calculate BMI. PCP: Ronit Zhou MD  05/13/20    Telehealth Screening Questions  Since your last office visit have you had any    1. New medical diagnoses No  2. Changes in your medications  No  3. Surgical procedures No  4. Changes in your Allergies to medication No  5. What is your pain level today 2/10      CHIEF COMPLAINT[de-identified] Left shoulder follow up    HPI: Bethany Del Castillo is a 53 y.o. female patient who was seen today in a virtual visit. Since her last visit she reports improvement in her left shoulder symptoms with meloxicam and stretching. She has very little pain today and is very happy. She has improved range of motion. She says that she is able to sleep with less pain as well. She does not do her exercises every day but about every other day. Past Medical History:   Diagnosis Date    Asthma        Family History   Family history unknown: Yes   Problem Relation Age of Onset    Family history unknown: Yes    Hypertension Mother     Hypertension Father     Heart Disease Father     Stroke Father        Current Outpatient Medications   Medication Sig Dispense Refill    meloxicam (MOBIC) 7.5 mg tablet Take 1 Tab by mouth daily. 120 Tab 2    lidocaine (LIDODERM) 5 % 1 Patch by TransDERmal route every twelve (12) hours. Apply patch to the affected area for 12 hours a day and remove for 12 hours a day.  1 Each 2    SYMBICORT 160-4.5 mcg/actuation HFAA INHALE 2 PUFFS PO BID      cyclobenzaprine (FLEXERIL) 10 mg tablet Take 1 Tab by mouth three (3) times daily as needed for Muscle Spasm(s). 30 Tab 0    fexofenadine-pseudoephedrine (ALLEGRA-D 12 HOUR)  mg Tb12 Take 1 Tab by mouth every twelve (12) hours.  ergocalciferol (ERGOCALCIFEROL) 50,000 unit capsule TAKE 1 CAPSULE BY MOUTH EVERY 7 DAYS 12 Cap 1    oxaprozin (DAYPRO) 600 mg tablet Take 1 Tab by mouth daily. (Patient not taking: Reported on 3/2/2020) 90 Tab 2    OTHER Jacob Rhino Salt Packets  99    azelastine (ASTELIN) 137 mcg (0.1 %) nasal spray INT 2 SPRAYS IEN BID  1    QNASL 80 mcg/actuation HFAA INHALE 1 PUFF IEN BID  5    ergocalciferol (ERGOCALCIFEROL) 50,000 unit capsule TK 1 C PO Q WEEK  0    guaiFENesin ER (MUCINEX) 600 mg ER tablet Take 600 mg by mouth daily.  budesonide (PULMICORT) 0.5 mg/2 mL nbsp 500 mcg by Nebulization route.  OLANZapine-FLUoxetine (SYMBYAX) 3-25 mg per capsule Take 1 Cap by mouth every evening.  loratadine (CLARITIN) 10 mg tablet Take 10 mg by mouth two (2) times a day.  esomeprazole (NEXIUM) 20 mg capsule Take 40 mg by mouth two (2) times a day.  escitalopram oxalate (LEXAPRO) 20 mg tablet Take 20 mg by mouth daily.  montelukast (SINGULAIR) 10 mg tablet Take 10 mg by mouth daily. Allergies   Allergen Reactions    Fluticasone Furoate-Vilanterol Anaphylaxis    Nutritional Supplement-Fiber Anaphylaxis    Sulfa (Sulfonamide Antibiotics) Other (comments) and Shortness of Breath     Other reaction(s): Other (See Comments)  Sinus swells up, wheezing states Pt.     Calcium Other (comments)    Egg Swelling    Gluten Shortness of Breath    Milk Containing Products Other (comments)    Red Dye Swelling       Past Surgical History:   Procedure Laterality Date    HX HYSTERECTOMY      HX PELVIC LAPAROSCOPY      HX TONSILLECTOMY         Social History     Socioeconomic History    Marital status:      Spouse name: Not on file    Number of children: Not on file    Years of education: Not on file  Highest education level: Not on file   Occupational History    Not on file   Social Needs    Financial resource strain: Not on file    Food insecurity     Worry: Not on file     Inability: Not on file    Transportation needs     Medical: Not on file     Non-medical: Not on file   Tobacco Use    Smoking status: Never Smoker    Smokeless tobacco: Never Used   Substance and Sexual Activity    Alcohol use: No    Drug use: No    Sexual activity: Not on file   Lifestyle    Physical activity     Days per week: Not on file     Minutes per session: Not on file    Stress: Not on file   Relationships    Social connections     Talks on phone: Not on file     Gets together: Not on file     Attends [employer] service: Not on file     Active member of club or organization: Not on file     Attends meetings of clubs or organizations: Not on file     Relationship status: Not on file    Intimate partner violence     Fear of current or ex partner: Not on file     Emotionally abused: Not on file     Physically abused: Not on file     Forced sexual activity: Not on file   Other Topics Concern    Not on file   Social History Narrative    Not on file       REVIEW OF SYSTEMS:      CON: negative for recent weight loss/gain, fever, or chills  EYE: negative for double or blurry vision  ENT: negative for hoarseness  RS:   negative for cough, URI, SOB  CV:  negative for chest pain, palpitations  GI:    negative for blood in stool, nausea/vomiting  :  negative for blood in urine  MS: As per HPI  Other systems reviewed and noted below. PHYSICAL EXAMINATION:  There is no height or weight on file to calculate BMI. GENERAL: Alert and oriented x3, in no acute distress  HEENT: Normocephalic, atraumatic in appearance  RESP: Non labored breathing, able to communicate clearly and without difficulty  Left shoulder examination: She has full range of motion today.   Slight pain with abduction and internal rotation placing her arm behind her head. There is no apparent weaknesses on examination of the rotator cuff today. None of the rotator cuff strength exercises or resistance exercises cause undue pain. Due to this being a TeleHealth evaluation, certain elements of the physical examination are unable to be assessed. ASSESSMENT & PLAN  Diagnosis: left shoulder pain, resolving    1. Medications indicated at this visit: Yes: Comment: Continue Meloxicam as prescribed  2. Injections indicated at this visit: No  3. Physical therapy or home exercise indicated at this visit: No  4. Bracing/DME indicated at this visit: No  5. Advanced Imaging Indicated at this visit: No  6. Surgery indicated at this visit: No    Plan for in office follow up in 4 weeks. Pursuant to the emergency declaration under the [facility], [facility] waiver authority and the [employer] Act, this Virtual  Visit was conducted, with patient's consent, to reduce the patient's risk of exposure to COVID-19 and provide care for the patient. This service was provided through [employer] me, both the patient located at home and the provider located at home participated. Also, consent for the visit being done via virtual (Telehealth, Telephone, or E-Visit) covers us for HIPPA that the patient consents to continue visit this way. Consent: The patient and/or the patient's healthcare decision maker is aware that this patient-initiated Telehealth encounter is a billable service, with coverage as determined by their insurance carrier. The patient is aware that they may receive a bill and has provided verbal consent to proceed. Please note: This visit was conducted in a virtual environment. Any judgements were made based on the history and virtual visual physical examination, not based on direct patient contact or direct examination.     Total time spent on the encounter was over 15 minutes with over half of this time spent during communication with the patient regarding diagnosis and treatment options.         Electronically signed by: Zoran Mcdonald MD

## 2020-05-18 ENCOUNTER — HOSPITAL ENCOUNTER (OUTPATIENT)
Dept: PHYSICAL THERAPY | Age: 55
Discharge: HOME | End: 2020-05-18
Payer: COMMERCIAL

## 2020-05-18 PROCEDURE — 97110 THERAPEUTIC EXERCISES: CPT

## 2020-05-18 PROCEDURE — 97535 SELF CARE MNGMENT TRAINING: CPT

## 2020-05-18 PROCEDURE — 97161 PT EVAL LOW COMPLEX 20 MIN: CPT

## 2020-05-18 NOTE — PROGRESS NOTES
PT DAILY TREATMENT NOTE/CERVICAL [id number]    Patient Name: Gui Fontanez  Date:2020  : 1965  [x]  Patient  Verified  Payor: Rocio Rosenberg / Plan: [facility] [employer]  CAPITATED PT / Product Type: Commerical /    In time:11:16  Out time:11:45  Total Treatment Time (min): 29  Visit #: 1 of [address] [place] [address] Road was informed of the inherent limitations of a virtual visit,  and has consented to a virtual therapy visit on 2020. Information regarding emergency contact information for this patient during this visit is to contact: Daughter (Rosita Garcia) [phone] in addition to calling 911. The patient was informed that at any time during the virtual visit, they can decide to stop the virtual visit. The patient verified that they are physically located in the [facility] for this virtual visit.     Treatment Area: Thoracic back pain [M54.6]  Neck pain [M54.2]  Left shoulder pain [M25.512]    SUBJECTIVE  Pain Level (0-10 scale): 0  []constant [x]intermittent [x]improving []worsening []no change since onset    Any medication changes, allergies to medications, adverse drug reactions, diagnosis change, or new procedure performed?: [x] No    [] Yes (see summary sheet for update)  Subjective functional status/changes:     PLOF: Improved ADL ease and self care prior to   Limitations to PLOF: difficulty sleeping initially, soreness with OH activity  Mechanism of Injury: Unsure- possibly sleeping wrong  Current symptoms/Complaints: Left shoulder soreness with repeated activity  Previous Treatment/Compliance: injection mild improvement  PMHx/Surgical Hx: laparascopy, pelvic, hysterectomy, asthma  Work Hx: from home  Living Situation: n/a  Pt Goals: \"More strength and movement\"  Barriers: [x]pain []financial []time []transportation []other  Motivation: good  Substance use: []Alcohol []Tobacco []other:   FABQ Score: []low []elevate  Cognition: A & O x 4 Other: OBJECTIVE/EXAMINATION  Domestic Life: N/A  Activity/Recreational Limitations: limited activity duration due to difficulty with repeated activity using left UE  Mobility: good  Self Care: Independent       13 min [x]Eval                  []Re-Eval       8 min Therapeutic Exercise:  [] See flow sheet :   Rationale: increase ROM and increase strength to improve the patients ability to improve ease of ADLs and self care    8 min Therapeutic Activity:  []  See flow sheet :   Rationale: self care and pt education  to improve the patients ability to self manage symptoms and maximize therapeutic effect             With   [] TE   [] TA   [] neuro   [] other: Patient Education: [x] Review HEP    [] Progressed/Changed HEP based on:   [] positioning   [] body mechanics   [] transfers   [] heat/ice application    [] other:      Other Objective/Functional Measures:     Physical Therapy Evaluation Cervical Spine     Symptoms  Aggravated by:   [] Bending [] Sitting [] Standing [x] Reaching Overhead   [x] Moving [] Cough [] Sneeze [] Eating   [] AM  [] PM  Lying:  [] sup   [] pro   [x] sidelying   [] Other:     Eased by:    [] Bending [] Sitting [] Standing Lying: [] sup  [] pro  [] sidelying   [] Moving [] AM  [] PM  [] Other:     General Health:  Red Flags Indicated? [] Yes    [x] No  [] Yes [] No Recent weight change (If yes, due to dieting?  [] Yes  [] No)   [] Yes [] No Persistent cough  [] Yes [] No Unremitting pain at night  [] Yes [] No Dizziness  [] Yes [] No Blurred vision  [] Yes [] No Hands more cold or painful in cold weather  [] Yes [] No Ringing in ears  [] Yes [] No Difficulty swallowing  [] Yes [] No Dysfunction of bowel or bladder  [] Yes [] No Recent illness within past 3 weeks (i.e, cold, flu)  [] Yes [] No Jaw pain    Past History/Treatments:    Diagnostic Tests: [] Lab work [] X-rays    [] CT [] MRI     [] Other:  Results:    Functional Status  What position do you sleep in?: sidelying  No longer UE overhead    Headaches: Do you have headaches? [] Yes   [x] No  How often do you get headaches? How long does the headache last?  What aggravates it? What relieves it? Does the headache coincide with any other symptoms (visual disturbances, light sensitivity)? Where is the headache? Does it change locations?   Other:    OBJECTIVE  Posture: [] WNL  Head Position:  Shoulder/Scapular Position:  C-Kyphosis:  [] increased   [] decreased   C-Lordosis:   [] increased   [] decreased  T-Kyphosis:  [x] increased   [] decreased  T-Lordosis:   [] increased   [] decreased     TMJ: [] N/A [] Abnormal - ROM:   Palpation:    Cervical Retraction: [] WNL    [] Abnormal:    Shoulder/Scapular Screen: [] WNL    [x] Abnormal: left UE stiffness limiting proper OH motion and rotation    Active Movements: [] N/A   [] Too acute   [x] Other: WNL aside from rotation   ROM % AROM % PROM Comments:pain, area   Forward flexion  WNL   (-)   Extension WNL   (-)   SB right 75%   (-)   SB left  75%   (-)   Rotation right 75%   sore left UT   Rotation left 75%   sore left UT     Thoracic Spine: [] N/A    [] WNL   [x] Other: slightly kyphotic    PROM:    Palpation:  [x] Min  [] Mod  [] Severe    Location: levator scap/ supraspinatus region  [] Min  [] Mod  [] Severe    Location:  [] Min  [] Mod  [] Severe    Location:    Neuro Screen (myotome/dematome/felexes): [] WNL  Myotome Level Muscle Test Myotome Level Muscle Test   C5 Shoulder Adduction - Deltoid C8 Finger Flexors   C6 Wrist Extension T1 Finger Abduction - Interossei   C7 Elbow Extension     Comments:  Upper Limb Tension Tests: [] N/A       Ulnar: [] R    [] L    [] +    [] -       Median: [] R    [] L    [] +    [] -       Radial: [] R    [] L    [] +    [] -    Special Tests:  Cervical:        Vertebral Artery:  [] R    [] L    [] +    [] -       Alar Ligament: [] R    [] L    [] +    [] -       Transverse Lig: [] R    [] L    [] +    [] -       Spurling's:  [] R    [x] L    [] +    [x] - (self administered)       Distraction:  [] R    [] L    [] +    [] -       Compression: [] R    [] L    [] +    [] -    Thoracic Outlet Tests: [] N/A       Adson's:  [] R    [] L    [] +    [] -       Hyperabduction: [] R    [] L    [] +    [] -       Philip's:  [] R    [] L    [] +    [] -       Don:  [] R    [] L    [] +    [] -    Diaphragmatic Breathing: [] Normal    [] Abnormal    Muscle Flexibility: [] N/A   Scalenes: [] WNL    [] Tight    [] R    [] L   Upper Trap: [] WNL    [x] Tight    [] R    [x] L   Levator: [] WNL    [] Tight    [] R    [x] L   Pect. Minor: [] WNL    [] Tight    [] R    [] L    Global Muscular Weakness: [] N/A   Lower Trap:   Rhomboids:   Middle Trap:   Serratus Ant:   Ext Rotators: Other:    Other tests/comments: Quick DASH 37    Pain Level (0-10 scale) post treatment: 1    ASSESSMENT/Changes in Function: see POC    Patient will continue to benefit from skilled PT services to modify and progress therapeutic interventions, address functional mobility deficits, address ROM deficits, address strength deficits, analyze and address soft tissue restrictions, analyze and cue movement patterns and analyze and modify body mechanics/ergonomics to attain remaining goals. [x]  See Plan of Care  []  See progress note/recertification  []  See Discharge Summary         Progress towards goals / Updated goals:  Short Term Goals: To be accomplished in 2 weeks:  1. The pt will demonstrate I and compliance with HEP to promote self management of symptoms and maximize therapeutic effect. IE: HEP issued  2. Pt will demonstrate cervical rotation to 75% of WNL B without onset of left cervical discomfort. IE: left discomfort with left > right rotation  Long Term Goals: To be accomplished in 4 weeks:  1. Pt will demonstrate left shoulder functional ER and IR equal to right without sensation of increased tightness to improve self care ease. IE: limited left MARY and FIR with tight sensation  2.  Pt will report ability to perform more than 30 minutes of housework or ADLs without increase in left shoulder pain to improve quality of life. IE: pt reports pain with repetitive activity  3. Pt will improve quickDASH score to 15 or better to demonstrate improved quality of life and function. IE: 37  4. Pt will perform 10 full cans with 2# weight void of pain to improve RTC strength with OH activity.     IE: no resistive work performed yet    PLAN  []  Upgrade activities as tolerated     [x]  Continue plan of care  []  Update interventions per flow sheet       []  Discharge due to:_  []  Other:_      Peters Officer ABENA BUNDY 5/18/2020  11:08 AM

## 2020-05-18 NOTE — PROGRESS NOTES
[facility]  [address]  [place], [address]  [phone] fax    Plan of Care/ Statement of Necessity for Physical Therapy Services    Patient name: Harsh Cole Start of Care: 2020   Referral source: Ravi Hernandez MD : 1965    Medical Diagnosis: Thoracic back pain [M54.6]  Neck pain [M54.2]  Left shoulder pain [M25.512]  Payor: Naila Hawkins / Plan: [address] PT / Product Type: Commerical /  Onset Date: 2019    Treatment Diagnosis: Left shoulder pain   Prior Hospitalization: see medical history Provider#: [id number]   Medications: Verified on Patient summary List    Comorbidities: Asthma,    Prior Level of Function: Pt with increased left UE ROM and ease of ADLs      The Plan of Care and following information is based on the information from the initial evaluation. Assessment/ key information: Pt is a 52 y/o F presenting with c/o left shoulder pain and stiffness since 2019. Pt reports she is unsure of mechanism but was caring for a relative and neglected treatment at time of onset. She did have an injection with fair results and reports Meloxicam has significantly improved her symptoms. She reports she also adjusted her sleeping to reduce left UE OH position which has helped. At this time she reports mostly soreness with repetitive use of left UE and some tightness with shoulder ROM. She does have some cervical DDD per MD note but he also believes the shoulder is more involved vs cervical spine. The pt demonstrates near [facility] for all motions aside from MARY and FIR on left. Pt reports tightness more than pain with motion at this time. Negative cervical screening based off of pt self positioning. Some levator scap region discomfort noted with cervical rotation.  Pt signs and symptoms appear consistent with mechanical shoulder pain with onset of myofascial lower cervical symptoms as part of compensatory motion. She would benefit from PT to improve ROM, strength, pain and ADL ease to return to PLOF. Evaluation Complexity History MEDIUM  Complexity : 1-2 comorbidities / personal factors will impact the outcome/ POC ; Examination LOW Complexity : 1-2 Standardized tests and measures addressing body structure, function, activity limitation and / or participation in recreation  ;Presentation LOW Complexity : Stable, uncomplicated  ;Clinical Decision Making Other outcome measures Quick DASH  MEDIUM  Overall Complexity Rating: LOW   Problem List: pain affecting function, decrease ROM, decrease strength, decrease ADL/ functional abilitiies, decrease activity tolerance and decrease flexibility/ joint mobility   Treatment Plan may include any combination of the following: Therapeutic exercise, Therapeutic activities, Neuromuscular re-education, Patient education, Self Care training and Functional mobility training  Patient / Family readiness to learn indicated by: asking questions and trying to perform skills  Persons(s) to be included in education: patient (P)  Barriers to Learning/Limitations: None  Patient Goal (s): More strength and movement  Patient Self Reported Health Status: not reported  Rehabilitation Potential: good    Short Term Goals: To be accomplished in 2 weeks:  1. The pt will demonstrate I and compliance with HEP to promote self management of symptoms and maximize therapeutic effect. 2. Pt will demonstrate cervical rotation to 75% of WNL B without onset of left cervical discomfort. Long Term Goals: To be accomplished in 4 weeks:  1. Pt will demonstrate left shoulder functional ER and IR equal to right without sensation of increased tightness to improve self care ease. 2. Pt will report ability to perform more than 30 minutes of housework or ADLs without increase in left shoulder pain to improve quality of life.   3. Pt will improve quickDASH score to 15 or better to demonstrate improved quality of life and function. 4. Pt will perform 10 full cans with 2# weight void of pain to improve RTC strength with OH activity. Frequency / Duration: Patient to be seen 1 times per week for 4 weeks. Patient/ Caregiver education and instruction: Diagnosis, prognosis, self care and exercises   [x]  Plan of care has been reviewed with [facility]    Peters Officer ABENA BUNDY 5/18/2020 12:30 PM    ________________________________________________________________________    I certify that the above Therapy Services are being furnished while the patient is under my care. I agree with the treatment plan and certify that this therapy is necessary.     Physician's Signature:____________Date:_________TIME:________    ** Signature, Date and Time must be completed for valid certification **    Please sign and return to [facility] [address]  [place], [facility] [address]  [phone] fax

## 2020-05-21 ENCOUNTER — APPOINTMENT (OUTPATIENT)
Dept: PHYSICAL THERAPY | Age: 55
End: 2020-05-21
Payer: COMMERCIAL

## 2020-05-28 ENCOUNTER — APPOINTMENT (OUTPATIENT)
Dept: PHYSICAL THERAPY | Age: 55
End: 2020-05-28
Payer: COMMERCIAL

## 2020-06-11 ENCOUNTER — HOSPITAL ENCOUNTER (OUTPATIENT)
Dept: PHYSICAL THERAPY | Age: 55
Discharge: HOME | End: 2020-06-11
Payer: COMMERCIAL

## 2020-06-11 PROCEDURE — 97110 THERAPEUTIC EXERCISES: CPT

## 2020-06-11 NOTE — PROGRESS NOTES
PT DAILY TREATMENT NOTE 10-18    Patient Name: Harsh Cole  Date:2020  : 1965  [x]  Patient  Verified  Payor: Naila Hawkins / Plan: [address] PT / Product Type: Commerical /    In time:3:28  Out time:3:41  Total Treatment Time (min): 13  Visit #: 2 of [address] [place] [address] was informed of the inherent limitations of a virtual visit,  and has consented to a virtual therapy visit on 2020. Information regarding emergency contact information for this patient during this visit is to contact:  Daughter (Kalpana Loomis) [phone] in addition to calling 911. The patient was informed that at any time during the virtual visit, they can decide to stop the virtual visit. The patient verified that they are physically located in the [address] for this virtual visit. Treatment Area: Thoracic back pain [M54.6]  Neck pain [M54.2]  Left shoulder pain [M25.512]    SUBJECTIVE  Pain Level (0-10 scale): 0  Any medication changes, allergies to medications, adverse drug reactions, diagnosis change, or new procedure performed?: [x] No    [] Yes (see summary sheet for update)  Subjective functional status/changes:   [] No changes reported  The pt reports she has been partially compliant with HEP and reports her shoulder has been doing very well     OBJECTIVE      13 min Therapeutic Exercise:  [] See flow sheet : reassessment and pt discussion   Rationale: goal reassessment to improve the patients ability to self manage symptoms without PT          With   [] TE   [] TA   [] neuro   [] other: Patient Education: [x] Review HEP    [] Progressed/Changed HEP based on:   [] positioning   [] body mechanics   [] transfers   [] heat/ice application    [] other:      Other Objective/Functional Measures:      Pain Level (0-10 scale) post treatment: 0    ASSESSMENT/Changes in Function: The pt reports nearly abolished shoulder pain since initial evaluation.  She does report partial HEP compliance which has been helping. The pt has met all LTG's at this time. Opted for 2 week hold of PT in case of regression but plan to f/u with MD on 7/22 and continue with HEP     []  See Plan of Care  []  See progress note/recertification  []  See Discharge Summary         Progress towards goals / Updated goals:  Short Term Goals: To be accomplished in 2 weeks:  1. The pt will demonstrate I and compliance with HEP to promote self management of symptoms and maximize therapeutic effect. IE: HEP issued   Current: partial compliance 6/11/2020  2. Pt will demonstrate cervical rotation to 75% of WNL B without onset of left cervical discomfort. IE: left discomfort with left > right rotation   Current: Met- no cervical discomfort with symmetrical rotation 6/11/2020  Long Term Goals: To be accomplished in 4 weeks:  1. Pt will demonstrate left shoulder functional ER and IR equal to right without sensation of increased tightness to improve self care ease. IE: limited left MARY and FIR with tight sensation   Current: met- mild soreness at left levator scap region with MARY 6/11/2020  2. Pt will report ability to perform more than 30 minutes of housework or ADLs without increase in left shoulder pain to improve quality of life. IE: pt reports pain with repetitive activity   Current: Met- pt reports able to perform yardwork the last two weeks without pain 6/11/2020  3. Pt will improve quickDASH score to 15 or better to demonstrate improved quality of life and function. IE: 37   Current: Met 3 6/11/2020  4. Pt will perform 10 full cans with 2# weight void of pain to improve RTC strength with OH activity.                IE: no resistive work performed yet   Current: Met- pt able to lift 2# box 10 times OH without issue 6/11/2020    PLAN  []  Upgrade activities as tolerated     []  Continue plan of care  []  Update interventions per flow sheet       []  Discharge due to:_  [x] Other: PT hold for 2 weeks     Shayy Bertrand DPT, CMTPT 6/11/2020  3:31 PM    Future Appointments   Date Time Provider Alpesh Romo   7/22/2020  8:35 AM MD Isma Ferreira Mateo

## 2020-06-30 NOTE — PROGRESS NOTES
[facility]  [address]  [place], [address]  [phone] fax    Physical Therapy Discharge Summary  Patient name: Fatoumata Lawton Start of Care: 2020   Referral source: Terell Ibarra MD : 1965                Medical Diagnosis: Thoracic back pain [M54.6]  Neck pain [M54.2]  Left shoulder pain [M25.512]  Payor: Kike Bernal / Plan: [address] PT / Product Type: Commerical /  Onset Date: 2019                Treatment Diagnosis: Left shoulder pain   Prior Hospitalization: see medical history Provider#: [id number]   Medications: Verified on Patient summary List    Comorbidities: Asthma,    Prior Level of Function: Pt with increased left UE ROM and ease of ADLs   Visits from Start of Care: 2    Missed Visits: 2  Reporting Period : 20 to 20      Summary of Care: Pt has completed 2 skilled PT interventions to address L shoulder and neck pain. At last session on 20,  pt wanted to trial a 2 week hold as she was doing much better and had met all LTG. Pt is planning to follow up with MD on . Discharge recommendations are to complete HEP I and follow-up with physician as needed. Goal:The pt will demonstrate I and compliance with HEP to promote self management of symptoms and maximize therapeutic effect. Status at last note/certification: partial compliance  Status at discharge: met    Goal: Pt will demonstrate cervical rotation to 75% of WNL B without onset of left cervical discomfort. Status at last note/certification: no cervical discomfort with symmetrical rotation  Status at discharge: met    Goal: Pt will demonstrate left shoulder functional ER and IR equal to right without sensation of increased tightness to improve self care ease.   Status at last note/certification: mild soreness at left levator scap region with MARY   Status at discharge: met    Goal: Pt will report ability to perform more than 30 minutes of housework or ADLs without increase in left shoulder pain to improve quality of life. Status at last note/certification: pt reports able to perform yardwork the last two weeks without pain  Status at discharge: met     Goal: Pt will improve quickDASH score to 15 or better to demonstrate improved quality of life and function.   Status at last note/certification: 3  Status at discharge: met    Goal: Pt will perform 10 full cans with 2# weight void of pain to improve RTC strength with OH activity.   Status at last note/certification: pt able to lift 2# box 10 times OH without issue  Status at discharge: met    ASSESSMENT/RECOMMENDATIONS:  [x]Discontinue therapy: [x]Patient has reached or is progressing toward set goals      []Patient is non-compliant or has abdicated      []Due to lack of appreciable progress towards set goals    Melecio Cunha 6/30/2020 8:19 AM

## 2020-07-28 ENCOUNTER — OFFICE VISIT (OUTPATIENT)
Dept: ORTHOPEDIC SURGERY | Age: 55
End: 2020-07-28

## 2020-07-28 VITALS
HEIGHT: 63 IN | TEMPERATURE: 98.2 F | SYSTOLIC BLOOD PRESSURE: 139 MMHG | DIASTOLIC BLOOD PRESSURE: 65 MMHG | HEART RATE: 61 BPM | OXYGEN SATURATION: 97 % | BODY MASS INDEX: 39.83 KG/M2 | WEIGHT: 224.8 LBS

## 2020-07-28 DIAGNOSIS — M25.541 METACARPOPHALANGEAL JOINT PAIN, RIGHT: ICD-10-CM

## 2020-07-28 DIAGNOSIS — M25.512 LEFT SHOULDER PAIN, UNSPECIFIED CHRONICITY: Primary | ICD-10-CM

## 2020-07-28 DIAGNOSIS — M50.30 DDD (DEGENERATIVE DISC DISEASE), CERVICAL: ICD-10-CM

## 2020-07-28 NOTE — LETTER
7/28/20 Patient: Olga Napoles YOB: 1965 Date of Visit: 7/28/2020 Elizabeth Bolton MD 
[address] [place] [address] VIA Facsimile: [phone] Dear Elizabeth Bolton MD, Thank you for referring Ms. Quiana Luther to [address] for evaluation. My notes for this consultation are attached. If you have questions, please do not hesitate to call me. I look forward to following your patient along with you. Sincerely, Irvin Skiff, MD

## 2020-07-28 NOTE — PROGRESS NOTES
[facility]  [address] [facility]  [address], [address]   Phone: [phone]  Fax: [phone]        PROGRESS NOTE      HISTORY OF PRESENT ILLNESS:  The patient is a 53 y.o. female and was seen today for follow up of pain in the  posterolateral aspect of the left shoulder pain. Previously, she was seen for neck and upper back pain x 10/2019. Additionally, she endorses pain in the LUE at the shoulder to the elbow since 10/2019  when she slept on her arm funny. She denies h/o of shoulder surgery. She denies pain with reaching behind and above. Her pain is exacerbated with lying on her left side or back. Patient received a cortisone injection at the left shoulder on 2/17/2020 with 75% improvement x 3 days. Patient denies previous spinal or shoulder surgery, injections, or recent physical therapy/chiropractic care. Pt denies dropping things or loss of balance. Pt denies change in bowel or bladder habits.  Pt denies fever, weight loss, or skin changes. Patient denies h/o DM. Note from Dr. Arpan Guillory 5/29/18 indicating he obtained thoracic XRs. She was treated with Tramadol.  Note from Dr. Arpan Guillory 6/13/18 indicating patient was seen with c/o lumbar spinal stenosis. XRs indicated old compression fractures at T5, T8 and L1. Note from Alfredo Manley 46/03/5530 [id number] patient was seen with c/o upper back pain, told she has fractures of the thoracic and lumbar spine after falling coming out of the shower on 12/17/2018. She heard two cracks at the time. Pain is primarily in the thoracic spine. Admits she fractured her thoracic spine 30+ years ago. She is doing better since her fall. No neurological symptoms. Note from Dr. Fatuma Solorzano dated 4/1/20 indicating patient was seen via virtual visit for c/o left shoulder pain. Indicated she had pain x 6 months with stiffness. Patient reported pain with overhead activity. She experienced several days relief with cortisone injection. Indicated she had a Dx of left shoulder pain, possible etiology rotator cuff, adhesive capsulitis, glenohumeral arthritis. Provided her meloxicam and recommended PT, which is scheduled to start 4/23/20. T Spine CT dated 6/6/2018 revealed, per report: mild to moderate old compression of T8 vertebral body. Mild old compression of T5 vertebral body. Bones are moderately osteopenic throughout thoracic spine. Evidence of moderate, presumably old compression of L1 vertebral body, as described with a CT scan of lumbar spine. Mild-to-moderate multilevel degenerative disc disease in mid and lower thoracic spine. No obvious focal disc bulge or protrusion identified in thoracic spine. No evidence of compromise of thoracic spinal canal. No significant or hypertrophic osteoarthritic changes involving the costotransverse or costovertebral joints on both sides of thoracic spine. Evidence of moderate to marked degenerative disc disease at C6-C7 level with central disc protrusion and mild-to-moderate central stenosis. Bone Density Study completed on 6/20/2018 revealed: bone mineral density sows evidence of osteopenia. Fracture risk is moderate. T Spine MRI dated 12/18/2018 revealed, per report: mild to moderate, old L1 compression fracture. Multilevel degenerative disc and degenerative facet disease with mild areas of canal stenosis and mild to moderate foraminal narrowing. No evidence of acute fracture. At her last clinical appointment, she was responding well to meloxicam, and her PT was yet to start. I continued to suspect her symptoms were mostly related to shoulder pathology. I recommended she follow through with her PT as prescribed and continue to follow up with Dr. Johann Trammell as planned.      The patient returns today with pain in the right shoulder. She rates her pain 2/10, previously 1/10. She continues on Meloxicam with benefit. Therapy notes reviewed. Pt is noncompliant with her HEP.  Pt denies dropping things or loss of balance. Note from Dr. Erika Pennington dated 5/13/2020 indicating patient had improvement of left shoulder pain and improvement in ROM. She should continue on Meloxicam. F/u in 4 weeks.  reviewed. Body mass index is 39.82 kg/m². PCP: Brandon Sarah MD      Past Medical History:   Diagnosis Date    Asthma         Social History     Socioeconomic History    Marital status:      Spouse name: Not on file    Number of children: Not on file    Years of education: Not on file    Highest education level: Not on file   Occupational History    Not on file   Social Needs    Financial resource strain: Not on file    Food insecurity     Worry: Not on file     Inability: Not on file    Transportation needs     Medical: Not on file     Non-medical: Not on file   Tobacco Use    Smoking status: Never Smoker    Smokeless tobacco: Never Used   Substance and Sexual Activity    Alcohol use: No    Drug use: No    Sexual activity: Not on file   Lifestyle    Physical activity     Days per week: Not on file     Minutes per session: Not on file    Stress: Not on file   Relationships    Social connections     Talks on phone: Not on file     Gets together: Not on file     Attends Mosque service: Not on file     Active member of club or organization: Not on file     Attends meetings of clubs or organizations: Not on file     Relationship status: Not on file    Intimate partner violence     Fear of current or ex partner: Not on file     Emotionally abused: Not on file     Physically abused: Not on file     Forced sexual activity: Not on file   Other Topics Concern    Not on file   Social History Narrative    Not on file       Current Outpatient Medications   Medication Sig Dispense Refill    lidocaine (LIDODERM) 5 % 1 Patch by TransDERmal route every twelve (12) hours. Apply patch to the affected area for 12 hours a day and remove for 12 hours a day.  1 Each 2    SYMBICORT 160-4.5 mcg/actuation HFAA INHALE 2 PUFFS PO BID      cyclobenzaprine (FLEXERIL) 10 mg tablet Take 1 Tab by mouth three (3) times daily as needed for Muscle Spasm(s). 30 Tab 0    ergocalciferol (ERGOCALCIFEROL) 50,000 unit capsule TAKE 1 CAPSULE BY MOUTH EVERY 7 DAYS 12 Cap 1    ergocalciferol (ERGOCALCIFEROL) 50,000 unit capsule TK 1 C PO Q WEEK  0    budesonide (PULMICORT) 0.5 mg/2 mL nbsp 500 mcg by Nebulization route.  esomeprazole (NEXIUM) 20 mg capsule Take 40 mg by mouth two (2) times a day.  escitalopram oxalate (LEXAPRO) 20 mg tablet Take 20 mg by mouth daily.  montelukast (SINGULAIR) 10 mg tablet Take 10 mg by mouth daily.  meloxicam (MOBIC) 7.5 mg tablet Take 1 Tab by mouth daily. (Patient not taking: Reported on 7/28/2020) 120 Tab 2    fexofenadine-pseudoephedrine (ALLEGRA-D 12 HOUR)  mg Tb12 Take 1 Tab by mouth every twelve (12) hours.  oxaprozin (DAYPRO) 600 mg tablet Take 1 Tab by mouth daily. (Patient not taking: Reported on 3/2/2020) 90 Tab 2    OTHER Jacob Rhino Salt Packets  99    azelastine (ASTELIN) 137 mcg (0.1 %) nasal spray INT 2 SPRAYS IEN BID  1    QNASL 80 mcg/actuation HFAA INHALE 1 PUFF IEN BID  5    guaiFENesin ER (MUCINEX) 600 mg ER tablet Take 600 mg by mouth daily.  OLANZapine-FLUoxetine (SYMBYAX) 3-25 mg per capsule Take 1 Cap by mouth every evening.  loratadine (CLARITIN) 10 mg tablet Take 10 mg by mouth two (2) times a day. Allergies   Allergen Reactions    Fluticasone Furoate-Vilanterol Anaphylaxis    Nutritional Supplement-Fiber Anaphylaxis    Sulfa (Sulfonamide Antibiotics) Other (comments) and Shortness of Breath     Other reaction(s): Other (See Comments)  Sinus swells up, wheezing states Pt.     Calcium Other (comments)    Egg Swelling    Gluten Shortness of Breath    Milk Containing Products Other (comments)    Red Dye Swelling          PHYSICAL EXAMINATION    Visit Vitals  /65 (BP 1 Location: Left arm, BP Patient Position: Sitting)   Pulse 61   Temp 98.2 °F (36.8 °C) (Temporal)   Ht 5' 3\" (1.6 m)   Wt 224 lb 12.8 oz (102 kg)   SpO2 97%   BMI 39.82 kg/m²       CONSTITUTIONAL: NAD, A&O x 3  SENSATION: Intact to light touch throughout  NEURO: Yumiko's is negative bilaterally. RANGE OF MOTION: The patient has full passive range of motion in all four extremities. MOTOR:       Shoulder AB/Flex Elbow Flex Wrist Ext Elbow Ext Wrist Flex Hand Intrin Tone   Right +4/5 +4/5 +4/5 +4/5 +4/5 +4/5 +4/5   Left +4/5 +4/5 +4/5 +4/5 +4/5 +4/5 +4/5                 ASSESSMENT   Diagnoses and all orders for this visit:    1. Left shoulder pain, unspecified chronicity  -     REFERRAL TO ORTHOPEDICS    2. DDD (degenerative disc disease), cervical  -     REFERRAL TO ORTHOPEDICS    3. Metacarpophalangeal joint pain, right  -     REFERRAL TO ORTHOPEDICS      IMPRESSION AND PLAN:  Patient returns to the office today with c/o pain in the right shoulder. Multiple treatment options were discussed. I recommended she increase the frequency of HEP to daily. Pt failed to f/u with Dr. Annemarie Chaudhary after her appointment on 5/13/2020. I scheduled her a f/u with Dr. Annemarie Chaudhary for her shoulder pain. Pt also has chronic pain in the first metacarpal phalangeal joint in the RUE. I referred her to Dr. Isma Espinoza. Patient is neurologically intact. I will see the patient back in 6 week's time or earlier if needed. Written by Akil Sanders, as dictated by Khushboo Howard MD  I examined the patient, reviewed and agree with the note.

## 2020-08-07 ENCOUNTER — OFFICE VISIT (OUTPATIENT)
Dept: ORTHOPEDIC SURGERY | Age: 55
End: 2020-08-07

## 2020-08-07 VITALS
SYSTOLIC BLOOD PRESSURE: 137 MMHG | TEMPERATURE: 98.7 F | HEIGHT: 63 IN | WEIGHT: 223.8 LBS | RESPIRATION RATE: 16 BRPM | OXYGEN SATURATION: 98 % | HEART RATE: 60 BPM | DIASTOLIC BLOOD PRESSURE: 77 MMHG | BODY MASS INDEX: 39.65 KG/M2

## 2020-08-07 DIAGNOSIS — G56.03 CARPAL TUNNEL SYNDROME, BILATERAL: ICD-10-CM

## 2020-08-07 DIAGNOSIS — M19.031 ARTHRITIS OF SCAPHOID-TRAPEZIUM-TRAPEZOID JOINT OF RIGHT HAND: ICD-10-CM

## 2020-08-07 DIAGNOSIS — M18.11 PRIMARY OSTEOARTHRITIS OF FIRST CARPOMETACARPAL JOINT OF RIGHT HAND: Primary | ICD-10-CM

## 2020-08-07 NOTE — PROGRESS NOTES
Gela Gamino is a 53 y.o. female right handed . Worker's Compensation and legal considerations: none filed. Vitals:    08/07/20 0802   BP: 137/77   Pulse: 60   Resp: 16   Temp: 98.7 °F (37.1 °C)   TempSrc: Skin   SpO2: 98%   Weight: 223 lb 12.8 oz (101.5 kg)   Height: 5' 3\" (1.6 m)   PainSc:   5   PainLoc: Hand           Chief Complaint   Patient presents with    Hand Pain     right         HPI: Patient comes in today with complaints of right thumb base pain. She also has a history of bilateral hand numbness and tingling especially at night. She says she was previously diagnosed with carpal tunnel in the past.    Date of onset: Chronic    Injury: No    Prior Treatment:  No    Numbness/ Tingling: Yes: Comment: Bilateral hands right worse than left      ROS: Review of Systems - General ROS: negative  Psychological ROS: negative  ENT ROS: negative  Allergy and Immunology ROS: negative  Hematological and Lymphatic ROS: negative  Respiratory ROS: no cough, shortness of breath, or wheezing  Cardiovascular ROS: no chest pain or dyspnea on exertion  Gastrointestinal ROS: no abdominal pain, change in bowel habits, or black or bloody stools  Musculoskeletal ROS: positive for - pain in thumb - right and swelling in thumb - right  Neurological ROS: positive for - numbness/tingling  Dermatological ROS: negative    Past Medical History:   Diagnosis Date    Asthma        Past Surgical History:   Procedure Laterality Date    HX HYSTERECTOMY      HX PELVIC LAPAROSCOPY      HX TONSILLECTOMY         Current Outpatient Medications   Medication Sig Dispense Refill    triamcinolone acetonide (KENALOG) 10 mg/mL injection 1 mL by Intra artICUlar route once for 1 dose. 1 Vial 0    meloxicam (MOBIC) 7.5 mg tablet Take 1 Tab by mouth daily. 120 Tab 2    lidocaine (LIDODERM) 5 % 1 Patch by TransDERmal route every twelve (12) hours.  Apply patch to the affected area for 12 hours a day and remove for 12 hours a day. 1 Each 2    SYMBICORT 160-4.5 mcg/actuation HFAA INHALE 2 PUFFS PO BID      cyclobenzaprine (FLEXERIL) 10 mg tablet Take 1 Tab by mouth three (3) times daily as needed for Muscle Spasm(s). 30 Tab 0    ergocalciferol (ERGOCALCIFEROL) 50,000 unit capsule TAKE 1 CAPSULE BY MOUTH EVERY 7 DAYS 12 Cap 1    OTHER Jacob Rhino Salt Packets  99    azelastine (ASTELIN) 137 mcg (0.1 %) nasal spray INT 2 SPRAYS IEN BID  1    QNASL 80 mcg/actuation HFAA INHALE 1 PUFF IEN BID  5    ergocalciferol (ERGOCALCIFEROL) 50,000 unit capsule TK 1 C PO Q WEEK  0    budesonide (PULMICORT) 0.5 mg/2 mL nbsp 500 mcg by Nebulization route.  OLANZapine-FLUoxetine (SYMBYAX) 3-25 mg per capsule Take 1 Cap by mouth every evening.  esomeprazole (NEXIUM) 20 mg capsule Take 40 mg by mouth two (2) times a day.  escitalopram oxalate (LEXAPRO) 20 mg tablet Take 20 mg by mouth daily.  montelukast (SINGULAIR) 10 mg tablet Take 10 mg by mouth daily.  fexofenadine-pseudoephedrine (ALLEGRA-D 12 HOUR)  mg Tb12 Take 1 Tab by mouth every twelve (12) hours.  oxaprozin (DAYPRO) 600 mg tablet Take 1 Tab by mouth daily. (Patient not taking: Reported on 3/2/2020) 90 Tab 2    guaiFENesin ER (MUCINEX) 600 mg ER tablet Take 600 mg by mouth daily.  loratadine (CLARITIN) 10 mg tablet Take 10 mg by mouth two (2) times a day. Allergies   Allergen Reactions    Fluticasone Furoate-Vilanterol Anaphylaxis    Nutritional Supplement-Fiber Anaphylaxis    Sulfa (Sulfonamide Antibiotics) Other (comments) and Shortness of Breath     Other reaction(s): Other (See Comments)  Sinus swells up, wheezing states Pt.  Calcium Other (comments)    Egg Swelling    Gluten Shortness of Breath    Milk Containing Products Other (comments)    Red Dye Swelling           PE:     Physical Exam  Vitals signs and nursing note reviewed. Constitutional:       General: She is not in acute distress.      Appearance: Normal appearance. She is not ill-appearing. Eyes:      Pupils: Pupils are equal, round, and reactive to light. Neck:      Musculoskeletal: Normal range of motion. Cardiovascular:      Pulses: Normal pulses. Pulmonary:      Effort: Pulmonary effort is normal. No respiratory distress. Abdominal:      General: Abdomen is flat. Musculoskeletal: Normal range of motion. General: Swelling and tenderness present. No deformity or signs of injury. Right lower leg: No edema. Left lower leg: No edema. Skin:     General: Skin is warm and dry. Capillary Refill: Capillary refill takes less than 2 seconds. Findings: No bruising or erythema. Neurological:      General: No focal deficit present. Mental Status: She is alert and oriented to person, place, and time. Cranial Nerves: No cranial nerve deficit. Sensory: No sensory deficit. Psychiatric:         Mood and Affect: Mood normal.         Behavior: Behavior normal.            Hand:    Examination L Digit(s) R Digit(s)   1st CMC Tenderness -  +    1st CMC Grind -  +    Elder Nodes -  -    Heberden Nodes -  -    A1 Pulley Tenderness -  -    Triggering -  -    UCL Instability -  -    RCL Instability -  -    Lateral Stress Pain -  -    Palmar Cords -  -    Tabletop test -  -    Garrod's Pads -  -     Strength       Pinch Strength         ROM: Full      Wrist:    Tenderness L R Test L R   1st Ext Comp - - Finkelstein's - -   Snuff Box - - Clark - -   2nd Ext Comp - - S-L Shear - -   S-L Joint - - L-T Shear - -   L-T Joint - - DRUJ Sup - -   6th Ext Comp - - DRUJ Pro - -   Ulnar Snuff - - DRUJ Grind - -   Fovea - - TFCC - -   STT Joint - + Mid-Carp Inst - -   FCR - - P-T Grind - -   Intersection - - ECU Sublux. - -      Dorsal Ganglion: -   Volar Ganglion: -      ROM: Full      NEUROVASCULAR    Examination L R Examination L R   Carpal Comp. + + Pronator Comp.  - -   Carpal Tinel + + Pronator Tinel - -   Phalen's + + Pronator Stress - -   Cubital Comp. - - Guyon Comp. - -   Cubital Tinel - - Guyon Tinel - -   Elbow Hyperflexion - - Adson's - -   Spurling's - - SC Comp. - -   PCB Median abn - - SC Tinel - -   Radial Tinel - - IC Comp. - -   Digital Tinel - - IC Tinel - -   Radial 2-Pt WNL WNL Ulnar 2-Pt WNL WNL     Radial Pulse: 2+  Capillary Refill: < 2 sec  Philip: Not Performed  Digital Philip: Not Performed      Imagin/7/2020 plain films of right wrist are positive for degenerative changes about the STT and CMC joints of the thumb. These are consistent with Jamar Millard stage 3. ICD-10-CM ICD-9-CM    1. Primary osteoarthritis of first carpometacarpal joint of right hand  M18.11 715.14 AMB SUPPLY ORDER      TRIAMCINOLONE ACETONIDE INJ      triamcinolone acetonide (KENALOG) 10 mg/mL injection      DRAIN/INJECT SMALL JOINT/BURSA   2. Arthritis of dnuhjlrz-zuaocztck-imjcgiuwf joint of right hand  M19.031 716.93 AMB POC XRAY, WRIST; COMPLETE, 3+ VIE      AMB SUPPLY ORDER      TRIAMCINOLONE ACETONIDE INJ      triamcinolone acetonide (KENALOG) 10 mg/mL injection      DRAIN/INJECT SMALL JOINT/BURSA   3. Carpal tunnel syndrome, bilateral  G56.03 354.0 AMB SUPPLY ORDER      TRIAMCINOLONE ACETONIDE INJ      triamcinolone acetonide (KENALOG) 10 mg/mL injection      INJECT CARPAL TUNNEL      EMG TWO EXTREMITIES UPPER      NCV/LAT MOTOR PER NERVE UP/RT      NCV/LAT MOTOR PER NERVE UP/LT         Plan:     Right thumb CMC and STT joint injections    Bilateral carpal tunnel injections    Right thumb cool comfort brace to be worn during the day and when most active    Bilateral carpal tunnel braces to be worn at night. Bilateral upper extremity EMG and nerve conduction studies. Follow-up and Dispositions    · Return for EMG review.           Plan was reviewed with patient, who verbalized agreement and understanding of the plan     [facility] NOTE        Chart reviewed for the following:   Renny BAKER DO, have reviewed the History, Physical and updated the Allergic reactions for [address] performed immediately prior to start of procedure:   Renny BAKER DO, have performed the following reviews on [address] prior to the start of the procedure:            * Patient was identified by name and date of birth   * Agreement on procedure being performed was verified  * Risks and Benefits explained to the patient  * Procedure site verified and marked as necessary  * Patient was positioned for comfort  * Consent was signed and verified     Time: 08:45 AM      Date of procedure: 8/7/2020    Procedure performed by: Ellie Howard DO    Provider assisted by: Luis Miguel Shipman LPN    Patient assisted by: self    How tolerated by patient: tolerated the procedure well with no complications    Post Procedural Pain Scale: 0 - No Hurt    Comments: none    Procedure:  After consent was obtained, using sterile technique the joint and carpal tunnel was prepped. Local anesthetic used: 1% lidocaine. Kenalog 5 mg X4 and was then injected and the needle withdrawn. The procedure was well tolerated. The patient is asked to continue to rest the area for a few more days before resuming regular activities. It may be more painful for the first 1-2 days. Watch for fever, or increased swelling or persistent pain in the joint. Call or return to clinic prn if such symptoms occur or there is failure to improve as anticipated.

## 2020-08-07 NOTE — PATIENT INSTRUCTIONS
Learning About Arthritis at the [facility] of the Thumb What is it? Arthritis at the base of the thumb joint is wear and tear on the cartilage. Cartilage is a firm, thick, slippery tissue. It covers and protects the ends of bones where they meet to form a joint. When you have arthritis, there are changes in the cartilage that cause it to break down. The bones rub together and cause joint damage and pain. What causes it? Experts don't know what causes arthritis at the base of the thumb. But aging, a lot of use, an injury, or family history may play a part. What are the symptoms? Symptoms of arthritis at the base of the thumb include aching in your joint. Or the pain may feel burning or sharp. You may feel clicking, creaking, or catching in the joint. It may get stiff. You may have more pain and less strength when you pinch or  things. Symptoms may come and go, stay the same, or get worse over time. How is it diagnosed? Your doctor can often diagnose arthritis by asking you questions about your joint pain and other symptoms and examining you. You may also have X-rays and blood tests. Blood tests can help make sure another disease isn't causing your symptoms. How is it treated? Arthritis at the base of your thumb may be treated with rest, pain relievers, steroid medicines, using a brace or splint, andin some casessurgery. To help relieve pain in the joint, rest your sore hand. Switch hands for some activities. You can try heat and cold therapy, such as hot compresses, paraffin wax, cold packs, or ice massage. Your doctor may give you a splint to wear during some activities or when pain flares up. You can often manage mild or moderate arthritis pain with over-the-counter pain relievers. These include medicines that reduce swelling, such as ibuprofen or naproxen. You can also use acetaminophen. Sometimes these medicines are in creams that you can rub on your thumb and hand.  Your doctor may also prescribe other medicine for your pain. For some people, steroid shots may be an option. If none of the treatments work, your doctor may discuss surgery with you. Follow-up care is a key part of your treatment and safety. Be sure to make and go to all appointments, and call your doctor if you are having problems. It's also a good idea to know your test results and keep a list of the medicines you take. Where can you learn more? Go to http://www.gray.com/ Enter T110 in the search box to learn more about \"Learning About Arthritis at the [facility] of the Thumb. \" Current as of: December 9, 2019               Content Version: 12.5 © 8547-3366 [employer]. Care instructions adapted under license by [employer] (which disclaims liability or warranty for this information). If you have questions about a medical condition or this instruction, always ask your healthcare professional. [place] [address] any warranty or liability for your use of this information. Carpal Tunnel Syndrome: Care Instructions Overview Carpal tunnel syndrome is numbness, tingling, weakness, and pain in your hand, wrist, and sometimes forearm. It is caused by pressure on the median nerve. This nerve and several tough tissues called tendons run through a space in the wrist. This space is called the carpal tunnel. The repeated hand motions used in work and some hobbies and sports can put pressure on the median nerve. Pregnancy can cause carpal tunnel syndrome. Several conditions, such as diabetes, arthritis, and an underactive thyroid, can also cause it. You may be able to limit an activity or change the way you do it to reduce your symptoms. You also can take other steps to feel better. If your symptoms are mild, 1 to 2 weeks of home treatment are likely to ease your pain. Surgery is needed only if other treatments do not work. Follow-up care is a key part of your treatment and safety. Be sure to make and go to all appointments, and call your doctor if you are having problems. It's also a good idea to know your test results and keep a list of the medicines you take. How can you care for yourself at home? · If possible, stop or reduce the activity that causes your symptoms. If you cannot stop the activity, take frequent breaks to rest and stretch or change hand positions to do a task. Try switching hands, such as when using a computer mouse. · Try to avoid bending or twisting your wrists. · Ask your doctor if you can take an over-the-counter pain medicine, such as acetaminophen (Tylenol), ibuprofen (Advil, Motrin), or naproxen (Aleve). Be safe with medicines. Read and follow all instructions on the label. · If your doctor prescribes corticosteroid medicine to help reduce pain and swelling, take it exactly as prescribed. Call your doctor if you think you are having a problem with your medicine. · Put ice or a cold pack on your wrist for 10 to 20 minutes at a time to ease pain. Put a thin cloth between the ice and your skin. · If your doctor or your physical or occupational therapist tells you to wear a wrist splint, wear it as directed to keep your wrist in a neutral position. This also eases pressure on your median nerve. · Ask your doctor whether you should have physical or occupational therapy to learn how to do tasks differently. · Try a yoga class to stretch your muscles and build strength in your hands and wrists. Yoga has been shown to ease carpal tunnel symptoms. To prevent carpal tunnel · When working at a computer, keep your hands and wrists in line with your forearms. Hold your elbows close to your sides. Take a break every 10 to 15 minutes. · Try these exercises: 
? Warm up: Rotate your wrist up, down, and from side to side. Repeat this 4 times.  Stretch your fingers far apart, relax them, then stretch them again. Repeat 4 times. Stretch your thumb by pulling it back gently, holding it, and then releasing it. Repeat 4 times. ? Prayer stretch: Start with your palms together in front of your chest just below your chin. Slowly lower your hands toward your waistline while keeping your hands close to your stomach and your palms together until you feel a mild to moderate stretch under your forearms. Hold for 10 to 20 seconds. Repeat 4 times. ? Wrist flexor stretch: Hold your arm in front of you with your palm up. Bend your wrist, pointing your hand toward the floor. With your other hand, gently bend your wrist further until you feel a mild to moderate stretch in your forearm. Hold for 10 to 20 seconds. Repeat 4 times. ? Wrist extensor stretch: Repeat the steps for the wrist flexor stretch, but begin with your extended hand palm down. · Squeeze a rubber exercise ball several times a day to keep your hands and fingers strong. · Avoid holding objects (such as a book) in one position for a long time. When possible, use your whole hand to grasp an object. Using just the thumb and index finger can put stress on the wrist. 
· Do not smoke. It can make this condition worse by reducing blood flow to the median nerve. If you need help quitting, talk to your doctor about stop-smoking programs and medicines. These can increase your chances of quitting for good. When should you call for help? Watch closely for changes in your health, and be sure to contact your doctor if: 
· Your pain or other problems do not get better with home care. · You want more information about physical or occupational therapy. · You have side effects of your corticosteroid medicine, such as: 
? Weight gain. ? Mood changes. ? Trouble sleeping. ? Bruising easily. · You have any other problems with your medicine. Where can you learn more? Go to http://damián-solitario.info/ Enter R432 in the search box to learn more about \"Carpal Tunnel Syndrome: Care Instructions. \" Current as of: March 2, 2020               Content Version: 12.5 © 3030-4241 [employer]. Care instructions adapted under license by [employer] (which disclaims liability or warranty for this information). If you have questions about a medical condition or this instruction, always ask your healthcare professional. [place] [address] any warranty or liability for your use of this information.

## 2020-08-12 ENCOUNTER — OFFICE VISIT (OUTPATIENT)
Dept: ORTHOPEDIC SURGERY | Age: 55
End: 2020-08-12

## 2020-08-12 VITALS
HEART RATE: 53 BPM | SYSTOLIC BLOOD PRESSURE: 133 MMHG | OXYGEN SATURATION: 96 % | BODY MASS INDEX: 36.96 KG/M2 | WEIGHT: 221.8 LBS | HEIGHT: 65 IN | DIASTOLIC BLOOD PRESSURE: 83 MMHG | TEMPERATURE: 98.2 F | RESPIRATION RATE: 16 BRPM

## 2020-08-12 DIAGNOSIS — M25.512 CHRONIC PAIN OF BOTH SHOULDERS: Primary | ICD-10-CM

## 2020-08-12 DIAGNOSIS — M25.511 CHRONIC PAIN OF BOTH SHOULDERS: Primary | ICD-10-CM

## 2020-08-12 DIAGNOSIS — G89.29 CHRONIC PAIN OF BOTH SHOULDERS: Primary | ICD-10-CM

## 2020-08-12 DIAGNOSIS — M75.102 BILATERAL ROTATOR CUFF SYNDROME: ICD-10-CM

## 2020-08-12 DIAGNOSIS — M75.101 BILATERAL ROTATOR CUFF SYNDROME: ICD-10-CM

## 2020-08-12 NOTE — PROGRESS NOTES
Patient: Omayra Cevallos                MRN: [id number]       SSN: [id number]  YOB: 1965        AGE: 53 y.o. SEX: female  Body mass index is 36.91 kg/m². PCP: uLz Choudhury MD  08/12/20    Chief Complaint: Bilateral shoulder pain    HPI: Omayra Cevallos is a 53 y.o. female patient who returns to the office today with bilateral shoulder pain. The left is slightly greater than the right. This is been bothering her for almost a year. I have seen her virtually several times with home exercises which have helped. She also had a cortisone shot by  of his elbow which she says gave her slight relief for about a week. When she comes off the exercises her pain worsens. She has not had any advanced imaging. Past Medical History:   Diagnosis Date    Asthma        Family History   Family history unknown: Yes   Problem Relation Age of Onset    Family history unknown: Yes    Hypertension Mother     Hypertension Father     Heart Disease Father     Stroke Father        Current Outpatient Medications   Medication Sig Dispense Refill    meloxicam (MOBIC) 7.5 mg tablet Take 1 Tab by mouth daily. 120 Tab 2    lidocaine (LIDODERM) 5 % 1 Patch by TransDERmal route every twelve (12) hours. Apply patch to the affected area for 12 hours a day and remove for 12 hours a day. 1 Each 2    SYMBICORT 160-4.5 mcg/actuation HFAA INHALE 2 PUFFS PO BID      cyclobenzaprine (FLEXERIL) 10 mg tablet Take 1 Tab by mouth three (3) times daily as needed for Muscle Spasm(s).  30 Tab 0    ergocalciferol (ERGOCALCIFEROL) 50,000 unit capsule TAKE 1 CAPSULE BY MOUTH EVERY 7 DAYS 12 Cap 1    OTHER Jacob Rhino Salt Packets  99    azelastine (ASTELIN) 137 mcg (0.1 %) nasal spray INT 2 SPRAYS IEN BID  1    QNASL 80 mcg/actuation HFAA INHALE 1 PUFF IEN BID  5    ergocalciferol (ERGOCALCIFEROL) 50,000 unit capsule TK 1 C PO Q WEEK  0    budesonide (PULMICORT) 0.5 mg/2 mL nbsp 500 mcg by Nebulization route.  OLANZapine-FLUoxetine (SYMBYAX) 3-25 mg per capsule Take 1 Cap by mouth every evening.  esomeprazole (NEXIUM) 20 mg capsule Take 40 mg by mouth two (2) times a day.  escitalopram oxalate (LEXAPRO) 20 mg tablet Take 20 mg by mouth daily.  montelukast (SINGULAIR) 10 mg tablet Take 10 mg by mouth daily.  fexofenadine-pseudoephedrine (ALLEGRA-D 12 HOUR)  mg Tb12 Take 1 Tab by mouth every twelve (12) hours.  oxaprozin (DAYPRO) 600 mg tablet Take 1 Tab by mouth daily. (Patient not taking: Reported on 3/2/2020) 90 Tab 2    guaiFENesin ER (MUCINEX) 600 mg ER tablet Take 600 mg by mouth daily.  loratadine (CLARITIN) 10 mg tablet Take 10 mg by mouth two (2) times a day. Allergies   Allergen Reactions    Fluticasone Furoate-Vilanterol Anaphylaxis    Nutritional Supplement-Fiber Anaphylaxis    Sulfa (Sulfonamide Antibiotics) Other (comments) and Shortness of Breath     Other reaction(s): Other (See Comments)  Sinus swells up, wheezing states Pt.     Calcium Other (comments)    Egg Swelling    Gluten Shortness of Breath    Milk Containing Products Other (comments)    Red Dye Swelling       Past Surgical History:   Procedure Laterality Date    HX HYSTERECTOMY      HX PELVIC LAPAROSCOPY      HX TONSILLECTOMY         Social History     Socioeconomic History    Marital status:      Spouse name: Not on file    Number of children: Not on file    Years of education: Not on file    Highest education level: Not on file   Occupational History    Not on file   Social Needs    Financial resource strain: Not on file    Food insecurity     Worry: Not on file     Inability: Not on file    Transportation needs     Medical: Not on file     Non-medical: Not on file   Tobacco Use    Smoking status: Never Smoker    Smokeless tobacco: Never Used   Substance and Sexual Activity    Alcohol use: No    Drug use: No    Sexual activity: Not on file Lifestyle    Physical activity     Days per week: Not on file     Minutes per session: Not on file    Stress: Not on file   Relationships    Social connections     Talks on phone: Not on file     Gets together: Not on file     Attends Yarsanism service: Not on file     Active member of club or organization: Not on file     Attends meetings of clubs or organizations: Not on file     Relationship status: Not on file    Intimate partner violence     Fear of current or ex partner: Not on file     Emotionally abused: Not on file     Physically abused: Not on file     Forced sexual activity: Not on file   Other Topics Concern    Not on file   Social History Narrative    Not on file       REVIEW OF SYSTEMS:      No changes from previous review of systems unless noted. PHYSICAL EXAMINATION:  Visit Vitals  /83 (BP 1 Location: Left arm, BP Patient Position: Sitting)   Pulse (!) 53   Temp 98.2 °F (36.8 °C) (Skin)   Resp 16   Ht 5' 5\" (1.651 m)   Wt 221 lb 12.8 oz (100.6 kg)   SpO2 96%   BMI 36.91 kg/m²     Body mass index is 36.91 kg/m². GENERAL: Alert and oriented x3, in no acute distress. HEENT: Normocephalic, atraumatic. RESP: Non labored breathing. SKIN: No rashes or lesions noted. Shoulder Examination     R   L  ROM   FF  Full   Full  ER  Full   Full   IR  Full   Full  Rotator Cuff Pain   Supra  +   +   Infra  -   -   Subscap -   -  Crepitus  -   -  Effusion  -   -  Warmth  -   -   Erythema  -   -  Instability  -   -  AC Joint TTP  -   -  Clavicle   Deformity -   -   TTP  -   -  Proximal Humerus   Deformity -   -   TTP  -   -  Deltoid Strength 5   5  Biceps Strength 5   5  Biceps Deformity -   -  Biceps Groove Pain -   -  Impingement Sign -   -       IMAGING:  X-rays taken today of both shoulders do not show any arthritis or fractures    ASSESSMENT & PLAN  Diagnosis: Bilateral rotator cuff pain      Ms. Carlos Armstrong continues to have bilateral rotator cuff pain.   She does not have any weakness on exam. I recommended she continue with a home exercise program.  Failing this we would like to get her in some formal physical therapy. I would defer an MRI for now as she does not have any significant weakness or history of significant trauma.     Electronically signed by: Brenda Carias MD

## 2020-09-11 ENCOUNTER — OFFICE VISIT (OUTPATIENT)
Dept: ORTHOPEDIC SURGERY | Age: 55
End: 2020-09-11

## 2020-09-11 VITALS
OXYGEN SATURATION: 97 % | BODY MASS INDEX: 38.28 KG/M2 | SYSTOLIC BLOOD PRESSURE: 136 MMHG | DIASTOLIC BLOOD PRESSURE: 84 MMHG | HEIGHT: 64 IN | HEART RATE: 60 BPM | WEIGHT: 224.2 LBS | TEMPERATURE: 97.6 F

## 2020-09-11 DIAGNOSIS — M79.642 BILATERAL HAND PAIN: Primary | ICD-10-CM

## 2020-09-11 DIAGNOSIS — R20.2 PARESTHESIA OF HAND, BILATERAL: ICD-10-CM

## 2020-09-11 DIAGNOSIS — M79.641 BILATERAL HAND PAIN: Primary | ICD-10-CM

## 2020-09-11 NOTE — PROGRESS NOTES
[address]  [address], [address]  [address]  Phone: [phone]  Fax: [phone]        Akilah Amato  : 1965  PCP: Manny Byers MD  2020    ELECTROMYOGRAPHY AND NERVE CONDUCTION STUDIES    Carolann Levy was referred by Dr. Aurea Coronado for electrodiagnostic evaluation of bilateral hand numbness and pain. NCV & EMG Findings:  Evaluation of the left median motor nerve showed prolonged distal onset latency (5.1 ms). The right median motor nerve showed prolonged distal onset latency (5.5 ms) and reduced amplitude (4.5 mV). The left median sensory and the right median sensory nerves showed prolonged distal peak latency (L4.7, R4.7 ms) and decreased conduction velocity (Wrist-2nd Digit, L30, R30 m/s). All remaining nerves (as indicated in the following tables) were within normal limits. Left vs. Right side comparison data for the median motor nerve indicates abnormal L-R velocity difference (Elbow-Wrist, 51 m/s). The ulnar motor nerve indicates abnormal L-R velocity difference (A Elbow-B Elbow, 18 m/s). All remaining left vs. right side differences were within normal limits. All examined muscles (as indicated in the following table) showed no evidence of electrical instability. INTERPRETATION  This is an abnormal electrodiagnostic examination. These findings may be consistent with Moderate median mononeuropathy at the wrist (carpal tunnel syndrome), bilaterally.      There is no electrodiagnostic evidence of cervical radiculopathy, brachial plexopathy, polyneuropathy or other mononeuropathy. CLINICAL INTERPRETATION  Her electrodiagnostic finding of bilateral carpal tunnel syndrome is consistent with her bilateral hand symptoms. HISTORY OF PRESENT ILLNESS  Carolann Levy is a 53 y.o. female.     Patient is seen today at the request of Dr. Aurea Coronado for evaluation of possible bilateral carpal tunnel syndrome. She reports a relief of pain with the cortisone injection given by Dr. Shahab Borjas on her R thumb. Pt mentions that she was 16 years ago when she fell on ski slopes when she broke her thumb and attests some of the pain to that injury and typing. 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 PAST MEDICAL HISTORY   Past Medical History:   Diagnosis Date    Asthma        Past Surgical History:   Procedure Laterality Date    HX HYSTERECTOMY      HX PELVIC LAPAROSCOPY      HX TONSILLECTOMY     . MEDICATIONS    Current Outpatient Medications   Medication Sig Dispense Refill    meloxicam (MOBIC) 7.5 mg tablet Take 1 Tab by mouth daily. 120 Tab 2    lidocaine (LIDODERM) 5 % 1 Patch by TransDERmal route every twelve (12) hours. Apply patch to the affected area for 12 hours a day and remove for 12 hours a day. 1 Each 2    SYMBICORT 160-4.5 mcg/actuation HFAA INHALE 2 PUFFS PO BID      cyclobenzaprine (FLEXERIL) 10 mg tablet Take 1 Tab by mouth three (3) times daily as needed for Muscle Spasm(s). 30 Tab 0    fexofenadine-pseudoephedrine (ALLEGRA-D 12 HOUR)  mg Tb12 Take 1 Tab by mouth every twelve (12) hours.  ergocalciferol (ERGOCALCIFEROL) 50,000 unit capsule TAKE 1 CAPSULE BY MOUTH EVERY 7 DAYS 12 Cap 1    oxaprozin (DAYPRO) 600 mg tablet Take 1 Tab by mouth daily. (Patient not taking: Reported on 3/2/2020) 90 Tab 2    OTHER Jacob Rhino Salt Packets  99    azelastine (ASTELIN) 137 mcg (0.1 %) nasal spray INT 2 SPRAYS IEN BID  1    QNASL 80 mcg/actuation HFAA INHALE 1 PUFF IEN BID  5    ergocalciferol (ERGOCALCIFEROL) 50,000 unit capsule TK 1 C PO Q WEEK  0    guaiFENesin ER (MUCINEX) 600 mg ER tablet Take 600 mg by mouth daily.  budesonide (PULMICORT) 0.5 mg/2 mL nbsp 500 mcg by Nebulization route.  OLANZapine-FLUoxetine (SYMBYAX) 3-25 mg per capsule Take 1 Cap by mouth every evening.       loratadine (CLARITIN) 10 mg tablet Take 10 mg by mouth two (2) times a day.  esomeprazole (NEXIUM) 20 mg capsule Take 40 mg by mouth two (2) times a day.  escitalopram oxalate (LEXAPRO) 20 mg tablet Take 20 mg by mouth daily.  montelukast (SINGULAIR) 10 mg tablet Take 10 mg by mouth daily. ALLERGIES  Allergies   Allergen Reactions    Fluticasone Furoate-Vilanterol Anaphylaxis    Nutritional Supplement-Fiber Anaphylaxis    Sulfa (Sulfonamide Antibiotics) Other (comments) and Shortness of Breath     Other reaction(s): Other (See Comments)  Sinus swells up, wheezing states Pt.  Calcium Other (comments)    Egg Swelling    Gluten Shortness of Breath    Milk Containing Products Other (comments)    Red Dye Swelling          SOCIAL HISTORY    Social History     Socioeconomic History    Marital status:      Spouse name: Not on file    Number of children: Not on file    Years of education: Not on file    Highest education level: Not on file   Tobacco Use    Smoking status: Never Smoker    Smokeless tobacco: Never Used   Substance and Sexual Activity    Alcohol use: No    Drug use: No       FAMILY HISTORY  Family History   Family history unknown: Yes   Problem Relation Age of Onset    Family history unknown:  Yes    Hypertension Mother     Hypertension Father     Heart Disease Father     Stroke Father          PHYSICAL EXAMINATION  Visit Vitals  /84 (BP 1 Location: Right arm, BP Patient Position: Sitting)   Pulse 60   Temp 97.6 °F (36.4 °C) (Oral)   Ht 5' 4\" (1.626 m)   Wt 224 lb 3.2 oz (101.7 kg)   SpO2 97%   BMI 38.48 kg/m²       Pain Assessment  9/11/2020   Location of Pain Arm   Pain Location Comment -   Location Modifiers Left;Right   Severity of Pain 0   Quality of Pain -   Quality of Pain Comment -   Duration of Pain -   Frequency of Pain -   Aggravating Factors -   Aggravating Factors Comment -   Limiting Behavior -   Relieving Factors -   Relieving Factors Comment -   Result of Injury - Constitutional:  Well developed, well nourished, in no acute distress. Psychiatric: Affect and mood are appropriate. Integumentary: No rashes or abrasions noted on exposed areas. SPINE/MUSCULOSKELETAL EXAM    On brief examination: None.       NCV & EMG Findings:  Nerve Conduction Studies  Anti Sensory Summary Table     Stim Site NR Peak (ms) Norm Peak (ms) O-P Amp (µV) Norm O-P Amp Site1 Site2 Delta-P (ms) Dist (cm) Enmanuel (m/s) Norm Enmanuel (m/s)   Left Median Anti Sensory (2nd Digit)   Wrist    4.7 <3.6 10.2 >10 Wrist 2nd Digit 4.7 14.0 30 >39   Right Median Anti Sensory (2nd Digit)   Wrist    4.7 <3.6 12.9 >10 Wrist 2nd Digit 4.7 14.0 30 >39   Left Radial Anti Sensory (Base 1st Digit)   Wrist    2.1 <3.1 63.1  Wrist Base 1st Digit 2.1 0.0     Right Radial Anti Sensory (Base 1st Digit)   Wrist    2.0 <3.1 45.9  Wrist Base 1st Digit 2.0 0.0     Left Ulnar Anti Sensory (5th Digit)   Wrist    3.1 <3.7 27.2 >15.0 Wrist 5th Digit 3.1 14.0 45 >38   Right Ulnar Anti Sensory (5th Digit)   Wrist    3.1 <3.7 36.1 >15.0 Wrist 5th Digit 3.1 14.0 45 >38     Motor Summary Table     Stim Site NR Onset (ms) Norm Onset (ms) O-P Amp (mV) Norm O-P Amp Site1 Site2 Delta-0 (ms) Dist (cm) Enmanuel (m/s) Norm Enmanuel (m/s)   Left Median Motor (Abd Poll Brev)   Wrist    5.1 <4.2 5.8 >5 Elbow Wrist 3.9 19.5 50 >50   Elbow    9.0  5.1          Right Median Motor (Abd Poll Brev)   Wrist    5.5 <4.2 4.5 >5 Elbow Wrist 4.1 41.5 101 >50   Elbow    9.6  4.3          Left Ulnar Motor (Abd Dig Min)   Wrist    2.7 <4.2 10.7 >3 B Elbow Wrist 2.9 18.0 62 >53   B Elbow    5.6  10.4  A Elbow B Elbow 1.7 10.0 59 >53   A Elbow    7.3  9.7          Right Ulnar Motor (Abd Dig Min)   Wrist    2.7 <4.2 10.0 >3 B Elbow Wrist 3.2 19.0 59 >53   B Elbow    5.9  9.6  A Elbow B Elbow 1.3 10.0 77 >53   A Elbow    7.2  8.9            EMG     Side Muscle Nerve Root Ins Act Fibs Psw Amp Dur Poly Recrt Int Gove Saver Comment   Left Biceps Musculocut C5-6 Nml Nml Nml Nml Nml 0 Nml Nml    Left Triceps Radial C6-7-8 Nml Nml Nml Nml Nml 0 Nml Nml    Left PronatorTeres Median C6-7 Nml Nml Nml Nml Nml 0 Nml Nml    Left Abd Poll Brev Median C8-T1 Nml Nml Nml Nml Nml 0 Nml Nml    Left 1stDorInt Ulnar C8-T1 Nml Nml Nml Nml Nml 0 Nml Nml    Right Biceps Musculocut C5-6 Nml Nml Nml Nml Nml 0 Nml Nml    Right Triceps Radial C6-7-8 Nml Nml Nml Nml Nml 0 Nml Nml    Right PronatorTeres Median C6-7 Nml Nml Nml Nml Nml 0 Nml Nml    Right Abd Poll Brev Median C8-T1 Nml Nml Nml Nml Nml 0 Nml Nml    Right 1stDorInt Ulnar C8-T1 Nml Nml Nml Nml Nml 0 Nml Nml        Nerve Conduction Studies  Anti Sensory Left/Right Comparison     Stim Site L Lat (ms) R Lat (ms) L-R Lat (ms) L Amp (µV) R Amp (µV) L-R Amp (%) Site1 Site2 L Enmanuel (m/s) R Enmanuel (m/s) L-R Enmanuel (m/s)   Median Anti Sensory (2nd Digit)   Wrist 4.7 4.7 0.0 10.2 12.9 20.9 Wrist 2nd Digit 30 30 0   Radial Anti Sensory (Base 1st Digit)   Wrist 2.1 2.0 0.1 63.1 45.9 27.3 Wrist Base 1st Digit      Ulnar Anti Sensory (5th Digit)   Wrist 3.1 3.1 0.0 27.2 36.1 24.7 Wrist 5th Digit 45 45 0     Motor Left/Right Comparison     Stim Site L Lat (ms) R Lat (ms) L-R Lat (ms) L Amp (mV) R Amp (mV) L-R Amp (%) Site1 Site2 L Enmanuel (m/s) R Enmanuel (m/s) L-R Enmanuel (m/s)   Median Motor (Abd Poll Brev)   Wrist 5.1 5.5 0.4 5.8 4.5 22.4 Elbow Wrist 50 101 51   Elbow 9.0 9.6 0.6 5.1 4.3 15.7        Ulnar Motor (Abd Dig Min)   Wrist 2.7 2.7 0.0 10.7 10.0 6.5 B Elbow Wrist 62 59 3   B Elbow 5.6 5.9 0.3 10.4 9.6 7.7 A Elbow B Elbow 59 77 18   A Elbow 7.3 7.2 0.1 9.7 8.9 8.2              Waveforms:                                  [facility]  OFFICE PROCEDURE PROGRESS NOTE        Chart reviewed for the following:   Kristin BAKER, have reviewed the History, Physical and updated the Allergic reactions for Aelia Valene Free     TIME OUT performed immediately prior to start of procedure:   Kristin BAKER, have performed the following [facility] prior to the start of the procedure:            * Patient was identified by name and date of birth   * Agreement on procedure being performed was verified  * Risks and Benefits explained to the patient  * Procedure site verified and marked as necessary  * Patient was positioned for comfort  * Consent was signed and verified     Time: 4:02 PM        Date of procedure: 9/11/2020    Procedure performed by:  Ana Packer MD    Provider accompanied by: Scribe. Patient accompanied by: Self.     How tolerated by patient: tolerated the procedure well with no complications    Post Procedural Pain Scale: 0 - No Hurt    Comments: none    Written by Lelia Gagnon as dictated by Zenia Briscoe MD

## 2020-09-11 NOTE — LETTER
9/11/20 Patient: Charles Avalos YOB: 1965 Date of Visit: 9/11/2020 Darien Falk MD 
[address] [place] [address] VIA Facsimile: [phone] Dear Darien Falk MD, Thank you for referring Ms. Roula Pickering to [facility] for evaluation. My notes for this consultation are attached. If you have questions, please do not hesitate to call me. I look forward to following your patient along with you.  
 
 
Sincerely, 
 
Lizz Rowland MD

## 2020-10-09 ENCOUNTER — OFFICE VISIT (OUTPATIENT)
Dept: ORTHOPEDIC SURGERY | Age: 55
End: 2020-10-09
Payer: COMMERCIAL

## 2020-10-09 VITALS
DIASTOLIC BLOOD PRESSURE: 77 MMHG | TEMPERATURE: 97.1 F | OXYGEN SATURATION: 97 % | HEART RATE: 62 BPM | HEIGHT: 64 IN | SYSTOLIC BLOOD PRESSURE: 142 MMHG | WEIGHT: 224 LBS | RESPIRATION RATE: 18 BRPM | BODY MASS INDEX: 38.24 KG/M2

## 2020-10-09 DIAGNOSIS — G56.03 CARPAL TUNNEL SYNDROME, BILATERAL: Primary | ICD-10-CM

## 2020-10-09 DIAGNOSIS — M18.0 PRIMARY OSTEOARTHRITIS OF BOTH FIRST CARPOMETACARPAL JOINTS: ICD-10-CM

## 2020-10-09 DIAGNOSIS — M25.532 LEFT WRIST PAIN: ICD-10-CM

## 2020-10-09 PROCEDURE — 20600 DRAIN/INJ JOINT/BURSA W/O US: CPT | Performed by: ORTHOPAEDIC SURGERY

## 2020-10-09 PROCEDURE — 99214 OFFICE O/P EST MOD 30 MIN: CPT | Performed by: ORTHOPAEDIC SURGERY

## 2020-10-09 PROCEDURE — 20526 THER INJECTION CARP TUNNEL: CPT | Performed by: ORTHOPAEDIC SURGERY

## 2020-10-09 PROCEDURE — 73110 X-RAY EXAM OF WRIST: CPT | Performed by: ORTHOPAEDIC SURGERY

## 2020-10-09 NOTE — PROGRESS NOTES
Zeeshan Nava is a 63 y.o. female right handed . Worker's Compensation and legal considerations: none filed. Vitals:    10/09/20 0940   BP: (!) 142/77   Pulse: 62   Resp: 18   Temp: 97.1 °F (36.2 °C)   SpO2: 97%   Weight: 224 lb (101.6 kg)   Height: 5' 4\" (1.626 m)   PainSc:   0 - No pain           Chief Complaint   Patient presents with    Wrist Pain     F/U bilateral wrist EMG       HPI: Patient comes in today for bilateral upper extremity EMG follow-up as well as follow-up on her joint injections. She reports her left thumb base is starting to hurt her significantly. Initial HPI: Patient comes in today with complaints of right thumb base pain. She also has a history of bilateral hand numbness and tingling especially at night.   She says she was previously diagnosed with carpal tunnel in the past.    Date of onset: Chronic    Injury: No    Prior Treatment:  Yes: Comment: Bilateral carpal tunnel injections and right thumb CMC and STT joint injections    Numbness/ Tingling: Yes: Comment: Bilateral hands right worse than left      ROS: Review of Systems - General ROS: negative  Psychological ROS: negative  ENT ROS: negative  Allergy and Immunology ROS: negative  Hematological and Lymphatic ROS: negative  Respiratory ROS: no cough, shortness of breath, or wheezing  Cardiovascular ROS: no chest pain or dyspnea on exertion  Gastrointestinal ROS: no abdominal pain, change in bowel habits, or black or bloody stools  Musculoskeletal ROS: positive for - pain in thumb - right and swelling in thumb - right  Neurological ROS: positive for - numbness/tingling  Dermatological ROS: negative    Past Medical History:   Diagnosis Date    Asthma        Past Surgical History:   Procedure Laterality Date    HX HYSTERECTOMY      HX PELVIC LAPAROSCOPY      HX TONSILLECTOMY         Current Outpatient Medications   Medication Sig Dispense Refill    triamcinolone acetonide (KENALOG) 10 mg/mL injection 1 mL by Intra artICUlar route once for 1 dose. 1 Vial 0    meloxicam (MOBIC) 7.5 mg tablet Take 1 Tab by mouth daily. 120 Tab 2    lidocaine (LIDODERM) 5 % 1 Patch by TransDERmal route every twelve (12) hours. Apply patch to the affected area for 12 hours a day and remove for 12 hours a day. 1 Each 2    SYMBICORT 160-4.5 mcg/actuation HFAA INHALE 2 PUFFS PO BID      fexofenadine-pseudoephedrine (ALLEGRA-D 12 HOUR)  mg Tb12 Take 1 Tab by mouth every twelve (12) hours.  ergocalciferol (ERGOCALCIFEROL) 50,000 unit capsule TAKE 1 CAPSULE BY MOUTH EVERY 7 DAYS 12 Cap 1    OTHER Jacob Rhino Salt Packets  99    budesonide (PULMICORT) 0.5 mg/2 mL nbsp 500 mcg by Nebulization route.  esomeprazole (NEXIUM) 20 mg capsule Take 40 mg by mouth two (2) times a day.  escitalopram oxalate (LEXAPRO) 20 mg tablet Take 20 mg by mouth daily.  montelukast (SINGULAIR) 10 mg tablet Take 10 mg by mouth daily.  cyclobenzaprine (FLEXERIL) 10 mg tablet Take 1 Tab by mouth three (3) times daily as needed for Muscle Spasm(s). 30 Tab 0    oxaprozin (DAYPRO) 600 mg tablet Take 1 Tab by mouth daily. (Patient not taking: Reported on 3/2/2020) 90 Tab 2    azelastine (ASTELIN) 137 mcg (0.1 %) nasal spray INT 2 SPRAYS IEN BID  1    QNASL 80 mcg/actuation HFAA INHALE 1 PUFF IEN BID  5    ergocalciferol (ERGOCALCIFEROL) 50,000 unit capsule TK 1 C PO Q WEEK  0    guaiFENesin ER (MUCINEX) 600 mg ER tablet Take 600 mg by mouth daily.  OLANZapine-FLUoxetine (SYMBYAX) 3-25 mg per capsule Take 1 Cap by mouth every evening.  loratadine (CLARITIN) 10 mg tablet Take 10 mg by mouth two (2) times a day. Allergies   Allergen Reactions    Fluticasone Furoate-Vilanterol Anaphylaxis    Nutritional Supplement-Fiber Anaphylaxis    Sulfa (Sulfonamide Antibiotics) Other (comments) and Shortness of Breath     Other reaction(s): Other (See Comments)  Sinus swells up, wheezing states Pt.     Calcium Other (comments)    Egg Swelling    Gluten Shortness of Breath    Milk Containing Products Other (comments)    Red Dye Swelling           PE:     Physical Exam  Vitals signs and nursing note reviewed. Constitutional:       General: She is not in acute distress. Appearance: Normal appearance. She is not ill-appearing. Eyes:      Pupils: Pupils are equal, round, and reactive to light. Neck:      Musculoskeletal: Normal range of motion. Cardiovascular:      Pulses: Normal pulses. Pulmonary:      Effort: Pulmonary effort is normal. No respiratory distress. Abdominal:      General: Abdomen is flat. Musculoskeletal: Normal range of motion. General: Swelling and tenderness present. No deformity or signs of injury. Right lower leg: No edema. Left lower leg: No edema. Skin:     General: Skin is warm and dry. Capillary Refill: Capillary refill takes less than 2 seconds. Findings: No bruising or erythema. Neurological:      General: No focal deficit present. Mental Status: She is alert and oriented to person, place, and time. Cranial Nerves: No cranial nerve deficit. Sensory: No sensory deficit.    Psychiatric:         Mood and Affect: Mood normal.         Behavior: Behavior normal.            Hand:    Examination L Digit(s) R Digit(s)   1st CMC Tenderness +  +    1st CMC Grind +  +    Elder Nodes -  -    Heberden Nodes -  -    A1 Pulley Tenderness -  -    Triggering -  -    UCL Instability -  -    RCL Instability -  -    Lateral Stress Pain -  -    Palmar Cords -  -    Tabletop test -  -    Garrod's Pads -  -     Strength       Pinch Strength         ROM: Full      Wrist:    Tenderness L R Test L R   1st Ext Comp - - Finkelstein's - -   Snuff Box - - Clark - -   2nd Ext Comp - - S-L Shear - -   S-L Joint - - L-T Shear - -   L-T Joint - - DRUJ Sup - -   6th Ext Comp - - DRUJ Pro - -   Ulnar Snuff - - DRUJ Grind - -   Fovea - - TFCC - -   STT Joint - - Mid-Carp Inst - -   FCR - - P-T Grind - -   Intersection - - ECU Sublux. - -      Dorsal Ganglion: -   Volar Ganglion: -      ROM: Full      NEUROVASCULAR    Examination L R Examination L R   Carpal Comp. + + Pronator Comp. - -   Carpal Tinel + + Pronator Tinel - -   Phalen's + + Pronator Stress - -   Cubital Comp. - - Guyon Comp. - -   Cubital Tinel - - Guyon Tinel - -   Elbow Hyperflexion - - Adson's - -   Spurling's - - SC Comp. - -   PCB Median abn - - SC Tinel - -   Radial Tinel - - IC Comp. - -   Digital Tinel - - IC Tinel - -   Radial 2-Pt WNL WNL Ulnar 2-Pt WNL WNL     Radial Pulse: 2+  Capillary Refill: < 2 sec  Philip: Not Performed  [employer]: Not Performed      NCV & EMG Findings:  Evaluation of the left median motor nerve showed prolonged distal onset latency (5.1 ms). The right median motor nerve showed prolonged distal onset latency (5.5 ms) and reduced amplitude (4.5 mV). The left median sensory and the right median sensory nerves showed prolonged distal peak latency (L4.7, R4.7 ms) and decreased conduction velocity (Wrist-2nd Digit, L30, R30 m/s). All remaining nerves (as indicated in the following tables) were within normal limits. Left vs. Right side comparison data for the median motor nerve indicates abnormal L-R velocity difference (Elbow-Wrist, 51 m/s). The ulnar motor nerve indicates abnormal L-R velocity difference (A Elbow-B Elbow, 18 m/s). All remaining left vs. right side differences were within normal limits.       All examined muscles (as indicated in the following table) showed no evidence of electrical instability.       INTERPRETATION  This is an abnormal electrodiagnostic examination.  These findings may be consistent with Moderate median mononeuropathy at the wrist (carpal tunnel syndrome), bilaterally.      There is no electrodiagnostic evidence of cervical radiculopathy, brachial plexopathy, polyneuropathy or other mononeuropathy.         CLINICAL INTERPRETATION  Her electrodiagnostic finding of bilateral carpal tunnel syndrome is consistent with her bilateral hand symptoms.        Imaging:     10/9/2020 plain films of left wrist shows Eaton stage III-IV degenerative changes of the [facility] joint with early degenerative changes of the STT joint. 8/7/2020 plain films of right wrist are positive for degenerative changes about the STT and CMC joints of the thumb. These are consistent with Radhames Ma stage 3. ICD-10-CM ICD-9-CM    1. Carpal tunnel syndrome, bilateral  G56.03 354.0 TRIAMCINOLONE ACETONIDE INJ      triamcinolone acetonide (KENALOG) 10 mg/mL injection      INJECT CARPAL TUNNEL   2. Primary osteoarthritis of both first carpometacarpal joints  M18.0 715.14 AMB POC XRAY, WRIST; COMPLETE, 3+ VIE      AMB SUPPLY ORDER      TRIAMCINOLONE ACETONIDE INJ      triamcinolone acetonide (KENALOG) 10 mg/mL injection      DRAIN/INJECT SMALL JOINT/BURSA   3. Left wrist pain  M25.532 719.43 AMB POC XRAY, WRIST; COMPLETE, 3+ VIE         Plan:     Bilateral thumb CMC joint injections, bilateral carpal tunnel injections, left cool comfort brace. Continue nighttime carpal tunnel braces. Follow-up and Dispositions    · Return in about 2 months (around 12/9/2020) for reevaluation and possible surgical discussion.           Plan was reviewed with patient, who verbalized agreement and understanding of the plan    [address] [facility] NOTE        Chart reviewed for the following:   Renny BAKER DO, have reviewed the History, Physical and updated the Allergic reactions for [address] performed immediately prior to start of procedure:   Renny BAKER DO, have performed the following reviews on [facility] prior to the start of the procedure:            * Patient was identified by name and date of birth   * Agreement on procedure being performed was verified  * Risks and Benefits explained to the patient  * Procedure site verified and marked as necessary  * Patient was positioned for comfort  * Consent was signed and verified     Time: 10:45 AM      Date of procedure: 10/9/2020    Procedure performed by: Cecilia Diaz DO    Provider assisted by: Masha Tan LPN    Patient assisted by: self    How tolerated by patient: tolerated the procedure well with no complications    Post Procedural Pain Scale: 0 - No Hurt    Comments: none    Procedure:  After consent was obtained, using sterile technique the joint and carpal tunnel was prepped. Local anesthetic used: 1% lidocaine. Kenalog 5 mg X4 and was then injected and the needle withdrawn. The procedure was well tolerated. The patient is asked to continue to rest the area for a few more days before resuming regular activities. It may be more painful for the first 1-2 days. Watch for fever, or increased swelling or persistent pain in the joint. Call or return to clinic prn if such symptoms occur or there is failure to improve as anticipated.

## 2020-10-12 ENCOUNTER — OFFICE VISIT (OUTPATIENT)
Dept: CARDIOLOGY CLINIC | Age: 55
End: 2020-10-12
Payer: COMMERCIAL

## 2020-10-12 VITALS
HEIGHT: 64 IN | WEIGHT: 221.8 LBS | TEMPERATURE: 97.3 F | SYSTOLIC BLOOD PRESSURE: 147 MMHG | BODY MASS INDEX: 37.87 KG/M2 | DIASTOLIC BLOOD PRESSURE: 76 MMHG | HEART RATE: 67 BPM

## 2020-10-12 DIAGNOSIS — E66.01 OBESITY, MORBID (HCC): ICD-10-CM

## 2020-10-12 DIAGNOSIS — Z99.89 OSA ON CPAP: ICD-10-CM

## 2020-10-12 DIAGNOSIS — R60.9 EDEMA, UNSPECIFIED TYPE: ICD-10-CM

## 2020-10-12 DIAGNOSIS — G47.33 OSA ON CPAP: ICD-10-CM

## 2020-10-12 DIAGNOSIS — Z87.09 HISTORY OF ASTHMA: ICD-10-CM

## 2020-10-12 DIAGNOSIS — R00.2 PALPITATIONS: Primary | ICD-10-CM

## 2020-10-12 DIAGNOSIS — F41.9 ANXIETY: ICD-10-CM

## 2020-10-12 PROCEDURE — 99203 OFFICE O/P NEW LOW 30 MIN: CPT | Performed by: INTERNAL MEDICINE

## 2020-10-12 RX ORDER — EPINEPHRINE 0.3 MG/.3ML
0.3 INJECTION SUBCUTANEOUS
COMMUNITY

## 2020-10-12 NOTE — PROGRESS NOTES
HISTORY OF PRESENT ILLNESS  Sav Carrington is a 63 y.o. female. New Patient   The history is provided by the patient and medical records. Associated symptoms include shortness of breath. Pertinent negatives include no chest pain and no headaches. Palpitations    The history is provided by the patient. This is a new problem. The current episode started more than 1 week ago (yrs). The problem has been gradually improving (1 month). The problem occurs daily. The problem is associated with caffeine. Associated symptoms include lower extremity edema and shortness of breath. Pertinent negatives include no fever, no malaise/fatigue, no chest pain, no claudication, no orthopnea, no PND, no nausea, no vomiting, no headaches, no dizziness and no cough. Leg Swelling   The history is provided by the patient. This is a new problem. The current episode started more than 1 week ago (2018). The problem occurs every several days (1/wk). Associated symptoms include shortness of breath. Pertinent negatives include no chest pain and no headaches. The symptoms are aggravated by standing. The symptoms are relieved by sleep. Shortness of Breath   The history is provided by the patient. This is a new problem. The problem occurs intermittently. The current episode started more than 1 week ago. Associated symptoms include leg swelling. Pertinent negatives include no fever, no headaches, no cough, no wheezing, no PND, no orthopnea, no chest pain, no vomiting, no rash and no claudication. The problem's precipitants include exercise (rushing). Allergies   Allergen Reactions    Fluticasone Furoate-Vilanterol Anaphylaxis    Nutritional Supplement-Fiber Anaphylaxis    Sulfa (Sulfonamide Antibiotics) Other (comments) and Shortness of Breath     Other reaction(s): Other (See Comments)  Sinus swells up, wheezing states Pt.     Calcium Other (comments)    Egg Swelling    Gluten Shortness of Breath    Milk Containing Products Other (comments)    Red Dye Swelling       Past Medical History:   Diagnosis Date    Asthma        Family History   Problem Relation Age of Onset    Hypertension Mother     Hypertension Father     Heart Disease Father     Stroke Father 76    Coronary Artery Disease Father 79       Social History     Tobacco Use    Smoking status: Never Smoker    Smokeless tobacco: Never Used   Substance Use Topics    Alcohol use: No    Drug use: No        Current Outpatient Medications   Medication Sig    EPINEPHrine (EpiPen) 0.3 mg/0.3 mL injection 0.3 mg by IntraMUSCular route once as needed for Allergic Response.  DIETARY SUPPLEMENT PO Take  by mouth. Herbal concetrate    DIETARY SUPPLEMENT PO Take  by mouth. Alpha 20C    meloxicam (MOBIC) 7.5 mg tablet Take 1 Tab by mouth daily. (Patient taking differently: Take 7.5 mg by mouth as needed.)    SYMBICORT 160-4.5 mcg/actuation HFAA INHALE 2 PUFFS PO BID    cyclobenzaprine (FLEXERIL) 10 mg tablet Take 1 Tab by mouth three (3) times daily as needed for Muscle Spasm(s). (Patient taking differently: Take 10 mg by mouth as needed for Muscle Spasm(s).)    fexofenadine-pseudoephedrine (ALLEGRA-D 12 HOUR)  mg Tb12 Take 1 Tab by mouth every twelve (12) hours.  OTHER Jacob Rhino Salt Packets    QNASL 80 mcg/actuation HFAA INHALE 1 PUFF IEN BID    ergocalciferol (ERGOCALCIFEROL) 50,000 unit capsule Twice a week    budesonide (PULMICORT) 0.5 mg/2 mL nbsp 500 mcg by Nebulization route.  esomeprazole (NEXIUM) 20 mg capsule Take 40 mg by mouth two (2) times daily as needed.  escitalopram oxalate (LEXAPRO) 20 mg tablet Take 20 mg by mouth daily.  montelukast (SINGULAIR) 10 mg tablet Take 10 mg by mouth daily. No current facility-administered medications for this visit.          Past Surgical History:   Procedure Laterality Date    HX HYSTERECTOMY      HX PELVIC LAPAROSCOPY      HX TONSILLECTOMY         Visit Vitals  BP (!) 147/76   Pulse 67 Temp 97.3 °F (36.3 °C) (Temporal)   Ht 5' 4\" (1.626 m)   Wt 100.6 kg (221 lb 12.8 oz)   BMI 38.07 kg/m²       Diagnostic Studies:  I have reviewed the relevant tests done on the patient and show as follows  EKG tracings reviewed by me today. EKG Results     None        XR Results (most recent):  Results from Abstract encounter on 03/06/20   XR Steve Navarrete 2 V       Ms. Ismael Meza has a reminder for a \"due or due soon\" health maintenance. I have asked that she contact her primary care provider for follow-up on this health maintenance. Review of Systems   Constitutional: Negative for chills, fever, malaise/fatigue and weight loss. HENT: Negative for nosebleeds. Eyes: Negative for discharge. Respiratory: Positive for shortness of breath. Negative for cough and wheezing. Cardiovascular: Positive for palpitations and leg swelling. Negative for chest pain, orthopnea, claudication and PND. Gastrointestinal: Negative for diarrhea, nausea and vomiting. Genitourinary: Negative for dysuria and hematuria. Musculoskeletal: Negative for joint pain. Skin: Negative for rash. Neurological: Negative for dizziness, seizures, loss of consciousness and headaches. Endo/Heme/Allergies: Negative for polydipsia. Does not bruise/bleed easily. Psychiatric/Behavioral: Negative for depression and substance abuse. The patient does not have insomnia. Physical Exam   Constitutional: She is oriented to person, place, and time. She appears well-developed and well-nourished. No distress. HENT:   Head: Normocephalic and atraumatic. Mouth/Throat: Normal dentition. Eyes: Right eye exhibits no discharge. Left eye exhibits no discharge. No scleral icterus. Neck: Neck supple. No JVD present. Carotid bruit is not present. No thyromegaly present. Cardiovascular: Normal rate, regular rhythm, S1 normal, S2 normal, normal heart sounds and intact distal pulses. Exam reveals no gallop and no friction rub.    No murmur heard.  Pulmonary/Chest: Effort normal and breath sounds normal. She has no wheezes. She has no rales. Abdominal: Soft. She exhibits no mass. There is no abdominal tenderness. Musculoskeletal:         General: No edema. Lymphadenopathy:        Right cervical: No superficial cervical adenopathy present. Left cervical: No superficial cervical adenopathy present. Neurological: She is alert and oriented to person, place, and time. Skin: Skin is warm and dry. No rash noted. Psychiatric: She has a normal mood and affect. Her behavior is normal.       ASSESSMENT and PLAN    Palpitations are occasional and have not happened in the last 1 month. Even when they happen they were tolerated well hemodynamically. Clinically no abnormal heart sounds or murmurs. Will watch without doing any further cardiac testing. Discussed with patient and she agrees minute. Diet weight and exercise discussed. She will try to lose weight more as she is already doing it. Recommended to try Mediterranean or a healthy vegan diet. Diagnoses and all orders for this visit:    1. Palpitations    2. Obesity, morbid ([address]    3. Anxiety    4. History of asthma    5. Edema, unspecified type    6. CYN on CPAP        Pertinent laboratory and test data reviewed and discussed with patient.   See patient instructions also for other medical advice given    Medications Discontinued During This Encounter   Medication Reason    azelastine (ASTELIN) 137 mcg (0.1 %) nasal spray Therapy Completed    guaiFENesin ER (MUCINEX) 600 mg ER tablet Therapy Completed    lidocaine (LIDODERM) 5 % Therapy Completed    loratadine (CLARITIN) 10 mg tablet Therapy Completed    oxaprozin (DAYPRO) 600 mg tablet Therapy Completed    ergocalciferol (ERGOCALCIFEROL) 50,000 unit capsule     OLANZapine-FLUoxetine (SYMBYAX) 3-25 mg per capsule        Follow-up and Dispositions    · Return in about 3 months (around 1/12/2021), or if symptoms worsen or fail to improve, for Get labs from PCP including lipids.

## 2020-10-12 NOTE — PATIENT INSTRUCTIONS
Medications Discontinued During This Encounter Medication Reason  azelastine (ASTELIN) 137 mcg (0.1 %) nasal spray Therapy Completed  guaiFENesin ER (MUCINEX) 600 mg ER tablet Therapy Completed  lidocaine (LIDODERM) 5 % Therapy Completed  loratadine (CLARITIN) 10 mg tablet Therapy Completed  oxaprozin (DAYPRO) 600 mg tablet Therapy Completed  ergocalciferol (ERGOCALCIFEROL) 50,000 unit capsule  OLANZapine-FLUoxetine (SYMBYAX) 3-25 mg per capsule Body Mass Index: Care Instructions Your Care Instructions Body mass index (BMI) can help you see if your weight is raising your risk for health problems. It uses a formula to compare how much you weigh with how tall you are. · A BMI lower than 18.5 is considered underweight. · A BMI between 18.5 and 24.9 is considered healthy. · A BMI between 25 and 29.9 is considered overweight. A BMI of 30 or higher is considered obese. If your BMI is in the normal range, it means that you have a lower risk for weight-related health problems. If your BMI is in the overweight or obese range, you may be at increased risk for weight-related health problems, such as high blood pressure, heart disease, stroke, arthritis or joint pain, and diabetes. If your BMI is in the underweight range, you may be at increased risk for health problems such as fatigue, lower protection (immunity) against illness, muscle loss, bone loss, hair loss, and hormone problems. BMI is just one measure of your risk for weight-related health problems. You may be at higher risk for health problems if you are not active, you eat an unhealthy diet, or you drink too much alcohol or use tobacco products. Follow-up care is a key part of your treatment and safety. Be sure to make and go to all appointments, and call your doctor if you are having problems. It's also a good idea to know your test results and keep a list of the medicines you take. How can you care for yourself at home? · Practice healthy eating habits. This includes eating plenty of fruits, vegetables, whole grains, lean protein, and low-fat dairy. · If your doctor recommends it, get more exercise. Walking is a good choice. Bit by bit, increase the amount you walk every day. Try for at least 30 minutes on most days of the week. · Do not smoke. Smoking can increase your risk for health problems. If you need help quitting, talk to your doctor about stop-smoking programs and medicines. These can increase your chances of quitting for good. · Limit alcohol to 2 drinks a day for men and 1 drink a day for women. Too much alcohol can cause health problems. If you have a BMI higher than 25 · Your doctor may do other tests to check your risk for weight-related health problems. This may include measuring the distance around your waist. A waist measurement of more than 40 inches in men or 35 inches in women can increase the risk of weight-related health problems. · Talk with your doctor about steps you can take to stay healthy or improve your health. You may need to make lifestyle changes to lose weight and stay healthy, such as changing your diet and getting regular exercise. If you have a BMI lower than 18.5 · Your doctor may do other tests to check your risk for health problems. · Talk with your doctor about steps you can take to stay healthy or improve your health. You may need to make lifestyle changes to gain or maintain weight and stay healthy, such as getting more healthy foods in your diet and doing exercises to build muscle. Where can you learn more? Go to http://www.lopez.com/ Enter S176 in the search box to learn more about \"Body Mass Index: Care Instructions. \" Current as of: December 11, 2019               Content Version: 12.6 © 3077-2507 [employer].   
Care instructions adapted under license by [employer] (which disclaims liability or warranty for this information). If you have questions about a medical condition or this instruction, always ask your healthcare professional. [address] any warranty or liability for your use of this information.

## 2020-10-29 ENCOUNTER — DOCUMENTATION ONLY (OUTPATIENT)
Dept: ORTHOPEDIC SURGERY | Age: 55
End: 2020-10-29

## 2020-10-29 NOTE — PROGRESS NOTES
Patient dropped duty status assessment forms off at the font desk.  Patient can be reached at [phone]

## 2020-11-03 ENCOUNTER — TELEPHONE (OUTPATIENT)
Dept: ORTHOPEDIC SURGERY | Age: 55
End: 2020-11-03

## 2020-11-03 NOTE — PROGRESS NOTES
Spoke with patient in regards to the form she dropped off on 10/29/2020. Patient advised that since Dr. Tonny Lopez has not seen her since January 2020, that she would need to come into the office for a f/u appointment so her form can be filled out accurately. Patient inquired if we could go ahead and put in auth for her Euflexxa to be done in January. Notified patient that some insurances require documentation of x-rays and she has not had any since February 2019. Notified her we could do x-rays at her f/u appointment and put in for auth at that point and she would not have to come back until Euflexxa was approved. Patient verbalized understanding and was transferred to the [facility] to schedule an appointment. Please schedule patient an appointment with Dr. Tonny Lopez for bilateral knees. He has seen her previously for this.

## 2020-11-11 ENCOUNTER — OFFICE VISIT (OUTPATIENT)
Dept: ORTHOPEDIC SURGERY | Age: 55
End: 2020-11-11
Payer: COMMERCIAL

## 2020-11-11 VITALS
HEIGHT: 64 IN | TEMPERATURE: 97 F | WEIGHT: 223.6 LBS | OXYGEN SATURATION: 99 % | HEART RATE: 67 BPM | BODY MASS INDEX: 38.17 KG/M2 | DIASTOLIC BLOOD PRESSURE: 65 MMHG | SYSTOLIC BLOOD PRESSURE: 132 MMHG

## 2020-11-11 DIAGNOSIS — M25.561 CHRONIC PAIN OF RIGHT KNEE: ICD-10-CM

## 2020-11-11 DIAGNOSIS — M25.562 CHRONIC PAIN OF LEFT KNEE: ICD-10-CM

## 2020-11-11 DIAGNOSIS — M17.0 PRIMARY OSTEOARTHRITIS OF BOTH KNEES: Primary | ICD-10-CM

## 2020-11-11 DIAGNOSIS — G89.29 CHRONIC PAIN OF LEFT KNEE: ICD-10-CM

## 2020-11-11 DIAGNOSIS — G89.29 CHRONIC PAIN OF RIGHT KNEE: ICD-10-CM

## 2020-11-11 PROCEDURE — 99214 OFFICE O/P EST MOD 30 MIN: CPT | Performed by: ORTHOPAEDIC SURGERY

## 2020-11-11 PROCEDURE — 73564 X-RAY EXAM KNEE 4 OR MORE: CPT | Performed by: ORTHOPAEDIC SURGERY

## 2020-11-11 NOTE — LETTER
11/11/20 Patient: Wolf Fischer YOB: 1965 Date of Visit: 11/11/2020 Diaz Connell MD 
[place] [address] [address] [id number] [address] [address] VIA Facsimile: [phone] Dear Diaz Connell MD, Thank you for referring Ms. Dc Vazquez to [address] [place] [address] for evaluation. My notes for this consultation are attached. If you have questions, please do not hesitate to call me. I look forward to following your patient along with you.  
 
 
Sincerely, 
 
Bony Jesus MD

## 2020-11-11 NOTE — PROGRESS NOTES
AMBULATORY PROGRESS NOTE      Patient: Jaye Rivers             MRN: [id number]     SSN: [id number] Body mass index is 38.38 kg/m². YOB: 1965     AGE: 63 y.o. EX: female    PCP: Nery Schmidt MD       IMPRESSION //  DIAGNOSIS AND TREATMENT PLAN      DIAGNOSES  1. Primary osteoarthritis of both knees    2. Chronic pain of right knee    3. Chronic pain of left knee        Orders Placed This Encounter    [[id number]] Knee 4V     Order Specific Question:   Reason for Exam     Answer:   pain    [[id number]] Knee 4V     Order Specific Question:   Reason for Exam     Answer:   pain        Plan for her secondary flex injections, to her knees will get approval for this. We will call her when we get the approval.       PLAN:    1. Permanent Restriction Letter to allow pt to wear tennis shoes to work  2. Euflexxa Injections B/L knee    RTO- after Euflexxa authorization      HPI //  OBJECTIVE 93 Ananda Al IS A 63 y.o. female who presents to my outpatient office for evaluation of: chronic pain of bilateral knees. Pt was last evaluated 1/2020 for bilateral osteoarthritis of both knees. Given last Euflexxa injection. Pt states that she has been doing well overall and found a great relief with the Euflexxa injections given at the last OV. She states that she recently had bronchitis and was given Prednisone, which helped with her bilateral knee pain. Pt communicates that she felt great after the steroid, but started getting pains again after the Rx ended. Affirms pain is worse at night.      Visit Vitals  /65 (BP 1 Location: Right arm, BP Patient Position: Sitting)   Pulse 67   Temp 97 °F (36.1 °C) (Temporal)   Ht 5' 4\" (1.626 m)   Wt 223 lb 9.6 oz (101.4 kg)   SpO2 99%   BMI 38.38 kg/m²            OBJECTIVE EXAMINATION     Visit Vitals  /65 (BP 1 Location: Right arm, BP Patient Position: Sitting)   Pulse 67   Temp 97 °F (36.1 °C) (Temporal)   Ht 5' 4\" (1.626 m)   Wt 223 lb 9.6 oz (101.4 kg)   SpO2 99%   BMI 38.38 kg/m²       Appearance: Alert, well appearing and pleasant patient who is in no distress, oriented to person, place/time, and who follows commands. This patient is accompanied in the       office by her  self. Psychiatric: Affect and mood are appropriate. Cardiovascular/Peripheral Vascular: Normal Pulses to each hand and foot  Knees:  both            Gait: normal        Knees:  both         Cutaneous: Skin intact, no abrasions, blisters, wounds, erythema       Effusion: Is not present       Crepitus:  mild PF joint crepitus     Tenderness:Medial joint line        Alignment of Knee: mild VARUS KNEE when standing       ROM: diminished range with pain       Fullness or swelling: None to popliteal fossa region,         Stability: No instability to anterior, posterior, varus, valgus stress testing       Thrust:   No varus thrust with gait       Contractures: No Achilles or Gastrocnemius Contractures. Calf tenderness: Absent for calf or gastrocnemius muscle regions            Soft, supple, non tender, non taut lower extremity compartments  Extremities:   No embolic phenomena to the toes          No significant edema to the foot and or toes. Edema is not present to distal 1/3 tib/fib or ankle regions. Lower extremities are warm and appear well perfused    DVT: No evidence of DVT seen on examination at this time     No calf swelling, no tenderness to calf muscles  Lymphatic:  No Evidence of Lymphedema  Vascular: Medial Border of Tibia Region: Edema is not present        Pulses: Dorsalis Pedis &  Posterior Tibial Pulses : Palpable yes        Varicosities Lower Limbs :   None noted . Neuro: Negative bilateral Straight leg raise (seated position)    See Musculoskeletal section for pertinent individual extremity examination    No abnormal hand/wrist, foot/ankle, or facial/neck tremors.        CHART REVIEW     Patient Active Problem List Diagnosis Code    Obesity, morbid ([facility] E66.01    History of asthma Z87.09    Anxiety F41.9    Palpitations R00.2    CYN on CPAP G47.33, Z99.89    Edema R60.9        Elicia Pike has been experiencing pain and discomfort confirmed as outlined in the pain assessment outlined below. Pain Assessment  11/11/2020   Location of Pain Knee   Pain Location Comment -   Location Modifiers Right;Left   Severity of Pain 2   Quality of Pain Aching   Quality of Pain Comment -   Duration of Pain Persistent   Frequency of Pain Constant   Aggravating Factors (No Data)   Aggravating Factors Comment no   Limiting Behavior No   Relieving Factors Nothing   Relieving Factors Comment -   Result of Injury -        Kenneth Reed  has a past medical history of Asthma. She also has no past medical history of Chronic kidney disease, Diabetes ([facility] [address] Essential hypertension, Hyperlipidemia, Stroke ([facility] or Syncope. Patients is employed at:         Past Medical History:   Diagnosis Date    Asthma      Past Surgical History:   Procedure Laterality Date    HX HYSTERECTOMY      HX PELVIC LAPAROSCOPY      HX TONSILLECTOMY       Current Outpatient Medications   Medication Sig    EPINEPHrine (EpiPen) 0.3 mg/0.3 mL injection 0.3 mg by IntraMUSCular route once as needed for Allergic Response.  DIETARY SUPPLEMENT PO Take  by mouth. Herbal concetrate    DIETARY SUPPLEMENT PO Take  by mouth. Alpha 20C    meloxicam (MOBIC) 7.5 mg tablet Take 1 Tab by mouth daily. (Patient taking differently: Take 7.5 mg by mouth as needed.)    SYMBICORT 160-4.5 mcg/actuation HFAA INHALE 2 PUFFS PO BID    cyclobenzaprine (FLEXERIL) 10 mg tablet Take 1 Tab by mouth three (3) times daily as needed for Muscle Spasm(s). (Patient taking differently: Take 10 mg by mouth as needed for Muscle Spasm(s).)    fexofenadine-pseudoephedrine (ALLEGRA-D 12 HOUR)  mg Tb12 Take 1 Tab by mouth every twelve (12) hours.     OTHER Jacob Rhino Salt Packets    QNASL 80 mcg/actuation HFAA INHALE 1 PUFF IEN BID    ergocalciferol (ERGOCALCIFEROL) 50,000 unit capsule Twice a week    budesonide (PULMICORT) 0.5 mg/2 mL nbsp 500 mcg by Nebulization route.  esomeprazole (NEXIUM) 20 mg capsule Take 40 mg by mouth two (2) times daily as needed.  escitalopram oxalate (LEXAPRO) 20 mg tablet Take 20 mg by mouth daily.  montelukast (SINGULAIR) 10 mg tablet Take 10 mg by mouth daily. No current facility-administered medications for this visit. Allergies   Allergen Reactions    Fluticasone Furoate-Vilanterol Anaphylaxis    Nutritional Supplement-Fiber Anaphylaxis    Sulfa (Sulfonamide Antibiotics) Other (comments) and Shortness of Breath     Other reaction(s): Other (See Comments)  Sinus swells up, wheezing states Pt.  Calcium Other (comments)    Egg Swelling    Gluten Shortness of Breath    Milk Containing Products Other (comments)    Red Dye Swelling     Social History     Occupational History    Not on file   Tobacco Use    Smoking status: Never Smoker    Smokeless tobacco: Never Used   Substance and Sexual Activity    Alcohol use: No    Drug use: No    Sexual activity: Not on file     Family History   Problem Relation Age of Onset    Hypertension Mother     Hypertension Father     Heart Disease Father     Stroke Father 76    Coronary Artery Disease Father 79       THE  FOR Fransico Hess BY Kasi Grandchild 11/11/2020 .       DIAGNOSTIC IMAGING  LAB DATA      No results found for: HBA1C, HGBE8, KVK5TNBD // No results found for: GLU, GLUCPOC     No results found for: VHE2BWRU, [id number]      Lab Results   Component Value Date/Time    VITAMIN D, 25-HYDROXY 21.2 (L) 01/15/2018 02:30 PM         REVIEW OF SYSTEMS : 11/11/2020  ALL BELOW ARE Negative except : SEE HPI     CONSTITUTIONAL: No weight loss  PSYCHOLOGICAL : No Feelings of anxiety, depression, agitation  EYES: No blurred vision and no eye discharge. NO eye pain, double vision  ENT: No nasal discharge. No ear pain. CARDIOVASCULAR: No chest pain and no diaphoresis. RESPIRATORY: No cough, no hemoptysis. GI: No vomiting, no diarrhea   : No urinary frequency and no dysuria. MUSCULOSKELETAL: see HPI  SKIN: No rashes. NEURO:  No dizziness,weakness, headaches// No visual changes or confusion, or seizures,   ENDOCRINE: No polyphagia and no polydipsia. HEMATOLOGY: No bleeding tendencies. DIAGNOSTIC IMAGING       X RAYS AT [address] [place] [address]  11/11/2020    Right KNEE:     WEIGHT BEARING    Bones: No fractures, subluxations, or dislocations  Alignment: mild Varus Knee alignment  Joint: Medial joint with mild OA changes    Soft Tissues: No swelling  Mineralization:suggests Normal Bone    I have personally reviewed the results of the above study. The interpretation of this study is my professional opinion. X RAYS AT [address] [place] [address]  11/11/2020    Left KNEE:     WEIGHT BEARING    Bones: No fractures, subluxations, or dislocations  Alignment: mild Varus Knee alignment  Joint: Medial joint with mild OA changes    Soft Tissues: No swelling  Mineralization:suggests Normal Bone    I have personally reviewed the results of the above study. The interpretation of this study is my professional opinion.          Written by Dereck Lange, as dictated by Alva Garcia MD.

## 2020-12-02 ENCOUNTER — OFFICE VISIT (OUTPATIENT)
Dept: ORTHOPEDIC SURGERY | Age: 55
End: 2020-12-02
Payer: COMMERCIAL

## 2020-12-02 VITALS
DIASTOLIC BLOOD PRESSURE: 75 MMHG | WEIGHT: 225.2 LBS | BODY MASS INDEX: 38.45 KG/M2 | HEART RATE: 60 BPM | RESPIRATION RATE: 16 BRPM | OXYGEN SATURATION: 97 % | SYSTOLIC BLOOD PRESSURE: 140 MMHG | TEMPERATURE: 97 F | HEIGHT: 64 IN

## 2020-12-02 DIAGNOSIS — M17.0 PRIMARY OSTEOARTHRITIS OF BOTH KNEES: Primary | ICD-10-CM

## 2020-12-02 PROCEDURE — 20610 DRAIN/INJ JOINT/BURSA W/O US: CPT | Performed by: PHYSICIAN ASSISTANT

## 2020-12-02 PROCEDURE — 99213 OFFICE O/P EST LOW 20 MIN: CPT | Performed by: PHYSICIAN ASSISTANT

## 2020-12-02 NOTE — PROGRESS NOTES
Patient: Cam Dick                MRN: [id number]       SSN: [id number]  YOB: 1965                               AGE: 63 y.o. SEX: female    Ricardo Beatty MD      HPI:     Patient is a 63 y.o. female who presents today for follow up evaluation of bilateral knee osteoarthritis. At last visit, patients was provided the brochure on Euflexxa injections. She has had the Euflexxa injections in the past (last in January 2020). She has done well with the Euflexxa series in the past which has lasted almost a year for her at this time. Patient presents today for Euflexxa injection #1 of 3 to the bilateral knees. Cam Dick  has a past medical history of Asthma. She also has no past medical history of Chronic kidney disease, Diabetes ([facility] [address] Essential hypertension, Hyperlipidemia, Stroke ([facility] [address] or Syncope. There have been no changes to medical, social or family history since patient was last seen in the office and her pain assessment is confirmed as indicated below:     Pain Assessment  12/2/2020   Location of Pain Knee   Pain Location Comment -   Location Modifiers Left;Right   Severity of Pain 3   Quality of Pain Throbbing;Aching   Quality of Pain Comment stiffness, sore   Duration of Pain A few hours   Frequency of Pain Intermittent   Aggravating Factors Bending;Walking   Aggravating Factors Comment -   Limiting Behavior No   Relieving Factors Elevation   Relieving Factors Comment -   Result of Injury No          PHYSICAL EXAM:       Visit Vitals  BP (!) 140/75 (BP 1 Location: Left arm, BP Patient Position: Sitting)   Pulse 60   Temp 97 °F (36.1 °C) (Temporal)   Resp 16   Ht 5' 4\" (1.626 m)   Wt 225 lb 3.2 oz (102.2 kg)   SpO2 97%   BMI 38.66 kg/m²           Appearance: Alert, well appearing and pleasant patient who is in no distress, oriented to person, place/time, and who follows commands.  This patient is accompanied by self  Psychiatric: Affect and mood are appropriate. Cardiovascular/Peripheral Vascular: Normal Pulses to each hand and foot    Gait: antalgic     Knees: bilateral  Cutaneous: Skin intact, no abrasions, blisters, wounds, erythema  Effusion: Is not present  Crepitus: moderate PF joint crepitus  Tenderness:  mild medial joint line    Alignment of Knee:   mild valgus when standing  ROM: diminished range of motion secondary to pain  Fullness or swelling: None to popliteal fossa region,    Stability: No gross instability noted to anterior, posterior, varus, valgus stress testing  Extremities:    Lower extremities are warm and appear well perfused              DVT: No evidence of DVT seen on examination at this time   No calf swelling, no tenderness to calf muscles  NEUROVASCULAR:  grossly intact. Positive distal pulses and capillary refill. DVT ASSESSMENT:  The calf is not tender to palpation. No evidence of DVT seen on physical exam.    IMPRESSION:       ICD-10-CM ICD-9-CM   1. Primary osteoarthritis of both knees  M17.0 715.16        PAST MEDICAL HISTORY:     Past Medical History:   Diagnosis Date    Asthma        MEDICATIONS:     Current Outpatient Medications   Medication Sig    EPINEPHrine (EpiPen) 0.3 mg/0.3 mL injection 0.3 mg by IntraMUSCular route once as needed for Allergic Response.  DIETARY SUPPLEMENT PO Take  by mouth. Herbal concetrate    DIETARY SUPPLEMENT PO Take  by mouth. Alpha 20C    SYMBICORT 160-4.5 mcg/actuation HFAA INHALE 2 PUFFS PO BID    fexofenadine-pseudoephedrine (ALLEGRA-D 12 HOUR)  mg Tb12 Take 1 Tab by mouth every twelve (12) hours.  OTHER Jacob Rhino Salt Packets    QNASL 80 mcg/actuation HFAA INHALE 1 PUFF IEN BID    ergocalciferol (ERGOCALCIFEROL) 50,000 unit capsule Twice a week    budesonide (PULMICORT) 0.5 mg/2 mL nbsp 500 mcg by Nebulization route.  esomeprazole (NEXIUM) 20 mg capsule Take 40 mg by mouth two (2) times daily as needed.     escitalopram oxalate (LEXAPRO) 20 mg tablet Take 20 mg by mouth daily.  montelukast (SINGULAIR) 10 mg tablet Take 10 mg by mouth daily.  meloxicam (MOBIC) 7.5 mg tablet Take 1 Tab by mouth daily. (Patient taking differently: Take 7.5 mg by mouth as needed.)    cyclobenzaprine (FLEXERIL) 10 mg tablet Take 1 Tab by mouth three (3) times daily as needed for Muscle Spasm(s). (Patient taking differently: Take 10 mg by mouth as needed for Muscle Spasm(s). )     No current facility-administered medications for this visit. ALLERGIES:     Allergies   Allergen Reactions    Fluticasone Furoate-Vilanterol Anaphylaxis    Nutritional Supplement-Fiber Anaphylaxis    Sulfa (Sulfonamide Antibiotics) Other (comments) and Shortness of Breath     Other reaction(s): Other (See Comments)  Sinus swells up, wheezing states Pt.  Calcium Other (comments)    Egg Swelling    Gluten Shortness of Breath    Milk Containing Products Other (comments)    Red Dye Swelling       EUFLEXXA INJECTION PROCEDURE:       Pre-procedure pain assessment:    3/10  Post procedure pain assessment:  3/10      TIME OUT performed immediately prior to start of procedure:   * Patient was identified by Tramaine Lee and 1965  * Agreement on procedure being performed was verified: Euflexxa Injection (2.0 mL) to bilaterally knees  Date of procedure: 12/2/2020  Time of consent: 3:40 pm  Time of time out: 3:45 pm  Procedure performed by: Bronson Sagastume PA-C  Provider assisted by: Medical Staff    Tramaine Lee , is here for the Euflexxa injection into bilateral knees. Patient denies any redness, fevers, shakes, chills, or any reaction from the prior Euflexxa injection. This patient is accompanied in the office by self. The procedure was explained to the patient and possible adverse reactions were discussed. The risks include, but are not limited to infection, bleeding and reactive synovitis.  The patient expressed understanding and wishes to proceed with the procedure. After informed consent, and time out listed above, using sterile technique the bilaterally knee was cleansed with alcohol, sterilized with Betadine and anesthetized with Ethyl Chloride. Then the Euflexxa preparation (2.0 mL)was placed along the anterolateral aspect of the bilaterally knee without difficulty. The patient tolerated the procedure well and has been instructed on post injection care. Band-Aid was applied. Modify activities today   Apply ice (protecting skin) if needed  Follow up in 1 week for Euflexxa #2 of 3 to the bilaterally knee    PLAN:     Orders Placed This Encounter    DRAIN/INJECT LARGE JOINT/BURSA    sodium hyaluronate (SUPARTZ FX/EUFLEXXA/HYALGAN) 10 mg/mL injection syrg 20 mg        Modify activities today   Apply ice (protecting skin) if needed  Remove the band aid in 3 hours, wash site with clean soap, No further dressings there after. Call [phone] if any: pain, redness, drainage, fever, or any concerns/questions that patient may have regarding the injection. Patient was advised on the signs of infection and instructed to go to the ER if it is office after hours. Patient understands treatment plan and has been provided with patient education. Allergies   Allergen Reactions    Fluticasone Furoate-Vilanterol Anaphylaxis    Nutritional Supplement-Fiber Anaphylaxis    Sulfa (Sulfonamide Antibiotics) Other (comments) and Shortness of Breath     Other reaction(s): Other (See Comments)  Sinus swells up, wheezing states Pt.     Calcium Other (comments)    Egg Swelling    Gluten Shortness of Breath    Milk Containing Products Other (comments)    Red Dye Swelling         PAST SURGICAL HISTORY:     Past Surgical History:   Procedure Laterality Date    HX HYSTERECTOMY      HX PELVIC LAPAROSCOPY      HX TONSILLECTOMY         SOCIAL HISTORY:     Social History     Socioeconomic History    Marital status:      Spouse name: Not on file    Number of children: Not on file    Years of education: Not on file    Highest education level: Not on file   Occupational History    Not on file   Social Needs    Financial resource strain: Not on file    Food insecurity     Worry: Not on file     Inability: Not on file    Transportation needs     Medical: Not on file     Non-medical: Not on file   Tobacco Use    Smoking status: Never Smoker    Smokeless tobacco: Never Used   Substance and Sexual Activity    Alcohol use: No    Drug use: No    Sexual activity: Not on file   Lifestyle    Physical activity     Days per week: Not on file     Minutes per session: Not on file    Stress: Not on file   Relationships    Social connections     Talks on phone: Not on file     Gets together: Not on file     Attends Mosque service: Not on file     Active member of club or organization: Not on file     Attends meetings of clubs or organizations: Not on file     Relationship status: Not on file    Intimate partner violence     Fear of current or ex partner: Not on file     Emotionally abused: Not on file     Physically abused: Not on file     Forced sexual activity: Not on file   Other Topics Concern    Not on file   Social History Narrative    Not on file       FAMILY HISTORY:     Family History   Problem Relation Age of Onset    Hypertension Mother     Hypertension Father     Heart Disease Father     Stroke Father 76    Coronary Artery Disease Father 79       REVIEW OF SYSTEMS:     Otherwise as noted in HPI       Cindy Velasquez PA-C  12/2/2020

## 2020-12-10 ENCOUNTER — OFFICE VISIT (OUTPATIENT)
Dept: ORTHOPEDIC SURGERY | Age: 55
End: 2020-12-10

## 2020-12-10 DIAGNOSIS — M17.0 PRIMARY OSTEOARTHRITIS OF BOTH KNEES: Primary | ICD-10-CM

## 2020-12-13 RX ORDER — HYALURONATE SODIUM 10 MG/ML
2 SYRINGE (ML) INTRAARTICULAR ONCE
Qty: 2 ML | Refills: 0
Start: 2020-12-13 | End: 2021-01-19

## 2020-12-16 ENCOUNTER — OFFICE VISIT (OUTPATIENT)
Dept: ORTHOPEDIC SURGERY | Age: 55
End: 2020-12-16
Payer: COMMERCIAL

## 2020-12-16 VITALS
TEMPERATURE: 96.3 F | SYSTOLIC BLOOD PRESSURE: 132 MMHG | BODY MASS INDEX: 38.41 KG/M2 | HEIGHT: 64 IN | OXYGEN SATURATION: 97 % | WEIGHT: 225 LBS | HEART RATE: 61 BPM | DIASTOLIC BLOOD PRESSURE: 61 MMHG

## 2020-12-16 DIAGNOSIS — M25.561 CHRONIC PAIN OF RIGHT KNEE: ICD-10-CM

## 2020-12-16 DIAGNOSIS — G89.29 CHRONIC PAIN OF LEFT KNEE: ICD-10-CM

## 2020-12-16 DIAGNOSIS — M25.562 CHRONIC PAIN OF LEFT KNEE: ICD-10-CM

## 2020-12-16 DIAGNOSIS — M17.0 PRIMARY OSTEOARTHRITIS OF BOTH KNEES: Primary | ICD-10-CM

## 2020-12-16 DIAGNOSIS — G89.29 CHRONIC PAIN OF RIGHT KNEE: ICD-10-CM

## 2020-12-16 PROCEDURE — 20610 DRAIN/INJ JOINT/BURSA W/O US: CPT | Performed by: PHYSICIAN ASSISTANT

## 2020-12-16 PROCEDURE — 99213 OFFICE O/P EST LOW 20 MIN: CPT | Performed by: PHYSICIAN ASSISTANT

## 2020-12-16 NOTE — PROGRESS NOTES
Patient: Jaye Rivers                MRN: [id number]       SSN: [id number]  YOB: 1965                               AGE: 63 y.o. SEX: female    Nora Krause MD      HPI:     Patient is a 63 y.o. female who presents today for follow up evaluation of bilateral knee osteoarthritis. At last visit, patients was provided the brochure on Euflexxa injections. She has had the Euflexxa injections in the past (last in January 2020). She has done well with the Euflexxa series in the past which has lasted almost a year for her at this time. Patient presents today for Euflexxa injection #2 of 3 to the bilateral knees. Jaye Rivers  has a past medical history of Asthma. She also has no past medical history of Chronic kidney disease, Diabetes ([facility] [address] Essential hypertension, Hyperlipidemia, Stroke ([facility] [address] or Syncope. There have been no changes to medical, social or family history since patient was last seen in the office and her pain assessment is confirmed as indicated below:     Pain Assessment  12/16/2020   Location of Pain Knee   Pain Location Comment -   Location Modifiers Right;Left   Severity of Pain -   Quality of Pain Dull;Aching   Quality of Pain Comment -   Duration of Pain Persistent   Frequency of Pain Constant   Aggravating Factors Bending   Aggravating Factors Comment -   Limiting Behavior -   Relieving Factors Other (Comment)   Relieving Factors Comment pain meds   Result of Injury -          PHYSICAL EXAM:       Visit Vitals  /61 (BP 1 Location: Left arm, BP Patient Position: Sitting)   Pulse 61   Temp (!) 96.3 °F (35.7 °C) (Temporal)   Ht 5' 4\" (1.626 m)   Wt 225 lb (102.1 kg)   SpO2 97%   BMI 38.62 kg/m²           Appearance: Alert, well appearing and pleasant patient who is in no distress, oriented to person, place/time, and who follows commands.  This patient is accompanied by self  Psychiatric: Affect and mood are appropriate. Cardiovascular/Peripheral Vascular: Normal Pulses to each hand and foot    Gait: antalgic     Knees: bilateral  Cutaneous: Skin intact, no abrasions, blisters, wounds, erythema  Effusion: Is not present  Crepitus: moderate PF joint crepitus  Tenderness:  mild medial joint line    Alignment of Knee:   mild valgus when standing  ROM: diminished range of motion secondary to pain  Fullness or swelling: None to popliteal fossa region,    Stability: No gross instability noted to anterior, posterior, varus, valgus stress testing  Extremities:    Lower extremities are warm and appear well perfused              DVT: No evidence of DVT seen on examination at this time   No calf swelling, no tenderness to calf muscles  NEUROVASCULAR:  grossly intact. Positive distal pulses and capillary refill. DVT ASSESSMENT:  The calf is not tender to palpation. No evidence of DVT seen on physical exam.    IMPRESSION:       ICD-10-CM ICD-9-CM   1. Primary osteoarthritis of both knees  M17.0 715.16   2. Chronic pain of right knee  M25.561 719.46    G89.29 338.29   3. Chronic pain of left knee  M25.562 719.46    G89.29 338.29        PAST MEDICAL HISTORY:     Past Medical History:   Diagnosis Date    Asthma        MEDICATIONS:     Current Outpatient Medications   Medication Sig    EPINEPHrine (EpiPen) 0.3 mg/0.3 mL injection 0.3 mg by IntraMUSCular route once as needed for Allergic Response.  DIETARY SUPPLEMENT PO Take  by mouth. Herbal concetrate    DIETARY SUPPLEMENT PO Take  by mouth. Alpha 20C    meloxicam (MOBIC) 7.5 mg tablet Take 1 Tab by mouth daily. (Patient taking differently: Take 7.5 mg by mouth as needed.)    SYMBICORT 160-4.5 mcg/actuation HFAA INHALE 2 PUFFS PO BID    cyclobenzaprine (FLEXERIL) 10 mg tablet Take 1 Tab by mouth three (3) times daily as needed for Muscle Spasm(s).  (Patient taking differently: Take 10 mg by mouth as needed for Muscle Spasm(s).)    fexofenadine-pseudoephedrine (ALLEGRA-D 12 HOUR)  mg Tb12 Take 1 Tab by mouth every twelve (12) hours.  OTHER Jacob Rhino Salt Packets    QNASL 80 mcg/actuation HFAA INHALE 1 PUFF IEN BID    ergocalciferol (ERGOCALCIFEROL) 50,000 unit capsule Twice a week    budesonide (PULMICORT) 0.5 mg/2 mL nbsp 500 mcg by Nebulization route.  esomeprazole (NEXIUM) 20 mg capsule Take 40 mg by mouth two (2) times daily as needed.  escitalopram oxalate (LEXAPRO) 20 mg tablet Take 20 mg by mouth daily.  montelukast (SINGULAIR) 10 mg tablet Take 10 mg by mouth daily. No current facility-administered medications for this visit. ALLERGIES:     Allergies   Allergen Reactions    Fluticasone Furoate-Vilanterol Anaphylaxis    Nutritional Supplement-Fiber Anaphylaxis    Sulfa (Sulfonamide Antibiotics) Other (comments) and Shortness of Breath     Other reaction(s): Other (See Comments)  Sinus swells up, wheezing states Pt.  Calcium Other (comments)    Egg Swelling    Gluten Shortness of Breath    Milk Containing Products Other (comments)    Red Dye Swelling       EUFLEXXA INJECTION PROCEDURE:       Pre-procedure pain assessment:    3/10  Post procedure pain assessment:  3/10      TIME OUT performed immediately prior to start of procedure:   * Patient was identified by Rai Huynh and 1965  * Agreement on procedure being performed was verified: Euflexxa Injection (2.0 mL) to bilaterally knees  Date of procedure: 12/16/2020  Time of consent: 4:40 pm  Time of time out: 4:50 pm  Procedure performed by: Lea Mclaughlin PA-C  Provider assisted by: Medical Staff    Rai Huynh , is here for the Euflexxa injection into bilateral knees. Patient denies any redness, fevers, shakes, chills, or any reaction from the prior Euflexxa injection. This patient is accompanied in the office by self. The procedure was explained to the patient and possible adverse reactions were discussed.  The risks include, but are not limited to infection, bleeding and reactive synovitis. The patient expressed understanding and wishes to proceed with the procedure. After informed consent, and time out listed above, using sterile technique the bilaterally knee was cleansed with alcohol, sterilized with Betadine and anesthetized with Ethyl Chloride. Then the Euflexxa preparation (2.0 mL)was placed along the anterolateral aspect of the bilaterally knee without difficulty. The patient tolerated the procedure well and has been instructed on post injection care. Band-Aid was applied. Modify activities today   Apply ice (protecting skin) if needed  Follow up in 1 week for Euflexxa #3 of 3 to the bilaterally knee    PLAN:     No orders of the defined types were placed in this encounter. Modify activities today   Apply ice (protecting skin) if needed  Remove the band aid in 3 hours, wash site with clean soap, No further dressings there after. Call [phone] if any: pain, redness, drainage, fever, or any concerns/questions that patient may have regarding the injection. Patient was advised on the signs of infection and instructed to go to the ER if it is office after hours. Patient understands treatment plan and has been provided with patient education. Allergies   Allergen Reactions    Fluticasone Furoate-Vilanterol Anaphylaxis    Nutritional Supplement-Fiber Anaphylaxis    Sulfa (Sulfonamide Antibiotics) Other (comments) and Shortness of Breath     Other reaction(s): Other (See Comments)  Sinus swells up, wheezing states Pt.     Calcium Other (comments)    Egg Swelling    Gluten Shortness of Breath    Milk Containing Products Other (comments)    Red Dye Swelling         PAST SURGICAL HISTORY:     Past Surgical History:   Procedure Laterality Date    HX HYSTERECTOMY      HX PELVIC LAPAROSCOPY      HX TONSILLECTOMY         SOCIAL HISTORY:     Social History     Socioeconomic History    Marital status:      Spouse name: Not on file    Number of children: Not on file    Years of education: Not on file    Highest education level: Not on file   Occupational History    Not on file   Social Needs    Financial resource strain: Not on file    Food insecurity     Worry: Not on file     Inability: Not on file    Transportation needs     Medical: Not on file     Non-medical: Not on file   Tobacco Use    Smoking status: Never Smoker    Smokeless tobacco: Never Used   Substance and Sexual Activity    Alcohol use: No    Drug use: No    Sexual activity: Not on file   Lifestyle    Physical activity     Days per week: Not on file     Minutes per session: Not on file    Stress: Not on file   Relationships    Social connections     Talks on phone: Not on file     Gets together: Not on file     Attends Shinto service: Not on file     Active member of club or organization: Not on file     Attends meetings of clubs or organizations: Not on file     Relationship status: Not on file    Intimate partner violence     Fear of current or ex partner: Not on file     Emotionally abused: Not on file     Physically abused: Not on file     Forced sexual activity: Not on file   Other Topics Concern    Not on file   Social History Narrative    Not on file       FAMILY HISTORY:     Family History   Problem Relation Age of Onset    Hypertension Mother     Hypertension Father     Heart Disease Father     Stroke Father 76    Coronary Artery Disease Father 79       REVIEW OF SYSTEMS:     Otherwise as noted in HPI       Negro Courtney PA-C  12/16/2020

## 2020-12-21 ENCOUNTER — OFFICE VISIT (OUTPATIENT)
Dept: ORTHOPEDIC SURGERY | Age: 55
End: 2020-12-21
Payer: COMMERCIAL

## 2020-12-21 VITALS
HEIGHT: 64 IN | BODY MASS INDEX: 38.24 KG/M2 | HEART RATE: 63 BPM | WEIGHT: 224 LBS | TEMPERATURE: 97.1 F | RESPIRATION RATE: 16 BRPM | SYSTOLIC BLOOD PRESSURE: 142 MMHG | DIASTOLIC BLOOD PRESSURE: 69 MMHG | OXYGEN SATURATION: 97 %

## 2020-12-21 DIAGNOSIS — M17.0 PRIMARY OSTEOARTHRITIS OF BOTH KNEES: Primary | ICD-10-CM

## 2020-12-21 PROCEDURE — 20611 DRAIN/INJ JOINT/BURSA W/US: CPT | Performed by: PHYSICIAN ASSISTANT

## 2020-12-21 NOTE — PROGRESS NOTES
Patient: Apolinar Baeza                MRN: [id number]       SSN: [id number]  YOB: 1965        AGE: 63 y.o. SEX: female  Body mass index is 38.45 kg/m². PCP: Apryl Aiken MD  12/21/20    Chief Complaint   Patient presents with    Knee Pain     bilateral knee pain       HISTORY:  Apolinar Baeza is a 63 y.o. female who is seen for reevaluation of Bilateral knees and here for 3rd and final injection of Euflexxa. PROCEDURE:  Under ultrasound guidance, patient's Bilateral knees, after timeout under sterile conditions, was injected with 2 cc of Euflexxa. [facility]  OFFICE PROCEDURE PROGRESS NOTE        Chart reviewed for the following:   Ori BAKER PA, have reviewed the History, Physical and updated the Allergic reactions for [address] performed immediately prior to start of procedure:   Ori BAKER PA, have performed the following reviews on Apolinar Baeza prior to the start of the procedure:            * Patient was identified by name and date of birth   * Agreement on procedure being performed was verified  * Risks and Benefits explained to the patient  * Procedure site verified and marked as necessary  * Patient was positioned for comfort  * Consent was signed and verified     Time: 2:00 PM       Date of procedure: 12/21/2020    Procedure performed by:  VERITO Medina    Provider assisted by: None     How tolerated by patient: tolerated the procedure well with no complications    Comments: none    IMPRESSION:     ICD-10-CM ICD-9-CM    1. Primary osteoarthritis of both knees  M17.0 715.16 sodium hyaluronate (SUPARTZ FX/EUFLEXXA/HYALGAN) 10 mg/mL injection syrg 40 mg      DRAIN/INJECT LARGE JOINT/BURSA        PLAN: Ms. Neal Pina has completed her Euflexxa injection series. she will return as needed.       Ana Paula Hallman ([address]) as dictated by Gregg Irizarry PA-C    I, Gregg Irizarry PA-C confirm that all documentation is accurate.     GIOVANNI Smith [facility] Specialist

## 2021-01-08 ENCOUNTER — OFFICE VISIT (OUTPATIENT)
Dept: ORTHOPEDIC SURGERY | Age: 56
End: 2021-01-08
Payer: COMMERCIAL

## 2021-01-08 VITALS
SYSTOLIC BLOOD PRESSURE: 136 MMHG | OXYGEN SATURATION: 97 % | WEIGHT: 224 LBS | RESPIRATION RATE: 18 BRPM | DIASTOLIC BLOOD PRESSURE: 82 MMHG | BODY MASS INDEX: 38.24 KG/M2 | TEMPERATURE: 96.6 F | HEART RATE: 63 BPM | HEIGHT: 64 IN

## 2021-01-08 DIAGNOSIS — G56.03 CARPAL TUNNEL SYNDROME, BILATERAL: ICD-10-CM

## 2021-01-08 DIAGNOSIS — M18.0 PRIMARY OSTEOARTHRITIS OF BOTH FIRST CARPOMETACARPAL JOINTS: Primary | ICD-10-CM

## 2021-01-08 PROCEDURE — 20526 THER INJECTION CARP TUNNEL: CPT | Performed by: ORTHOPAEDIC SURGERY

## 2021-01-08 PROCEDURE — 20600 DRAIN/INJ JOINT/BURSA W/O US: CPT | Performed by: ORTHOPAEDIC SURGERY

## 2021-01-08 PROCEDURE — 99213 OFFICE O/P EST LOW 20 MIN: CPT | Performed by: ORTHOPAEDIC SURGERY

## 2021-01-08 NOTE — PROGRESS NOTES
Sabrina Chiang is a 63 y.o. female right handed . Worker's Compensation and legal considerations: none filed. Vitals:    01/08/21 0809   BP: 136/82   Pulse: 63   Resp: 18   Temp: (!) 96.6 °F (35.9 °C)   SpO2: 97%   Weight: 224 lb (101.6 kg)   Height: 5' 4\" (1.626 m)   PainSc:   5           Chief Complaint   Patient presents with    Hand Pain     bilateral       HPI: Patient returns today requesting bilateral thumb CMC joint injections and bilateral carpal tunnel injections. Her previous injections worked well for her. She would like to consider surgery on the right side in the future. 10/9/2020 HPI: Patient comes in today for bilateral upper extremity EMG follow-up as well as follow-up on her joint injections. She reports her left thumb base is starting to hurt her significantly. Initial HPI: Patient comes in today with complaints of right thumb base pain. She also has a history of bilateral hand numbness and tingling especially at night.   She says she was previously diagnosed with carpal tunnel in the past.    Date of onset: Chronic    Injury: No    Prior Treatment:  Yes: Comment: Bilateral carpal tunnel injections and right thumb CMC and STT joint injections    Numbness/ Tingling: Yes: Comment: Bilateral hands right worse than left      ROS: Review of Systems - General ROS: negative  Psychological ROS: negative  ENT ROS: negative  Allergy and Immunology ROS: negative  Hematological and Lymphatic ROS: negative  Respiratory ROS: no cough, shortness of breath, or wheezing  Cardiovascular ROS: no chest pain or dyspnea on exertion  Gastrointestinal ROS: no abdominal pain, change in bowel habits, or black or bloody stools  Musculoskeletal ROS: positive for - pain in thumb - right and swelling in thumb - right  Neurological ROS: positive for - numbness/tingling  Dermatological ROS: negative    Past Medical History:   Diagnosis Date    Asthma        Past Surgical History: Procedure Laterality Date    HX HYSTERECTOMY      HX PELVIC LAPAROSCOPY      HX TONSILLECTOMY         Current Outpatient Medications   Medication Sig Dispense Refill    EPINEPHrine (EpiPen) 0.3 mg/0.3 mL injection 0.3 mg by IntraMUSCular route once as needed for Allergic Response.  DIETARY SUPPLEMENT PO Take  by mouth. Alpha 20C      meloxicam (MOBIC) 7.5 mg tablet Take 1 Tab by mouth daily. (Patient taking differently: Take 7.5 mg by mouth as needed.) 120 Tab 2    fexofenadine-pseudoephedrine (ALLEGRA-D 12 HOUR)  mg Tb12 Take 1 Tab by mouth every twelve (12) hours.  OTHER Jacob Rhino Salt Packets  99    ergocalciferol (ERGOCALCIFEROL) 50,000 unit capsule Twice a week  0    budesonide (PULMICORT) 0.5 mg/2 mL nbsp 500 mcg by Nebulization route.  esomeprazole (NEXIUM) 20 mg capsule Take 40 mg by mouth two (2) times daily as needed.  escitalopram oxalate (LEXAPRO) 20 mg tablet Take 20 mg by mouth daily.  montelukast (SINGULAIR) 10 mg tablet Take 10 mg by mouth daily.  DIETARY SUPPLEMENT PO Take  by mouth. Herbal concetrate      SYMBICORT 160-4.5 mcg/actuation HFAA INHALE 2 PUFFS PO BID      cyclobenzaprine (FLEXERIL) 10 mg tablet Take 1 Tab by mouth three (3) times daily as needed for Muscle Spasm(s). (Patient taking differently: Take 10 mg by mouth as needed for Muscle Spasm(s).) 30 Tab 0    QNASL 80 mcg/actuation HFAA INHALE 1 PUFF IEN BID  5     Current Facility-Administered Medications   Medication Dose Route Frequency Provider Last Rate Last Admin    triamcinolone acetonide (KENALOG) 10 mg/mL injection 20 mg  20 mg Other ONCE Renny Banda, DO           Allergies   Allergen Reactions    Fluticasone Furoate-Vilanterol Anaphylaxis    Nutritional Supplement-Fiber Anaphylaxis    Sulfa (Sulfonamide Antibiotics) Other (comments) and Shortness of Breath     Other reaction(s): Other (See Comments)  Sinus swells up, wheezing states Pt.     Calcium Other (comments)    Egg Swelling    Gluten Shortness of Breath    Milk Containing Products Other (comments)    Red Dye Swelling           PE:     Physical Exam  Vitals signs and nursing note reviewed. Constitutional:       General: She is not in acute distress. Appearance: Normal appearance. She is not ill-appearing. Eyes:      Pupils: Pupils are equal, round, and reactive to light. Neck:      Musculoskeletal: Normal range of motion. Cardiovascular:      Pulses: Normal pulses. Pulmonary:      Effort: Pulmonary effort is normal. No respiratory distress. Abdominal:      General: Abdomen is flat. Musculoskeletal: Normal range of motion. General: Swelling and tenderness present. No deformity or signs of injury. Right lower leg: No edema. Left lower leg: No edema. Skin:     General: Skin is warm and dry. Capillary Refill: Capillary refill takes less than 2 seconds. Findings: No bruising or erythema. Neurological:      General: No focal deficit present. Mental Status: She is alert and oriented to person, place, and time. Cranial Nerves: No cranial nerve deficit. Sensory: No sensory deficit. Psychiatric:         Mood and Affect: Mood normal.         Behavior: Behavior normal.            Hand:    Examination L Digit(s) R Digit(s)   1st CMC Tenderness +  +    1st CMC Grind +  +    Elder Nodes -  -    Heberden Nodes -  -    A1 Pulley Tenderness -  -    Triggering -  -    UCL Instability -  -    RCL Instability -  -    Lateral Stress Pain -  -    Palmar Cords -  -    Tabletop test -  -    Garrod's Pads -  -     Strength       Pinch Strength         ROM: Full      NEUROVASCULAR    Examination L R Examination L R   Carpal Comp. + + Pronator Comp. - -   Carpal Tinel + + Pronator Tinel - -   Phalen's + + Pronator Stress - -   Cubital Comp. - - Guyon Comp. - -   Cubital Tinel - - Guyon Tinel - -   Elbow Hyperflexion - - Adson's - -   Spurling's - - SC Comp.  - -   PCB Median abn - - SC Tinel - -   Radial Tinel - - IC Comp. - -   Digital Tinel - - IC Tinel - -   Radial 2-Pt WNL WNL Ulnar 2-Pt WNL WNL     Radial Pulse: 2+  Capillary Refill: < 2 sec  Philip: Not Performed  [employer]: Not Performed      NCV & EMG Findings:  Evaluation of the left median motor nerve showed prolonged distal onset latency (5.1 ms). The right median motor nerve showed prolonged distal onset latency (5.5 ms) and reduced amplitude (4.5 mV). The left median sensory and the right median sensory nerves showed prolonged distal peak latency (L4.7, R4.7 ms) and decreased conduction velocity (Wrist-2nd Digit, L30, R30 m/s). All remaining nerves (as indicated in the following tables) were within normal limits. Left vs. Right side comparison data for the median motor nerve indicates abnormal L-R velocity difference (Elbow-Wrist, 51 m/s). The ulnar motor nerve indicates abnormal L-R velocity difference (A Elbow-B Elbow, 18 m/s). All remaining left vs. right side differences were within normal limits.       All examined muscles (as indicated in the following table) showed no evidence of electrical instability.       INTERPRETATION  This is an abnormal electrodiagnostic examination. These findings may be consistent with Moderate median mononeuropathy at the wrist (carpal tunnel syndrome), bilaterally.      There is no electrodiagnostic evidence of cervical radiculopathy, brachial plexopathy, polyneuropathy or other mononeuropathy.         CLINICAL INTERPRETATION  Her electrodiagnostic finding of bilateral carpal tunnel syndrome is consistent with her bilateral hand symptoms.        Imaging:     10/9/2020 plain films of left wrist shows Eaton stage III-IV degenerative changes of the [facility] joint with early degenerative changes of the STT joint. 8/7/2020 plain films of right wrist are positive for degenerative changes about the STT and CMC joints of the thumb. These are consistent with Merleen Bibber stage 3.         ICD-10-CM ICD-9-CM    1. Primary osteoarthritis of both first carpometacarpal joints  M18.0 715.14 triamcinolone acetonide (KENALOG) 10 mg/mL injection 20 mg      DRAIN/INJECT SMALL JOINT/BURSA   2. Carpal tunnel syndrome, bilateral  G56.03 354.0 triamcinolone acetonide (KENALOG) 10 mg/mL injection 20 mg      INJECT CARPAL TUNNEL         Plan:     Repeat bilateral thumb CMC joint injections, bilateral carpal tunnel injections, continue left cool comfort brace. Continue nighttime carpal tunnel braces. Follow-up and Dispositions    · Return in about 3 months (around 4/8/2021) for Reevaluation and possible right-sided surgical discussion. Plan was reviewed with patient, who verbalized agreement and understanding of the plan    [address] [facility] NOTE        Chart reviewed for the following:   Renny BAKER DO, have reviewed the History, Physical and updated the Allergic reactions for [address] performed immediately prior to start of procedure:   Renny BAKER DO, have performed the following reviews on [facility] prior to the start of the procedure:            * Patient was identified by name and date of birth   * Agreement on procedure being performed was verified  * Risks and Benefits explained to the patient  * Procedure site verified and marked as necessary  * Patient was positioned for comfort  * Consent was signed and verified     Time: 08:35 AM      Date of procedure: 1/8/2021    Procedure performed by: Celia Gilbert DO    Provider assisted by: Deborah Edward LPN    Patient assisted by: self    How tolerated by patient: tolerated the procedure well with no complications    Post Procedural Pain Scale: 0 - No Hurt    Comments: none    Procedure:  After consent was obtained, using sterile technique the joint and carpal tunnel was prepped. Local anesthetic used: 1% lidocaine.  Kenalog 5 mg X4 and was then injected and the needle withdrawn. The procedure was well tolerated. The patient is asked to continue to rest the area for a few more days before resuming regular activities. It may be more painful for the first 1-2 days. Watch for fever, or increased swelling or persistent pain in the joint. Call or return to clinic prn if such symptoms occur or there is failure to improve as anticipated.

## 2021-04-09 ENCOUNTER — OFFICE VISIT (OUTPATIENT)
Dept: ORTHOPEDIC SURGERY | Age: 56
End: 2021-04-09
Payer: COMMERCIAL

## 2021-04-09 VITALS
TEMPERATURE: 97.2 F | BODY MASS INDEX: 39.57 KG/M2 | WEIGHT: 231.8 LBS | HEIGHT: 64 IN | HEART RATE: 62 BPM | OXYGEN SATURATION: 97 %

## 2021-04-09 DIAGNOSIS — M18.0 PRIMARY OSTEOARTHRITIS OF BOTH FIRST CARPOMETACARPAL JOINTS: Primary | ICD-10-CM

## 2021-04-09 DIAGNOSIS — G56.03 CARPAL TUNNEL SYNDROME, BILATERAL: ICD-10-CM

## 2021-04-09 PROCEDURE — 20526 THER INJECTION CARP TUNNEL: CPT | Performed by: ORTHOPAEDIC SURGERY

## 2021-04-09 PROCEDURE — 99213 OFFICE O/P EST LOW 20 MIN: CPT | Performed by: ORTHOPAEDIC SURGERY

## 2021-04-09 PROCEDURE — 20600 DRAIN/INJ JOINT/BURSA W/O US: CPT | Performed by: ORTHOPAEDIC SURGERY

## 2021-04-09 NOTE — PROGRESS NOTES
Jennifer Knight is a 63 y.o. female right handed . Worker's Compensation and legal considerations: none filed. Vitals:    04/09/21 0825   Pulse: 62   Temp: 97.2 °F (36.2 °C)   SpO2: 97%   Weight: 231 lb 12.8 oz (105.1 kg)   Height: 5' 4\" (1.626 m)   PainSc:   5   PainLoc: Hand           Chief Complaint   Patient presents with    Hand Pain     Bilateral       HPI: Patient presents today for follow-up of bilateral thumb CMC joint arthritis and bilateral carpal tunnel syndrome. She has received injections in the past and is requesting injections today. She is not quite ready for surgery for carpal tunnel yet. 1/8/2021 HPI: Patient returns today requesting bilateral thumb CMC joint injections and bilateral carpal tunnel injections. Her previous injections worked well for her. She would like to consider surgery on the right side in the future. 10/9/2020 HPI: Patient comes in today for bilateral upper extremity EMG follow-up as well as follow-up on her joint injections. She reports her left thumb base is starting to hurt her significantly. Initial HPI: Patient comes in today with complaints of right thumb base pain. She also has a history of bilateral hand numbness and tingling especially at night.   She says she was previously diagnosed with carpal tunnel in the past.    Date of onset: Chronic    Injury: No    Prior Treatment:  Yes: Comment: Bilateral carpal tunnel injections and right thumb CMC and STT joint injections    Numbness/ Tingling: Yes: Comment: Bilateral hands right worse than left      ROS: Review of Systems - General ROS: negative  Psychological ROS: negative  ENT ROS: negative  Allergy and Immunology ROS: negative  Hematological and Lymphatic ROS: negative  Respiratory ROS: no cough, shortness of breath, or wheezing  Cardiovascular ROS: no chest pain or dyspnea on exertion  Gastrointestinal ROS: no abdominal pain, change in bowel habits, or black or bloody stools  Musculoskeletal ROS: positive for - pain in thumb - right and swelling in thumb - right  Neurological ROS: positive for - numbness/tingling  Dermatological ROS: negative    Past Medical History:   Diagnosis Date    Asthma        Past Surgical History:   Procedure Laterality Date    HX HYSTERECTOMY      HX PELVIC LAPAROSCOPY      HX TONSILLECTOMY         Current Outpatient Medications   Medication Sig Dispense Refill    EPINEPHrine (EpiPen) 0.3 mg/0.3 mL injection 0.3 mg by IntraMUSCular route once as needed for Allergic Response.  DIETARY SUPPLEMENT PO Take  by mouth. Herbal concetrate      DIETARY SUPPLEMENT PO Take  by mouth. Alpha 20C      meloxicam (MOBIC) 7.5 mg tablet Take 1 Tab by mouth daily. (Patient taking differently: Take 7.5 mg by mouth as needed.) 120 Tab 2    SYMBICORT 160-4.5 mcg/actuation HFAA INHALE 2 PUFFS PO BID      cyclobenzaprine (FLEXERIL) 10 mg tablet Take 1 Tab by mouth three (3) times daily as needed for Muscle Spasm(s). (Patient taking differently: Take 10 mg by mouth as needed for Muscle Spasm(s).) 30 Tab 0    fexofenadine-pseudoephedrine (ALLEGRA-D 12 HOUR)  mg Tb12 Take 1 Tab by mouth every twelve (12) hours.  OTHER Jacob Rhino Salt Packets  99    QNASL 80 mcg/actuation HFAA INHALE 1 PUFF IEN BID  5    ergocalciferol (ERGOCALCIFEROL) 50,000 unit capsule Twice a week  0    budesonide (PULMICORT) 0.5 mg/2 mL nbsp 500 mcg by Nebulization route.  esomeprazole (NEXIUM) 20 mg capsule Take 40 mg by mouth two (2) times daily as needed.  escitalopram oxalate (LEXAPRO) 20 mg tablet Take 20 mg by mouth daily.  montelukast (SINGULAIR) 10 mg tablet Take 10 mg by mouth daily. Allergies   Allergen Reactions    Fluticasone Furoate-Vilanterol Anaphylaxis    Nutritional Supplement-Fiber Anaphylaxis    Sulfa (Sulfonamide Antibiotics) Other (comments) and Shortness of Breath     Other reaction(s):  Other (See Comments)  Sinus swells up, wheezing states Pt.  Calcium Other (comments)    Egg Swelling    Gluten Shortness of Breath    Milk Containing Products Other (comments)    Red Dye Swelling           PE:     Physical Exam  Vitals signs and nursing note reviewed. Constitutional:       General: She is not in acute distress. Appearance: Normal appearance. She is not ill-appearing. Neck:      Musculoskeletal: Normal range of motion. Cardiovascular:      Pulses: Normal pulses. Pulmonary:      Effort: Pulmonary effort is normal.   Musculoskeletal: Normal range of motion. General: Swelling and tenderness present. No deformity or signs of injury. Right lower leg: No edema. Left lower leg: No edema. Skin:     General: Skin is warm and dry. Capillary Refill: Capillary refill takes less than 2 seconds. Findings: No bruising or erythema. Neurological:      General: No focal deficit present. Mental Status: She is alert and oriented to person, place, and time. Cranial Nerves: No cranial nerve deficit. Sensory: No sensory deficit. Psychiatric:         Mood and Affect: Mood normal.         Behavior: Behavior normal.            Hand:    Examination L Digit(s) R Digit(s)   1st CMC Tenderness +  +    1st CMC Grind +  +    Elder Nodes -  -    Heberden Nodes -  -    A1 Pulley Tenderness -  -    Triggering -  -    UCL Instability -  -    RCL Instability -  -    Lateral Stress Pain -  -    Palmar Cords -  -    Tabletop test -  -    Garrod's Pads -  -     Strength       Pinch Strength         ROM: Full      NEUROVASCULAR    Examination L R Examination L R   Carpal Comp. + + Pronator Comp. - -   Carpal Tinel + + Pronator Tinel - -   Phalen's + + Pronator Stress - -   Cubital Comp. - - Guyon Comp. - -   Cubital Tinel - - Guyon Tinel - -   Elbow Hyperflexion - - Adson's - -   Spurling's - - SC Comp. - -   PCB Median abn - - SC Tinel - -   Radial Tinel - - IC Comp.  - -   Digital Tinel - - IC Tinel - - Radial 2-Pt WNL WNL Ulnar 2-Pt WNL WNL     Radial Pulse: 2+  Capillary Refill: < 2 sec  Philip: Not Performed  [employer]: Not Performed      NCV & EMG Findings:  Evaluation of the left median motor nerve showed prolonged distal onset latency (5.1 ms). The right median motor nerve showed prolonged distal onset latency (5.5 ms) and reduced amplitude (4.5 mV). The left median sensory and the right median sensory nerves showed prolonged distal peak latency (L4.7, R4.7 ms) and decreased conduction velocity (Wrist-2nd Digit, L30, R30 m/s). All remaining nerves (as indicated in the following tables) were within normal limits. Left vs. Right side comparison data for the median motor nerve indicates abnormal L-R velocity difference (Elbow-Wrist, 51 m/s). The ulnar motor nerve indicates abnormal L-R velocity difference (A Elbow-B Elbow, 18 m/s). All remaining left vs. right side differences were within normal limits.       All examined muscles (as indicated in the following table) showed no evidence of electrical instability.       INTERPRETATION  This is an abnormal electrodiagnostic examination. These findings may be consistent with Moderate median mononeuropathy at the wrist (carpal tunnel syndrome), bilaterally.      There is no electrodiagnostic evidence of cervical radiculopathy, brachial plexopathy, polyneuropathy or other mononeuropathy.         CLINICAL INTERPRETATION  Her electrodiagnostic finding of bilateral carpal tunnel syndrome is consistent with her bilateral hand symptoms.        Imaging:     10/9/2020 plain films of left wrist shows Eaton stage III-IV degenerative changes of the [facility] joint with early degenerative changes of the STT joint. 8/7/2020 plain films of right wrist are positive for degenerative changes about the STT and CMC joints of the thumb. These are consistent with Artelia Baptise stage 3. ICD-10-CM ICD-9-CM    1.  Primary osteoarthritis of both first carpometacarpal joints  M18.0 715.14 triamcinolone acetonide (KENALOG) 10 mg/mL injection 20 mg      DRAIN/INJECT SMALL JOINT/BURSA   2. Carpal tunnel syndrome, bilateral  G56.03 354.0 triamcinolone acetonide (KENALOG) 10 mg/mL injection 20 mg      INJECT CARPAL TUNNEL         Plan:     Repeat bilateral thumb CMC joint injections, bilateral carpal tunnel injections, continue left cool comfort brace. Continue nighttime carpal tunnel braces. Discussed the need for intervention for the carpal tunnel sooner than later with possibility of also operating for her Aia 16 arthritis. Follow-up and Dispositions    · Return in about 2 months (around 6/9/2021) for Reevaluation and surgical discussion. .          Plan was reviewed with patient, who verbalized agreement and understanding of the plan    [address] NOTE        Chart reviewed for the following:   Renny BAKER DO, have reviewed the History, Physical and updated the Allergic reactions for [address] performed immediately prior to start of procedure:   Renny BAKER DO, have performed the [facility] prior to the start of the procedure:            * Patient was identified by name and date of birth   * Agreement on procedure being performed was verified  * Risks and Benefits explained to the patient  * Procedure site verified and marked as necessary  * Patient was positioned for comfort  * Consent was signed and verified     Time: 08:49 AM      Date of procedure: 4/9/2021    Procedure performed by:   Oneil Moreira DO    Provider assisted by: Noemi Dior LPN    Patient assisted by: self    How tolerated by patient: tolerated the procedure well with no complications    Post Procedural Pain Scale: 0 - No Hurt    Comments: none    Procedure:  After consent was obtained, using sterile technique the bilateral thumb CMC joint and bilateral carpal tunnel was prepped. Local anesthetic used: 1% lidocaine. Kenalog 5 mg X4 and was then injected and the needle withdrawn. The procedure was well tolerated. The patient is asked to continue to rest the area for a few more days before resuming regular activities. It may be more painful for the first 1-2 days. Watch for fever, or increased swelling or persistent pain in the joint. Call or return to clinic prn if such symptoms occur or there is failure to improve as anticipated.

## 2021-04-16 DIAGNOSIS — M25.512 CHRONIC LEFT SHOULDER PAIN: ICD-10-CM

## 2021-04-16 DIAGNOSIS — G89.29 CHRONIC LEFT SHOULDER PAIN: ICD-10-CM

## 2021-04-16 RX ORDER — MELOXICAM 7.5 MG/1
7.5 TABLET ORAL
Qty: 90 TAB | Refills: 2 | Status: SHIPPED | OUTPATIENT
Start: 2021-04-16 | End: 2022-01-11

## 2021-06-11 ENCOUNTER — OFFICE VISIT (OUTPATIENT)
Dept: ORTHOPEDIC SURGERY | Age: 56
End: 2021-06-11
Payer: COMMERCIAL

## 2021-06-11 VITALS
BODY MASS INDEX: 40.7 KG/M2 | TEMPERATURE: 97.5 F | WEIGHT: 238.4 LBS | HEART RATE: 68 BPM | OXYGEN SATURATION: 98 % | HEIGHT: 64 IN

## 2021-06-11 DIAGNOSIS — M18.0 PRIMARY OSTEOARTHRITIS OF BOTH FIRST CARPOMETACARPAL JOINTS: ICD-10-CM

## 2021-06-11 DIAGNOSIS — G56.03 CARPAL TUNNEL SYNDROME, BILATERAL: ICD-10-CM

## 2021-06-11 DIAGNOSIS — G56.01 RIGHT CARPAL TUNNEL SYNDROME: Primary | ICD-10-CM

## 2021-06-11 PROCEDURE — 20600 DRAIN/INJ JOINT/BURSA W/O US: CPT | Performed by: ORTHOPAEDIC SURGERY

## 2021-06-11 PROCEDURE — 99214 OFFICE O/P EST MOD 30 MIN: CPT | Performed by: ORTHOPAEDIC SURGERY

## 2021-06-11 PROCEDURE — 20526 THER INJECTION CARP TUNNEL: CPT | Performed by: ORTHOPAEDIC SURGERY

## 2021-06-11 NOTE — PROGRESS NOTES
Precious Gustafson is a 63 y.o. female right handed . Worker's Compensation and legal considerations: none filed. Vitals:    06/11/21 0811   Pulse: 68   Temp: 97.5 °F (36.4 °C)   TempSrc: Skin   SpO2: 98%   Weight: 238 lb 6.4 oz (108.1 kg)   Height: 5' 4\" (1.626 m)   PainSc:   5   PainLoc: Hand           Chief Complaint   Patient presents with    Hand Pain     bilateral, request injections       HPI: Patient returns today requesting injections for bilateral thumb bases and bilateral carpal tunnels. She was also like to set up surgery for her right carpal tunnel. 4/9/2021 HPI: Patient presents today for follow-up of bilateral thumb CMC joint arthritis and bilateral carpal tunnel syndrome. She has received injections in the past and is requesting injections today. She is not quite ready for surgery for carpal tunnel yet. 1/8/2021 HPI: Patient returns today requesting bilateral thumb CMC joint injections and bilateral carpal tunnel injections. Her previous injections worked well for her. She would like to consider surgery on the right side in the future. 10/9/2020 HPI: Patient comes in today for bilateral upper extremity EMG follow-up as well as follow-up on her joint injections. She reports her left thumb base is starting to hurt her significantly. Initial HPI: Patient comes in today with complaints of right thumb base pain. She also has a history of bilateral hand numbness and tingling especially at night.   She says she was previously diagnosed with carpal tunnel in the past.    Date of onset: Chronic    Injury: No    Prior Treatment:  Yes: Comment: Bilateral carpal tunnel injections and right thumb CMC and STT joint injections    Numbness/ Tingling: Yes: Comment: Bilateral hands right worse than left      ROS: Review of Systems - General ROS: negative  Psychological ROS: negative  ENT ROS: negative  Allergy and Immunology ROS: negative  Hematological and Lymphatic ROS: negative  Respiratory ROS: no cough, shortness of breath, or wheezing  Cardiovascular ROS: no chest pain or dyspnea on exertion  Gastrointestinal ROS: no abdominal pain, change in bowel habits, or black or bloody stools  Musculoskeletal ROS: positive for - pain in thumb - right and swelling in thumb - right  Neurological ROS: positive for - numbness/tingling  Dermatological ROS: negative    Past Medical History:   Diagnosis Date    Asthma     Bilateral hand pain        Past Surgical History:   Procedure Laterality Date    HX HYSTERECTOMY      HX PELVIC LAPAROSCOPY      HX TONSILLECTOMY         Current Outpatient Medications   Medication Sig Dispense Refill    meloxicam (MOBIC) 7.5 mg tablet Take 1 Tab by mouth daily as needed for Pain. 90 Tab 2    EPINEPHrine (EpiPen) 0.3 mg/0.3 mL injection 0.3 mg by IntraMUSCular route once as needed for Allergic Response.  DIETARY SUPPLEMENT PO Take  by mouth. Herbal concetrate      DIETARY SUPPLEMENT PO Take  by mouth. Alpha 20C      SYMBICORT 160-4.5 mcg/actuation HFAA INHALE 2 PUFFS PO BID      cyclobenzaprine (FLEXERIL) 10 mg tablet Take 1 Tab by mouth three (3) times daily as needed for Muscle Spasm(s). (Patient taking differently: Take 10 mg by mouth as needed for Muscle Spasm(s).) 30 Tab 0    fexofenadine-pseudoephedrine (ALLEGRA-D 12 HOUR)  mg Tb12 Take 1 Tab by mouth every twelve (12) hours.  OTHER Jacob Rhino Salt Packets  99    QNASL 80 mcg/actuation HFAA INHALE 1 PUFF IEN BID  5    ergocalciferol (ERGOCALCIFEROL) 50,000 unit capsule Twice a week  0    budesonide (PULMICORT) 0.5 mg/2 mL nbsp 500 mcg by Nebulization route.  esomeprazole (NEXIUM) 20 mg capsule Take 40 mg by mouth two (2) times daily as needed.  escitalopram oxalate (LEXAPRO) 20 mg tablet Take 20 mg by mouth daily.  montelukast (SINGULAIR) 10 mg tablet Take 10 mg by mouth daily.          Allergies   Allergen Reactions    Fluticasone Furoate-Vilanterol Anaphylaxis    Nutritional Supplement-Fiber Anaphylaxis    Sulfa (Sulfonamide Antibiotics) Other (comments) and Shortness of Breath     Other reaction(s): Other (See Comments)  Sinus swells up, wheezing states Pt.  Calcium Other (comments)    Egg Swelling    Gluten Shortness of Breath    Milk Containing Products Other (comments)    Red Dye Swelling           PE:     Physical Exam  Vitals and nursing note reviewed. Constitutional:       General: She is not in acute distress. Appearance: Normal appearance. She is not ill-appearing, toxic-appearing or diaphoretic. HENT:      Head: Normocephalic and atraumatic. Nose: Nose normal.      Mouth/Throat:      Mouth: Mucous membranes are moist.   Eyes:      Extraocular Movements: Extraocular movements intact. Pupils: Pupils are equal, round, and reactive to light. Cardiovascular:      Pulses: Normal pulses. Pulmonary:      Effort: Pulmonary effort is normal. No respiratory distress. Abdominal:      General: Abdomen is flat. There is no distension. Musculoskeletal:         General: Swelling and tenderness present. No deformity or signs of injury. Normal range of motion. Cervical back: Normal range of motion and neck supple. Right lower leg: No edema. Left lower leg: No edema. Skin:     General: Skin is warm and dry. Capillary Refill: Capillary refill takes less than 2 seconds. Findings: No bruising or erythema. Neurological:      General: No focal deficit present. Mental Status: She is alert and oriented to person, place, and time. Cranial Nerves: No cranial nerve deficit. Sensory: No sensory deficit.    Psychiatric:         Mood and Affect: Mood normal.         Behavior: Behavior normal.            Hand:    Examination L Digit(s) R Digit(s)   1st CMC Tenderness +  +    1st CMC Grind +  +    Elder Nodes -  -    Heberden Nodes -  -    A1 Pulley Tenderness -  -    Triggering -  -    UCL Instability - -    RCL Instability -  -    Lateral Stress Pain -  -    Palmar Cords -  -    Tabletop test -  -    Garrod's Pads -  -     Strength       Pinch Strength         ROM: Full      NEUROVASCULAR    Examination L R Examination L R   Carpal Comp. + + Pronator Comp. - -   Carpal Tinel + + Pronator Tinel - -   Phalen's + + Pronator Stress - -   Cubital Comp. - - Guyon Comp. - -   Cubital Tinel - - Guyon Tinel - -   Elbow Hyperflexion - - Adson's - -   Spurling's - - SC Comp. - -   PCB Median abn - - SC Tinel - -   Radial Tinel - - IC Comp. - -   Digital Tinel - - IC Tinel - -   Radial 2-Pt WNL WNL Ulnar 2-Pt WNL WNL     Radial Pulse: 2+  Capillary Refill: < 2 sec  Philip: Not Performed  [employer]: Not Performed      NCV & EMG Findings:  Evaluation of the left median motor nerve showed prolonged distal onset latency (5.1 ms). The right median motor nerve showed prolonged distal onset latency (5.5 ms) and reduced amplitude (4.5 mV). The left median sensory and the right median sensory nerves showed prolonged distal peak latency (L4.7, R4.7 ms) and decreased conduction velocity (Wrist-2nd Digit, L30, R30 m/s). All remaining nerves (as indicated in the following tables) were within normal limits. Left vs. Right side comparison data for the median motor nerve indicates abnormal L-R velocity difference (Elbow-Wrist, 51 m/s). The ulnar motor nerve indicates abnormal L-R velocity difference (A Elbow-B Elbow, 18 m/s). All remaining left vs. right side differences were within normal limits.       All examined muscles (as indicated in the following table) showed no evidence of electrical instability.       INTERPRETATION  This is an abnormal electrodiagnostic examination. These findings may be consistent with Moderate median mononeuropathy at the wrist (carpal tunnel syndrome), bilaterally.      There is no electrodiagnostic evidence of cervical radiculopathy, brachial plexopathy, polyneuropathy or other mononeuropathy.       CLINICAL INTERPRETATION  Her electrodiagnostic finding of bilateral carpal tunnel syndrome is consistent with her bilateral hand symptoms.        Imaging:     10/9/2020 plain films of left wrist shows Eaton stage III-IV degenerative changes of the [facility] joint with early degenerative changes of the STT joint. 8/7/2020 plain films of right wrist are positive for degenerative changes about the STT and CMC joints of the thumb. These are consistent with Chayo Pals stage 3. ICD-10-CM ICD-9-CM    1. Right carpal tunnel syndrome  G56.01 354.0 SCHEDULE SURGERY   2. Primary osteoarthritis of both first carpometacarpal joints  M18.0 715.14 triamcinolone acetonide (KENALOG) 10 mg/mL injection 20 mg      DRAIN/INJECT SMALL JOINT/BURSA   3. Carpal tunnel syndrome, bilateral  G56.03 354.0 triamcinolone acetonide (KENALOG) 10 mg/mL injection 20 mg      INJECT CARPAL TUNNEL         Plan:     Schedule right carpal tunnel release. Bilateral thumb CMC joint injections and bilateral carpal tunnel injections. This procedure has been fully reviewed with the patient and written informed consent has been obtained. The patient was counseled at length about the risks of shari Covid-19 during their perioperative period and any recovery window from their procedure. The patient was made aware that shari Covid-19  may worsen their prognosis for recovering from their procedure and lend to a higher morbidity and/or mortality risk. All material risks, benefits, and reasonable alternatives including postponing the procedure were discussed. The patient does  wish to proceed with the procedure at this time. Follow-up and Dispositions    · Return for 2 weeks postop.           Plan was reviewed with patient, who verbalized agreement and understanding of the plan    [address] [facility] NOTE        Chart reviewed for the following:   Zi BAKER DO, have reviewed the History, Physical and updated the Allergic reactions for [address] performed immediately prior to start of procedure:   Renny BAKER DO, have performed the following reviews on [address] prior to the start of the procedure:            * Patient was identified by name and date of birth   * Agreement on procedure being performed was verified  * Risks and Benefits explained to the patient  * Procedure site verified and marked as necessary  * Patient was positioned for comfort  * Consent was signed and verified     Time: 08:20 AM      Date of procedure: 6/11/2021    Procedure performed by: Donna Carvalho DO    Provider assisted by: Karen Oshea LPN    Patient assisted by: self    How tolerated by patient: tolerated the procedure well with no complications    Post Procedural Pain Scale: 0 - No Hurt    Comments: none    Procedure:  After consent was obtained, using sterile technique the bilateral thumb CMC joint and bilateral carpal tunnel was prepped. Local anesthetic used: 1% lidocaine. Kenalog 5 mg X4 and was then injected and the needle withdrawn. The procedure was well tolerated. The patient is asked to continue to rest the area for a few more days before resuming regular activities. It may be more painful for the first 1-2 days. Watch for fever, or increased swelling or persistent pain in the joint. Call or return to clinic prn if such symptoms occur or there is failure to improve as anticipated.

## 2021-06-28 ENCOUNTER — DOCUMENTATION ONLY (OUTPATIENT)
Dept: ORTHOPEDIC SURGERY | Age: 56
End: 2021-06-28

## 2021-06-28 NOTE — PROGRESS NOTES
Patient's daughter called to cancel surgery scheduled for 7/8/21. Patient is currently hospitalized at [facility] with [facility]. Patient will call back to schedule when ready.

## 2021-09-03 ENCOUNTER — OFFICE VISIT (OUTPATIENT)
Dept: ORTHOPEDIC SURGERY | Age: 56
End: 2021-09-03
Payer: COMMERCIAL

## 2021-09-03 VITALS
WEIGHT: 220 LBS | OXYGEN SATURATION: 98 % | BODY MASS INDEX: 37.76 KG/M2 | TEMPERATURE: 97.1 F | DIASTOLIC BLOOD PRESSURE: 74 MMHG | RESPIRATION RATE: 19 BRPM | HEART RATE: 64 BPM | SYSTOLIC BLOOD PRESSURE: 125 MMHG

## 2021-09-03 DIAGNOSIS — M18.0 PRIMARY OSTEOARTHRITIS OF BOTH FIRST CARPOMETACARPAL JOINTS: Primary | ICD-10-CM

## 2021-09-03 PROCEDURE — 20526 THER INJECTION CARP TUNNEL: CPT | Performed by: ORTHOPAEDIC SURGERY

## 2021-09-03 PROCEDURE — 99214 OFFICE O/P EST MOD 30 MIN: CPT | Performed by: ORTHOPAEDIC SURGERY

## 2021-09-03 PROCEDURE — 20600 DRAIN/INJ JOINT/BURSA W/O US: CPT | Performed by: ORTHOPAEDIC SURGERY

## 2021-09-03 RX ORDER — GUAIFENESIN 600 MG/1
1200 TABLET, EXTENDED RELEASE ORAL
COMMUNITY

## 2021-09-03 RX ORDER — OMEPRAZOLE 40 MG/1
CAPSULE, DELAYED RELEASE ORAL
COMMUNITY
Start: 2021-07-12 | End: 2021-12-22 | Stop reason: ALTCHOICE

## 2021-09-03 RX ORDER — ALBUTEROL SULFATE 0.83 MG/ML
SOLUTION RESPIRATORY (INHALATION)
COMMUNITY
Start: 2021-07-12

## 2021-09-03 RX ORDER — ALBUTEROL SULFATE 0.83 MG/ML
2.5 SOLUTION RESPIRATORY (INHALATION)
COMMUNITY

## 2021-09-03 RX ORDER — MINERAL OIL
180 ENEMA (ML) RECTAL DAILY
COMMUNITY

## 2021-09-03 NOTE — PROGRESS NOTES
Elvin Sparks is a 63 y.o. female right handed . Worker's Compensation and legal considerations: none filed. Vitals:    09/03/21 0842   BP: 125/74   Pulse: 64   Resp: 19   Temp: 97.1 °F (36.2 °C)   SpO2: 98%   Weight: 220 lb (99.8 kg)   PainSc:   4           Chief Complaint   Patient presents with    Wrist Pain       HPI: Patient presents today for follow-up of bilateral thumb CMC arthritis. She is requesting injections today. She was recently admitted to the hospital and discharged due to Covid. She was discharged in July. She is currently on oxygen due to residual pulmonary issues. 6/11/2021 HPI: Patient returns today requesting injections for bilateral thumb bases and bilateral carpal tunnels. She was also like to set up surgery for her right carpal tunnel. 4/9/2021 HPI: Patient presents today for follow-up of bilateral thumb CMC joint arthritis and bilateral carpal tunnel syndrome. She has received injections in the past and is requesting injections today. She is not quite ready for surgery for carpal tunnel yet. 1/8/2021 HPI: Patient returns today requesting bilateral thumb CMC joint injections and bilateral carpal tunnel injections. Her previous injections worked well for her. She would like to consider surgery on the right side in the future. 10/9/2020 HPI: Patient comes in today for bilateral upper extremity EMG follow-up as well as follow-up on her joint injections. She reports her left thumb base is starting to hurt her significantly. Initial HPI: Patient comes in today with complaints of right thumb base pain. She also has a history of bilateral hand numbness and tingling especially at night.   She says she was previously diagnosed with carpal tunnel in the past.    Date of onset: Chronic    Injury: No    Prior Treatment:  Yes: Comment: Bilateral carpal tunnel injections and right thumb CMC and STT joint injections    Numbness/ Tingling: Yes: Comment: Bilateral hands right worse than left      ROS: Review of Systems - General ROS: negative  Psychological ROS: negative  ENT ROS: negative  Allergy and Immunology ROS: negative  Hematological and Lymphatic ROS: negative  Respiratory ROS: no cough, shortness of breath, or wheezing  Cardiovascular ROS: no chest pain or dyspnea on exertion  Gastrointestinal ROS: no abdominal pain, change in bowel habits, or black or bloody stools  Musculoskeletal ROS: positive for - pain in thumb - right and swelling in thumb - right  Neurological ROS: positive for - numbness/tingling  Dermatological ROS: negative    Past Medical History:   Diagnosis Date    Asthma     Bilateral hand pain        Past Surgical History:   Procedure Laterality Date    HX HYSTERECTOMY      HX PELVIC LAPAROSCOPY      HX TONSILLECTOMY         Current Outpatient Medications   Medication Sig Dispense Refill    albuterol (PROVENTIL VENTOLIN) 2.5 mg /3 mL (0.083 %) nebu Take 2.5 mg by inhalation.  albuterol (PROVENTIL VENTOLIN) 2.5 mg /3 mL (0.083 %) nebu INHALE 3 ML AS NEEDED EVERY 6 HOURS      apixaban (ELIQUIS) 5 mg tablet Take 5 mg by mouth two (2) times a day.  fexofenadine (ALLEGRA) 180 mg tablet Take 180 mg by mouth daily.  guaiFENesin ER (MUCINEX) 600 mg ER tablet Take 1,200 mg by mouth.  omeprazole (PRILOSEC) 40 mg capsule TAKE 1 CAPSULE BY MOUTH EVERY DAY 30 MINUTES BEFORE BREAKFAST      meloxicam (MOBIC) 7.5 mg tablet Take 1 Tab by mouth daily as needed for Pain. 90 Tab 2    EPINEPHrine (EpiPen) 0.3 mg/0.3 mL injection 0.3 mg by IntraMUSCular route once as needed for Allergic Response.  DIETARY SUPPLEMENT PO Take  by mouth. Herbal concetrate      SYMBICORT 160-4.5 mcg/actuation HFAA INHALE 2 PUFFS PO BID      cyclobenzaprine (FLEXERIL) 10 mg tablet Take 1 Tab by mouth three (3) times daily as needed for Muscle Spasm(s).  (Patient taking differently: Take 10 mg by mouth as needed for Muscle Spasm(s).) 30 Tab 0  fexofenadine-pseudoephedrine (ALLEGRA-D 12 HOUR)  mg Tb12 Take 1 Tab by mouth every twelve (12) hours.  OTHER Jacob Rhino Salt Packets  99    QNASL 80 mcg/actuation HFAA INHALE 1 PUFF IEN BID  5    ergocalciferol (ERGOCALCIFEROL) 50,000 unit capsule Twice a week  0    budesonide (PULMICORT) 0.5 mg/2 mL nbsp 500 mcg by Nebulization route.  esomeprazole (NEXIUM) 20 mg capsule Take 40 mg by mouth two (2) times daily as needed.  escitalopram oxalate (LEXAPRO) 20 mg tablet Take 20 mg by mouth daily.  montelukast (SINGULAIR) 10 mg tablet Take 10 mg by mouth daily. Allergies   Allergen Reactions    Fluticasone Furoate-Vilanterol Anaphylaxis    Nutritional Supplement-Fiber Anaphylaxis    Sulfa (Sulfonamide Antibiotics) Other (comments) and Shortness of Breath     Other reaction(s): Other (See Comments)  Sinus swells up, wheezing states Pt.  Calcium Other (comments)    Egg Swelling    Gluten Shortness of Breath    Milk Containing Products Other (comments)    Red Dye Swelling           PE:     Physical Exam  Vitals and nursing note reviewed. Constitutional:       General: She is not in acute distress. Appearance: Normal appearance. She is not ill-appearing. Cardiovascular:      Pulses: Normal pulses. Pulmonary:      Effort: Pulmonary effort is normal. No respiratory distress. Musculoskeletal:         General: Swelling and tenderness present. No deformity or signs of injury. Normal range of motion. Cervical back: Normal range of motion and neck supple. Right lower leg: No edema. Left lower leg: No edema. Skin:     General: Skin is warm and dry. Capillary Refill: Capillary refill takes less than 2 seconds. Findings: No bruising or erythema. Neurological:      General: No focal deficit present. Mental Status: She is alert and oriented to person, place, and time. Cranial Nerves: No cranial nerve deficit.       Sensory: No sensory deficit. Psychiatric:         Mood and Affect: Mood normal.         Behavior: Behavior normal.            Hand:    Examination L Digit(s) R Digit(s)   1st CMC Tenderness +  +    1st CMC Grind +  +    Elder Nodes -  -    Heberden Nodes -  -    A1 Pulley Tenderness -  -    Triggering -  -    UCL Instability -  -    RCL Instability -  -    Lateral Stress Pain -  -    Palmar Cords -  -    Tabletop test -  -    Garrod's Pads -  -     Strength       Pinch Strength         ROM: Full      NEUROVASCULAR    Examination L R Examination L R   Carpal Comp. - - Pronator Comp. - -   Carpal Tinel - - Pronator Tinel - -   Phalen's - - Pronator Stress - -   Cubital Comp. - - Guyon Comp. - -   Cubital Tinel - - Guyon Tinel - -   Elbow Hyperflexion - - Adson's - -   Spurling's - - SC Comp. - -   PCB Median abn - - SC Tinel - -   Radial Tinel - - IC Comp. - -   Digital Tinel - - IC Tinel - -   Radial 2-Pt WNL WNL Ulnar 2-Pt WNL WNL     Radial Pulse: 2+  Capillary Refill: < 2 sec  Philip: Not Performed  [employer]: Not Performed      NCV & EMG Findings:  Evaluation of the left median motor nerve showed prolonged distal onset latency (5.1 ms). The right median motor nerve showed prolonged distal onset latency (5.5 ms) and reduced amplitude (4.5 mV). The left median sensory and the right median sensory nerves showed prolonged distal peak latency (L4.7, R4.7 ms) and decreased conduction velocity (Wrist-2nd Digit, L30, R30 m/s). All remaining nerves (as indicated in the following tables) were within normal limits. Left vs. Right side comparison data for the median motor nerve indicates abnormal L-R velocity difference (Elbow-Wrist, 51 m/s). The ulnar motor nerve indicates abnormal L-R velocity difference (A Elbow-B Elbow, 18 m/s). All remaining left vs. right side differences were within normal limits.       All examined muscles (as indicated in the following table) showed no evidence of electrical instability.    INTERPRETATION  This is an abnormal electrodiagnostic examination. These findings may be consistent with Moderate median mononeuropathy at the wrist (carpal tunnel syndrome), bilaterally.      There is no electrodiagnostic evidence of cervical radiculopathy, brachial plexopathy, polyneuropathy or other mononeuropathy.         CLINICAL INTERPRETATION  Her electrodiagnostic finding of bilateral carpal tunnel syndrome is consistent with her bilateral hand symptoms.        Imaging:     10/9/2020 plain films of left wrist shows Eaton stage III-IV degenerative changes of the [facility] joint with early degenerative changes of the STT joint. 8/7/2020 plain films of right wrist are positive for degenerative changes about the STT and CMC joints of the thumb. These are consistent with Semaj Emms stage 3. ICD-10-CM ICD-9-CM    1. Primary osteoarthritis of both first carpometacarpal joints  M18.0 715.14 DRAIN/INJECT SMALL JOINT/BURSA      triamcinolone acetonide (KENALOG) 10 mg/mL injection 10 mg         Plan:     Bilateral thumb CMC joint injections    Continue CMC brace wear and carpal tunnel brace wear at night. We also discussed the possibility of setting her up for surgery in the future once she is no longer on oxygen. Additionally we also discussed the possibility of carpal tunnel injections in the future as needed. Follow-up and Dispositions    · Return if symptoms worsen or fail to improve.           Plan was reviewed with patient, who verbalized agreement and understanding of the plan    [address] [facility] NOTE        Chart reviewed for the following:   IRenny DO, have reviewed the History, Physical and updated the Allergic reactions for [facility] [address] performed immediately prior to start of procedure:   Heather BAKER DO, have performed the following reviews on [facility] prior to the start of the procedure:            * Patient was identified by name and date of birth   * Agreement on procedure being performed was verified  * Risks and Benefits explained to the patient  * Procedure site verified and marked as necessary  * Patient was positioned for comfort  * Consent was signed and verified     Time: 09:00 AM      Date of procedure: 9/3/2021    Procedure performed by: Keshia Borrero DO    Provider assisted by: Otis Dobbins LPN    Patient assisted by: self    How tolerated by patient: tolerated the procedure well with no complications    Post Procedural Pain Scale: 0 - No Hurt    Comments: none    Procedure:  After consent was obtained, using sterile technique the bilateral thumb CMC joint and bilateral carpal tunnel was prepped. Local anesthetic used: 1% lidocaine. Kenalog 5 mg X4 and was then injected and the needle withdrawn. The procedure was well tolerated. The patient is asked to continue to rest the area for a few more days before resuming regular activities. It may be more painful for the first 1-2 days. Watch for fever, or increased swelling or persistent pain in the joint. Call or return to clinic prn if such symptoms occur or there is failure to improve as anticipated.

## 2021-09-07 NOTE — PROGRESS NOTES
AMBULATORY PROGRESS NOTE      Patient: Toby Cates             MRN: [id number]     SSN: [id number] Body mass index is 37.42 kg/m². YOB: 1965     AGE: 63 y.o. EX: female    PCP: Saul Raines MD       IMPRESSION //  DIAGNOSIS AND TREATMENT PLAN        Toby Cates has a diagnosis of:      DIAGNOSES    1. Primary osteoarthritis of both knees        Orders Placed This Encounter    PROCEDURE AUTHORIZATION TO      Euflexxa authorization bilat knee.  DISCONTD: sodium hyaluronate (SUPARTZ FX/EUFLEXXA/HYALGAN) 10 mg/mL injection syrg 20 mg        PLAN:    1. Get authorization for Euflexxa injections       RTO-   after the approval is received for Euflexxa    Toby Cates  expresses understanding of the diagnosis, treatment plan, and all of their proposed questions were answered to their satisfaction. Patient education has been provided re the diagnoses. HPI //  [address] IS A 63 y.o. female who is a/an  established patient, presenting to my outpatient office for evaluation of  the following chief complaint(s):     Chief Complaint   Patient presents with    Knee Pain     bilateral knee  L>R     At LOV pt was last seen and evaluated by my PA, Yunior Jose. Since LOV patient presented bilateral knee pain (L>R). Reports she got COVID in 2021 and almost  she mentions. She was hospitalized at that time, and [facility].  Since then, she uses an oxygen tank as part of her recovery from her COVID infection. She goes to PT. H/o of asthma. Visit Vitals  Pulse 68   Temp 97.5 °F (36.4 °C) (Temporal)   Ht 5' 4\" (1.626 m)   Wt 218 lb (98.9 kg)   SpO2 98%   BMI 37.42 kg/m²       Appearance: Alert, well appearing and pleasant patient who is in no distress, oriented to person, place/time, and who follows commands. This patient is accompanied in the examination room by her  self. There is signs of: no dementia  Psychiatric: Affect/mood are appropriate. Speech normal in context and clarity, memory intact grossly, no involuntary movements - tremors. Patient arrives to office via: with assistive device: oxygen tank  H EENT (2): Head normocephalic & atraumatic. Eye: pupils are round// EOM are intact // Neck: ROM WNL  // Hearings Intact   Respiratory: Breathing non labored     Knees:  Bilateral       Cutaneous: Skin intact, no abrasions, blisters, wounds, erythema       Effusion: Is not present       Crepitus:  mild PF joint crepitus in left knee       Tenderness: Tenderness: Medial joint line        Alignment of Knee: mild varus when standing       ROM: Functional intervention, is present       Fullness or swelling: None to popliteal fossa region,         Stability: No instability to anterior, posterior, varus, valgus stress testing       Neuro: knee flexion, knee extension, plantar flexion and plantar dorsiflexion, Extensor mechanism is intact         CHART REVIEW     Bharathi Herring has been experiencing pain and discomfort confirmed as outlined in the pain assessment outlined below.  was reviewed by Aleksander Carrington MD on 9/8/2021. Pain Assessment  9/8/2021   Location of Pain Knee   Pain Location Comment -   Location Modifiers Left;Right   Severity of Pain 4   Quality of Pain Dull   Quality of Pain Comment -   Duration of Pain -   Frequency of Pain Constant   Aggravating Factors Walking   Aggravating Factors Comment -   Limiting Behavior Yes   Relieving Factors Rest   Relieving Factors Comment -   Result of Injury -        Roelceferinopaolo Rogers  has a past medical history of Asthma and Bilateral hand pain. She also has no past medical history of Chronic kidney disease, Diabetes ([address] Essential hypertension, Hyperlipidemia, Stroke ([facility] [address] or Syncope.      Patients is employed at:         Past Medical History:   Diagnosis Date    Asthma     Bilateral hand pain      Past Surgical History:   Procedure Laterality Date    HX HYSTERECTOMY      HX PELVIC LAPAROSCOPY      HX TONSILLECTOMY       Current Outpatient Medications   Medication Sig    albuterol (PROVENTIL VENTOLIN) 2.5 mg /3 mL (0.083 %) nebu Take 2.5 mg by inhalation.  albuterol (PROVENTIL VENTOLIN) 2.5 mg /3 mL (0.083 %) nebu INHALE 3 ML AS NEEDED EVERY 6 HOURS    apixaban (ELIQUIS) 5 mg tablet Take 5 mg by mouth two (2) times a day.  fexofenadine (ALLEGRA) 180 mg tablet Take 180 mg by mouth daily.  guaiFENesin ER (MUCINEX) 600 mg ER tablet Take 1,200 mg by mouth.  omeprazole (PRILOSEC) 40 mg capsule TAKE 1 CAPSULE BY MOUTH EVERY DAY 30 MINUTES BEFORE BREAKFAST    meloxicam (MOBIC) 7.5 mg tablet Take 1 Tab by mouth daily as needed for Pain.  EPINEPHrine (EpiPen) 0.3 mg/0.3 mL injection 0.3 mg by IntraMUSCular route once as needed for Allergic Response.  DIETARY SUPPLEMENT PO Take  by mouth. Herbal concetrate    SYMBICORT 160-4.5 mcg/actuation HFAA INHALE 2 PUFFS PO BID    cyclobenzaprine (FLEXERIL) 10 mg tablet Take 1 Tab by mouth three (3) times daily as needed for Muscle Spasm(s). (Patient taking differently: Take 10 mg by mouth as needed for Muscle Spasm(s).)    fexofenadine-pseudoephedrine (ALLEGRA-D 12 HOUR)  mg Tb12 Take 1 Tab by mouth every twelve (12) hours.  OTHER Jacob Rhino Salt Packets    QNASL 80 mcg/actuation HFAA INHALE 1 PUFF IEN BID    ergocalciferol (ERGOCALCIFEROL) 50,000 unit capsule Twice a week    budesonide (PULMICORT) 0.5 mg/2 mL nbsp 500 mcg by Nebulization route.  esomeprazole (NEXIUM) 20 mg capsule Take 40 mg by mouth two (2) times daily as needed.  escitalopram oxalate (LEXAPRO) 20 mg tablet Take 20 mg by mouth daily.  montelukast (SINGULAIR) 10 mg tablet Take 10 mg by mouth daily. No current facility-administered medications for this visit.      Allergies   Allergen Reactions    Fluticasone Furoate-Vilanterol Anaphylaxis    Nutritional Supplement-Fiber Anaphylaxis    Sulfa (Sulfonamide Antibiotics) Other (comments) and Shortness of Breath     Other reaction(s): Other (See Comments)  Sinus swells up, wheezing states Pt.  Calcium Other (comments)    Egg Swelling    Gluten Shortness of Breath    Milk Containing Products Other (comments)    Red Dye Swelling     Social History     Occupational History    Not on file   Tobacco Use    Smoking status: Never Smoker    Smokeless tobacco: Never Used   Substance and Sexual Activity    Alcohol use: No    Drug use: No    Sexual activity: Not on file     Family History   Problem Relation Age of Onset    Hypertension Mother     Hypertension Father     Heart Disease Father     Stroke Father 76    Coronary Artery Disease Father 79        DIAGNOSTIC LAB DATA      No results found for: HBA1C, CUW7REWD, BEZ9HRPZ // No results found for: GLU, GLUCPOC     No results found for: YHV2NWXT, GNZ2JVQH      Lab Results   Component Value Date/Time    VITAMIN D, 25-HYDROXY 21.2 (L) 01/15/2018 02:30 PM         REVIEW OF SYSTEMS : 9/8/2021  ALL BELOW ARE Negative except : SEE HPI     All other systems reviewed and are negative. 12 point review of systems otherwise negative unless noted in HPI. DIAGNOSTIC IMAGING /ORDERS       Orders Placed This Encounter    PROCEDURE AUTHORIZATION TO      Euflexxa authorization bilat knee.  DISCONTD: sodium hyaluronate (SUPARTZ FX/EUFLEXXA/HYALGAN) 10 mg/mL injection syrg 20 mg            I have reviewed the results of the above study. The interpretation of this study is my professional opinion. On this date 09/08/2021 I have spent 20 minutes reviewing previous notes, test results and face to face with the patient discussing the diagnosis and importance of compliance with the treatment plan as well as documenting on the day of the visit. An electronic signature was used to authenticate this note.       Disclaimer: Sections of this note are dictated using utilizing voice recognition software, which may have resulted in some phonetic based errors in grammar and contents. Even though attempts were made to correct all the mistakes, some may have been missed, and remained in the body of the document. If questions arise, please contact our department. Jason Russo may have a reminder for a \"due or due soon\" health maintenance. I have asked that she contact her primary care provider for follow-up on this health maintenance.     [facility], as dictated by  Yolanda Yusuf MD  9/8/2021  8:35 AM

## 2021-09-08 ENCOUNTER — OFFICE VISIT (OUTPATIENT)
Dept: ORTHOPEDIC SURGERY | Age: 56
End: 2021-09-08
Payer: COMMERCIAL

## 2021-09-08 VITALS
HEART RATE: 68 BPM | HEIGHT: 64 IN | WEIGHT: 218 LBS | BODY MASS INDEX: 37.22 KG/M2 | OXYGEN SATURATION: 98 % | TEMPERATURE: 97.5 F

## 2021-09-08 DIAGNOSIS — M17.0 PRIMARY OSTEOARTHRITIS OF BOTH KNEES: Primary | ICD-10-CM

## 2021-09-08 PROCEDURE — 99213 OFFICE O/P EST LOW 20 MIN: CPT | Performed by: ORTHOPAEDIC SURGERY

## 2021-11-02 ENCOUNTER — OFFICE VISIT (OUTPATIENT)
Dept: ORTHOPEDIC SURGERY | Age: 56
End: 2021-11-02
Payer: COMMERCIAL

## 2021-11-02 VITALS
HEIGHT: 64 IN | HEART RATE: 74 BPM | OXYGEN SATURATION: 98 % | BODY MASS INDEX: 36.7 KG/M2 | WEIGHT: 215 LBS | TEMPERATURE: 96.8 F

## 2021-11-02 DIAGNOSIS — M17.0 PRIMARY OSTEOARTHRITIS OF BOTH KNEES: Primary | ICD-10-CM

## 2021-11-02 DIAGNOSIS — M54.50 LUMBAR PAIN: ICD-10-CM

## 2021-11-02 PROCEDURE — 20610 DRAIN/INJ JOINT/BURSA W/O US: CPT | Performed by: ORTHOPAEDIC SURGERY

## 2021-11-02 NOTE — LETTER
NOTIFICATION FOR WORK 
 
11/2/2021 2:22 PM 
 
Ms. Darlene Xiong [facility] [phone] To Whom It May Concern: 
 
Darlene Vera is currently under the care of Miesha Isma Somes [facility] Perez. Please allow her to continue wearing tennis shoes to work. If there are questions or concerns please have the patient contact our office.  
 
 
 
Sincerely, 
 
 
Lolly Aguilar MD

## 2021-11-02 NOTE — PROGRESS NOTES
AMBULATORY PROGRESS NOTE      Patient: Mt Wilder             MRN: [id number]     SSN: [id number] Body mass index is 36.9 kg/m². YOB: 1965     AGE: 61 y.o. EX: female    PCP: Johnie Owen MD       IMPRESSION/DIAGNOSIS AND TREATMENT PLAN      DIAGNOSES  1. Primary osteoarthritis of both knees    2. Lumbar pain        Orders Placed This Encounter    DRAIN/INJECT LARGE JOINT/BURSA    REFERRAL TO SPINE SURGERY    sodium hyaluronate (SUPARTZ FX/EUFLEXXA/HYALGAN) 10 mg/mL injection syrg 40 mg      Mt Wilder understands her diagnoses and the proposed plan. PLAN //  HPI AND EXAMINATION      Mt Wilder IS A 61 y.o. female who presents to my outpatient office for evaluation of:           Mt Wilder is here for RTO assessment for an Ortho Dx of     ICD-10-CM ICD-9-CM    1. Primary osteoarthritis of both knees  M17.0 715.16 sodium hyaluronate (SUPARTZ FX/EUFLEXXA/HYALGAN) 10 mg/mL injection syrg 40 mg      DRAIN/INJECT LARGE JOINT/BURSA   2. Lumbar pain  M54.50 724.2 REFERRAL TO SPINE SURGERY   . Since last OV, Mt Wilder states that her is doing about the same overall, is same pain/discomfort, is standing/walking short time duration or distances. Tony Jackson REPORTS after the prior Visco Supplementation Injections: No knee redness or swelling       OBJECTIVE EXAMINATION     Visit Vitals  Pulse 74   Temp 96.8 °F (36 °C) (Temporal)   Ht 5' 4\" (1.626 m)   Wt 215 lb (97.5 kg)   SpO2 98%   BMI 36.90 kg/m²       Appearance: On examination 11/2/2021 , the patient is alert, oriented (name, place, time) and follows commands. she is in no acute distress and her affect and mood are appropriate. Psychiatric: Affect and mood are appropriate.    Cardiovascular/Peripheral Vascular: Normal Pulses to each hand and foot           Knees:  Bilateral        Gait: slow         Cutaneous: Skin intact, no abrasions, blisters, wounds, erythema        Effusion: Is not present         Crepitus:  mild PF joint crepitus       Tenderness:Medial joint line and Lateral joint line            ROM: diminished range of motion        Fullness or swelling: None to popliteal fossa region,          Stability: No instability to anterior, posterior, varus, valgus stress testing           Contractures: No Achilles or Gastrocnemius Contractures. Calf tenderness: Absent for calf or gastrocnemius muscle regions            Soft, supple, non tender, non taut lower extremity compartments  Extremities:   No embolic phenomena to the toes          No significant edema to the foot and or toes. Edema is not present to distal 1/3 tib/fib or ankle regions. Lower extremities are warm and appear well perfused    DVT: No evidence of DVT seen on examination at this time     No calf swelling, no tenderness to calf muscles  Lymphatic:  No Evidence of Lymphedema  Vascular: Medial Border of Tibia Region: Edema is not present        Pulses: Dorsalis Pedis &  Posterior Tibial Pulses : Palpable yes        Varicosities Lower Limbs :   None noted . Neuro: Negative bilateral Straight leg raise (seated position)    See Musculoskeletal section for pertinent individual extremity examination    No abnormal hand/wrist, foot/ankle, or facial/neck tremors. Extensor mechanism is yes intact    CHART REVIEW      Past Medical History:   Diagnosis Date    Asthma     Bilateral hand pain      Current Outpatient Medications   Medication Sig    albuterol (PROVENTIL VENTOLIN) 2.5 mg /3 mL (0.083 %) nebu Take 2.5 mg by inhalation.  albuterol (PROVENTIL VENTOLIN) 2.5 mg /3 mL (0.083 %) nebu INHALE 3 ML AS NEEDED EVERY 6 HOURS    apixaban (ELIQUIS) 5 mg tablet Take 5 mg by mouth two (2) times a day.  fexofenadine (ALLEGRA) 180 mg tablet Take 180 mg by mouth daily.  guaiFENesin ER (MUCINEX) 600 mg ER tablet Take 1,200 mg by mouth.     omeprazole (PRILOSEC) 40 mg capsule TAKE 1 CAPSULE BY MOUTH EVERY DAY 30 MINUTES BEFORE BREAKFAST    meloxicam (MOBIC) 7.5 mg tablet Take 1 Tab by mouth daily as needed for Pain.  EPINEPHrine (EpiPen) 0.3 mg/0.3 mL injection 0.3 mg by IntraMUSCular route once as needed for Allergic Response.  DIETARY SUPPLEMENT PO Take  by mouth. Herbal concetrate    SYMBICORT 160-4.5 mcg/actuation HFAA INHALE 2 PUFFS PO BID    cyclobenzaprine (FLEXERIL) 10 mg tablet Take 1 Tab by mouth three (3) times daily as needed for Muscle Spasm(s). (Patient taking differently: Take 10 mg by mouth as needed for Muscle Spasm(s).)    fexofenadine-pseudoephedrine (ALLEGRA-D 12 HOUR)  mg Tb12 Take 1 Tab by mouth every twelve (12) hours.  OTHER Jacob Rhino Salt Packets    QNASL 80 mcg/actuation HFAA INHALE 1 PUFF IEN BID    ergocalciferol (ERGOCALCIFEROL) 50,000 unit capsule Twice a week    budesonide (PULMICORT) 0.5 mg/2 mL nbsp 500 mcg by Nebulization route.  esomeprazole (NEXIUM) 20 mg capsule Take 40 mg by mouth two (2) times daily as needed.  escitalopram oxalate (LEXAPRO) 20 mg tablet Take 20 mg by mouth daily.  montelukast (SINGULAIR) 10 mg tablet Take 10 mg by mouth daily. Current Facility-Administered Medications   Medication Dose Route Frequency    sodium hyaluronate (SUPARTZ FX/EUFLEXXA/HYALGAN) 10 mg/mL injection syrg 40 mg  40 mg Intra artICUlar ONCE     Allergies   Allergen Reactions    Fluticasone Furoate-Vilanterol Anaphylaxis    Nutritional Supplement-Fiber Anaphylaxis    Sulfa (Sulfonamide Antibiotics) Other (comments) and Shortness of Breath     Other reaction(s): Other (See Comments)  Sinus swells up, wheezing states Pt.     Calcium Other (comments)    Egg Swelling    Gluten Shortness of Breath    Milk Containing Products Other (comments)    Red Dye Swelling     Past Surgical History:   Procedure Laterality Date    HX HYSTERECTOMY      HX PELVIC LAPAROSCOPY      HX TONSILLECTOMY Social History     Occupational History    Not on file   Tobacco Use    Smoking status: Never Smoker    Smokeless tobacco: Never Used   Substance and Sexual Activity    Alcohol use: No    Drug use: No    Sexual activity: Not on file     Family History   Problem Relation Age of Onset    Hypertension Mother     Hypertension Father     Heart Disease Father     Stroke Father 76    Coronary Artery Disease Father 79        REVIEW OF SYSTEMS : 11/2/2021  ALL BELOW ARE Negative except : SEE HPI      Constitutional: Negative for fever, chills and weight loss. Neg Weight Loss  Cardiovascular: Negative for chest pain, claudication and leg swelling. SOB, TALAVERA   Gastrointestinal/Urological: Negative for pain, N/V/D/C, Blood in stool or urine,dysuria,  Hematuria, Incontinence  Endocrine: See HPI  Skin: see HPI  Musculoskeletal: see HPI. Neurological: Negative for dizziness and weakness, headaches,Visual Changes or   Confusion, or Seizures,   Psychiatric/Behavioral: Negative for depression, memory loss and substance abuse. Extremities:  Negative for hair changes, rash or skin lesion changes. Hematologic: Negative for Bleeding problems, bruising, pallor or swollen lymph nodes. Peripheral Vascular: No calf pain, vascular vein tenderness to calf pain              No calf throbbing, posterior knee throbbing pain     DIAGNOSTIC IMAGING         No notes on file        [address], THE FOLLOWING IS TRUE:    Junie Bolivar has a Diagnosis of osteoarthritis and has documentation that the pain interferes with functional activities. Junie Bolivar has documentation of failure to respond  adequately to at least 3 months of conservative therapy: which (Activity modification, home exercise, protective weight bearing, and various analgesics.  NAME is unable to tolerate conservative therapy (prolonged NSAID use) because of adverse effects of NSAIDs (GI irritation, gastric/colonic irritation. Thus visco supplementation is requested as an alternative treatment for knee OA. Cherylene Goltz, MD, have reviewed the History, Physical and updated the Allergic reactions for [address] performed immediately prior to start of procedure:       * Patient was identified by name Joe Cordero and date of birth (1965)  * Agreement on procedure being performed was verified  * Risks and Benefits explained to the patient: see below  * Procedure site verified and marked as necessary  * Patient was positioned for comfort  * Verbal Consent and Written Consent Verified by myself and my office staff. * Complications: None  *  All patient and/or family questions answered     The procedure was explained to the patient and possible adverse reactions were discussed. These risks included, but not limited to: bleeding, infection, septic arthritis, local skin irritation (skin discoloration, hyperpigmentation, hypopigmentation, thinning of the skin, development of a wound, skin necrosis), synovitis, subcutaneous fat pad atrophy, subcutaneous abscess, tendon rupture, transient hyperglycemia. Infection may occur requiring surgical debridement and hospitalization. All Diabetics instructed to check serum blood sugar levels 3 times a day (day of the injection) due to hyperglycemia induced from cortisone injections. If serum blood sugar level > 250 mg%, Joe Cordero instructed to call PCP to determine if any additional measures are needed. Time: 2:39 PM  Date of procedure: 11/2/2021  Procedure performed by: Claudeen Lapidus, MD  Provider assisted by:  MA  Patient accompanied by: self  How tolerated by patient: tolerated the procedure well with no complications    Joe Cordero , is here for the 1st Euflexxa injection into both knee.   Joe Cordero denies any redness, fevers, shakes, chills, or any reaction from the prior Euflexxa injection. This patient is accompanied in the office by her self. A formal time out was conducted by me informing Harsh Cole of the risks and benefits of the infection. The injection was performed 11/2/2021 2:39 PM with sterile technique. The both knee is cleansed with alcohol and then sterilized with Betadine, the medial aspect of the both knee. both  knee was then injected with Euflexxa. she tolerated the procedure well. Band-Aids were applied. The risks of Euflexxa include bleeding, infection, and reactive synovitis. The pain assessment score PRIOR/AFTER to the injection: 4 /10 // 4 /10 pain severity, mild intensity, aching Pain quality. Please see above section of this report. I have personally reviewed the results of the above study. The interpretation of this study is my professional opinion.       Rosana Carson MD  11/2/2021  2:39 PM

## 2021-11-02 NOTE — PROGRESS NOTES
AMBULATORY PROGRESS NOTE Patient: Zack Cortez             MRN: [id number]     SSN: [id number] Body mass index is 36.9 kg/m². YOB: 1965     AGE: 61 y.o. EX: female PCP: Ashley Guerrero MD 
 
 
 IMPRESSION //  DIAGNOSIS AND TREATMENT PLAN Zack Cortez has a diagnosis of: DIAGNOSES 1. Primary osteoarthritis of both knees 2. Lumbar pain No orders of the defined types were placed in this encounter. PLAN: 
 
1. I will administer Euflexxa injections for each of her knees. 2. I will refer her to Dr. Rachel Nunn for her back pain. RTO 1 week There are no Patient Instructions on file for this visit. Please follow up with your PCP for any health maintenance as recommended Zack Cortez  expresses understanding of the diagnosis, treatment plan, and all of their proposed questions were answered to their satisfaction. Patient education has been provided re the diagnoses. HPI //  OBJECTIVE EXAMINATION Zack Cortez IS A 61 y.o. female who is a/an  established patient, presenting to my outpatient office for evaluation of  the following chief complaint(s): 
  
Chief Complaint Patient presents with  Knee Pain  
  both knees 1st inj,  
 
 
*** Visit Vitals Pulse 74 Temp 96.8 °F (36 °C) (Temporal) Ht 5' 4\" (1.626 m) Wt 215 lb (97.5 kg) SpO2 98% BMI 36.90 kg/m² Appearance: Alert, well appearing and pleasant patient who is in no distress, oriented to person, place/time, and who follows commands. Normal dress/motor activity/thought processes/memory. This patient is accompanied in the examination room by her  self. There is signs of: no dementia Patient arrives to office via: without assistive device. Psychiatric:  Normal Affect/mood. Judgement, behavior, and conduct are appropriate.  Speech normal in context and clarity, memory intact grossly, no involuntary movements - tremors. H EENT (2): Head normocephalic & atraumatic. Eye: pupils are round// EOM are intact // Neck: ROM WNL  // Hearings Intact Respiratory: Breathing non labored ANKLE/FOOT {LEFT/RIGHT/BILATERAL:14479} Gait: {gait:20598} Tenderness: {Mild-Mod-Sev:40837449} ***  {foot ankle tenderness:65937} Cutaneous: *** WNL. Joint Motion: *** WNL //  {Exam; ankle abnormals:43651} Joint / Tendon Stability: No Ankle or Subtalar instability or joint laxity. No peroneal sublux ability or dislocation Alignment: {Neutral/varus:65141} Hindfoot, Neuro Motor/Sensory: NL/NL Vascular: NL foot/ankle pulses, Lymphatics: No extremity lymphedema, No calf swelling, no tenderness to calf muscles. CHART REVIEW Sabrina Chiang has been experiencing pain and discomfort confirmed as outlined in the pain assessment outlined below.  was reviewed by Oksana Garay on 11/2/2021. Pain Assessment  11/2/2021 Location of Pain Knee Pain Location Comment - Location Modifiers Right;Left Severity of Pain 2 Quality of Pain Aching Quality of Pain Comment - Duration of Pain - Frequency of Pain - Aggravating Factors - Aggravating Factors Comment - Limiting Behavior -  
Relieving Factors - Relieving Factors Comment - Result of Injury - Sabrina Chiang  has a past medical history of Asthma and Bilateral hand pain. She also has no past medical history of Chronic kidney disease, Diabetes ([facility] [address] Essential hypertension, Hyperlipidemia, Stroke ([facility] [address] or Syncope. Patients is employed at:    
   
Past Medical History:  
Diagnosis Date  Asthma  Bilateral hand pain Past Surgical History:  
Procedure Laterality Date  HX HYSTERECTOMY  HX PELVIC LAPAROSCOPY    
 HX TONSILLECTOMY Current Outpatient Medications Medication Sig  
 albuterol (PROVENTIL VENTOLIN) 2.5 mg /3 mL (0.083 %) nebu Take 2.5 mg by inhalation.  albuterol (PROVENTIL VENTOLIN) 2.5 mg /3 mL (0.083 %) nebu INHALE 3 ML AS NEEDED EVERY 6 HOURS  
 apixaban (ELIQUIS) 5 mg tablet Take 5 mg by mouth two (2) times a day.  fexofenadine (ALLEGRA) 180 mg tablet Take 180 mg by mouth daily.  guaiFENesin ER (MUCINEX) 600 mg ER tablet Take 1,200 mg by mouth.  omeprazole (PRILOSEC) 40 mg capsule TAKE 1 CAPSULE BY MOUTH EVERY DAY 30 MINUTES BEFORE BREAKFAST  meloxicam (MOBIC) 7.5 mg tablet Take 1 Tab by mouth daily as needed for Pain.  EPINEPHrine (EpiPen) 0.3 mg/0.3 mL injection 0.3 mg by IntraMUSCular route once as needed for Allergic Response.  DIETARY SUPPLEMENT PO Take  by mouth. Herbal concetrate  SYMBICORT 160-4.5 mcg/actuation HFAA INHALE 2 PUFFS PO BID  cyclobenzaprine (FLEXERIL) 10 mg tablet Take 1 Tab by mouth three (3) times daily as needed for Muscle Spasm(s). (Patient taking differently: Take 10 mg by mouth as needed for Muscle Spasm(s).)  fexofenadine-pseudoephedrine (ALLEGRA-D 12 HOUR)  mg Tb12 Take 1 Tab by mouth every twelve (12) hours.  OTHER Jacob Rhino Salt Packets  QNASL 80 mcg/actuation HFAA INHALE 1 PUFF IEN BID  ergocalciferol (ERGOCALCIFEROL) 50,000 unit capsule Twice a week  budesonide (PULMICORT) 0.5 mg/2 mL nbsp 500 mcg by Nebulization route.  esomeprazole (NEXIUM) 20 mg capsule Take 40 mg by mouth two (2) times daily as needed.  escitalopram oxalate (LEXAPRO) 20 mg tablet Take 20 mg by mouth daily.  montelukast (SINGULAIR) 10 mg tablet Take 10 mg by mouth daily. No current facility-administered medications for this visit. Allergies Allergen Reactions  Fluticasone Furoate-Vilanterol Anaphylaxis  Nutritional Supplement-Fiber Anaphylaxis  Sulfa (Sulfonamide Antibiotics) Other (comments) and Shortness of Breath Other reaction(s): Other (See Comments) Sinus swells up, wheezing states Pt.  Calcium Other (comments)  Egg Swelling  Gluten Shortness of Breath  Milk Containing Products Other (comments)  Red Dye Swelling Social History Occupational History  Not on file Tobacco Use  Smoking status: Never Smoker  Smokeless tobacco: Never Used Substance and Sexual Activity  Alcohol use: No  
 Drug use: No  
 Sexual activity: Not on file Family History Problem Relation Age of Onset  Hypertension Mother  Hypertension Father  Heart Disease Father [place] Favorite Stroke Father 76  Coronary Artery Disease Father 79 DIAGNOSTIC LAB DATA No results found for: HBA1C, RRF5UXVI, EPM9PSCF // No results found for: GLU, GLUCPOC No results found for: EKI0PUXE, RBE8CDBG Lab Results Component Value Date/Time VITAMIN D, 25-HYDROXY 21.2 (L) 01/15/2018 02:30 PM  
  
 
Drug Screen Most Recent Result Date No resulted procedures found. REVIEW OF SYSTEMS : 11/2/2021  ALL BELOW ARE Negative except : SEE HPI All other systems reviewed and are negative. 12 point review of systems otherwise negative unless noted in HPI. RADIOGRAPHS// IMAGING//DIAGNOSTIC DATA No orders of the defined types were placed in this encounter. *** X-rays, *** views, AP/LAT/OBL completed 11/2/2021 AT [address] [place] [address] 
 
X-rays reveal *** no acute fracture, dislocation or subluxation noted. Overall alignment is *** acceptable. Soft tissue swelling is *** noted. No osteolytic or osteoblastic lesions noted. Mineralization suggests *** osteopenia. Degenerative changes are *** noted. Calcified vessels are *** present. I have personally reviewed the images of the above study. The interpretation of this study is my professional opinion {Time Documentation Optional:90056} Disclaimer:  
 
Sections of this note are dictated using utilizing voice recognition software, which may have resulted in some phonetic based errors in grammar and contents.  Even though attempts were made to correct all the mistakes, some may have been missed, and remained in the body of the document. If questions arise, please contact our department. An electronic signature was used to authenticate this note. Joe Mckinney may have a reminder for a \"due or due soon\" health maintenance. I have asked that she contact her primary care provider for follow-up on this health maintenance. There are no Patient Instructions on file for this visit. Please follow up with your PCP for any health maintenance as recommended Geri Romberg 
11/2/2021 
2:26 PM

## 2021-11-11 ENCOUNTER — OFFICE VISIT (OUTPATIENT)
Dept: ORTHOPEDIC SURGERY | Age: 56
End: 2021-11-11
Payer: COMMERCIAL

## 2021-11-11 VITALS
RESPIRATION RATE: 16 BRPM | HEIGHT: 64 IN | TEMPERATURE: 97.5 F | BODY MASS INDEX: 36.7 KG/M2 | HEART RATE: 64 BPM | OXYGEN SATURATION: 95 % | WEIGHT: 215 LBS

## 2021-11-11 DIAGNOSIS — M17.0 PRIMARY OSTEOARTHRITIS OF BOTH KNEES: Primary | ICD-10-CM

## 2021-11-11 PROCEDURE — 20610 DRAIN/INJ JOINT/BURSA W/O US: CPT | Performed by: PHYSICIAN ASSISTANT

## 2021-11-11 NOTE — PROGRESS NOTES
AMBULATORY PROGRESS NOTE      Patient: Pascale Noel             MRN: [id number]     SSN: [id number] Body mass index is 36.9 kg/m². YOB: 1965     AGE: 61 y.o. EX: female    PCP: Estevan Flores MD       IMPRESSION/DIAGNOSIS AND TREATMENT PLAN        DIAGNOSES  1. Primary osteoarthritis of both knees        Orders Placed This Encounter    DRAIN/INJECT LARGE JOINT/BURSA    sodium hyaluronate (SUPARTZ FX/EUFLEXXA/HYALGAN) 10 mg/mL injection syrg 20 mg      Pascale Noel understands her diagnoses and the proposed plan. PLAN //  HPI AND EXAMINATION      Pascale Noel IS A 61 y.o. female who presents to my outpatient office for evaluation of:     Since I saw her last, she is doing fairly well she tolerated 1st Euflexxa injection, 1 week ago. No ill effects, from the 1st series of Euflexxa injections, to either knee    Pascale Noel is here for RTO assessment for an Ortho Dx of     ICD-10-CM ICD-9-CM    1. Primary osteoarthritis of both knees  M17.0 715.16 sodium hyaluronate (SUPARTZ FX/EUFLEXXA/HYALGAN) 10 mg/mL injection syrg 20 mg      DRAIN/INJECT LARGE JOINT/BURSA   . OBJECTIVE EXAMINATION     Visit Vitals  Pulse 64   Temp 97.5 °F (36.4 °C)   Resp 16   Ht 5' 4\" (1.626 m)   Wt 215 lb (97.5 kg)   SpO2 95%   BMI 36.90 kg/m²       Appearance: On examination 11/11/2021 , the patient is alert, oriented (name, place, time) and follows commands. she is in no acute distress and her affect and mood are appropriate. Psychiatric: Affect and mood are appropriate. Cardiovascular/Peripheral Vascular: Normal Pulses to each hand and foot           Knees:  Bilateral     No redness no erythema no drainage, to either knee. Functional range of motion, is noted to each knee, there is some bilateral mild patellofemoral crepitus, and some bilateral medial joint line tenderness.   No fullness is in either popliteal fossa, no gross instability to either knee. CHART REVIEW      Past Medical History:   Diagnosis Date    Asthma     Bilateral hand pain      Current Outpatient Medications   Medication Sig    albuterol (PROVENTIL VENTOLIN) 2.5 mg /3 mL (0.083 %) nebu Take 2.5 mg by inhalation.  albuterol (PROVENTIL VENTOLIN) 2.5 mg /3 mL (0.083 %) nebu INHALE 3 ML AS NEEDED EVERY 6 HOURS    apixaban (ELIQUIS) 5 mg tablet Take 5 mg by mouth two (2) times a day.  fexofenadine (ALLEGRA) 180 mg tablet Take 180 mg by mouth daily.  guaiFENesin ER (MUCINEX) 600 mg ER tablet Take 1,200 mg by mouth.  omeprazole (PRILOSEC) 40 mg capsule TAKE 1 CAPSULE BY MOUTH EVERY DAY 30 MINUTES BEFORE BREAKFAST    meloxicam (MOBIC) 7.5 mg tablet Take 1 Tab by mouth daily as needed for Pain.  EPINEPHrine (EpiPen) 0.3 mg/0.3 mL injection 0.3 mg by IntraMUSCular route once as needed for Allergic Response.  DIETARY SUPPLEMENT PO Take  by mouth. Herbal concetrate    SYMBICORT 160-4.5 mcg/actuation HFAA INHALE 2 PUFFS PO BID    cyclobenzaprine (FLEXERIL) 10 mg tablet Take 1 Tab by mouth three (3) times daily as needed for Muscle Spasm(s). (Patient taking differently: Take 10 mg by mouth as needed for Muscle Spasm(s).)    fexofenadine-pseudoephedrine (ALLEGRA-D 12 HOUR)  mg Tb12 Take 1 Tab by mouth every twelve (12) hours.  OTHER Jacob Rhino Salt Packets    QNASL 80 mcg/actuation HFAA INHALE 1 PUFF IEN BID    ergocalciferol (ERGOCALCIFEROL) 50,000 unit capsule Twice a week    budesonide (PULMICORT) 0.5 mg/2 mL nbsp 500 mcg by Nebulization route.  esomeprazole (NEXIUM) 20 mg capsule Take 40 mg by mouth two (2) times daily as needed.  escitalopram oxalate (LEXAPRO) 20 mg tablet Take 20 mg by mouth daily.  montelukast (SINGULAIR) 10 mg tablet Take 10 mg by mouth daily.      Current Facility-Administered Medications   Medication Dose Route Frequency    sodium hyaluronate (SUPARTZ FX/EUFLEXXA/HYALGAN) 10 mg/mL injection syrg 20 mg 20 mg Intra artICUlar ONCE     Allergies   Allergen Reactions    Fluticasone Furoate-Vilanterol Anaphylaxis    Nutritional Supplement-Fiber Anaphylaxis    Sulfa (Sulfonamide Antibiotics) Other (comments) and Shortness of Breath     Other reaction(s): Other (See Comments)  Sinus swells up, wheezing states Pt.  Calcium Other (comments)    Egg Swelling    Gluten Shortness of Breath    Milk Containing Products Other (comments)    Red Dye Swelling     Past Surgical History:   Procedure Laterality Date    HX HYSTERECTOMY      HX PELVIC LAPAROSCOPY      HX TONSILLECTOMY       Social History     Occupational History    Not on file   Tobacco Use    Smoking status: Never Smoker    Smokeless tobacco: Never Used   Substance and Sexual Activity    Alcohol use: No    Drug use: No    Sexual activity: Not on file     Family History   Problem Relation Age of Onset    Hypertension Mother     Hypertension Father     Heart Disease Father     Stroke Father 76    Coronary Art Dis Father 79        REVIEW OF SYSTEMS : 11/11/2021  ALL BELOW ARE Negative except : SEE HPI      Constitutional: Negative for fever, chills and weight loss. Neg Weight Loss  Cardiovascular: Negative for chest pain, claudication and leg swelling. SOB, TALAVERA   Gastrointestinal/Urological: Negative for pain, N/V/D/C, Blood in stool or urine,dysuria,  Hematuria, Incontinence  Endocrine: See HPI  Skin: see HPI  Musculoskeletal: see HPI. Neurological: Negative for dizziness and weakness, headaches,Visual Changes or   Confusion, or Seizures,   Psychiatric/Behavioral: Negative for depression, memory loss and substance abuse. Extremities:  Negative for hair changes, rash or skin lesion changes. Hematologic: Negative for Bleeding problems, bruising, pallor or swollen lymph nodes.   Peripheral Vascular: No calf pain, vascular vein tenderness to calf pain              No calf throbbing, posterior knee throbbing pain     DIAGNOSTIC IMAGING         No notes on file        PROCEDURE     FOR Edmar Padron, THE FOLLOWING IS TRUE:    Edmar Padron has a Diagnosis of osteoarthritis and has documentation that the pain interferes with functional activities. Edmar Padron has documentation of failure to respond  adequately to at least 3 months of conservative therapy: which (Activity modification, home exercise, protective weight bearing, and various analgesics. NAME is unable to tolerate conservative therapy (prolonged NSAID use) because of adverse effects of NSAIDs (GI irritation, gastric/colonic irritation. Thus visco supplementation is requested as an alternative treatment for knee OA. Stefanie Godinez MD, have reviewed the History, Physical and updated the Allergic reactions for [address] performed immediately prior to start of procedure:       * Patient was identified by name Edmar Padron and date of birth (1965)  * Agreement on procedure being performed was verified  * Risks and Benefits explained to the patient: see below  * Procedure site verified and marked as necessary  * Patient was positioned for comfort  * Verbal Consent and Written Consent Verified by myself and my office staff. * Complications: None  *  All patient and/or family questions answered     The procedure was explained to the patient and possible adverse reactions were discussed. These risks included, but not limited to: bleeding, infection, septic arthritis, local skin irritation (skin discoloration, hyperpigmentation, hypopigmentation, thinning of the skin, development of a wound, skin necrosis), synovitis, subcutaneous fat pad atrophy, subcutaneous abscess, tendon rupture, transient hyperglycemia. Infection may occur requiring surgical debridement and hospitalization.  All Diabetics instructed to check serum blood sugar levels 3 times a day (day of the injection) due to hyperglycemia induced from cortisone injections. If serum blood sugar level > 250 mg%, Ismael Carver instructed to call PCP to determine if any additional measures are needed. Time: 2:14 PM  Date of procedure: 11/11/2021  Procedure performed by: Luis Joseph MD  Provider assisted by:  MA  Patient accompanied by: self  How tolerated by patient: tolerated the procedure well with no complications    Ismael Carver , is here for the right knee left knee Euflexxa injection into both knee. Ismael Carver denies any redness, fevers, shakes, chills, or any reaction from the prior Euflexxa injection. This patient is accompanied in the office by her self. A formal time out was conducted by me informing Ismael Carver of the risks and benefits of the infection. The injection was performed 11/11/2021 2:14 PM with sterile technique. The both knee is cleansed with alcohol and then sterilized with Betadine, the medial aspect of the both knee. both  knee was then injected with Euflexxa. she tolerated the procedure well. Band-Aids were applied. The risks of Euflexxa include bleeding, infection, and reactive synovitis. The pain assessment score PRIOR/AFTER to the injection: Pain after this right knee injection, and left knee injection, to the point she had to lay down she felt like she was going to throw up. We had her lie down,, in a horizontal position. Place a pillow on either head, placed a cool washcloth, and she remained stable. She remained alert felt much better and the current plan this time is to have her discharge from the office see her again in 1 week for Euflexxa No. 3 injection, to each knee. Please see above section of this report. I have personally reviewed the results of the above study. The interpretation of this study is my professional opinion.       Luis Joseph MD  11/11/2021  2:14 PM

## 2021-11-16 NOTE — PROGRESS NOTES
AMBULATORY PROGRESS NOTE      Patient: Zack Cortez             MRN: [id number]     SSN: [id number] There is no height or weight on file to calculate BMI. YOB: 1965     AGE: 61 y.o. EX: female    PCP: Ashley Guerrero MD       IMPRESSION/DIAGNOSIS AND TREATMENT PLAN      DIAGNOSES  1. Primary osteoarthritis of both knees        Orders Placed This Encounter    DRAIN/INJECT LARGE JOINT/BURSA    sodium hyaluronate (SUPARTZ FX/EUFLEXXA/HYALGAN) 10 mg/mL injection syrg 40 mg      Zack Cortez understands her diagnoses and the proposed plan. PLAN //  HPI AND EXAMINATION      Zack Cortez IS A 61 y.o. female who presents to my outpatient office for evaluation of:           Zack Cortez is here for RTO assessment for an Ortho Dx of     ICD-10-CM ICD-9-CM    1. Primary osteoarthritis of both knees  M17.0 715.16 sodium hyaluronate (SUPARTZ FX/EUFLEXXA/HYALGAN) 10 mg/mL injection syrg 40 mg      DRAIN/INJECT LARGE JOINT/BURSA   . Since last OV, Zack Cortez states that her is doing better overall, is having little to small amount of discomfort, is standing/walking short time duration or distances. Dellabaltazar Jackson REPORTS after the prior Visco Supplementation Injections: No knee redness or swelling    Zack Cortez is not taking medications for pain relief[de-identified]    none. OBJECTIVE EXAMINATION     There were no vitals taken for this visit. Appearance: On examination 11/17/2021 , the patient is alert, oriented (name, place, time) and follows commands. she is in no acute distress and her affect and mood are appropriate. Psychiatric: Affect and mood are appropriate.    Cardiovascular/Peripheral Vascular: Normal Pulses to each hand and foot           Knees:  Bilateral        Gait: slow         Cutaneous: Skin intact, no abrasions, blisters, wounds, erythema        Effusion: Is not present         Crepitus:  mild PF joint crepitus       Tenderness:Medial joint line         ROM: diminished range of motion        Fullness or swelling: None to popliteal fossa region,          Stability: No instability to anterior, posterior, varus, valgus stress testing        Contractures: No Achilles or Gastrocnemius Contractures. Calf tenderness: Absent for calf or gastrocnemius muscle regions            Soft, supple, non tender, non taut lower extremity compartments  Extremities:   No embolic phenomena to the toes          No significant edema to the foot and or toes. Edema is not present to distal 1/3 tib/fib or ankle regions. Lower extremities are warm and appear well perfused    DVT: No evidence of DVT seen on examination at this time     No calf swelling, no tenderness to calf muscles  Lymphatic:  No Evidence of Lymphedema  Vascular: Medial Border of Tibia Region: Edema is not present         Pulses: Dorsalis Pedis &  Posterior Tibial Pulses : Palpable yes        Varicosities Lower Limbs :   None noted . Neuro: Negative bilateral Straight leg raise (seated position)    See Musculoskeletal section for pertinent individual extremity examination    No abnormal hand/wrist, foot/ankle, or facial/neck tremors. Extensor mechanism is yes intact    CHART REVIEW      Past Medical History:   Diagnosis Date    Asthma     Bilateral hand pain      Current Outpatient Medications   Medication Sig    albuterol (PROVENTIL VENTOLIN) 2.5 mg /3 mL (0.083 %) nebu Take 2.5 mg by inhalation.  albuterol (PROVENTIL VENTOLIN) 2.5 mg /3 mL (0.083 %) nebu INHALE 3 ML AS NEEDED EVERY 6 HOURS    apixaban (ELIQUIS) 5 mg tablet Take 5 mg by mouth two (2) times a day.  fexofenadine (ALLEGRA) 180 mg tablet Take 180 mg by mouth daily.  guaiFENesin ER (MUCINEX) 600 mg ER tablet Take 1,200 mg by mouth.     omeprazole (PRILOSEC) 40 mg capsule TAKE 1 CAPSULE BY MOUTH EVERY DAY 30 MINUTES BEFORE BREAKFAST    meloxicam (MOBIC) 7.5 mg tablet Take 1 Tab by mouth daily as needed for Pain.  EPINEPHrine (EpiPen) 0.3 mg/0.3 mL injection 0.3 mg by IntraMUSCular route once as needed for Allergic Response.  DIETARY SUPPLEMENT PO Take  by mouth. Herbal concetrate    SYMBICORT 160-4.5 mcg/actuation HFAA INHALE 2 PUFFS PO BID    cyclobenzaprine (FLEXERIL) 10 mg tablet Take 1 Tab by mouth three (3) times daily as needed for Muscle Spasm(s). (Patient taking differently: Take 10 mg by mouth as needed for Muscle Spasm(s).)    fexofenadine-pseudoephedrine (ALLEGRA-D 12 HOUR)  mg Tb12 Take 1 Tab by mouth every twelve (12) hours.  OTHER Jacob Rhino Salt Packets    QNASL 80 mcg/actuation HFAA INHALE 1 PUFF IEN BID    ergocalciferol (ERGOCALCIFEROL) 50,000 unit capsule Twice a week    budesonide (PULMICORT) 0.5 mg/2 mL nbsp 500 mcg by Nebulization route.  esomeprazole (NEXIUM) 20 mg capsule Take 40 mg by mouth two (2) times daily as needed.  escitalopram oxalate (LEXAPRO) 20 mg tablet Take 20 mg by mouth daily.  montelukast (SINGULAIR) 10 mg tablet Take 10 mg by mouth daily. Current Facility-Administered Medications   Medication Dose Route Frequency    sodium hyaluronate (SUPARTZ FX/EUFLEXXA/HYALGAN) 10 mg/mL injection syrg 40 mg  40 mg Intra artICUlar ONCE     Allergies   Allergen Reactions    Fluticasone Furoate-Vilanterol Anaphylaxis    Nutritional Supplement-Fiber Anaphylaxis    Sulfa (Sulfonamide Antibiotics) Other (comments) and Shortness of Breath     Other reaction(s): Other (See Comments)  Sinus swells up, wheezing states Pt.     Calcium Other (comments)    Egg Swelling    Gluten Shortness of Breath    Milk Containing Products Other (comments)    Red Dye Swelling     Past Surgical History:   Procedure Laterality Date    HX HYSTERECTOMY      HX PELVIC LAPAROSCOPY      HX TONSILLECTOMY       Social History     Occupational History    Not on file   Tobacco Use    Smoking status: Never Smoker    Smokeless tobacco: Never Used   Substance and Sexual Activity    Alcohol use: No    Drug use: No    Sexual activity: Not on file     Family History   Problem Relation Age of Onset    Hypertension Mother     Hypertension Father     Heart Disease Father     Stroke Father 76    Coronary Art Dis Father 79        REVIEW OF SYSTEMS : 11/17/2021  ALL BELOW ARE Negative except : SEE HPI      Constitutional: Negative for fever, chills and weight loss. Neg Weight Loss  Cardiovascular: Negative for chest pain, claudication and leg swelling. SOB, TALAVERA   Gastrointestinal/Urological: Negative for pain, N/V/D/C, Blood in stool or urine,dysuria,  Hematuria, Incontinence  Endocrine: See HPI  Skin: see HPI  Musculoskeletal: see HPI. Neurological: Negative for dizziness and weakness, headaches,Visual Changes or   Confusion, or Seizures,   Psychiatric/Behavioral: Negative for depression, memory loss and substance abuse. Extremities:  Negative for hair changes, rash or skin lesion changes. Hematologic: Negative for Bleeding problems, bruising, pallor or swollen lymph nodes. Peripheral Vascular: No calf pain, vascular vein tenderness to calf pain              No calf throbbing, posterior knee throbbing pain     DIAGNOSTIC IMAGING         No notes on file        [address], THE FOLLOWING IS TRUE:    Meng Hummel has a Diagnosis of osteoarthritis and has documentation that the pain interferes with functional activities. Meng Hummel has documentation of failure to respond  adequately to at least 3 months of conservative therapy: which (Activity modification, home exercise, protective weight bearing, and various analgesics. NAME is unable to tolerate conservative therapy (prolonged NSAID use) because of adverse effects of NSAIDs (GI irritation, gastric/colonic irritation. Thus visco supplementation is requested as an alternative treatment for knee OA.     I, Staci Reed MD, have reviewed the History, Physical and updated the Allergic reactions for [address] performed immediately prior to start of procedure:       * Patient was identified by name Adin Reddy and date of birth (1965)  * Agreement on procedure being performed was verified  * Risks and Benefits explained to the patient: see below  * Procedure site verified and marked as necessary  * Patient was positioned for comfort  * Verbal Consent and Written Consent Verified by myself and my office staff. * Complications: None  *  All patient and/or family questions answered     The procedure was explained to the patient and possible adverse reactions were discussed. These risks included, but not limited to: bleeding, infection, septic arthritis, local skin irritation (skin discoloration, hyperpigmentation, hypopigmentation, thinning of the skin, development of a wound, skin necrosis), synovitis, subcutaneous fat pad atrophy, subcutaneous abscess, tendon rupture, transient hyperglycemia. Infection may occur requiring surgical debridement and hospitalization. All Diabetics instructed to check serum blood sugar levels 3 times a day (day of the injection) due to hyperglycemia induced from cortisone injections. If serum blood sugar level > 250 mg%, Adin Reddy instructed to call PCP to determine if any additional measures are needed. Time: 6:26 PM  Date of procedure: 11/17/2021  Procedure performed by: Staci Reed MD  Provider assisted by:  MA  Patient accompanied by: self  How tolerated by patient: tolerated the procedure well with no complications    Adin Reddy , is here for the 3rd Euflexxa injection into both knee. Adin Reddy denies any redness, fevers, shakes, chills, or any reaction from the prior Euflexxa injection. This patient is accompanied in the office by her self.  A formal time out was conducted by me informing Ravi Fu of the risks and benefits of the infection. The injection was performed 11/17/2021 6:26 PM with sterile technique. The both knee is cleansed with alcohol and then sterilized with Betadine, the medial aspect of the both knee. both  knee was then injected with Euflexxa. she tolerated the procedure well. Band-Aids were applied. The risks of Euflexxa include bleeding, infection, and reactive synovitis. The pain assessment score PRIOR/AFTER to the injection: 2 /10 // 2 /10 pain severity, mild intensity, aching Pain quality. Please see above section of this report. I have personally reviewed the results of the above study. The interpretation of this study is my professional opinion.       Scott Carmichael MD  11/17/2021  6:26 PM

## 2021-11-17 ENCOUNTER — OFFICE VISIT (OUTPATIENT)
Dept: ORTHOPEDIC SURGERY | Age: 56
End: 2021-11-17
Payer: COMMERCIAL

## 2021-11-17 DIAGNOSIS — M17.0 PRIMARY OSTEOARTHRITIS OF BOTH KNEES: Primary | ICD-10-CM

## 2021-11-17 PROCEDURE — 20610 DRAIN/INJ JOINT/BURSA W/O US: CPT | Performed by: ORTHOPAEDIC SURGERY

## 2021-11-22 ENCOUNTER — OFFICE VISIT (OUTPATIENT)
Dept: ORTHOPEDIC SURGERY | Age: 56
End: 2021-11-22
Payer: COMMERCIAL

## 2021-11-22 VITALS — HEART RATE: 84 BPM | TEMPERATURE: 97.1 F | OXYGEN SATURATION: 98 %

## 2021-11-22 DIAGNOSIS — M18.11 PRIMARY OSTEOARTHRITIS OF FIRST CARPOMETACARPAL JOINT OF RIGHT HAND: ICD-10-CM

## 2021-11-22 DIAGNOSIS — G56.03 CARPAL TUNNEL SYNDROME, BILATERAL: Primary | ICD-10-CM

## 2021-11-22 PROCEDURE — 20600 DRAIN/INJ JOINT/BURSA W/O US: CPT | Performed by: ORTHOPAEDIC SURGERY

## 2021-11-22 PROCEDURE — 20526 THER INJECTION CARP TUNNEL: CPT | Performed by: ORTHOPAEDIC SURGERY

## 2021-11-22 PROCEDURE — 99213 OFFICE O/P EST LOW 20 MIN: CPT | Performed by: ORTHOPAEDIC SURGERY

## 2021-12-21 NOTE — PROGRESS NOTES
[facility]  [address], [place]   Phone: [phone]  Fax: [phone]        PROGRESS NOTE      HISTORY OF PRESENT ILLNESS:  The patient is a 61 y.o. female and was seen today for follow up of right shoulder pain. Previously seen for pain in the posterolateral aspect of the left shoulder pain. Previously, she was seen for neck and upper back pain x 10/2019. Additionally, she endorses pain in the LUE at the shoulder to the elbow since 10/2019  when she slept on her arm funny. She denies h/o of shoulder surgery. She denies pain with reaching behind and above. Her pain is exacerbated with lying on her left side or back. Patient received a cortisone injection at the left shoulder on 2/17/2020 with 75% improvement x 3 days. Patient denies previous spinal or shoulder surgery, injections, or recent physical therapy/chiropractic care. Pt denies dropping things or loss of balance. Pt denies change in bowel or bladder habits.  Pt denies fever, weight loss, or skin changes. Patient denies h/o DM. Note from Dr. Latonya Bonilla 5/29/18 indicating he obtained thoracic XRs. She was treated with Tramadol.  Note from Dr. Latonya Bonilla 6/13/18 indicating patient was seen with c/o lumbar spinal stenosis. XRs indicated old compression fractures at T5, T8 and L1. Note from Alfredo Dominguez Loser 53/79/2088 BNGVLNESUS patient was seen with c/o upper back pain, told she has fractures of the thoracic and lumbar spine after falling coming out of the shower on 12/17/2018. She heard two cracks at the time. Pain is primarily in the thoracic spine. Admits she fractured her thoracic spine 30+ years ago. She is doing better since her fall. No neurological symptoms. Note from Dr. Lalit Guardado 4/1/20 indicating patient was seen via virtual visit for c/o left shoulder pain. Indicated she had pain x 6 months with stiffness. Patient reported pain with overhead activity.  She experienced several days relief with cortisone injection. Indicated she had a Dx of left shoulder pain, possible etiology rotator cuff, adhesive capsulitis, glenohumeral arthritis. Provided her meloxicam and recommended PT, which is scheduled to start 4/23/20. Note from Dr. Travis Melendrez dated 5/13/2020 indicating patient had improvement of left shoulder pain and improvement in ROM. She should continue on Meloxicam. F/u in 4 weeks. T Spine CT dated 6/6/2018 revealed, per report: mild to moderate old compression of T8 vertebral body. Mild old compression of T5 vertebral body. Bones are moderately osteopenic throughout thoracic spine. Evidence of moderate, presumably old compression of L1 vertebral body, as described with a CT scan of lumbar spine. Mild-to-moderate multilevel degenerative disc disease in mid and lower thoracic spine. No obvious focal disc bulge or protrusion identified in thoracic spine. No evidence of compromise of thoracic spinal canal. No significant or hypertrophic osteoarthritic changes involving the costotransverse or costovertebral joints on both sides of thoracic spine. Evidence of moderate to marked degenerative disc disease at C6-C7 level with central disc protrusion and mild-to-moderate central stenosis. Bone Density Study completed on 6/20/2018 revealed: bone mineral density sows evidence of osteopenia. Fracture risk is moderate. T Spine MRI dated 12/18/2018 revealed, per report: mild to moderate, old L1 compression fracture. Multilevel degenerative disc and degenerative facet disease with mild areas of canal stenosis and mild to moderate foraminal narrowing. No evidence of acute fracture. At her last clinical appointment,  I recommended she increase the frequency of HEP to daily. Pt failed to f/u with Dr. Travis Melendrez after her appointment on 5/13/2020. I scheduled her a f/u with Dr. Travis Melendrez for her shoulder pain. Pt also has chronic pain in the first metacarpal phalangeal joint in the RUE.  I referred her to  Renuka Wright      The patient returns today with progressive neck pain into the left shoulder to the elbow x 2 months without injury. She rates her pain 5-8/10, previously 2/10. Her left shoulder pain is exacerbated with reaching above and lying on her left shoulder. Her shoulder pain is alleviated with reaching across. She has been treating with OTC Tylenol qam and qhs with temporary relief. Pt is noncompliant with her bilateral shoulder HEP. Pt states she was scheduled for CTS surgery back in the summer with Dr. Severa Marking but had been dx with COVID prior to the procedure and had been hospitalized. Pt denies being treated or followed for osteoporosis/osteopenia. Denies any falls since last visit. Pt denies dropping things or loss of balance. Pt was last seen by me on 7/28/2020 for right shoulder and neck pain. I had referred her to Dr. Irene Amaya secondary to shoulder pain. Pt was scheduled to f/u in 6 week's time but failed to do so. Note from Dr. Irene Amaya dated 8/12/2020 reviewed and indicated pt was dx with bilateral rotator cuff syndrome. Advised to continue to preform her HEP. Note from [id number] dated 11/17/2021 indicating patient was seen with c/o osteoarthritis of bilateral knees. Pt received bursa injections. Note from Dr. Severa Marking dated 11/22/2021 indicating patient was seen with c/o CTS bilaterally and primary osteoarthritis of first carpometacarpal joint of right hand.  reviewed. There is no height or weight on file to calculate BMI.     PCP: Teresa Vergara MD      Past Medical History:   Diagnosis Date    Asthma     Bilateral hand pain         Social History     Socioeconomic History    Marital status:      Spouse name: Not on file    Number of children: Not on file    Years of education: Not on file    Highest education level: Not on file   Occupational History    Not on file   Tobacco Use    Smoking status: Never Smoker    Smokeless tobacco: Never Used   Substance and Sexual Activity    Alcohol use: No    Drug use: No    Sexual activity: Not on file   Other Topics Concern    Not on file   Social History Narrative    Not on file     Social Determinants of Health     Financial Resource Strain:     Difficulty of Paying Living Expenses: Not on file   Food Insecurity:     Worried About Running Out of Food in the Last Year: Not on file    Mack of Food in the Last Year: Not on file   Transportation Needs:     Lack of Transportation (Medical): Not on file    Lack of Transportation (Non-Medical): Not on file   Physical Activity:     Days of Exercise per Week: Not on file    Minutes of Exercise per Session: Not on file   Stress:     Feeling of Stress : Not on file   Social Connections:     Frequency of Communication with Friends and Family: Not on file    Frequency of Social Gatherings with Friends and Family: Not on file    Attends Taoism Services: Not on file    Active Member of [address] [place] [address] [employer] or Organizations: Not on file    Attends Club or Organization Meetings: Not on file    Marital Status: Not on file   Intimate Partner Violence:     Fear of Current or Ex-Partner: Not on file    Emotionally Abused: Not on file    Physically Abused: Not on file    Sexually Abused: Not on file   Housing Stability:     Unable to Pay for Housing in the Last Year: Not on file    Number of [place] in the Last Year: Not on file    Unstable Housing in the Last Year: Not on file       Current Outpatient Medications   Medication Sig Dispense Refill    albuterol (PROVENTIL VENTOLIN) 2.5 mg /3 mL (0.083 %) nebu Take 2.5 mg by inhalation.  albuterol (PROVENTIL VENTOLIN) 2.5 mg /3 mL (0.083 %) nebu INHALE 3 ML AS NEEDED EVERY 6 HOURS      apixaban (ELIQUIS) 5 mg tablet Take 5 mg by mouth two (2) times a day.  fexofenadine (ALLEGRA) 180 mg tablet Take 180 mg by mouth daily.  guaiFENesin ER (MUCINEX) 600 mg ER tablet Take 1,200 mg by mouth.       omeprazole (PRILOSEC) 40 mg capsule TAKE 1 CAPSULE BY MOUTH EVERY DAY 30 MINUTES BEFORE BREAKFAST      meloxicam (MOBIC) 7.5 mg tablet Take 1 Tab by mouth daily as needed for Pain. 90 Tab 2    EPINEPHrine (EpiPen) 0.3 mg/0.3 mL injection 0.3 mg by IntraMUSCular route once as needed for Allergic Response.  DIETARY SUPPLEMENT PO Take  by mouth. Herbal concetrate      SYMBICORT 160-4.5 mcg/actuation HFAA INHALE 2 PUFFS PO BID      cyclobenzaprine (FLEXERIL) 10 mg tablet Take 1 Tab by mouth three (3) times daily as needed for Muscle Spasm(s). (Patient not taking: Reported on 11/22/2021) 30 Tab 0    fexofenadine-pseudoephedrine (ALLEGRA-D 12 HOUR)  mg Tb12 Take 1 Tab by mouth every twelve (12) hours. (Patient not taking: Reported on 11/22/2021)      OTHER Jacob Rhino Salt Packets  99    QNASL 80 mcg/actuation HFAA INHALE 1 PUFF IEN BID  5    ergocalciferol (ERGOCALCIFEROL) 50,000 unit capsule Twice a week  0    budesonide (PULMICORT) 0.5 mg/2 mL nbsp 500 mcg by Nebulization route.  esomeprazole (NEXIUM) 20 mg capsule Take 40 mg by mouth two (2) times daily as needed.  escitalopram oxalate (LEXAPRO) 20 mg tablet Take 20 mg by mouth daily.  montelukast (SINGULAIR) 10 mg tablet Take 10 mg by mouth daily. Allergies   Allergen Reactions    Fluticasone Furoate-Vilanterol Anaphylaxis    Nutritional Supplement-Fiber Anaphylaxis    Sulfa (Sulfonamide Antibiotics) Other (comments) and Shortness of Breath     Other reaction(s): Other (See Comments)  Sinus swells up, wheezing states Pt.  Calcium Other (comments)    Egg Swelling    Gluten Shortness of Breath    Milk Containing Products Other (comments)    Red Dye Swelling          PHYSICAL EXAMINATION    There were no vitals taken for this visit. CONSTITUTIONAL: NAD, A&O x 3  SENSATION: Intact to light touch throughout  NEURO: Yumiko's is negative bilaterally.   RANGE OF MOTION: The patient has full passive range of motion in all four extremities. MOTOR:  Straight Leg Raise: Negative, bilateral    Tandem gait: negative    Negative LUE impingement sign and no increase in pain with direct palpation of the bicipital tendon. Slight tenderness with direct palpation of T4/5 level     Shoulder AB/Flex Elbow Flex Wrist Ext Elbow Ext Wrist Flex Hand Intrin Tone   Right +4/5 +4/5 +4/5 +4/5 +4/5 +4/5 +4/5   Left +4/5 +4/5 +4/5 +4/5 +4/5 +4/5 +4/5              Hip Flex Knee Ext Knee Flex Ankle DF GTE Ankle PF Tone   Right +4/5 +4/5 +4/5 +4/5 +4/5 +4/5 +4/5   Left +4/5 +4/5 +4/5 +4/5 +4/5 +4/5 +4/5     RADIOGRAPHS  Cervical spine plain films dated 12/22/2021. 2 views: AP and lateral. Revealed:  Mild disc space narrowing at C6-7. No acute pathology identified. Thoracic spine plain films dated 12/22/2021. 2 views: AP and lateral. Revealed:  compression deformities of L1, T11, T8, and T5 possibly T4. Noted on prior studies:T5, T8 and L1. T11 and T4 age indeterminate. ASSESSMENT   Diagnoses and all orders for this visit:    1. Neck pain  -     AMB POC XRAY, SPINE, CERVICAL; 2 OR 3    2. Upper back pain  -     AMB POC XRAY, SPINE; THORACIC, 2 VIEW    3. Left shoulder pain, unspecified chronicity    4. Compression fracture of T4 vertebra, initial encounter ([facility] [address]      IMPRESSION AND PLAN:  Patient returns to the office today with c/o progressive neck pain into the left shoulder to the elbow x 2 months without injury. Multiple treatment options were discussed. It appears the pt may have a compression deformity at T4. I will order a T spine MRI for further evaluation. I did explain to the patient the natural course of healing for these sorts of injuries. I did discuss kyphoplasty with the patient. In the interim, I will provide her with Tramadol. Patient is neurologically intact. I will see the patient back in 1 month's time or earlier if needed.       Written by Rock Feliz, as dictated by Renan Alvarado MD  I examined the patient, reviewed and agree with the note.

## 2021-12-22 ENCOUNTER — TELEPHONE (OUTPATIENT)
Dept: ORTHOPEDIC SURGERY | Age: 56
End: 2021-12-22

## 2021-12-22 ENCOUNTER — OFFICE VISIT (OUTPATIENT)
Dept: ORTHOPEDIC SURGERY | Age: 56
End: 2021-12-22
Payer: COMMERCIAL

## 2021-12-22 VITALS
BODY MASS INDEX: 36.22 KG/M2 | RESPIRATION RATE: 18 BRPM | HEART RATE: 72 BPM | TEMPERATURE: 97.1 F | OXYGEN SATURATION: 95 % | WEIGHT: 211 LBS

## 2021-12-22 DIAGNOSIS — M25.512 LEFT SHOULDER PAIN, UNSPECIFIED CHRONICITY: ICD-10-CM

## 2021-12-22 DIAGNOSIS — M54.2 NECK PAIN: Primary | ICD-10-CM

## 2021-12-22 DIAGNOSIS — S22.040A COMPRESSION FRACTURE OF T4 VERTEBRA, INITIAL ENCOUNTER (HCC): ICD-10-CM

## 2021-12-22 DIAGNOSIS — M54.9 UPPER BACK PAIN: ICD-10-CM

## 2021-12-22 PROCEDURE — 72040 X-RAY EXAM NECK SPINE 2-3 VW: CPT | Performed by: PHYSICAL MEDICINE & REHABILITATION

## 2021-12-22 PROCEDURE — 99214 OFFICE O/P EST MOD 30 MIN: CPT | Performed by: PHYSICAL MEDICINE & REHABILITATION

## 2021-12-22 PROCEDURE — 72070 X-RAY EXAM THORAC SPINE 2VWS: CPT | Performed by: PHYSICAL MEDICINE & REHABILITATION

## 2021-12-22 RX ORDER — TRAMADOL HYDROCHLORIDE 50 MG/1
50 TABLET ORAL
Qty: 40 TABLET | Refills: 0 | Status: SHIPPED | OUTPATIENT
Start: 2021-12-22 | End: 2021-12-25

## 2021-12-22 NOTE — LETTER
12/22/2021    Patient: Philly Rivera   YOB: 1965   Date of Visit: 12/22/2021     Gaye Rosenberg, [address] [address]  Via Fax: [phone]     Natalie Thomas MD  [address] [place] [address]  [facility]    Dear MD Natalie Leung MD,      Thank you for referring Ms. Birdena Hashimoto to [address] for evaluation. My notes for this consultation are attached. If you have questions, please do not hesitate to call me. I look forward to following your patient along with you.       Sincerely,    Victorino Lerma MD

## 2021-12-22 NOTE — TELEPHONE ENCOUNTER
MRI of the Thoracic Spine without contrast has been faxed to [facility] for scheduling, [phone], fax [phone]. [employer] has complete-approved our request but has not issued a pre-authorization number yet.

## 2021-12-29 DIAGNOSIS — M54.9 UPPER BACK PAIN: ICD-10-CM

## 2022-01-11 DIAGNOSIS — G89.29 CHRONIC LEFT SHOULDER PAIN: ICD-10-CM

## 2022-01-11 DIAGNOSIS — M25.512 CHRONIC LEFT SHOULDER PAIN: ICD-10-CM

## 2022-01-11 RX ORDER — MELOXICAM 7.5 MG/1
TABLET ORAL
Qty: 90 TABLET | Refills: 2 | Status: SHIPPED | OUTPATIENT
Start: 2022-01-11 | End: 2022-11-04

## 2022-02-04 NOTE — PROGRESS NOTES
[facility]  [address], [place]   Phone: [phone]  Fax: [phone]        PROGRESS NOTE      HISTORY OF PRESENT ILLNESS:  The patient is a 61 y.o. female and was seen today for follow up of progressive neck pain into the left shoulder to the elbow x 2 months without injury. Previously seen for right shoulder pain. Previously seen for pain in the posterolateral aspect of the left shoulder pain. Previously, she was seen for neck and upper back pain x 10/2019. Additionally, she endorses pain in the LUE at the shoulder to the elbow since 10/2019  when she slept on her arm funny. She denies h/o of shoulder surgery. She denies pain with reaching behind and above. Her pain is exacerbated with lying on her left side or back. Patient received a cortisone injection at the left shoulder on 2/17/2020 with 75% improvement x 3 days. Patient denies previous spinal or shoulder surgery, injections, or recent physical therapy/chiropractic care. Pt denies dropping things or loss of balance. Pt denies change in bowel or bladder habits. Pt denies fever, weight loss, or skin changes. Patient denies h/o DM. Note from Dr. Pili Denney 5/29/18 indicating he obtained thoracic XRs. She was treated with Tramadol.  Note from Dr. Pili Denney 6/13/18 indicating patient was seen with c/o lumbar spinal stenosis. XRs indicated old compression fractures at T5, T8 and L1. Note from Alfredo Aranda Litter [birth year] [id number] patient was seen with c/o upper back pain, told she has fractures of the thoracic and lumbar spine after falling coming out of the shower on 12/17/2018. She heard two cracks at the time. Pain is primarily in the thoracic spine. Admits she fractured her thoracic spine 30+ years ago. She is doing better since her fall. No neurological symptoms. Note from Dr. Ralph Gallo 4/1/20 indicating patient was seen via virtual visit for c/o left shoulder pain.  Indicated she had pain x 6 months with stiffness. Patient reported pain with overhead activity. She experienced several days relief with cortisone injection. Indicated she had a Dx of left shoulder pain, possible etiology rotator cuff, adhesive capsulitis, glenohumeral arthritis. Provided her meloxicam and recommended PT, which is scheduled to start 4/23/20.  Note from Dr. [facility] 5/13/2020 indicating patient had improvement of left shoulder pain and improvement in ROM. She should continue on Meloxicam. F/u in 4 weeks. T  Note from Dr. Mariel Chau dated 8/12/2020 reviewed and indicated pt was dx with bilateral rotator cuff syndrome. Advised to continue to preform her HEP. Note from Thai Corea dated 11/17/2021 indicating patient was seen with c/o osteoarthritis of bilateral knees. Pt received bursa injections. Note from Dr. Viridiana Flores dated 11/22/2021 indicating patient was seen with c/o CTS bilaterally and primary osteoarthritis of first carpometacarpal joint of right hand. Spine CT dated 6/6/2018 revealed, per report: mild to moderate old compression of T8 vertebral body. Mild old compression of T5 vertebral body. Bones are moderately osteopenic throughout thoracic spine. Evidence of moderate, presumably old compression of L1 vertebral body, as described with a CT scan of lumbar spine. Mild-to-moderate multilevel degenerative disc disease in mid and lower thoracic spine. No obvious focal disc bulge or protrusion identified in thoracic spine. No evidence of compromise of thoracic spinal canal. No significant or hypertrophic osteoarthritic changes involving the costotransverse or costovertebral joints on both sides of thoracic spine. Evidence of moderate to marked degenerative disc disease at C6-C7 level with central disc protrusion and mild-to-moderate central stenosis. Bone Density Study completed on 6/20/2018 revealed: bone mineral density sows evidence of osteopenia. Fracture risk is moderate.  T Spine MRI dated 12/18/2018 revealed, per report: mild to moderate, old L1 compression fracture. Multilevel degenerative disc and degenerative facet disease with mild areas of canal stenosis and mild to moderate foraminal narrowing. No evidence of acute fracture. Cervical spine plain films dated 12/22/2021. 2 views: AP and lateral. Revealed: Mild disc space narrowing at C6-7. No acute pathology identified. Thoracic spine plain films dated 12/22/2021. 2 views: AP and lateral. Revealed:compression deformities of L1, T11, T8, and T5 possibly T4. Noted on prior studies:T5, T8 and L1. T11 and T4 age indeterminate. At her last clinical appointment, it appeared the pt may have a compression deformity at T4. I ordered a T spine MRI for further evaluation. I did explain to the patient the natural course of healing for these sorts of injuries. I did discuss kyphoplasty with the patient. In the interim, I provided her with Tramadol.        The patient returns today and reports pain location and distribution remains unchanged. She rates her pain 3/10, previously 5-8/10. Pt reports her neck pain is less intense in nature. Pt additionally c/o an increase in low back pain without injury, rates 5/10. Recently aggravated by sleeping in a new bed last week. Prior to this she would have rated her low back pain 0/10. She has been back to sleeping at home x 1 day without benefit. Previous L spine MRI 12/18/2018. She admits to prior PT for her back years ago, denies preforming her HEP. Pt treated with Prednisone recently due to [facility]. Denies seeing Dr. Edson Nolen since her last office visit secondary to shoulder pain. Per pt, pending bone density study. Pt denies change in bowel or bladder habits. Pt denies dropping things or loss of balance. Thoracic spine MRI dated 1/10/2022 films independently reviewed. Per report, several chronic compression fractures of the thoracic spine as well as L1. No acute osseous normality.  Minor degenerative change without significant canal or foraminal stenosis.  reviewed. There is no height or weight on file to calculate BMI. PCP: Geoffrey Perea MD      Past Medical History:   Diagnosis Date    Asthma     Bilateral hand pain         Social History     Socioeconomic History    Marital status:      Spouse name: Not on file    Number of children: Not on file    Years of education: Not on file    Highest education level: Not on file   Occupational History    Not on file   Tobacco Use    Smoking status: Never Smoker    Smokeless tobacco: Never Used   Substance and Sexual Activity    Alcohol use: No    Drug use: No    Sexual activity: Not on file   Other Topics Concern    Not on file   Social History Narrative    Not on file     Social Determinants of Health     Financial Resource Strain:     Difficulty of Paying Living Expenses: Not on file   Food Insecurity:     Worried About Running Out of Food in the Last Year: Not on file    Mack of Food in the Last Year: Not on file   Transportation Needs:     Lack of Transportation (Medical): Not on file    Lack of Transportation (Non-Medical):  Not on file   Physical Activity:     Days of Exercise per Week: Not on file    Minutes of Exercise per Session: Not on file   Stress:     Feeling of Stress : Not on file   Social Connections:     Frequency of Communication with Friends and Family: Not on file    Frequency of Social Gatherings with Friends and Family: Not on file    Attends [employer] Services: Not on file    Active Member of Clubs or Organizations: Not on file    Attends Club or Organization Meetings: Not on file    Marital Status: Not on file   Intimate Partner Violence:     Fear of Current or Ex-Partner: Not on file    Emotionally Abused: Not on file    Physically Abused: Not on file    Sexually Abused: Not on file   Housing Stability:     Unable to Pay for Housing in the Last Year: Not on file    Number of [place] in the Last Year: Not on file    Unstable Housing in the Last Year: Not on file       Current Outpatient Medications   Medication Sig Dispense Refill    meloxicam (MOBIC) 7.5 mg tablet TAKE 1 TABLET BY MOUTH DAILY AS NEEDED FOR PAIN 90 Tablet 2    albuterol (PROVENTIL VENTOLIN) 2.5 mg /3 mL (0.083 %) nebu Take 2.5 mg by inhalation.  albuterol (PROVENTIL VENTOLIN) 2.5 mg /3 mL (0.083 %) nebu INHALE 3 ML AS NEEDED EVERY 6 HOURS      fexofenadine (ALLEGRA) 180 mg tablet Take 180 mg by mouth daily.  guaiFENesin ER (MUCINEX) 600 mg ER tablet Take 1,200 mg by mouth.  EPINEPHrine (EpiPen) 0.3 mg/0.3 mL injection 0.3 mg by IntraMUSCular route once as needed for Allergic Response.  DIETARY SUPPLEMENT PO Take  by mouth. Herbal concetrate      SYMBICORT 160-4.5 mcg/actuation HFAA INHALE 2 PUFFS PO BID      cyclobenzaprine (FLEXERIL) 10 mg tablet Take 1 Tab by mouth three (3) times daily as needed for Muscle Spasm(s). (Patient not taking: Reported on 11/22/2021) 30 Tab 0    fexofenadine-pseudoephedrine (ALLEGRA-D 12 HOUR)  mg Tb12 Take 1 Tab by mouth every twelve (12) hours. (Patient not taking: Reported on 11/22/2021)      OTHER Jacob Rhino Salt Packets  99    ergocalciferol (ERGOCALCIFEROL) 50,000 unit capsule Twice a week  0    budesonide (PULMICORT) 0.5 mg/2 mL nbsp 500 mcg by Nebulization route.  esomeprazole (NEXIUM) 20 mg capsule Take 40 mg by mouth two (2) times daily as needed.  escitalopram oxalate (LEXAPRO) 20 mg tablet Take 20 mg by mouth daily.  montelukast (SINGULAIR) 10 mg tablet Take 10 mg by mouth daily. Allergies   Allergen Reactions    Fluticasone Furoate-Vilanterol Anaphylaxis    Nutritional Supplement-Fiber Anaphylaxis    Sulfa (Sulfonamide Antibiotics) Other (comments) and Shortness of Breath     Other reaction(s): Other (See Comments)  Sinus swells up, wheezing states Pt.     Calcium Other (comments)    Egg Swelling    Gluten Shortness of Breath    Milk Containing Products Other (comments)    Red Dye Swelling          PHYSICAL EXAMINATION    There were no vitals taken for this visit. CONSTITUTIONAL: NAD, A&O x 3  SENSATION: Intact to light touch throughout  NEURO: Yumiko's is negative bilaterally. RANGE OF MOTION: The patient has full passive range of motion in all four extremities. MOTOR:  Straight Leg Raise: Negative, bilateral       Shoulder AB/Flex Elbow Flex Wrist Ext Elbow Ext Wrist Flex Hand Intrin Tone   Right +4/5 +4/5 +4/5 +4/5 +4/5 +4/5 +4/5   Left +4/5 +4/5 +4/5 +4/5 +4/5 +4/5 +4/5              Hip Flex Knee Ext Knee Flex Ankle DF GTE Ankle PF Tone   Right +4/5 +4/5 +4/5 +4/5 +4/5 +4/5 +4/5   Left +4/5 +4/5 +4/5 +4/5 +4/5 +4/5 +4/5       ASSESSMENT   Diagnoses and all orders for this visit:    1. Strain of lumbar region, initial encounter      IMPRESSION AND PLAN:  Patient returns to the office today with c/o neck pain into the left shoulder to the elbow and low back pain. Multiple treatment options were discussed. There was no evidence of acute fracture by thoracic spine MRI. Her sxs are most consistent for lumbar strain. I will provide her with a prescription of Flexeril and she should continue on her Mobic. If pain persists may refer her to PT. Patient is neurologically intact. I will see the patient back in 1 month's time or earlier if needed. Written by Duane Bajwa, as dictated by Valencia Crooks MD  I examined the patient, reviewed and agree with the note.

## 2022-02-07 ENCOUNTER — OFFICE VISIT (OUTPATIENT)
Dept: ORTHOPEDIC SURGERY | Age: 57
End: 2022-02-07
Payer: COMMERCIAL

## 2022-02-07 VITALS
WEIGHT: 213.8 LBS | OXYGEN SATURATION: 95 % | TEMPERATURE: 97.3 F | HEIGHT: 64 IN | BODY MASS INDEX: 36.5 KG/M2 | HEART RATE: 63 BPM

## 2022-02-07 DIAGNOSIS — S39.012A STRAIN OF LUMBAR REGION, INITIAL ENCOUNTER: Primary | ICD-10-CM

## 2022-02-07 PROCEDURE — 99213 OFFICE O/P EST LOW 20 MIN: CPT | Performed by: PHYSICAL MEDICINE & REHABILITATION

## 2022-02-07 RX ORDER — OMEPRAZOLE 40 MG/1
CAPSULE, DELAYED RELEASE ORAL
COMMUNITY
Start: 2022-01-20

## 2022-02-07 RX ORDER — CYCLOBENZAPRINE HCL 10 MG
10 TABLET ORAL
Qty: 30 TABLET | Refills: 0 | Status: SHIPPED | OUTPATIENT
Start: 2022-02-07

## 2022-02-07 NOTE — LETTER
2/7/2022    Patient: Brian Scherer   YOB: 1965   Date of Visit: 2/7/2022     Della Alvarenga MD  [address] [place] [id number]  Via Fax: [phone]    Dear Della Alvarenga MD,      Thank you for referring Ms. José Luis Andrew to [address] for evaluation. My notes for this consultation are attached. If you have questions, please do not hesitate to call me. I look forward to following your patient along with you.       Sincerely,    Yuliet Vargas MD

## 2022-03-14 ENCOUNTER — OFFICE VISIT (OUTPATIENT)
Dept: ORTHOPEDIC SURGERY | Age: 57
End: 2022-03-14
Payer: COMMERCIAL

## 2022-03-14 VITALS — TEMPERATURE: 97.6 F | BODY MASS INDEX: 36.37 KG/M2 | HEIGHT: 64 IN | WEIGHT: 213 LBS

## 2022-03-14 DIAGNOSIS — G56.03 CARPAL TUNNEL SYNDROME, BILATERAL: Primary | ICD-10-CM

## 2022-03-14 DIAGNOSIS — M18.0 PRIMARY OSTEOARTHRITIS OF BOTH FIRST CARPOMETACARPAL JOINTS: ICD-10-CM

## 2022-03-14 PROCEDURE — 99213 OFFICE O/P EST LOW 20 MIN: CPT | Performed by: ORTHOPAEDIC SURGERY

## 2022-03-14 PROCEDURE — 20600 DRAIN/INJ JOINT/BURSA W/O US: CPT | Performed by: ORTHOPAEDIC SURGERY

## 2022-03-14 PROCEDURE — 20526 THER INJECTION CARP TUNNEL: CPT | Performed by: ORTHOPAEDIC SURGERY

## 2022-03-14 NOTE — PROGRESS NOTES
See Blanco is a 61 y.o. female right handed . Worker's Compensation and legal considerations: none filed. Vitals:    03/14/22 0900   Temp: 97.6 °F (36.4 °C)   Weight: 213 lb (96.6 kg)   Height: 5' 4\" (1.626 m)   PainSc:   5   PainLoc: Wrist           Chief Complaint   Patient presents with    Wrist Pain     BILAT    Thumb Pain     BILAT       HPI: Patient returns today for follow-up of bilateral thumb CMC arthritis and bilateral carpal tunnel syndrome. She is requesting injections today. She cannot have surgery just yet due to personal issues. 11/22/2021 HPI: Patient presents today requesting injections for her bilateral thumbs and carpal tunnels. September 3, 2021 HPI: Patient presents today for follow-up of bilateral thumb CMC arthritis. She is requesting injections today. She was recently admitted to the hospital and discharged due to Covid. She was discharged in July. She is currently on oxygen due to residual pulmonary issues. 6/11/2021 HPI: Patient returns today requesting injections for bilateral thumb bases and bilateral carpal tunnels. She was also like to set up surgery for her right carpal tunnel. 4/9/2021 HPI: Patient presents today for follow-up of bilateral thumb CMC joint arthritis and bilateral carpal tunnel syndrome. She has received injections in the past and is requesting injections today. She is not quite ready for surgery for carpal tunnel yet. 1/8/2021 HPI: Patient returns today requesting bilateral thumb CMC joint injections and bilateral carpal tunnel injections. Her previous injections worked well for her. She would like to consider surgery on the right side in the future. 10/9/2020 HPI: Patient comes in today for bilateral upper extremity EMG follow-up as well as follow-up on her joint injections. She reports her left thumb base is starting to hurt her significantly.     Initial HPI: Patient comes in today with complaints of right thumb base pain. She also has a history of bilateral hand numbness and tingling especially at night. She says she was previously diagnosed with carpal tunnel in the past.    Date of onset: Chronic    Injury: No    Prior Treatment:  Yes: Comment: Bilateral carpal tunnel injections and right thumb CMC and STT joint injections    Numbness/ Tingling: Yes: Comment: Bilateral hands right worse than left      ROS: Review of Systems - General ROS: negative  Psychological ROS: negative  ENT ROS: negative  Allergy and Immunology ROS: negative  Hematological and Lymphatic ROS: negative  Respiratory ROS: no cough, shortness of breath, or wheezing  Cardiovascular ROS: no chest pain or dyspnea on exertion  Gastrointestinal ROS: no abdominal pain, change in bowel habits, or black or bloody stools  Musculoskeletal ROS: positive for - pain in thumb - right and swelling in thumb - right  Neurological ROS: positive for - numbness/tingling  Dermatological ROS: negative    Past Medical History:   Diagnosis Date    Asthma     Bilateral hand pain        Past Surgical History:   Procedure Laterality Date    HX HYSTERECTOMY      HX PELVIC LAPAROSCOPY      HX TONSILLECTOMY         Current Outpatient Medications   Medication Sig Dispense Refill    omeprazole (PRILOSEC) 40 mg capsule TAKE 1 CAPSULE BY MOUTH EVERY DAY 30 MINUTES BEFORE BREAKFAST      cyclobenzaprine (FLEXERIL) 10 mg tablet Take 1 Tablet by mouth three (3) times daily as needed for Muscle Spasm(s). 30 Tablet 0    meloxicam (MOBIC) 7.5 mg tablet TAKE 1 TABLET BY MOUTH DAILY AS NEEDED FOR PAIN 90 Tablet 2    albuterol (PROVENTIL VENTOLIN) 2.5 mg /3 mL (0.083 %) nebu Take 2.5 mg by inhalation.  albuterol (PROVENTIL VENTOLIN) 2.5 mg /3 mL (0.083 %) nebu INHALE 3 ML AS NEEDED EVERY 6 HOURS      fexofenadine (ALLEGRA) 180 mg tablet Take 180 mg by mouth daily.  guaiFENesin ER (MUCINEX) 600 mg ER tablet Take 1,200 mg by mouth.       EPINEPHrine (EpiPen) 0.3 mg/0.3 mL injection 0.3 mg by IntraMUSCular route once as needed for Allergic Response.  DIETARY SUPPLEMENT PO Take  by mouth. Herbal concetrate      SYMBICORT 160-4.5 mcg/actuation HFAA INHALE 2 PUFFS PO BID      fexofenadine-pseudoephedrine (ALLEGRA-D 12 HOUR)  mg Tb12 Take 1 Tablet by mouth every twelve (12) hours.  OTHER Jacob Rhino Salt Packets  99    ergocalciferol (ERGOCALCIFEROL) 50,000 unit capsule Twice a week  0    budesonide (PULMICORT) 0.5 mg/2 mL nbsp 500 mcg by Nebulization route.  esomeprazole (NEXIUM) 20 mg capsule Take 40 mg by mouth two (2) times daily as needed.  escitalopram oxalate (LEXAPRO) 20 mg tablet Take 20 mg by mouth daily.  montelukast (SINGULAIR) 10 mg tablet Take 10 mg by mouth daily. Allergies   Allergen Reactions    Fluticasone Furoate-Vilanterol Anaphylaxis    Nutritional Supplement-Fiber Anaphylaxis    Sulfa (Sulfonamide Antibiotics) Other (comments) and Shortness of Breath     Other reaction(s): Other (See Comments)  Sinus swells up, wheezing states Pt.  Calcium Other (comments)    Egg Swelling    Gluten Shortness of Breath    Milk Containing Products Other (comments)    Red Dye Swelling           PE:     Physical Exam  Vitals and nursing note reviewed. Constitutional:       General: She is not in acute distress. Appearance: Normal appearance. She is not ill-appearing. Cardiovascular:      Pulses: Normal pulses. Pulmonary:      Effort: Pulmonary effort is normal. No respiratory distress. Musculoskeletal:         General: Swelling and tenderness present. No deformity or signs of injury. Normal range of motion. Cervical back: Normal range of motion and neck supple. Right lower leg: No edema. Left lower leg: No edema. Skin:     General: Skin is warm and dry. Capillary Refill: Capillary refill takes less than 2 seconds. Findings: No bruising or erythema.    Neurological:      General: No focal deficit present. Mental Status: She is alert and oriented to person, place, and time. Cranial Nerves: No cranial nerve deficit. Sensory: No sensory deficit. Psychiatric:         Mood and Affect: Mood normal.         Behavior: Behavior normal.            Hand:    Examination L Digit(s) R Digit(s)   1st CMC Tenderness +  +    1st CMC Grind +  +    Elder Nodes -  -    Heberden Nodes -  -    A1 Pulley Tenderness -  -    Triggering -  -    UCL Instability -  -    RCL Instability -  -    Lateral Stress Pain -  -    Palmar Cords -  -    Tabletop test -  -    Garrod's Pads -  -     Strength       Pinch Strength         ROM: Full      NEUROVASCULAR    Examination L R Examination L R   Carpal Comp. - - Pronator Comp. - -   Carpal Tinel + + Pronator Tinel - -   Phalen's - - Pronator Stress - -   Cubital Comp. - - Guyon Comp. - -   Cubital Tinel - - Guyon Tinel - -   Elbow Hyperflexion - - Adson's - -   Spurling's - - SC Comp. - -   PCB Median abn - - SC Tinel - -   Radial Tinel - - IC Comp. - -   Digital Tinel - - IC Tinel - -   Radial 2-Pt WNL WNL Ulnar 2-Pt WNL WNL     Radial Pulse: 2+  Capillary Refill: < 2 sec  Philip: Not Performed  [employer]: Not Performed      NCV & EMG Findings:  Evaluation of the left median motor nerve showed prolonged distal onset latency (5.1 ms). The right median motor nerve showed prolonged distal onset latency (5.5 ms) and reduced amplitude (4.5 mV). The left median sensory and the right median sensory nerves showed prolonged distal peak latency (L4.7, R4.7 ms) and decreased conduction velocity (Wrist-2nd Digit, L30, R30 m/s). All remaining nerves (as indicated in the following tables) were within normal limits. Left vs. Right side comparison data for the median motor nerve indicates abnormal L-R velocity difference (Elbow-Wrist, 51 m/s). The ulnar motor nerve indicates abnormal L-R velocity difference (A Elbow-B Elbow, 18 m/s).   All remaining left vs. right side differences were within normal limits.       All examined muscles (as indicated in the following table) showed no evidence of electrical instability.       INTERPRETATION  This is an abnormal electrodiagnostic examination. These findings may be consistent with Moderate median mononeuropathy at the wrist (carpal tunnel syndrome), bilaterally.      There is no electrodiagnostic evidence of cervical radiculopathy, brachial plexopathy, polyneuropathy or other mononeuropathy.         CLINICAL INTERPRETATION  Her electrodiagnostic finding of bilateral carpal tunnel syndrome is consistent with her bilateral hand symptoms.        Imaging:     10/9/2020 plain films of left wrist shows [place] stage III-IV degenerative changes of the [facility] joint with early degenerative changes of the STT joint. 8/7/2020 plain films of right wrist are positive for degenerative changes about the STT and CMC joints of the thumb. These are consistent with [place] Gisela stage 3. ICD-10-CM ICD-9-CM    1. Carpal tunnel syndrome, bilateral  G56.03 354.0 INJECT CARPAL TUNNEL      triamcinolone acetonide (KENALOG) 10 mg/mL injection 20 mg   2. Primary osteoarthritis of both first carpometacarpal joints  M18.0 715.14 triamcinolone acetonide (KENALOG) 10 mg/mL injection 20 mg      NC DRAIN/INJECT SMALL JOINT/BURSA         Plan:     Repeat bilateral thumb CMC joint injections and bilateral carpal tunnel injections. Continue brace wear. Follow-up and Dispositions    · Return in about 3 months (around 6/14/2022) for Reevaluation and injections versus surgical discussion. .          Plan was reviewed with patient, who verbalized agreement and understanding of the plan    [facility] NOTE        Chart reviewed for the following:   IRenny DO, have reviewed the History, Physical and updated the Allergic reactions for [address] performed immediately prior to start of procedure:   Renny BAKER DO, have performed the following reviews on [address] prior to the start of the procedure:            * Patient was identified by name and date of birth   * Agreement on procedure being performed was verified  * Risks and Benefits explained to the patient  * Procedure site verified and marked as necessary  * Patient was positioned for comfort  * Consent was signed and verified     Time: 09:35 AM      Date of procedure: 3/14/2022    Procedure performed by: Heike Humphries DO    Provider assisted by: Freda Cao LPN    Patient assisted by: self    How tolerated by patient: tolerated the procedure well with no complications    Post Procedural Pain Scale: 0 - No Hurt    Comments: none    Procedure:  After consent was obtained, using sterile technique the bilateral thumb CMC joint and bilateral carpal tunnel was prepped. Local anesthetic used: 1% lidocaine. Kenalog 5 mg X4 and was then injected and the needle withdrawn. The procedure was well tolerated. The patient is asked to continue to rest the area for a few more days before resuming regular activities. It may be more painful for the first 1-2 days. Watch for fever, or increased swelling or persistent pain in the joint. Call or return to clinic prn if such symptoms occur or there is failure to improve as anticipated.

## 2022-03-19 PROBLEM — R00.2 PALPITATIONS: Status: ACTIVE | Noted: 2020-10-12

## 2022-03-19 PROBLEM — R60.9 EDEMA: Status: ACTIVE | Noted: 2020-10-12

## 2022-03-19 PROBLEM — E66.01 OBESITY, MORBID (HCC): Status: ACTIVE | Noted: 2017-12-06

## 2022-03-19 PROBLEM — Z87.09 HISTORY OF ASTHMA: Status: ACTIVE | Noted: 2020-10-12

## 2022-03-19 PROBLEM — G47.33 OSA ON CPAP: Status: ACTIVE | Noted: 2020-10-12

## 2022-03-19 PROBLEM — F41.9 ANXIETY: Status: ACTIVE | Noted: 2020-10-12

## 2022-03-19 PROBLEM — Z99.89 OSA ON CPAP: Status: ACTIVE | Noted: 2020-10-12

## 2022-05-04 ENCOUNTER — OFFICE VISIT (OUTPATIENT)
Dept: ORTHOPEDIC SURGERY | Age: 57
End: 2022-05-04
Payer: COMMERCIAL

## 2022-05-04 VITALS
DIASTOLIC BLOOD PRESSURE: 76 MMHG | WEIGHT: 218 LBS | OXYGEN SATURATION: 95 % | HEIGHT: 64 IN | SYSTOLIC BLOOD PRESSURE: 127 MMHG | TEMPERATURE: 96.5 F | BODY MASS INDEX: 37.22 KG/M2 | HEART RATE: 64 BPM

## 2022-05-04 DIAGNOSIS — Z01.818 PREOP EXAMINATION: Primary | ICD-10-CM

## 2022-05-04 DIAGNOSIS — M18.11 PRIMARY OSTEOARTHRITIS OF FIRST CARPOMETACARPAL JOINT OF RIGHT HAND: Primary | ICD-10-CM

## 2022-05-04 DIAGNOSIS — G56.03 CARPAL TUNNEL SYNDROME, BILATERAL: ICD-10-CM

## 2022-05-04 DIAGNOSIS — G56.01 RIGHT CARPAL TUNNEL SYNDROME: ICD-10-CM

## 2022-05-04 DIAGNOSIS — M18.0 PRIMARY OSTEOARTHRITIS OF BOTH FIRST CARPOMETACARPAL JOINTS: ICD-10-CM

## 2022-05-04 PROCEDURE — 99214 OFFICE O/P EST MOD 30 MIN: CPT | Performed by: ORTHOPAEDIC SURGERY

## 2022-05-04 RX ORDER — METHYLPREDNISOLONE 4 MG/1
TABLET ORAL
Qty: 1 DOSE PACK | Refills: 0 | Status: SHIPPED | OUTPATIENT
Start: 2022-05-04

## 2022-05-04 NOTE — PROGRESS NOTES
Curtis Monsalve is a 61 y.o. female right handed . Worker's Compensation and legal considerations: none filed. Vitals:    05/04/22 1540   BP: 127/76   Pulse: 64   Temp: (!) 96.5 °F (35.8 °C)   TempSrc: Temporal   SpO2: 95%   Weight: 218 lb (98.9 kg)   Height: 5' 4\" (1.626 m)   PainSc:   5   PainLoc: Finger           Chief Complaint   Patient presents with    Thumb Pain     Right       HPI: Patient presents today for follow-up of her right worse than left thumb arthritis. She reports feeling a pop when grabbing something. She says it is recently been getting better but still aching. She think she is ready to move forward with the surgery at the end of June. 3/14/2022 HPI: Patient returns today for follow-up of bilateral thumb CMC arthritis and bilateral carpal tunnel syndrome. She is requesting injections today. She cannot have surgery just yet due to personal issues. 11/22/2021 HPI: Patient presents today requesting injections for her bilateral thumbs and carpal tunnels. September 3, 2021 HPI: Patient presents today for follow-up of bilateral thumb CMC arthritis. She is requesting injections today. She was recently admitted to the hospital and discharged due to Covid. She was discharged in July. She is currently on oxygen due to residual pulmonary issues. 6/11/2021 HPI: Patient returns today requesting injections for bilateral thumb bases and bilateral carpal tunnels. She was also like to set up surgery for her right carpal tunnel. 4/9/2021 HPI: Patient presents today for follow-up of bilateral thumb CMC joint arthritis and bilateral carpal tunnel syndrome. She has received injections in the past and is requesting injections today. She is not quite ready for surgery for carpal tunnel yet. 1/8/2021 HPI: Patient returns today requesting bilateral thumb CMC joint injections and bilateral carpal tunnel injections. Her previous injections worked well for her. She would like to consider surgery on the right side in the future. 10/9/2020 HPI: Patient comes in today for bilateral upper extremity EMG follow-up as well as follow-up on her joint injections. She reports her left thumb base is starting to hurt her significantly. Initial HPI: Patient comes in today with complaints of right thumb base pain. She also has a history of bilateral hand numbness and tingling especially at night. She says she was previously diagnosed with carpal tunnel in the past.    Date of onset: Chronic    Injury: No    Prior Treatment:  Yes: Comment: Bilateral carpal tunnel injections and right thumb CMC and STT joint injections    Numbness/ Tingling: Yes: Comment: Bilateral hands right worse than left      ROS: Review of Systems - General ROS: negative  Psychological ROS: negative  ENT ROS: negative  Allergy and Immunology ROS: negative  Hematological and Lymphatic ROS: negative  Respiratory ROS: no cough, shortness of breath, or wheezing  Cardiovascular ROS: no chest pain or dyspnea on exertion  Gastrointestinal ROS: no abdominal pain, change in bowel habits, or black or bloody stools  Musculoskeletal ROS: positive for - pain in thumb - right and swelling in thumb - right  Neurological ROS: positive for - numbness/tingling  Dermatological ROS: negative    Past Medical History:   Diagnosis Date    Asthma     Bilateral hand pain        Past Surgical History:   Procedure Laterality Date    HX HYSTERECTOMY      HX PELVIC LAPAROSCOPY      HX TONSILLECTOMY         Current Outpatient Medications   Medication Sig Dispense Refill    methylPREDNISolone (MEDROL DOSEPACK) 4 mg tablet Per dose pack instructions 1 Dose Pack 0    omeprazole (PRILOSEC) 40 mg capsule TAKE 1 CAPSULE BY MOUTH EVERY DAY 30 MINUTES BEFORE BREAKFAST      cyclobenzaprine (FLEXERIL) 10 mg tablet Take 1 Tablet by mouth three (3) times daily as needed for Muscle Spasm(s).  30 Tablet 0    meloxicam (MOBIC) 7.5 mg tablet TAKE 1 TABLET BY MOUTH DAILY AS NEEDED FOR PAIN 90 Tablet 2    albuterol (PROVENTIL VENTOLIN) 2.5 mg /3 mL (0.083 %) nebu Take 2.5 mg by inhalation.  albuterol (PROVENTIL VENTOLIN) 2.5 mg /3 mL (0.083 %) nebu INHALE 3 ML AS NEEDED EVERY 6 HOURS      fexofenadine (ALLEGRA) 180 mg tablet Take 180 mg by mouth daily.  guaiFENesin ER (MUCINEX) 600 mg ER tablet Take 1,200 mg by mouth.  EPINEPHrine (EpiPen) 0.3 mg/0.3 mL injection 0.3 mg by IntraMUSCular route once as needed for Allergic Response.  DIETARY SUPPLEMENT PO Take  by mouth. Herbal concetrate      SYMBICORT 160-4.5 mcg/actuation HFAA INHALE 2 PUFFS PO BID      fexofenadine-pseudoephedrine (ALLEGRA-D 12 HOUR)  mg Tb12 Take 1 Tablet by mouth every twelve (12) hours.  OTHER Jacob Rhino Salt Packets  99    ergocalciferol (ERGOCALCIFEROL) 50,000 unit capsule Twice a week  0    budesonide (PULMICORT) 0.5 mg/2 mL nbsp 500 mcg by Nebulization route.  esomeprazole (NEXIUM) 20 mg capsule Take 40 mg by mouth two (2) times daily as needed.  escitalopram oxalate (LEXAPRO) 20 mg tablet Take 20 mg by mouth daily.  montelukast (SINGULAIR) 10 mg tablet Take 10 mg by mouth daily. Allergies   Allergen Reactions    Fluticasone Furoate-Vilanterol Anaphylaxis    Nutritional Supplement-Fiber Anaphylaxis    Sulfa (Sulfonamide Antibiotics) Other (comments) and Shortness of Breath     Other reaction(s): Other (See Comments)  Sinus swells up, wheezing states Pt.  Calcium Other (comments)    Egg Swelling    Gluten Shortness of Breath    Milk Containing Products Other (comments)    Red Dye Swelling           PE:     Physical Exam  Vitals and nursing note reviewed. Constitutional:       General: She is not in acute distress. Appearance: Normal appearance. She is not ill-appearing. Cardiovascular:      Pulses: Normal pulses. Pulmonary:      Effort: Pulmonary effort is normal. No respiratory distress. Musculoskeletal:         General: Swelling and tenderness present. No deformity or signs of injury. Normal range of motion. Cervical back: Normal range of motion and neck supple. Right lower leg: No edema. Left lower leg: No edema. Skin:     General: Skin is warm and dry. Capillary Refill: Capillary refill takes less than 2 seconds. Findings: No bruising or erythema. Neurological:      General: No focal deficit present. Mental Status: She is alert and oriented to person, place, and time. Cranial Nerves: No cranial nerve deficit. Sensory: No sensory deficit. Psychiatric:         Mood and Affect: Mood normal.         Behavior: Behavior normal.            Hand:    Examination L Digit(s) R Digit(s)   1st CMC Tenderness +  +    1st CMC Grind +  +    Elder Nodes -  -    Heberden Nodes -  -    A1 Pulley Tenderness -  -    Triggering -  -    UCL Instability -  -    RCL Instability -  -    Lateral Stress Pain -  -    Palmar Cords -  -    Tabletop test -  -    Garrod's Pads -  -     Strength       Pinch Strength         ROM: Full      NEUROVASCULAR    Examination L R Examination L R   Carpal Comp. - - Pronator Comp. - -   Carpal Tinel + + Pronator Tinel - -   Phalen's - - Pronator Stress - -   Cubital Comp. - - Guyon Comp. - -   Cubital Tinel - - Guyon Tinel - -   Elbow Hyperflexion - - Adson's - -   Spurling's - - SC Comp. - -   PCB Median abn - - SC Tinel - -   Radial Tinel - - IC Comp. - -   Digital Tinel - - IC Tinel - -   Radial 2-Pt WNL WNL Ulnar 2-Pt WNL WNL     Radial Pulse: 2+  Capillary Refill: < 2 sec  Philip: Not Performed  [employer]: Not Performed      NCV & EMG Findings:  Evaluation of the left median motor nerve showed prolonged distal onset latency (5.1 ms). The right median motor nerve showed prolonged distal onset latency (5.5 ms) and reduced amplitude (4.5 mV).   The left median sensory and the right median sensory nerves showed prolonged distal peak latency (L4.7, R4.7 ms) and decreased conduction velocity (Wrist-2nd Digit, L30, R30 m/s). All remaining nerves (as indicated in the following tables) were within normal limits. Left vs. Right side comparison data for the median motor nerve indicates abnormal L-R velocity difference (Elbow-Wrist, 51 m/s). The ulnar motor nerve indicates abnormal L-R velocity difference (A Elbow-B Elbow, 18 m/s). All remaining left vs. right side differences were within normal limits.       All examined muscles (as indicated in the following table) showed no evidence of electrical instability.       INTERPRETATION  This is an abnormal electrodiagnostic examination. These findings may be consistent with Moderate median mononeuropathy at the wrist (carpal tunnel syndrome), bilaterally.      There is no electrodiagnostic evidence of cervical radiculopathy, brachial plexopathy, polyneuropathy or other mononeuropathy.         CLINICAL INTERPRETATION  Her electrodiagnostic finding of bilateral carpal tunnel syndrome is consistent with her bilateral hand symptoms.        Imaging:     10/9/2020 plain films of left wrist shows Eaton stage III-IV degenerative changes of the [facility] joint with early degenerative changes of the STT joint. 8/7/2020 plain films of right wrist are positive for degenerative changes about the STT and CMC joints of the thumb. These are consistent with Alex Cruzito stage 3. ICD-10-CM ICD-9-CM    1. Primary osteoarthritis of first carpometacarpal joint of right hand  M18.11 715.14 SCHEDULE SURGERY   2. Right carpal tunnel syndrome  G56.01 354.0 SCHEDULE SURGERY   3. Primary osteoarthritis of both first carpometacarpal joints  M18.0 715.14 methylPREDNISolone (MEDROL DOSEPACK) 4 mg tablet         Plan:     Medrol Dosepak sent to pharmacy. Schedule right thumb CMC trapeziectomy arthroscopic versus open and right carpal tunnel release.     Time was spent discussing operative versus nonoperative treatment and what to expect. Follow-up and Dispositions    · Return for H&P.           Plan was reviewed with patient, who verbalized agreement and understanding of the plan

## 2022-06-02 ENCOUNTER — DOCUMENTATION ONLY (OUTPATIENT)
Dept: ORTHOPEDIC SURGERY | Age: 57
End: 2022-06-02

## 2022-06-02 NOTE — PROGRESS NOTES
Patient postponed surgery originally scheduled for 6/28/22, as her daughter is getting  in July. Patient will make an appt when ready to RS in the future.

## 2022-06-08 ENCOUNTER — TELEPHONE (OUTPATIENT)
Dept: ORTHOPEDIC SURGERY | Age: 57
End: 2022-06-08

## 2022-06-08 NOTE — TELEPHONE ENCOUNTER
Patient is requesting an updated order for bilat knee Euflexxa. Last series ended 11/17/21.
21-Apr-2022 20:46

## 2022-06-24 ENCOUNTER — OFFICE VISIT (OUTPATIENT)
Dept: ORTHOPEDIC SURGERY | Age: 57
End: 2022-06-24
Payer: COMMERCIAL

## 2022-06-24 VITALS — WEIGHT: 219 LBS | TEMPERATURE: 96.4 F | BODY MASS INDEX: 37.39 KG/M2 | HEIGHT: 64 IN

## 2022-06-24 DIAGNOSIS — G56.03 CARPAL TUNNEL SYNDROME, BILATERAL: ICD-10-CM

## 2022-06-24 DIAGNOSIS — M18.0 PRIMARY OSTEOARTHRITIS OF BOTH FIRST CARPOMETACARPAL JOINTS: Primary | ICD-10-CM

## 2022-06-24 PROCEDURE — 20600 DRAIN/INJ JOINT/BURSA W/O US: CPT | Performed by: ORTHOPAEDIC SURGERY

## 2022-06-24 PROCEDURE — 20526 THER INJECTION CARP TUNNEL: CPT | Performed by: ORTHOPAEDIC SURGERY

## 2022-06-24 NOTE — PROGRESS NOTES
Corazon Centeno is a 61 y.o. female right handed . Worker's Compensation and legal considerations: none filed. Vitals:    06/24/22 0813   Temp: (!) 96.4 °F (35.8 °C)   TempSrc: Temporal   Weight: 219 lb (99.3 kg)   Height: 5' 4\" (1.626 m)   PainSc:   5   PainLoc: Finger           Chief Complaint   Patient presents with    Thumb Pain     bilateral        HPI: Patient presents today requesting repeat bilateral CMC and carpal tunnel injections. She is interested in getting surgery on the right side after her daughter's vomiting. 5/4/2022 HPI: Patient presents today for follow-up of her right worse than left thumb arthritis. She reports feeling a pop when grabbing something. She says it is recently been getting better but still aching. She think she is ready to move forward with the surgery at the end of June. 3/14/2022 HPI: Patient returns today for follow-up of bilateral thumb CMC arthritis and bilateral carpal tunnel syndrome. She is requesting injections today. She cannot have surgery just yet due to personal issues. 11/22/2021 HPI: Patient presents today requesting injections for her bilateral thumbs and carpal tunnels. September 3, 2021 HPI: Patient presents today for follow-up of bilateral thumb CMC arthritis. She is requesting injections today. She was recently admitted to the hospital and discharged due to Covid. She was discharged in July. She is currently on oxygen due to residual pulmonary issues. 6/11/2021 HPI: Patient returns today requesting injections for bilateral thumb bases and bilateral carpal tunnels. She was also like to set up surgery for her right carpal tunnel. 4/9/2021 HPI: Patient presents today for follow-up of bilateral thumb CMC joint arthritis and bilateral carpal tunnel syndrome. She has received injections in the past and is requesting injections today. She is not quite ready for surgery for carpal tunnel yet.     1/8/2021 HPI: Patient returns today requesting bilateral thumb CMC joint injections and bilateral carpal tunnel injections. Her previous injections worked well for her. She would like to consider surgery on the right side in the future. 10/9/2020 HPI: Patient comes in today for bilateral upper extremity EMG follow-up as well as follow-up on her joint injections. She reports her left thumb base is starting to hurt her significantly. Initial HPI: Patient comes in today with complaints of right thumb base pain. She also has a history of bilateral hand numbness and tingling especially at night.   She says she was previously diagnosed with carpal tunnel in the past.    Date of onset: Chronic    Injury: No    Prior Treatment:  Yes: Comment: Bilateral carpal tunnel injections and right thumb CMC and STT joint injections    Numbness/ Tingling: Yes: Comment: Bilateral hands right worse than left      ROS: Review of Systems - General ROS: negative  Psychological ROS: negative  ENT ROS: negative  Allergy and Immunology ROS: negative  Hematological and Lymphatic ROS: negative  Respiratory ROS: no cough, shortness of breath, or wheezing  Cardiovascular ROS: no chest pain or dyspnea on exertion  Gastrointestinal ROS: no abdominal pain, change in bowel habits, or black or bloody stools  Musculoskeletal ROS: positive for - pain in thumb - right and swelling in thumb - right  Neurological ROS: positive for - numbness/tingling  Dermatological ROS: negative    Past Medical History:   Diagnosis Date    Asthma     Bilateral hand pain        Past Surgical History:   Procedure Laterality Date    HX HYSTERECTOMY      HX PELVIC LAPAROSCOPY      HX TONSILLECTOMY         Current Outpatient Medications   Medication Sig Dispense Refill    methylPREDNISolone (MEDROL DOSEPACK) 4 mg tablet Per dose pack instructions 1 Dose Pack 0    omeprazole (PRILOSEC) 40 mg capsule TAKE 1 CAPSULE BY MOUTH EVERY DAY 30 MINUTES BEFORE BREAKFAST      cyclobenzaprine (FLEXERIL) 10 mg tablet Take 1 Tablet by mouth three (3) times daily as needed for Muscle Spasm(s). 30 Tablet 0    meloxicam (MOBIC) 7.5 mg tablet TAKE 1 TABLET BY MOUTH DAILY AS NEEDED FOR PAIN 90 Tablet 2    albuterol (PROVENTIL VENTOLIN) 2.5 mg /3 mL (0.083 %) nebu Take 2.5 mg by inhalation.  albuterol (PROVENTIL VENTOLIN) 2.5 mg /3 mL (0.083 %) nebu INHALE 3 ML AS NEEDED EVERY 6 HOURS      fexofenadine (ALLEGRA) 180 mg tablet Take 180 mg by mouth daily.  guaiFENesin ER (MUCINEX) 600 mg ER tablet Take 1,200 mg by mouth.  EPINEPHrine (EpiPen) 0.3 mg/0.3 mL injection 0.3 mg by IntraMUSCular route once as needed for Allergic Response.  DIETARY SUPPLEMENT PO Take  by mouth. Herbal concetrate      SYMBICORT 160-4.5 mcg/actuation HFAA INHALE 2 PUFFS PO BID      fexofenadine-pseudoephedrine (ALLEGRA-D 12 HOUR)  mg Tb12 Take 1 Tablet by mouth every twelve (12) hours.  OTHER Jacob Rhino Salt Packets  99    ergocalciferol (ERGOCALCIFEROL) 50,000 unit capsule Twice a week  0    budesonide (PULMICORT) 0.5 mg/2 mL nbsp 500 mcg by Nebulization route.  esomeprazole (NEXIUM) 20 mg capsule Take 40 mg by mouth two (2) times daily as needed.  escitalopram oxalate (LEXAPRO) 20 mg tablet Take 20 mg by mouth daily.  montelukast (SINGULAIR) 10 mg tablet Take 10 mg by mouth daily. Current Facility-Administered Medications   Medication Dose Route Frequency Provider Last Rate Last Admin    triamcinolone acetonide (KENALOG) 10 mg/mL injection 20 mg  20 mg Other ONCE Renny Banda, DO           Allergies   Allergen Reactions    Fluticasone Furoate-Vilanterol Anaphylaxis    Nutritional Supplement-Fiber Anaphylaxis    Sulfa (Sulfonamide Antibiotics) Other (comments) and Shortness of Breath     Other reaction(s): Other (See Comments)  Sinus swells up, wheezing states Pt.     Calcium Other (comments)    Egg Swelling    Gluten Shortness of Breath    Milk Containing Products Other (comments)    Red Dye Swelling           PE:     Physical Exam  Vitals and nursing note reviewed. Constitutional:       General: She is not in acute distress. Appearance: Normal appearance. She is not ill-appearing. Cardiovascular:      Pulses: Normal pulses. Pulmonary:      Effort: Pulmonary effort is normal. No respiratory distress. Musculoskeletal:         General: Swelling and tenderness present. No deformity or signs of injury. Normal range of motion. Cervical back: Normal range of motion and neck supple. Right lower leg: No edema. Left lower leg: No edema. Skin:     General: Skin is warm and dry. Capillary Refill: Capillary refill takes less than 2 seconds. Findings: No bruising or erythema. Neurological:      General: No focal deficit present. Mental Status: She is alert and oriented to person, place, and time. Cranial Nerves: No cranial nerve deficit. Sensory: No sensory deficit. Psychiatric:         Mood and Affect: Mood normal.         Behavior: Behavior normal.            Hand:    Examination L Digit(s) R Digit(s)   1st CMC Tenderness +  +    1st CMC Grind +  +    Elder Nodes -  -    Heberden Nodes -  -    A1 Pulley Tenderness -  -    Triggering -  -    UCL Instability -  -    RCL Instability -  -    Lateral Stress Pain -  -    Palmar Cords -  -    Tabletop test -  -    Garrod's Pads -  -     Strength       Pinch Strength         ROM: Full      NEUROVASCULAR    Examination L R Examination L R   Carpal Comp. - - Pronator Comp. - -   Carpal Tinel + + Pronator Tinel - -   Phalen's - - Pronator Stress - -   Cubital Comp. - - Guyon Comp. - -   Cubital Tinel - - Guyon Tinel - -   Elbow Hyperflexion - - Adson's - -   Spurling's - - SC Comp. - -   PCB Median abn - - SC Tinel - -   Radial Tinel - - IC Comp.  - -   Digital Tinel - - IC Tinel - -   Radial 2-Pt WNL WNL Ulnar 2-Pt WNL WNL     Radial Pulse: 2+  Capillary Refill: < 2 sec  Philip: Not Performed  [employer]: Not Performed      NCV & EMG Findings:  Evaluation of the left median motor nerve showed prolonged distal onset latency (5.1 ms). The right median motor nerve showed prolonged distal onset latency (5.5 ms) and reduced amplitude (4.5 mV). The left median sensory and the right median sensory nerves showed prolonged distal peak latency (L4.7, R4.7 ms) and decreased conduction velocity (Wrist-2nd Digit, L30, R30 m/s). All remaining nerves (as indicated in the following tables) were within normal limits. Left vs. Right side comparison data for the median motor nerve indicates abnormal L-R velocity difference (Elbow-Wrist, 51 m/s). The ulnar motor nerve indicates abnormal L-R velocity difference (A Elbow-B Elbow, 18 m/s). All remaining left vs. right side differences were within normal limits.       All examined muscles (as indicated in the following table) showed no evidence of electrical instability.       INTERPRETATION  This is an abnormal electrodiagnostic examination. These findings may be consistent with Moderate median mononeuropathy at the wrist (carpal tunnel syndrome), bilaterally.      There is no electrodiagnostic evidence of cervical radiculopathy, brachial plexopathy, polyneuropathy or other mononeuropathy.         CLINICAL INTERPRETATION  Her electrodiagnostic finding of bilateral carpal tunnel syndrome is consistent with her bilateral hand symptoms.        Imaging:     10/9/2020 plain films of left wrist shows Eaton stage III-IV degenerative changes of the [facility] joint with early degenerative changes of the STT joint. 8/7/2020 plain films of right wrist are positive for degenerative changes about the STT and CMC joints of the thumb. These are consistent with Jessica Shutter stage 3. ICD-10-CM ICD-9-CM    1.  Primary osteoarthritis of both first carpometacarpal joints  M18.0 715.14 DRAIN/INJECT SMALL JOINT/BURSA      triamcinolone acetonide (KENALOG) 10 mg/mL injection 20 mg   2. Carpal tunnel syndrome, bilateral  G56.03 354.0 triamcinolone acetonide (KENALOG) 10 mg/mL injection 20 mg      INJECT CARPAL TUNNEL         Plan:     Repeat bilateral carpal tunnel injections and bilateral thumb CMC joint injections. We discussed possibility of surgery potentially in August after her daughter's wedding in mid July. Follow-up and Dispositions    · Return in about 1 month (around 7/24/2022) for Reevaluation and surgical discussion. Plan was reviewed with patient, who verbalized agreement and understanding of the plan    [address] [place] [address] NOTE        Chart reviewed for the following:   Renny BAKER DO, have reviewed the History, Physical and updated the Allergic reactions for [address] performed immediately prior to start of procedure:   Renny BAKER DO, have performed the following reviews on [address] prior to the start of the procedure:            * Patient was identified by name and date of birth   * Agreement on procedure being performed was verified  * Risks and Benefits explained to the patient  * Procedure site verified and marked as necessary  * Patient was positioned for comfort  * Consent was signed and verified     Time: 08:26 AM      Date of procedure: 6/24/2022    Procedure performed by: Sunny Vasquez DO    Provider assisted by: Rosas Jackson MA    Patient assisted by: self    How tolerated by patient: tolerated the procedure well with no complications    Post Procedural Pain Scale: 0 - No Hurt    Comments: none    Procedure:  After consent was obtained, using sterile technique the bilateral thumbs and carpal tunnels was prepped. Local anesthetic used: 1% lidocaine. Kenalog 5 mg X4 and was then injected and the needle withdrawn. The procedure was well tolerated.   The patient is asked to continue to rest the area for a few more days before resuming regular activities. It may be more painful for the first 1-2 days. Watch for fever, or increased swelling or persistent pain in the joint. Call or return to clinic prn if such symptoms occur or there is failure to improve as anticipated.

## 2022-08-15 NOTE — TELEPHONE ENCOUNTER
Last Visit: 06/28/2017 with MD Jon Chan    Next Appointment: noted to f/u prn   Previous Refill Encounters: 06/21/2017 per MD Jon Chan #[id number] with 3 refills     Requested Prescriptions     Pending Prescriptions Disp Refills    oxaprozin (DAYPRO) 600 mg tablet 60 Tab 3     Sig: Take 1 Tab by mouth two (2) times daily (with meals).
OK
Improved

## 2022-10-14 ENCOUNTER — OFFICE VISIT (OUTPATIENT)
Dept: ORTHOPEDIC SURGERY | Age: 57
End: 2022-10-14
Payer: COMMERCIAL

## 2022-10-14 VITALS
HEIGHT: 64 IN | HEART RATE: 62 BPM | TEMPERATURE: 97 F | BODY MASS INDEX: 39.09 KG/M2 | OXYGEN SATURATION: 97 % | RESPIRATION RATE: 14 BRPM | WEIGHT: 229 LBS | SYSTOLIC BLOOD PRESSURE: 132 MMHG | DIASTOLIC BLOOD PRESSURE: 65 MMHG

## 2022-10-14 DIAGNOSIS — G56.03 CARPAL TUNNEL SYNDROME, BILATERAL: ICD-10-CM

## 2022-10-14 DIAGNOSIS — M18.0 PRIMARY OSTEOARTHRITIS OF BOTH FIRST CARPOMETACARPAL JOINTS: Primary | ICD-10-CM

## 2022-10-14 PROCEDURE — 20600 DRAIN/INJ JOINT/BURSA W/O US: CPT | Performed by: ORTHOPAEDIC SURGERY

## 2022-10-14 PROCEDURE — 20526 THER INJECTION CARP TUNNEL: CPT | Performed by: ORTHOPAEDIC SURGERY

## 2022-10-14 NOTE — PROGRESS NOTES
Jayne Alvarez is a 61 y.o. female right handed . Worker's Compensation and legal considerations: none filed. Vitals:    10/14/22 0809   BP: 132/65   Pulse: 62   Resp: 14   Temp: 97 °F (36.1 °C)   TempSrc: Temporal   SpO2: 97%   Weight: 229 lb (103.9 kg)   Height: 5' 4\" (1.626 m)   PainSc:   3   PainLoc: Hand           Chief Complaint   Patient presents with    Hand Pain     HPI: Patient presents today for follow-up requesting repeat bilateral CMC and carpal tunnel injections. She is potentially interested in surgery next year. 6/24/2022 HPI: Patient presents today requesting repeat bilateral CMC and carpal tunnel injections. She is interested in getting surgery on the right side after her daughter's vomiting. 5/4/2022 HPI: Patient presents today for follow-up of her right worse than left thumb arthritis. She reports feeling a pop when grabbing something. She says it is recently been getting better but still aching. She think she is ready to move forward with the surgery at the end of June. 3/14/2022 HPI: Patient returns today for follow-up of bilateral thumb CMC arthritis and bilateral carpal tunnel syndrome. She is requesting injections today. She cannot have surgery just yet due to personal issues. 11/22/2021 HPI: Patient presents today requesting injections for her bilateral thumbs and carpal tunnels. September 3, 2021 HPI: Patient presents today for follow-up of bilateral thumb CMC arthritis. She is requesting injections today. She was recently admitted to the hospital and discharged due to Covid. She was discharged in July. She is currently on oxygen due to residual pulmonary issues. 6/11/2021 HPI: Patient returns today requesting injections for bilateral thumb bases and bilateral carpal tunnels. She was also like to set up surgery for her right carpal tunnel.     4/9/2021 HPI: Patient presents today for follow-up of bilateral thumb CMC joint arthritis and bilateral carpal tunnel syndrome. She has received injections in the past and is requesting injections today. She is not quite ready for surgery for carpal tunnel yet. 1/8/2021 HPI: Patient returns today requesting bilateral thumb CMC joint injections and bilateral carpal tunnel injections. Her previous injections worked well for her. She would like to consider surgery on the right side in the future. 10/9/2020 HPI: Patient comes in today for bilateral upper extremity EMG follow-up as well as follow-up on her joint injections. She reports her left thumb base is starting to hurt her significantly. Initial HPI: Patient comes in today with complaints of right thumb base pain. She also has a history of bilateral hand numbness and tingling especially at night.   She says she was previously diagnosed with carpal tunnel in the past.    Date of onset: Chronic    Injury: No    Prior Treatment:  Yes: Comment: Bilateral carpal tunnel injections and right thumb CMC and STT joint injections    Numbness/ Tingling: Yes: Comment: Bilateral hands right worse than left      ROS: Review of Systems - General ROS: negative  Psychological ROS: negative  ENT ROS: negative  Allergy and Immunology ROS: negative  Hematological and Lymphatic ROS: negative  Respiratory ROS: no cough, shortness of breath, or wheezing  Cardiovascular ROS: no chest pain or dyspnea on exertion  Gastrointestinal ROS: no abdominal pain, change in bowel habits, or black or bloody stools  Musculoskeletal ROS: positive for - pain in thumb - right and swelling in thumb - right  Neurological ROS: positive for - numbness/tingling  Dermatological ROS: negative    Past Medical History:   Diagnosis Date    Asthma     Bilateral hand pain        Past Surgical History:   Procedure Laterality Date    HX HYSTERECTOMY      HX PELVIC LAPAROSCOPY      HX TONSILLECTOMY         Current Outpatient Medications   Medication Sig Dispense Refill    methylPREDNISolone (MEDROL DOSEPACK) 4 mg tablet Per dose pack instructions 1 Dose Pack 0    omeprazole (PRILOSEC) 40 mg capsule TAKE 1 CAPSULE BY MOUTH EVERY DAY 30 MINUTES BEFORE BREAKFAST      cyclobenzaprine (FLEXERIL) 10 mg tablet Take 1 Tablet by mouth three (3) times daily as needed for Muscle Spasm(s). 30 Tablet 0    meloxicam (MOBIC) 7.5 mg tablet TAKE 1 TABLET BY MOUTH DAILY AS NEEDED FOR PAIN 90 Tablet 2    albuterol (PROVENTIL VENTOLIN) 2.5 mg /3 mL (0.083 %) nebu Take 2.5 mg by inhalation. albuterol (PROVENTIL VENTOLIN) 2.5 mg /3 mL (0.083 %) nebu INHALE 3 ML AS NEEDED EVERY 6 HOURS      fexofenadine (ALLEGRA) 180 mg tablet Take 180 mg by mouth daily. guaiFENesin ER (MUCINEX) 600 mg ER tablet Take 1,200 mg by mouth. EPINEPHrine (EPIPEN) 0.3 mg/0.3 mL injection 0.3 mg by IntraMUSCular route once as needed for Allergic Response. DIETARY SUPPLEMENT PO Take  by mouth. Herbal concetrate      SYMBICORT 160-4.5 mcg/actuation HFAA INHALE 2 PUFFS PO BID      fexofenadine-pseudoephedrine (ALLEGRA-D)  mg Tb12 Take 1 Tablet by mouth every twelve (12) hours. OTHER Jacob Rhino Salt Packets  99    ergocalciferol (ERGOCALCIFEROL) 50,000 unit capsule Twice a week  0    budesonide (PULMICORT) 0.5 mg/2 mL nbsp 500 mcg by Nebulization route.      esomeprazole (NEXIUM) 20 mg capsule Take 40 mg by mouth two (2) times daily as needed. escitalopram oxalate (LEXAPRO) 20 mg tablet Take 20 mg by mouth daily. montelukast (SINGULAIR) 10 mg tablet Take 10 mg by mouth daily. Allergies   Allergen Reactions    Fluticasone Furoate-Vilanterol Anaphylaxis    Nutritional Supplement-Fiber Anaphylaxis    Sulfa (Sulfonamide Antibiotics) Other (comments) and Shortness of Breath     Other reaction(s): Other (See Comments)  Sinus swells up, wheezing states Pt.     Calcium Other (comments)    Egg Swelling    Gluten Shortness of Breath    Milk Containing Products Other (comments)    Red Dye Swelling           PE: Physical Exam  Vitals and nursing note reviewed. Constitutional:       General: She is not in acute distress. Appearance: Normal appearance. She is not ill-appearing. Cardiovascular:      Pulses: Normal pulses. Pulmonary:      Effort: Pulmonary effort is normal. No respiratory distress. Musculoskeletal:         General: Swelling and tenderness present. No deformity or signs of injury. Normal range of motion. Cervical back: Normal range of motion and neck supple. Right lower leg: No edema. Left lower leg: No edema. Skin:     General: Skin is warm and dry. Capillary Refill: Capillary refill takes less than 2 seconds. Findings: No bruising or erythema. Neurological:      General: No focal deficit present. Mental Status: She is alert and oriented to person, place, and time. Cranial Nerves: No cranial nerve deficit. Sensory: No sensory deficit. Psychiatric:         Mood and Affect: Mood normal.         Behavior: Behavior normal.          Hand:    Examination L Digit(s) R Digit(s)   1st CMC Tenderness +  +    1st CMC Grind +  +    Elder Nodes -  -    Heberden Nodes -  -    A1 Pulley Tenderness -  -    Triggering -  -    UCL Instability -  -    RCL Instability -  -    Lateral Stress Pain -  -    Palmar Cords -  -    Tabletop test -  -    Garrod's Pads -  -     Strength       Pinch Strength         ROM: Full      NEUROVASCULAR    Examination L R Examination L R   Carpal Comp. - - Pronator Comp. - -   Carpal Tinel + + Pronator Tinel - -   Phalen's - - Pronator Stress - -   Cubital Comp. - - Guyon Comp. - -   Cubital Tinel - - Guyon Tinel - -   Elbow Hyperflexion - - Adson's - -   Spurling's - - SC Comp. - -   PCB Median abn - - SC Tinel - -   Radial Tinel - - IC Comp.  - -   Digital Tinel - - IC Tinel - -   Radial 2-Pt WNL WNL Ulnar 2-Pt WNL WNL     Radial Pulse: 2+  Capillary Refill: < 2 sec  Philip: Not Performed  [employer]: Not Performed      NCV & EMG Findings:  Evaluation of the left median motor nerve showed prolonged distal onset latency (5.1 ms). The right median motor nerve showed prolonged distal onset latency (5.5 ms) and reduced amplitude (4.5 mV). The left median sensory and the right median sensory nerves showed prolonged distal peak latency (L4.7, R4.7 ms) and decreased conduction velocity (Wrist-2nd Digit, L30, R30 m/s). All remaining nerves (as indicated in the following tables) were within normal limits. Left vs. Right side comparison data for the median motor nerve indicates abnormal L-R velocity difference (Elbow-Wrist, 51 m/s). The ulnar motor nerve indicates abnormal L-R velocity difference (A Elbow-B Elbow, 18 m/s). All remaining left vs. right side differences were within normal limits. All examined muscles (as indicated in the following table) showed no evidence of electrical instability. INTERPRETATION  This is an abnormal electrodiagnostic examination. These findings may be consistent with Moderate median mononeuropathy at the wrist (carpal tunnel syndrome), bilaterally. There is no electrodiagnostic evidence of cervical radiculopathy, brachial plexopathy, polyneuropathy or other mononeuropathy. CLINICAL INTERPRETATION  Her electrodiagnostic finding of bilateral carpal tunnel syndrome is consistent with her bilateral hand symptoms. Imaging:     10/9/2020 plain films of left wrist shows Eaton stage III-IV degenerative changes of the [address]ia 16 joint with early degenerative changes of the STT joint. 8/7/2020 plain films of right wrist are positive for degenerative changes about the STT and CMC joints of the thumb. These are consistent with [place] Racer stage 3. ICD-10-CM ICD-9-CM    1. Primary osteoarthritis of both first carpometacarpal joints  M18.0 715.14 DRAIN/INJECT SMALL JOINT/BURSA      triamcinolone acetonide (KENALOG) 10 mg/mL injection 20 mg      2.  Carpal tunnel syndrome, bilateral  G56.03 354.0 INJECT CARPAL TUNNEL      triamcinolone acetonide (KENALOG) 10 mg/mL injection 20 mg            Plan:     Repeat bilateral carpal tunnel injections and bilateral thumb CMC joint injections. We discussed possibility of surgery potentially in August after her daughter's wedding in mid July. Follow-up and Dispositions    Return in about 3 months (around 1/14/2023) for Reevaluation, possible injections versus surgical discussion. Plan was reviewed with patient, who verbalized agreement and understanding of the plan    [facility] NOTE        Chart reviewed for the following:   Renny BAKER DO, have reviewed the History, Physical and updated the Allergic reactions for [address] performed immediately prior to start of procedure:   Renny BAKER DO, have performed the [facility] prior to the start of the procedure:            * Patient was identified by name and date of birth   * Agreement on procedure being performed was verified  * Risks and Benefits explained to the patient  * Procedure site verified and marked as necessary  * Patient was positioned for comfort  * Consent was signed and verified     Time: 08:45 AM      Date of procedure: 10/14/2022    Procedure performed by: Daisha Basilio DO    Provider assisted by: Renee Zimmerman MA    Patient assisted by: self    How tolerated by patient: tolerated the procedure well with no complications    Post Procedural Pain Scale: 0 - No Hurt    Comments: none    Procedure:  After consent was obtained, using sterile technique the bilateral thumbs and carpal tunnels was prepped. Local anesthetic used: 1% lidocaine. Kenalog 5 mg X4 and was then injected and the needle withdrawn. The procedure was well tolerated. The patient is asked to continue to rest the area for a few more days before resuming regular activities. It may be more painful for the first 1-2 days. Watch for fever, or increased swelling or persistent pain in the joint. Call or return to clinic prn if such symptoms occur or there is failure to improve as anticipated.

## 2022-11-04 DIAGNOSIS — G89.29 CHRONIC LEFT SHOULDER PAIN: ICD-10-CM

## 2022-11-04 DIAGNOSIS — M25.512 CHRONIC LEFT SHOULDER PAIN: ICD-10-CM

## 2022-11-04 RX ORDER — MELOXICAM 7.5 MG/1
TABLET ORAL
Qty: 90 TABLET | Refills: 2 | Status: SHIPPED | OUTPATIENT
Start: 2022-11-04

## 2023-01-13 ENCOUNTER — OFFICE VISIT (OUTPATIENT)
Dept: ORTHOPEDIC SURGERY | Age: 58
End: 2023-01-13
Payer: COMMERCIAL

## 2023-01-13 VITALS — HEIGHT: 64 IN | TEMPERATURE: 96 F | BODY MASS INDEX: 40.12 KG/M2 | WEIGHT: 235 LBS

## 2023-01-13 DIAGNOSIS — G56.03 CARPAL TUNNEL SYNDROME, BILATERAL: ICD-10-CM

## 2023-01-13 DIAGNOSIS — M18.0 PRIMARY OSTEOARTHRITIS OF BOTH FIRST CARPOMETACARPAL JOINTS: Primary | ICD-10-CM

## 2023-01-13 NOTE — PROGRESS NOTES
Neto Solomon is a 61 y.o. female right handed . Worker's Compensation and legal considerations: none filed. Vitals:    01/13/23 0839   Temp: (!) 96 °F (35.6 °C)   TempSrc: Temporal   Weight: 235 lb (106.6 kg)   Height: 5' 4\" (1.626 m)   PainSc:   5   PainLoc: Hand             Chief Complaint   Patient presents with    Hand Pain     Bilateral hand pain       HPI: Patient presents today for follow-up of bilateral carpal tunnel syndrome and bilateral thumb CMC arthritis. We have previously discussed surgery with patient since her daughter is expecting a child in April and would like to wait until after this. She reports the pain to have come back and needing something to mitigate this. 10/14/2022 HPI: Patient presents today for follow-up requesting repeat bilateral CMC and carpal tunnel injections. She is potentially interested in surgery next year. 10/9/2020 HPI: Patient comes in today for bilateral upper extremity EMG follow-up as well as follow-up on her joint injections. She reports her left thumb base is starting to hurt her significantly. Initial HPI: Patient comes in today with complaints of right thumb base pain. She also has a history of bilateral hand numbness and tingling especially at night.   She says she was previously diagnosed with carpal tunnel in the past.    Date of onset: Chronic    Injury: No    Prior Treatment:  Yes: Comment: Bilateral carpal tunnel injections and right thumb CMC and STT joint injections    Numbness/ Tingling: Yes: Comment: Bilateral hands right worse than left      ROS: Review of Systems - General ROS: negative  Psychological ROS: negative  ENT ROS: negative  Allergy and Immunology ROS: negative  Hematological and Lymphatic ROS: negative  Respiratory ROS: no cough, shortness of breath, or wheezing  Cardiovascular ROS: no chest pain or dyspnea on exertion  Gastrointestinal ROS: no abdominal pain, change in bowel habits, or black or bloody stools  Musculoskeletal ROS: positive for - pain in thumb - right and swelling in thumb - right  Neurological ROS: positive for - numbness/tingling  Dermatological ROS: negative    Past Medical History:   Diagnosis Date    Asthma     Bilateral hand pain        Past Surgical History:   Procedure Laterality Date    HX HYSTERECTOMY      HX PELVIC LAPAROSCOPY      HX TONSILLECTOMY         Current Outpatient Medications   Medication Sig Dispense Refill    meloxicam (MOBIC) 7.5 mg tablet TAKE 1 TABLET BY MOUTH DAILY AS NEEDED FOR PAIN 90 Tablet 2    methylPREDNISolone (MEDROL DOSEPACK) 4 mg tablet Per dose pack instructions 1 Dose Pack 0    omeprazole (PRILOSEC) 40 mg capsule TAKE 1 CAPSULE BY MOUTH EVERY DAY 30 MINUTES BEFORE BREAKFAST      cyclobenzaprine (FLEXERIL) 10 mg tablet Take 1 Tablet by mouth three (3) times daily as needed for Muscle Spasm(s). 30 Tablet 0    albuterol (PROVENTIL VENTOLIN) 2.5 mg /3 mL (0.083 %) nebu Take 2.5 mg by inhalation. albuterol (PROVENTIL VENTOLIN) 2.5 mg /3 mL (0.083 %) nebu INHALE 3 ML AS NEEDED EVERY 6 HOURS      fexofenadine (ALLEGRA) 180 mg tablet Take 180 mg by mouth daily. guaiFENesin ER (MUCINEX) 600 mg ER tablet Take 1,200 mg by mouth. EPINEPHrine (EPIPEN) 0.3 mg/0.3 mL injection 0.3 mg by IntraMUSCular route once as needed for Allergic Response. DIETARY SUPPLEMENT PO Take  by mouth. Herbal concetrate      SYMBICORT 160-4.5 mcg/actuation HFAA INHALE 2 PUFFS PO BID      fexofenadine-pseudoephedrine (ALLEGRA-D)  mg Tb12 Take 1 Tablet by mouth every twelve (12) hours. OTHER Jacob Rhino Salt Packets  99    ergocalciferol (ERGOCALCIFEROL) 50,000 unit capsule Twice a week  0    budesonide (PULMICORT) 0.5 mg/2 mL nbsp 500 mcg by Nebulization route.      esomeprazole (NEXIUM) 20 mg capsule Take 40 mg by mouth two (2) times daily as needed. escitalopram oxalate (LEXAPRO) 20 mg tablet Take 20 mg by mouth daily.       montelukast (SINGULAIR) 10 mg tablet Take 10 mg by mouth daily. Current Facility-Administered Medications   Medication Dose Route Frequency Provider Last Rate Last Admin    triamcinolone acetonide (KENALOG) 10 mg/mL injection 20 mg  20 mg Other ONCE Renny Banda, DO           Allergies   Allergen Reactions    Fluticasone Furoate-Vilanterol Anaphylaxis    Nutritional Supplement-Fiber Anaphylaxis    Sulfa (Sulfonamide Antibiotics) Other (comments) and Shortness of Breath     Other reaction(s): Other (See Comments)  Sinus swells up, wheezing states Pt. Calcium Other (comments)    Egg Swelling    Gluten Shortness of Breath    Milk Containing Products Other (comments)    Red Dye Swelling           PE:     Physical Exam  Vitals and nursing note reviewed. Constitutional:       General: She is not in acute distress. Appearance: Normal appearance. She is not ill-appearing. Cardiovascular:      Pulses: Normal pulses. Pulmonary:      Effort: Pulmonary effort is normal. No respiratory distress. Musculoskeletal:         General: Swelling and tenderness present. No deformity or signs of injury. Normal range of motion. Cervical back: Normal range of motion and neck supple. Right lower leg: No edema. Left lower leg: No edema. Skin:     General: Skin is warm and dry. Capillary Refill: Capillary refill takes less than 2 seconds. Findings: No bruising or erythema. Neurological:      General: No focal deficit present. Mental Status: She is alert and oriented to person, place, and time. Cranial Nerves: No cranial nerve deficit. Sensory: No sensory deficit.    Psychiatric:         Mood and Affect: Mood normal.         Behavior: Behavior normal.          Hand:    Examination L Digit(s) R Digit(s)   1st CMC Tenderness +  +    1st CMC Grind +  +    Elder Nodes -  -    Heberden Nodes -  -    A1 Pulley Tenderness -  -    Triggering -  -    UCL Instability -  -    RCL Instability -  -    Lateral Stress Pain -  -    Palmar Cords -  -    Tabletop test -  -    Garrod's Pads -  -     Strength       Pinch Strength         ROM: Full      NEUROVASCULAR    Examination L R Examination L R   Carpal Comp. - - Pronator Comp. - -   Carpal Tinel + + Pronator Tinel - -   Phalen's - - Pronator Stress - -   Cubital Comp. - - Guyon Comp. - -   Cubital Tinel - - Guyon Tinel - -   Elbow Hyperflexion - - Adson's - -   Spurling's - - SC Comp. - -   PCB Median abn - - SC Tinel - -   Radial Tinel - - IC Comp. - -   Digital Tinel - - IC Tinel - -   Radial 2-Pt WNL WNL Ulnar 2-Pt WNL WNL     Radial Pulse: 2+  Capillary Refill: < 2 sec  Philip: Not Performed  [employer]: Not Performed      NCV & EMG Findings:  Evaluation of the left median motor nerve showed prolonged distal onset latency (5.1 ms). The right median motor nerve showed prolonged distal onset latency (5.5 ms) and reduced amplitude (4.5 mV). The left median sensory and the right median sensory nerves showed prolonged distal peak latency (L4.7, R4.7 ms) and decreased conduction velocity (Wrist-2nd Digit, L30, R30 m/s). All remaining nerves (as indicated in the following tables) were within normal limits. Left vs. Right side comparison data for the median motor nerve indicates abnormal L-R velocity difference (Elbow-Wrist, 51 m/s). The ulnar motor nerve indicates abnormal L-R velocity difference (A Elbow-B Elbow, 18 m/s). All remaining left vs. right side differences were within normal limits. All examined muscles (as indicated in the following table) showed no evidence of electrical instability. INTERPRETATION  This is an abnormal electrodiagnostic examination. These findings may be consistent with Moderate median mononeuropathy at the wrist (carpal tunnel syndrome), bilaterally. There is no electrodiagnostic evidence of cervical radiculopathy, brachial plexopathy, polyneuropathy or other mononeuropathy.          CLINICAL INTERPRETATION  Her electrodiagnostic finding of bilateral carpal tunnel syndrome is consistent with her bilateral hand symptoms. Imaging:     10/9/2020 plain films of left wrist shows Eaton stage III-IV degenerative changes of the [facility] joint with early degenerative changes of the STT joint. 8/7/2020 plain films of right wrist are positive for degenerative changes about the STT and CMC joints of the thumb. These are consistent with Lyndsey Foss stage 3. ICD-10-CM ICD-9-CM    1. Primary osteoarthritis of both first carpometacarpal joints  M18.0 715.14 triamcinolone acetonide (KENALOG) 10 mg/mL injection 20 mg      DRAIN/INJECT SMALL JOINT/BURSA      2. Carpal tunnel syndrome, bilateral  G56.03 354.0 triamcinolone acetonide (KENALOG) 10 mg/mL injection 20 mg      INJECT CARPAL TUNNEL            Plan:     Bilateral carpal tunnel injections and bilateral thumb CMC joint injections. Patient would like to wait until after the birth of her granddaughter later this spring before considering surgery. Follow-up and Dispositions    Return if symptoms worsen or fail to improve.           Plan was reviewed with patient, who verbalized agreement and understanding of the plan    [address] [place] [address] NOTE        Chart reviewed for the following:   Renny BAKER DO, have reviewed the History, Physical and updated the Allergic reactions for [address] performed immediately prior to start of procedure:   Renny BAKER DO, [facility] the [facility] to [facility] start of the procedure:            * Patient was identified by name and date of birth   * Agreement on procedure being performed was verified  * Risks and Benefits explained to the patient  * Procedure site verified and marked as necessary  * Patient was positioned for comfort  * Consent was signed and verified     Time: 08:35 AM      Date of procedure: 1/13/2023    Procedure performed by: Fernando Cruz DO    Provider assisted by: Corine Hernandez MA    Patient assisted by: self    How tolerated by patient: tolerated the procedure well with no complications    Post Procedural Pain Scale: 0 - No Hurt    Comments: none    Procedure:  After consent was obtained, using sterile technique the bilateral thumbs and carpal tunnels was prepped. Local anesthetic used: 1% lidocaine. Kenalog 5 mg X4 and was then injected and the needle withdrawn. The procedure was well tolerated. The patient is asked to continue to rest the area for a few more days before resuming regular activities. It may be more painful for the first 1-2 days. Watch for fever, or increased swelling or persistent pain in the joint. Call or return to clinic prn if such symptoms occur or there is failure to improve as anticipated.

## 2023-03-24 ENCOUNTER — OFFICE VISIT (OUTPATIENT)
Age: 58
End: 2023-03-24

## 2023-03-24 DIAGNOSIS — M18.0 ARTHRITIS OF CARPOMETACARPAL (CMC) JOINTS OF BOTH THUMBS: ICD-10-CM

## 2023-03-24 DIAGNOSIS — M25.561 PAIN IN BOTH KNEES, UNSPECIFIED CHRONICITY: ICD-10-CM

## 2023-03-24 DIAGNOSIS — M25.562 PAIN IN BOTH KNEES, UNSPECIFIED CHRONICITY: ICD-10-CM

## 2023-03-24 DIAGNOSIS — M65.331 TRIGGER MIDDLE FINGER OF RIGHT HAND: Primary | ICD-10-CM

## 2023-03-24 DIAGNOSIS — G56.02 LEFT CARPAL TUNNEL SYNDROME: ICD-10-CM

## 2023-03-24 DIAGNOSIS — G56.01 RIGHT CARPAL TUNNEL SYNDROME: ICD-10-CM

## 2023-03-24 NOTE — PROGRESS NOTES
is warm and dry. Capillary Refill: Capillary refill takes less than 2 seconds. Findings: No bruising or erythema. Neurological:      General: No focal deficit present. Mental Status: She is alert and oriented to person, place, and time. Psychiatric:         Mood and Affect: Mood normal.         Behavior: Behavior normal.          Hand:     Examination L Digit(s) R Digit(s)   1st CMC Tenderness +-   +-     1st CMC Grind +   +     Deven Nodes -   -     Heberden Nodes -   -     A1 Pulley Tenderness -   + LF   Triggering -   -     UCL Instability -   -     RCL Instability -   -     Lateral Stress Pain -   -     Palmar Cords -   -     Tabletop test -   -     Garrod's Pads -   -      Strength           Pinch Strength              ROM: Full        NEUROVASCULAR     Examination L R Examination L R   Carpal Comp. - - Pronator Comp. - -   Carpal Tinel + + Pronator Tinel - -   Phalen's - - Pronator Stress - -   Cubital Comp. - - Guyon Comp. - -   Cubital Tinel - - Guyon Tinel - -   Elbow Hyperflexion - - Adson's - -   Spurling's - - SC Comp. - -   PCB Median abn - - SC Tinel - -   Radial Tinel - - IC Comp. - -   Digital Tinel - - IC Tinel - -   Radial 2-Pt WNL WNL Ulnar 2-Pt WNL WNL      Radial Pulse: 2+  Capillary Refill: < 2 sec  Michel: Not Performed  [employer]: Not Performed        NCV & EMG Findings:  INTERPRETATION  This is an abnormal electrodiagnostic examination. These findings may be consistent with Moderate median mononeuropathy at the wrist (carpal tunnel syndrome), bilaterally. There is no electrodiagnostic evidence of cervical radiculopathy, brachial plexopathy, polyneuropathy or other mononeuropathy. CLINICAL INTERPRETATION  Her electrodiagnostic finding of bilateral carpal tunnel syndrome is consistent with her bilateral hand symptoms.          Imaging:      10/9/2020 plain films of left wrist shows Eaton stage III-IV degenerative changes of the [facility] joint with early degenerative

## 2023-06-23 ENCOUNTER — OFFICE VISIT (OUTPATIENT)
Age: 58
End: 2023-06-23

## 2023-06-23 VITALS — BODY MASS INDEX: 39.78 KG/M2 | HEIGHT: 64 IN | WEIGHT: 233 LBS

## 2023-06-23 DIAGNOSIS — Z01.818 PREOP EXAMINATION: Primary | ICD-10-CM

## 2023-06-23 DIAGNOSIS — G56.02 LEFT CARPAL TUNNEL SYNDROME: ICD-10-CM

## 2023-06-23 DIAGNOSIS — M18.0 ARTHRITIS OF CARPOMETACARPAL (CMC) JOINTS OF BOTH THUMBS: ICD-10-CM

## 2023-06-23 DIAGNOSIS — M18.12 ARTHRITIS OF CARPOMETACARPAL (CMC) JOINT OF LEFT THUMB: Primary | ICD-10-CM

## 2023-06-23 NOTE — PROGRESS NOTES
Maday Maldonado is a 61 y.o. female right handed . Worker's Compensation and legal considerations: none    Chief Complaint   Patient presents with    Hand Pain     Bilateral hand pain     Pain Score:   5    HPI: Patient presents today wanting to set up surgery for her left thumb base arthritis as well as her left carpal tunnel syndrome. 3/24/2023 HPI: Patient presents today with a new problem of right middle finger pain and locking. She also reports recurrence of the numbness in both hands. She reports the pain in her thumbs is mild. Initial HPI: Patient comes in today with complaints of right thumb base pain. She also has a history of bilateral hand numbness and tingling especially at night.   She says she was previously diagnosed with carpal tunnel in the past.    Date of onset: Chronic  Injury: No  Prior Treatment:  Yes: Comment: Bilateral thumb CMC joint and bilateral carpal tunnel injections    ROS: Review of Systems - General ROS: negative except HPI    Past Medical History:   Diagnosis Date    Asthma     Bilateral hand pain        Past Surgical History:   Procedure Laterality Date    HYSTERECTOMY (CERVIX STATUS UNKNOWN)      PELVIC LAPAROSCOPY      TONSILLECTOMY          Current Outpatient Medications   Medication Sig Dispense Refill    albuterol (PROVENTIL) (2.5 MG/3ML) 0.083% nebulizer solution INHALE 3 ML AS NEEDED EVERY 6 HOURS      budesonide (PULMICORT) 0.5 MG/2ML nebulizer suspension Inhale 2 mLs into the lungs      budesonide-formoterol (SYMBICORT) 160-4.5 MCG/ACT AERO INHALE 2 PUFFS PO BID      cyclobenzaprine (FLEXERIL) 10 MG tablet Take 1 tablet by mouth 3 times daily as needed      EPINEPHrine (EPIPEN) 0.3 MG/0.3ML SOAJ injection Inject 0.3 mLs into the muscle once as needed      ergocalciferol (ERGOCALCIFEROL) 1.25 MG (12879 UT) capsule Twice a week      escitalopram (LEXAPRO) 20 MG tablet Take 1 tablet by mouth daily      esomeprazole (NEXIUM) 20 MG delayed

## 2023-07-12 RX ORDER — GABAPENTIN 300 MG/1
CAPSULE ORAL
COMMUNITY
Start: 2023-05-26

## 2023-07-12 NOTE — PROGRESS NOTES
Instructions for your surgery at [address] [place] [address] Date:  7/12/2023      Patient's Name:  Juliana Vanessa           Surgery Date:  07/25/2023              Please enter the main entrance of the hospital and check-in at the  located in the lobby. Once registered, you will take the elevators to the second floor, check in at the desk or call ext [phone] and proceed to the surgical waiting room. Someone will come escort you to the pre-op area. Do NOT eat or drink anything, including candy, gum, or ice chips after midnight prior to your surgery, unless you have specific instructions from your surgeon or anesthesia provider to do so. Brush your teeth before coming to the hospital. You may swish with water, but do not swallow. No smoking/Vaping/E-Cigarettes 24 hours prior to the day of surgery. No alcohol 24 hours prior to the day of surgery. No recreational drugs for one week prior to the day of surgery. Bring Photo ID, Insurance information, and Co-pay if required on day of surgery. Bring in pertinent legal documents, such as, Medical Power of FLORENCIO BIRD, DNR, Advance Directive, etc.  Leave all valuables, including money/purse, at home. Remove all jewelry, including ALL body piercings, nail polish, acrylic nails, and makeup (including mascara); no lotions, powders, deodorant, or perfume/cologne/after shave on the skin. Follow instruction for Hibiclens washes and CHG wipes from surgeon's office. Glasses and dentures may be worn to the hospital. They must be removed prior to surgery. Please bring case/container for glasses or dentures. Contact lenses should not be worn on day of surgery. Call your doctor's office if symptoms of a cold or illness develop within 24-48 hours prior to your surgery. 14. AN ADULT (relative or friend 25 years or older) [address].   15. Please make arrangements for a responsible adult (18 years or older)

## 2023-07-13 LAB
A/G RATIO: 2.3 RATIO (ref 1.1–2.6)
ALBUMIN SERPL-MCNC: 4.3 G/DL (ref 3.5–5)
ALP BLD-CCNC: 98 U/L (ref 25–115)
ALT SERPL-CCNC: 24 U/L (ref 5–40)
ANION GAP SERPL CALCULATED.3IONS-SCNC: 13 MMOL/L (ref 3–15)
AST SERPL-CCNC: 10 U/L (ref 10–37)
BASOPHILS # BLD: 1 % (ref 0–2)
BASOPHILS ABSOLUTE: 0.1 K/UL (ref 0–0.2)
BILIRUB SERPL-MCNC: 0.2 MG/DL (ref 0.2–1.2)
BUN BLDV-MCNC: 23 MG/DL (ref 6–22)
CALCIUM SERPL-MCNC: 9.8 MG/DL (ref 8.4–10.5)
CHLORIDE BLD-SCNC: 103 MMOL/L (ref 98–110)
CO2: 24 MMOL/L (ref 20–32)
CREAT SERPL-MCNC: 1 MG/DL (ref 0.5–1.2)
EOSINOPHIL # BLD: 3 % (ref 0–6)
EOSINOPHILS ABSOLUTE: 0.3 K/UL (ref 0–0.5)
GLOBULIN: 1.9 G/DL (ref 2–4)
GLOMERULAR FILTRATION RATE: >60 ML/MIN/1.73 SQ.M.
GLUCOSE: 131 MG/DL (ref 70–99)
HCT VFR BLD CALC: 42.1 % (ref 35.1–48)
HEMOGLOBIN: 13 G/DL (ref 11.7–16)
LYMPHOCYTES # BLD: 32 % (ref 20–45)
LYMPHOCYTES ABSOLUTE: 2.5 K/UL (ref 1–4.8)
MCH RBC QN AUTO: 27 PG (ref 26–34)
MCHC RBC AUTO-ENTMCNC: 31 G/DL (ref 31–36)
MCV RBC AUTO: 88 FL (ref 80–99)
MONOCYTES ABSOLUTE: 0.6 K/UL (ref 0.1–1)
MONOCYTES: 8 % (ref 3–12)
NEUTROPHILS ABSOLUTE: 4.3 K/UL (ref 1.8–7.7)
NEUTROPHILS: 56 % (ref 40–75)
PDW BLD-RTO: 13.2 % (ref 10–15.5)
PLATELET # BLD: 191 K/UL (ref 140–440)
PMV BLD AUTO: 12.8 FL (ref 9–13)
POTASSIUM SERPL-SCNC: 4.2 MMOL/L (ref 3.5–5.5)
RBC: 4.78 M/UL (ref 3.8–5.2)
SODIUM BLD-SCNC: 140 MMOL/L (ref 133–145)
TOTAL PROTEIN: 6.2 G/DL (ref 6.4–8.3)
WBC: 7.7 K/UL (ref 4–11)

## 2023-07-14 ENCOUNTER — OFFICE VISIT (OUTPATIENT)
Age: 58
End: 2023-07-14

## 2023-07-14 VITALS
BODY MASS INDEX: 40.93 KG/M2 | DIASTOLIC BLOOD PRESSURE: 67 MMHG | SYSTOLIC BLOOD PRESSURE: 138 MMHG | HEIGHT: 63 IN | WEIGHT: 231 LBS

## 2023-07-14 DIAGNOSIS — M18.12 ARTHRITIS OF CARPOMETACARPAL (CMC) JOINT OF LEFT THUMB: Primary | ICD-10-CM

## 2023-07-14 DIAGNOSIS — G56.02 LEFT CARPAL TUNNEL SYNDROME: ICD-10-CM

## 2023-07-14 RX ORDER — SODIUM CHLORIDE 0.9 % (FLUSH) 0.9 %
5-40 SYRINGE (ML) INJECTION EVERY 12 HOURS SCHEDULED
OUTPATIENT
Start: 2023-07-14

## 2023-07-14 RX ORDER — SODIUM CHLORIDE 0.9 % (FLUSH) 0.9 %
5-40 SYRINGE (ML) INJECTION PRN
OUTPATIENT
Start: 2023-07-14

## 2023-07-14 RX ORDER — SODIUM CHLORIDE 9 MG/ML
INJECTION, SOLUTION INTRAVENOUS PRN
OUTPATIENT
Start: 2023-07-14

## 2023-07-14 NOTE — DISCHARGE INSTRUCTIONS
Ice and Elevate operative wound/dressing. Begin moving fingers immediately after surgery. Keep dressing clean and dry. Cover for showering. Do not remove dressing. DISCHARGE SUMMARY from Nurse    PATIENT INSTRUCTIONS:    After general anesthesia or intravenous sedation, for 24 hours or while taking prescription Narcotics:  Limit your activities  Do not drive and operate hazardous machinery  Do not make important personal or business decisions  Do  not drink alcoholic beverages  If you have not urinated within 8 hours after discharge, please contact your surgeon on call. Report the following to your surgeon:  Excessive pain, swelling, redness or odor of or around the surgical area  Temperature over 100.5  Nausea and vomiting lasting longer than 4 hours or if unable to take medications  Any signs of decreased circulation or nerve impairment to extremity: change in color, persistent  numbness, tingling, coldness or increase pain  Any questions      These are general instructions for a healthy lifestyle:    No smoking/ No tobacco products/ Avoid exposure to second hand smoke  Surgeon General's Warning:  Quitting smoking now greatly reduces serious risk to your health. Obesity, smoking, and sedentary lifestyle greatly increases your risk for illness    A healthy diet, regular physical exercise & weight monitoring are important for maintaining a healthy lifestyle    You may be retaining fluid if you have a history of heart failure or if you experience any of the following symptoms:  Weight gain of 3 pounds or more overnight or 5 pounds in a week, increased swelling in our hands or feet or shortness of breath while lying flat in bed. Please call your doctor as soon as you notice any of these symptoms; do not wait until your next office visit. The discharge information has been reviewed with the patient. The patient verbalized understanding.   Discharge medications reviewed with the patient

## 2023-07-14 NOTE — H&P
electrodiagnostic finding of bilateral carpal tunnel syndrome is consistent with her bilateral hand symptoms. Imaging:      10/9/2020 plain films of left wrist shows Eaton stage III-IV degenerative changes of the [facility] joint with early degenerative changes of the STT joint. 8/7/2020 plain films of right wrist are positive for degenerative changes about the STT and CMC joints of the thumb. These are consistent with Lucendia Rana stage 3. Impression     Diagnosis Orders   1. Arthritis of carpometacarpal [place]) joint of left thumb  External Referral To Occupational Therapy      2. Left carpal tunnel syndrome  External Referral To Occupational Therapy            Plan:     Proceed as scheduled for left thumb trapeziectomy and suspension arthroplasty and left carpal tunnel release. Referral to Occupational Therapy for range of motion exercises and other modalities. Discussed postoperative convalescence and answered all questions. Return in 24 days (on 8/7/2023) for postop. Plan was reviewed with patient, who verbalized agreement and understanding of the plan    Note: This note was completed using voice recognition software.   Any typographical/name errors or mistakes are unintentional.

## 2023-07-17 ENCOUNTER — TELEPHONE (OUTPATIENT)
Age: 58
End: 2023-07-17

## 2023-07-17 RX ORDER — MELOXICAM 7.5 MG/1
TABLET ORAL
Qty: 90 TABLET | Refills: 2 | Status: SHIPPED | OUTPATIENT
Start: 2023-07-17

## 2023-07-17 NOTE — TELEPHONE ENCOUNTER
Form completed and faxed to [facility] Sat [phone]    Patient notified and form sent to scanning. Patient reported she twisted left knee twice over the weekend. Patient is weightbearing, but it is very painful to bend. Patient is having surgery with Dr José Miguel Benedict on 7/25/23. Patient requested seeing Dr Elizabeth Nina sooner than 8/28/23.

## 2023-07-24 ENCOUNTER — ANESTHESIA EVENT (OUTPATIENT)
Facility: HOSPITAL | Age: 58
End: 2023-07-24
Payer: COMMERCIAL

## 2023-07-25 ENCOUNTER — HOSPITAL ENCOUNTER (OUTPATIENT)
Facility: HOSPITAL | Age: 58
Setting detail: OUTPATIENT SURGERY
Discharge: HOME | End: 2023-07-25
Attending: ORTHOPAEDIC SURGERY | Admitting: ORTHOPAEDIC SURGERY
Payer: COMMERCIAL

## 2023-07-25 ENCOUNTER — ANESTHESIA (OUTPATIENT)
Facility: HOSPITAL | Age: 58
End: 2023-07-25
Payer: COMMERCIAL

## 2023-07-25 VITALS
TEMPERATURE: 96.1 F | OXYGEN SATURATION: 95 % | WEIGHT: 230 LBS | RESPIRATION RATE: 16 BRPM | DIASTOLIC BLOOD PRESSURE: 62 MMHG | HEIGHT: 63 IN | SYSTOLIC BLOOD PRESSURE: 100 MMHG | BODY MASS INDEX: 40.75 KG/M2 | HEART RATE: 53 BPM

## 2023-07-25 DIAGNOSIS — G56.02 LEFT CARPAL TUNNEL SYNDROME: Primary | ICD-10-CM

## 2023-07-25 DIAGNOSIS — M18.12 ARTHRITIS OF CARPOMETACARPAL (CMC) JOINT OF LEFT THUMB: ICD-10-CM

## 2023-07-25 PROCEDURE — 3600000012 HC SURGERY LEVEL 2 ADDTL 15MIN: Performed by: ORTHOPAEDIC SURGERY

## 2023-07-25 PROCEDURE — 3700000001 HC ADD 15 MINUTES (ANESTHESIA): Performed by: ORTHOPAEDIC SURGERY

## 2023-07-25 PROCEDURE — 6360000002 HC RX W HCPCS: Performed by: ORTHOPAEDIC SURGERY

## 2023-07-25 PROCEDURE — C9290 INJ, BUPIVACAINE LIPOSOME: HCPCS | Performed by: ORTHOPAEDIC SURGERY

## 2023-07-25 PROCEDURE — 64415 NJX AA&/STRD BRCH PLXS IMG: CPT | Performed by: ANESTHESIOLOGY

## 2023-07-25 PROCEDURE — 2580000003 HC RX 258: Performed by: NURSE ANESTHETIST, CERTIFIED REGISTERED

## 2023-07-25 PROCEDURE — 2500000003 HC RX 250 WO HCPCS: Performed by: ORTHOPAEDIC SURGERY

## 2023-07-25 PROCEDURE — 6360000002 HC RX W HCPCS: Performed by: NURSE ANESTHETIST, CERTIFIED REGISTERED

## 2023-07-25 PROCEDURE — 3600000002 HC SURGERY LEVEL 2 BASE: Performed by: ORTHOPAEDIC SURGERY

## 2023-07-25 PROCEDURE — 6360000002 HC RX W HCPCS: Performed by: ANESTHESIOLOGY

## 2023-07-25 PROCEDURE — 7100000010 HC PHASE II RECOVERY - FIRST 15 MIN: Performed by: ORTHOPAEDIC SURGERY

## 2023-07-25 PROCEDURE — 7100000001 HC PACU RECOVERY - ADDTL 15 MIN: Performed by: ORTHOPAEDIC SURGERY

## 2023-07-25 PROCEDURE — 3700000000 HC ANESTHESIA ATTENDED CARE: Performed by: ORTHOPAEDIC SURGERY

## 2023-07-25 PROCEDURE — 2709999900 HC NON-CHARGEABLE SUPPLY: Performed by: ORTHOPAEDIC SURGERY

## 2023-07-25 PROCEDURE — 7100000000 HC PACU RECOVERY - FIRST 15 MIN: Performed by: ORTHOPAEDIC SURGERY

## 2023-07-25 PROCEDURE — 2580000003 HC RX 258: Performed by: ORTHOPAEDIC SURGERY

## 2023-07-25 PROCEDURE — 2500000003 HC RX 250 WO HCPCS: Performed by: NURSE ANESTHETIST, CERTIFIED REGISTERED

## 2023-07-25 PROCEDURE — 6360000002 HC RX W HCPCS

## 2023-07-25 PROCEDURE — 7100000011 HC PHASE II RECOVERY - ADDTL 15 MIN: Performed by: ORTHOPAEDIC SURGERY

## 2023-07-25 PROCEDURE — 6370000000 HC RX 637 (ALT 250 FOR IP): Performed by: NURSE ANESTHETIST, CERTIFIED REGISTERED

## 2023-07-25 PROCEDURE — C1713 ANCHOR/SCREW BN/BN,TIS/BN: HCPCS | Performed by: ORTHOPAEDIC SURGERY

## 2023-07-25 DEVICE — ANCHOR SUT L8.5MM DIA3.5MM HND WRST FORKED TIP EYELET: Type: IMPLANTABLE DEVICE | Site: HAND | Status: FUNCTIONAL

## 2023-07-25 RX ORDER — ONDANSETRON 2 MG/ML
4 INJECTION INTRAMUSCULAR; INTRAVENOUS
Status: DISCONTINUED | OUTPATIENT
Start: 2023-07-25 | End: 2023-07-25 | Stop reason: HOSPADM

## 2023-07-25 RX ORDER — ONDANSETRON 2 MG/ML
INJECTION INTRAMUSCULAR; INTRAVENOUS PRN
Status: DISCONTINUED | OUTPATIENT
Start: 2023-07-25 | End: 2023-07-25 | Stop reason: SDUPTHER

## 2023-07-25 RX ORDER — MIDAZOLAM HYDROCHLORIDE 2 MG/2ML
2 INJECTION, SOLUTION INTRAMUSCULAR; INTRAVENOUS ONCE
Status: COMPLETED | OUTPATIENT
Start: 2023-07-25 | End: 2023-07-25

## 2023-07-25 RX ORDER — FENTANYL CITRATE 50 UG/ML
100 INJECTION, SOLUTION INTRAMUSCULAR; INTRAVENOUS EVERY 5 MIN PRN
Status: DISCONTINUED | OUTPATIENT
Start: 2023-07-25 | End: 2023-07-25 | Stop reason: HOSPADM

## 2023-07-25 RX ORDER — SODIUM CHLORIDE 0.9 % (FLUSH) 0.9 %
5-40 SYRINGE (ML) INJECTION EVERY 12 HOURS SCHEDULED
Status: DISCONTINUED | OUTPATIENT
Start: 2023-07-25 | End: 2023-07-25 | Stop reason: HOSPADM

## 2023-07-25 RX ORDER — ROPIVACAINE HYDROCHLORIDE 5 MG/ML
30 INJECTION, SOLUTION EPIDURAL; INFILTRATION; PERINEURAL ONCE
Status: DISCONTINUED | OUTPATIENT
Start: 2023-07-25 | End: 2023-07-25 | Stop reason: HOSPADM

## 2023-07-25 RX ORDER — PROPOFOL 10 MG/ML
INJECTION, EMULSION INTRAVENOUS PRN
Status: DISCONTINUED | OUTPATIENT
Start: 2023-07-25 | End: 2023-07-25 | Stop reason: SDUPTHER

## 2023-07-25 RX ORDER — FENTANYL CITRATE 50 UG/ML
100 INJECTION, SOLUTION INTRAMUSCULAR; INTRAVENOUS ONCE
Status: COMPLETED | OUTPATIENT
Start: 2023-07-25 | End: 2023-07-25

## 2023-07-25 RX ORDER — FENTANYL CITRATE 50 UG/ML
INJECTION, SOLUTION INTRAMUSCULAR; INTRAVENOUS
Status: COMPLETED
Start: 2023-07-25 | End: 2023-07-25

## 2023-07-25 RX ORDER — SODIUM CHLORIDE 0.9 % (FLUSH) 0.9 %
5-40 SYRINGE (ML) INJECTION PRN
Status: DISCONTINUED | OUTPATIENT
Start: 2023-07-25 | End: 2023-07-25 | Stop reason: HOSPADM

## 2023-07-25 RX ORDER — FENTANYL CITRATE 50 UG/ML
INJECTION, SOLUTION INTRAMUSCULAR; INTRAVENOUS PRN
Status: DISCONTINUED | OUTPATIENT
Start: 2023-07-25 | End: 2023-07-25 | Stop reason: SDUPTHER

## 2023-07-25 RX ORDER — LIDOCAINE HYDROCHLORIDE 20 MG/ML
INJECTION, SOLUTION INTRAVENOUS PRN
Status: DISCONTINUED | OUTPATIENT
Start: 2023-07-25 | End: 2023-07-25 | Stop reason: SDUPTHER

## 2023-07-25 RX ORDER — MAGNESIUM SULFATE HEPTAHYDRATE 500 MG/ML
INJECTION, SOLUTION INTRAMUSCULAR; INTRAVENOUS PRN
Status: DISCONTINUED | OUTPATIENT
Start: 2023-07-25 | End: 2023-07-25 | Stop reason: SDUPTHER

## 2023-07-25 RX ORDER — ROPIVACAINE HYDROCHLORIDE 5 MG/ML
INJECTION, SOLUTION EPIDURAL; INFILTRATION; PERINEURAL
Status: COMPLETED | OUTPATIENT
Start: 2023-07-25 | End: 2023-07-25

## 2023-07-25 RX ORDER — LIDOCAINE HYDROCHLORIDE 10 MG/ML
1 INJECTION, SOLUTION EPIDURAL; INFILTRATION; INTRACAUDAL; PERINEURAL
Status: COMPLETED | OUTPATIENT
Start: 2023-07-25 | End: 2023-07-25

## 2023-07-25 RX ORDER — SODIUM CHLORIDE 9 MG/ML
INJECTION, SOLUTION INTRAVENOUS CONTINUOUS
Status: DISCONTINUED | OUTPATIENT
Start: 2023-07-25 | End: 2023-07-25 | Stop reason: HOSPADM

## 2023-07-25 RX ORDER — SODIUM CHLORIDE 9 MG/ML
INJECTION, SOLUTION INTRAVENOUS PRN
Status: DISCONTINUED | OUTPATIENT
Start: 2023-07-25 | End: 2023-07-25 | Stop reason: HOSPADM

## 2023-07-25 RX ORDER — MIDAZOLAM HYDROCHLORIDE 1 MG/ML
INJECTION INTRAMUSCULAR; INTRAVENOUS
Status: COMPLETED
Start: 2023-07-25 | End: 2023-07-25

## 2023-07-25 RX ORDER — OXYCODONE AND ACETAMINOPHEN 7.5; 325 MG/1; MG/1
1 TABLET ORAL EVERY 6 HOURS PRN
Qty: 20 TABLET | Refills: 0 | Status: SHIPPED | OUTPATIENT
Start: 2023-07-25 | End: 2023-07-30

## 2023-07-25 RX ORDER — FENTANYL CITRATE 50 UG/ML
50 INJECTION, SOLUTION INTRAMUSCULAR; INTRAVENOUS EVERY 5 MIN PRN
Status: DISCONTINUED | OUTPATIENT
Start: 2023-07-25 | End: 2023-07-25 | Stop reason: HOSPADM

## 2023-07-25 RX ORDER — ROPIVACAINE HYDROCHLORIDE 5 MG/ML
INJECTION, SOLUTION EPIDURAL; INFILTRATION; PERINEURAL
Status: COMPLETED
Start: 2023-07-25 | End: 2023-07-25

## 2023-07-25 RX ORDER — FAMOTIDINE 20 MG/1
20 TABLET, FILM COATED ORAL ONCE
Status: COMPLETED | OUTPATIENT
Start: 2023-07-25 | End: 2023-07-25

## 2023-07-25 RX ADMIN — MIDAZOLAM HYDROCHLORIDE 2 MG: 1 INJECTION, SOLUTION INTRAMUSCULAR; INTRAVENOUS at 07:23

## 2023-07-25 RX ADMIN — FENTANYL CITRATE 50 MCG: 50 INJECTION, SOLUTION INTRAMUSCULAR; INTRAVENOUS at 07:23

## 2023-07-25 RX ADMIN — MIDAZOLAM HYDROCHLORIDE 2 MG: 2 INJECTION, SOLUTION INTRAMUSCULAR; INTRAVENOUS at 07:23

## 2023-07-25 RX ADMIN — MAGNESIUM SULFATE HEPTAHYDRATE 2 G: 500 INJECTION, SOLUTION INTRAMUSCULAR; INTRAVENOUS at 07:57

## 2023-07-25 RX ADMIN — ONDANSETRON 4 MG: 2 INJECTION INTRAMUSCULAR; INTRAVENOUS at 07:40

## 2023-07-25 RX ADMIN — FENTANYL CITRATE 50 MCG: 50 INJECTION INTRAMUSCULAR; INTRAVENOUS at 08:41

## 2023-07-25 RX ADMIN — WATER 2000 MG: 1 INJECTION, SOLUTION INTRAMUSCULAR; INTRAVENOUS; SUBCUTANEOUS at 07:44

## 2023-07-25 RX ADMIN — TRANEXAMIC ACID 1000 MG: 100 INJECTION, SOLUTION INTRAVENOUS at 07:51

## 2023-07-25 RX ADMIN — FENTANYL CITRATE 50 MCG: 50 INJECTION INTRAMUSCULAR; INTRAVENOUS at 07:56

## 2023-07-25 RX ADMIN — PROPOFOL 150 MG: 10 INJECTION, EMULSION INTRAVENOUS at 07:35

## 2023-07-25 RX ADMIN — LIDOCAINE HYDROCHLORIDE 2 ML: 10 INJECTION, SOLUTION EPIDURAL; INFILTRATION; INTRACAUDAL; PERINEURAL at 07:32

## 2023-07-25 RX ADMIN — TRANEXAMIC ACID 1000 MG: 100 INJECTION, SOLUTION INTRAVENOUS at 08:52

## 2023-07-25 RX ADMIN — SODIUM CHLORIDE: 9 INJECTION, SOLUTION INTRAVENOUS at 06:38

## 2023-07-25 RX ADMIN — LIDOCAINE HYDROCHLORIDE 60 MG: 20 INJECTION, SOLUTION INTRAVENOUS at 07:35

## 2023-07-25 RX ADMIN — ROPIVACAINE HYDROCHLORIDE 150 MG: 5 INJECTION EPIDURAL; INFILTRATION; PERINEURAL at 07:29

## 2023-07-25 RX ADMIN — FAMOTIDINE 20 MG: 20 TABLET, FILM COATED ORAL at 06:27

## 2023-07-25 RX ADMIN — ROPIVACAINE HYDROCHLORIDE 30 ML: 5 INJECTION EPIDURAL; INFILTRATION; PERINEURAL at 07:22

## 2023-07-25 ASSESSMENT — PAIN SCALES - WONG BAKER: WONGBAKER_NUMERICALRESPONSE: 0

## 2023-07-25 ASSESSMENT — PAIN - FUNCTIONAL ASSESSMENT: PAIN_FUNCTIONAL_ASSESSMENT: 0-10

## 2023-07-25 NOTE — OP NOTE
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 Item Serial No.  Lot No. LRB No. Used Action   ANCHOR SUT L8.5MM DIA3. 5MM HND WRST FORKED TIP EYELET - [id number]  ANCHOR SUT L8.5MM DIA3. 5MM HND WRST FORKED TIP EYELET  [employer] [id number] Left 1 Implanted         Drains: * No LDAs found *    Findings: flattened median nerve, end stage djd CMC and STT joints, and very soft osteoporotic-appearing bone. Detailed Description of Procedure: Indications for procedure been outlined in the perioperative documentation, most notably being not amenable to conservative treatment. Informed consent was obtained from the patient. The risks and benefits of the procedure were discussed with the patient. They include but are not limited to neurovascular injury, tendon/ligamentous injury, blood loss, infection, incomplete release of nerve, incomplete excision of trapezium or trapezoid, adjacent joint arthritis, subsidence of metacarpal, need for further surgery, hematoma, neuroma, seroma, chronic pain, chronic stiffness, complications from anesthesia including death, and the possibility of bee Covid. After informed consent was obtained, the patient was taken back to the operative suite.

## 2023-07-25 NOTE — ANESTHESIA POSTPROCEDURE EVALUATION
Department of Anesthesiology  Postprocedure Note    Patient: Jose Blue  MRN: [id number]  YOB: 1965  Date of evaluation: 7/25/2023      Procedure Summary     Date: 07/25/23 Room / Location: [facility] MAIN 02 / [facility] MAIN OR    Anesthesia Start: 0732 Anesthesia Stop: 3032    Procedure: LEFT THUMB TRAPEZIECTOMY AND SUSPENSION ARTHROPLASTY; LEFT CARPAL TUNNEL RELEASE; PARTIAL TRAPEZIODAL EXCISION; [address] [facility] (Left: Hand) Diagnosis:       Arthritis of carpometacarpal (CMC) joint of left thumb      Left carpal tunnel syndrome      (Arthritis of carpometacarpal (CMC) joint of left thumb [M18.12])      (Left carpal tunnel syndrome [G56.02])    Surgeons: Margarette Mendez DO Responsible Provider: Sheri Ro MD    Anesthesia Type: General ASA Status: 3          Anesthesia Type: General    Ana Laura Phase I: Ana Laura Score: 9    Ana Laura Phase II: Ana Laura Score: 10      Anesthesia Post Evaluation    Patient location during evaluation: bedside  Patient participation: complete - patient participated  Pain score: 0  Airway patency: patent  Nausea & Vomiting: no nausea  Complications: no  Cardiovascular status: hemodynamically stable  Respiratory status: acceptable  Hydration status: euvolemic

## 2023-07-25 NOTE — H&P
Update History & Physical    The patient's History and Physical of July 14, 2023 was reviewed with the patient and I examined the patient. There was no change. The surgical site was confirmed by the patient and me. Plan: The risks, benefits, expected outcome, and alternative to the recommended procedure have been discussed with the patient. Patient understands and wants to proceed with the procedure.      Electronically signed by Domenico Leiva DO on 7/25/2023 at 7:09 AM

## 2023-07-25 NOTE — BRIEF OP NOTE
Brief Postoperative Note      Patient: Juliana Vanessa  YOB: 1965  MRN: [id number]    Date of Procedure: 7/25/2023    Pre-Op Diagnosis Codes:     * Arthritis of carpometacarpal (CMC) joint of left thumb [M18.12]     * Left carpal tunnel syndrome [G56.02]    Post-Op Diagnosis: Same       Procedure(s):  LEFT THUMB TRAPEZIECTOMY AND SUSPENSION ARTHROPLASTY; LEFT CARPAL TUNNEL RELEASE; [address] [place] [address]    Surgeon(s):  Fan Rondon DO    Assistant:  Surgical Assistant: Akin Palomares    Anesthesia: General    Estimated Blood Loss (mL): less than 50     Complications: None    Specimens:   * No specimens in log *    Implants:  Implant Name Type Inv. Item Serial No.  Lot No. LRB No. Used Action   ANCHOR SUT L8.5MM DIA3. 5MM HND WRST FORKED TIP EYELET - [id number]  ANCHOR SUT L8.5MM DIA3. 5MM HND WRST FORKED TIP EYELET  [employer]- [id number] Left 1 Implanted         Drains: * No LDAs found *    Findings: end stage djd CMC & STT.   Flattened median nerve      Electronically signed by Fan Rondon DO on 7/25/2023 at 8:57 AM

## 2023-07-25 NOTE — ANESTHESIA PRE PROCEDURE
Department of Anesthesiology  Preprocedure Note       Name:  Juliana Vanessa   Age:  61 y.o.  :  1965                                          MRN:  [id number]         Date:  2023      Surgeon: Whitley Pablo):  Charley Cutler DO    Procedure: Procedure(s):  LEFT THUMB TRAPEZIECTOMY AND SUSPENSION ARTHROPLASTY; LEFT CARPAL TUNNEL RELEASE; [address] [place]    Medications prior to admission:   Prior to Admission medications    Medication Sig Start Date End Date Taking? Authorizing Provider   oxyCODONE-acetaminophen (PERCOCET) 7.5-325 MG per tablet Take 1 tablet by mouth every 6 hours as needed for Pain for up to 5 days.  Intended supply: 28 days Max Daily Amount: 4 tablets 23 Yes Navjot Arechiga DO   meloxicam (MOBIC) 7.5 MG tablet TAKE 1 TABLET BY MOUTH DAILY AS NEEDED FOR PAIN 23   [place]into Marilyn NETTA Sandoval   gabapentin (NEURONTIN) 300 MG capsule TAKE 1 CAPSULE BY MOUTH EVERY NIGHT FOR 3 NIGHTS THEN TAKE 2 CAPSULES BY MOUTH EVERY NIGHT 23   Historical Provider, MD   albuterol (PROVENTIL) (2.5 MG/3ML) 0.083% nebulizer solution INHALE 3 ML AS NEEDED EVERY 6 HOURS 21   Ar Automatic Reconciliation   budesonide (PULMICORT) 0.5 MG/2ML nebulizer suspension Inhale 2 mLs into the lungs    Ar Automatic Reconciliation   budesonide-formoterol (SYMBICORT) 160-4.5 MCG/ACT AERO INHALE 2 PUFFS PO BID 20   Ar Automatic Reconciliation   cyclobenzaprine (FLEXERIL) 10 MG tablet Take 1 tablet by mouth 3 times daily as needed 22   Ar Automatic Reconciliation   EPINEPHrine (EPIPEN) 0.3 MG/0.3ML SOAJ injection Inject 0.3 mLs into the muscle once as needed  Patient not taking: Reported on 2023    Ar Automatic Reconciliation   ergocalciferol (ERGOCALCIFEROL) 1.25 MG (40910 UT) capsule Twice a week 10/27/16   Ar Automatic Reconciliation   escitalopram (LEXAPRO) 20 MG tablet Take 1 tablet by mouth daily    Ar Automatic Reconciliation   fexofenadine-pseudoephedrine (ALLEGRA-D 12HR)  MG per

## 2023-07-25 NOTE — ANESTHESIA PROCEDURE NOTES
Peripheral Block    Patient location during procedure: pre-op  Reason for block: post-op pain management and at surgeon's request  Start time: 7/25/2023 7:22 AM  End time: 7/25/2023 7:26 AM  Staffing  Anesthesiologist: Emma Mcmanus MD  Peripheral Block   Patient position: supine  Prep: ChloraPrep  Provider prep: mask  Patient monitoring: continuous pulse ox, frequent blood pressure checks, IV access, oxygen and responsive to questions  Block type: Brachial plexus  Interscalene  Laterality: left  Injection technique: single-shot  Guidance: Doppler guided  Local infiltration: ropivacaine  Infiltration strength: 0.5 %  Local infiltration: ropivacaine  Dose: 30 mL    Needle   Needle type: insulated echogenic nerve stimulator needle   Needle gauge: 22 G  Needle localization: ultrasound guidance and nerve stimulator  Assessment   Injection assessment: negative aspiration for heme, no paresthesia on injection, local visualized surrounding nerve on ultrasound and no intravascular symptoms  Slow fractionated injection: yes  Hemodynamics: stable  Real-time US image taken/store: yes  Outcomes: uncomplicated    Medications Administered  ropivacaine (NAROPIN) injection 0.5% - Perineural   30 mL - 7/25/2023 7:22:00 AM

## 2023-07-25 NOTE — PERIOP NOTE
Patient Darlene Cash has been informed that [facility] is not responsible for patient belongings per policy and the signed [facility] Patient Agreement document. Personal items should be sent home or checked in with security. Patient Darlene Cash selected the following action:                            [x]  Send personal items home with a family member or friend                                                 []  Check in personal items with security, excluding clothing                            []  Maintain personal items at the bedside, against recommendation                                 by [facility]                                   ** If patient Padma Rush chooses to maintain personal items at the bedside,                                      Complete the patient belongings inventory in the EMR.

## 2023-08-07 ENCOUNTER — OFFICE VISIT (OUTPATIENT)
Age: 58
End: 2023-08-07

## 2023-08-07 VITALS — HEIGHT: 63 IN | BODY MASS INDEX: 40.75 KG/M2 | WEIGHT: 230 LBS

## 2023-08-07 DIAGNOSIS — Z98.890 S/P CARPAL TUNNEL RELEASE: ICD-10-CM

## 2023-08-07 DIAGNOSIS — G56.02 LEFT CARPAL TUNNEL SYNDROME: ICD-10-CM

## 2023-08-07 DIAGNOSIS — M18.12 ARTHRITIS OF CARPOMETACARPAL (CMC) JOINT OF LEFT THUMB: Primary | ICD-10-CM

## 2023-08-07 DIAGNOSIS — Z98.890 S/P WRIST SURGERY: ICD-10-CM

## 2023-08-07 PROCEDURE — 99024 POSTOP FOLLOW-UP VISIT: CPT | Performed by: ORTHOPAEDIC SURGERY

## 2023-08-07 NOTE — PROGRESS NOTES
General: No swelling, tenderness, deformity or signs of injury. Normal range of motion. Cervical back: Normal range of motion and neck supple. Right lower leg: No edema. Left lower leg: No edema. Skin:     General: Skin is warm and dry. Capillary Refill: Capillary refill takes less than 2 seconds. Findings: No bruising or erythema. Neurological:      General: No focal deficit present. Mental Status: She is alert and oriented to person, place, and time. Psychiatric:         Mood and Affect: Mood normal.         Behavior: Behavior normal.          Left upper extremity: Incision(s) clean dry and intact no drainage breakdown erythema or any signs of infection. Neurovascularly intact distally. Range of motion full. NCV & EMG Findings:  INTERPRETATION  This is an abnormal electrodiagnostic examination. These findings may be consistent with Moderate median mononeuropathy at the wrist (carpal tunnel syndrome), bilaterally. There is no electrodiagnostic evidence of cervical radiculopathy, brachial plexopathy, polyneuropathy or other mononeuropathy. CLINICAL INTERPRETATION  Her electrodiagnostic finding of bilateral carpal tunnel syndrome is consistent with her bilateral hand symptoms. Imaging:      10/9/2020 plain films of left wrist shows Eaton stage III-IV degenerative changes of the [facility] joint with early degenerative changes of the STT joint. 8/7/2020 plain films of right wrist are positive for degenerative changes about the STT and CMC joints of the thumb. These are consistent with [place] stage 3. Impression     Diagnosis Orders   1. Arthritis of carpometacarpal (CMC) joint of left thumb  DME Order for (Specify) as OP      2. S/P wrist surgery        3. Left carpal tunnel syndrome        4. S/P carpal tunnel release              Plan:     Therapy as scheduled.     We will see the patient back in a couple weeks for evaluation of the right side as

## 2023-08-10 ENCOUNTER — PATIENT MESSAGE (OUTPATIENT)
Age: 58
End: 2023-08-10

## 2023-08-11 NOTE — TELEPHONE ENCOUNTER
From: Nyla Griffiths  To: Dr. Harry Senters: 8/10/2023 2:35 PM EDT  Subject: Drs note for work    Greetings Dr Bushra Arzate  I was just informed by HR at work I need a doctors note. My leave was only approved thru 8/7/23  So I need a new drs note. I have done well with OT therapy this week. Can you write me a note stating I can go back to work next week ? And state that I must telework for next 3 weeks. I just want to make sure my job is secure. Let me know.    Thanks Ms Felix Mayfield

## 2023-08-14 ENCOUNTER — CLINICAL DOCUMENTATION (OUTPATIENT)
Age: 58
End: 2023-08-14

## 2023-08-16 ENCOUNTER — TELEPHONE (OUTPATIENT)
Age: 58
End: 2023-08-16

## 2023-08-16 NOTE — TELEPHONE ENCOUNTER
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

## 2023-08-16 NOTE — TELEPHONE ENCOUNTER
We are not able to talk to the patient's HR department due to HIPAA regulations. We have provided letters and filled out paperwork for the patient stating she should be on telework for the next three weeks. Writer has communicated with patient via [address] [place] [address].

## 2023-08-25 ENCOUNTER — OFFICE VISIT (OUTPATIENT)
Age: 58
End: 2023-08-25

## 2023-08-25 VITALS — BODY MASS INDEX: 40.75 KG/M2 | HEIGHT: 63 IN | WEIGHT: 230 LBS

## 2023-08-25 DIAGNOSIS — Z98.890 S/P WRIST SURGERY: ICD-10-CM

## 2023-08-25 DIAGNOSIS — M65.331 TRIGGER MIDDLE FINGER OF RIGHT HAND: Primary | ICD-10-CM

## 2023-08-25 DIAGNOSIS — M18.12 ARTHRITIS OF CARPOMETACARPAL (CMC) JOINT OF LEFT THUMB: ICD-10-CM

## 2023-08-25 DIAGNOSIS — M18.11 ARTHRITIS OF CARPOMETACARPAL (CMC) JOINT OF RIGHT THUMB: ICD-10-CM

## 2023-08-25 NOTE — PROGRESS NOTES
Local anesthetic used: 1% Lidocaine Kenalog 5 mg x2 and was then injected and the needle withdrawn. The procedure was well tolerated. The patient is asked to continue to rest the area for a few more days before resuming regular activities. It may be more painful for the first 1-2 days. Watch for fever, or increased swelling or persistent pain in the joint. Call or return to clinic prn if such symptoms occur or there is failure to improve as anticipated. Note: This note was completed using voice recognition software.   Any typographical/name errors or mistakes are unintentional.

## 2023-09-08 ENCOUNTER — OFFICE VISIT (OUTPATIENT)
Age: 58
End: 2023-09-08

## 2023-09-08 VITALS — HEIGHT: 63 IN | BODY MASS INDEX: 40.75 KG/M2 | WEIGHT: 230 LBS

## 2023-09-08 DIAGNOSIS — E66.01 OBESITY, MORBID (HCC): ICD-10-CM

## 2023-09-08 DIAGNOSIS — M17.12 PRIMARY OSTEOARTHRITIS OF LEFT KNEE: Primary | ICD-10-CM

## 2023-09-08 DIAGNOSIS — M17.11 PRIMARY OSTEOARTHRITIS OF RIGHT KNEE: ICD-10-CM

## 2023-09-08 RX ORDER — TRIAMCINOLONE ACETONIDE 40 MG/ML
80 INJECTION, SUSPENSION INTRA-ARTICULAR; INTRAMUSCULAR ONCE
Status: COMPLETED | OUTPATIENT
Start: 2023-09-08 | End: 2023-09-08

## 2023-09-08 RX ADMIN — TRIAMCINOLONE ACETONIDE 80 MG: 40 INJECTION, SUSPENSION INTRA-ARTICULAR; INTRAMUSCULAR at 10:51

## 2023-09-08 NOTE — ASSESSMENT & PLAN NOTE
50-year-old female with bilateral knee osteoarthritis. She had been getting gel injections from Dr. Thorpe Borne in the past has not had any in a couple of years. Traditionally her right knee has been worsening but her left knee is catching up to it as far as pain is concerned. She will take occasional Voltaren gel and occasional Tylenol which relieves the pain but she especially gets aching at night. We will put in for authorization for the gel injections and I will give her bilateral knee corticosteroid injections today. I will have her back once the gel injections are authorized. After obtaining written consent for bilateral knee intra-articular injection the patient was prepped in normal sterile fashion with freeze spray and alcohol. 80mg kenalog and 2mL 2% lidocaine was injected . The needle was withdrawn, the area was cleansed and a sterile bandage was applied. The patient tolerated the procedure well.

## 2023-09-24 ENCOUNTER — PATIENT MESSAGE (OUTPATIENT)
Age: 58
End: 2023-09-24

## 2023-09-28 ENCOUNTER — TELEPHONE (OUTPATIENT)
Age: 58
End: 2023-09-28

## 2023-09-28 NOTE — TELEPHONE ENCOUNTER
Patient called to check status of injection series authorization. She's requesting a call back at [phone].

## 2023-10-11 ENCOUNTER — OFFICE VISIT (OUTPATIENT)
Age: 58
End: 2023-10-11
Payer: COMMERCIAL

## 2023-10-11 VITALS — TEMPERATURE: 97.3 F | WEIGHT: 247 LBS | BODY MASS INDEX: 43.77 KG/M2 | HEIGHT: 63 IN

## 2023-10-11 DIAGNOSIS — M17.11 PRIMARY OSTEOARTHRITIS OF RIGHT KNEE: ICD-10-CM

## 2023-10-11 DIAGNOSIS — M17.12 PRIMARY OSTEOARTHRITIS OF LEFT KNEE: Primary | ICD-10-CM

## 2023-10-11 PROCEDURE — 20611 DRAIN/INJ JOINT/BURSA W/US: CPT | Performed by: PHYSICIAN ASSISTANT

## 2023-10-11 RX ORDER — HYALURONATE SODIUM 10 MG/ML
20 SYRINGE (ML) INTRAARTICULAR ONCE
Status: COMPLETED | OUTPATIENT
Start: 2023-10-11 | End: 2023-10-11

## 2023-10-11 RX ADMIN — Medication 20 MG: at 15:15

## 2023-10-11 RX ADMIN — Medication 20 MG: at 15:16

## 2023-10-11 NOTE — PROGRESS NOTES
Patient: Dinesh Dorsey                MRN: [id number]       SSN: [id number]  YOB: 1965        AGE: 60 y.o. SEX: female  There is no height or weight on file to calculate BMI. PCP: Jake Rivera MD  10/11/23    No chief complaint on file. HISTORY:  Dinesh Dorsey is a 60 y.o. female who is seen for reevaluation of Bilateral knee here for 1st injection of euflexxa    PROCEDURE: Bilateral  knee Injection with Ultrasound Guidance    Indication:Bilateralknee pain/swelling    After sterile prep, 2 cc of euflexxa were injected into the Bilateral Knee. Intra-articular Ultrasound images captured using [address] Ultrasound machine using a frequency of 10 MHz with a linear transducer and scanned into patient's chart. OFFICE PROCEDURE PROGRESS NOTE        Chart reviewed for the following:   Columba ALATORRE PA-C, have reviewed the History, Physical and updated the Allergic reactions for [place] performed immediately prior to start of procedure:   Columba ALATORRE PA-C, have performed the following reviews on Dinesh Dorsey prior to the start of the procedure:            * Patient was identified by name and date of birth   * Agreement on procedure being performed was verified  * Risks and Benefits explained to the patient  * Procedure site verified and marked as necessary  * Patient was positioned for comfort  * Consent was signed and verified     Time: 3:03 PM       Date of procedure: 10/11/2023    Procedure performed by:  TORREY Singh    Provider assisted by: None     How tolerated by patient: tolerated the procedure well with no complications    Comments: none    IMPRESSION:     ICD-10-CM    1. Primary osteoarthritis of left knee  M17.12       2.  Primary osteoarthritis of right knee  M17.11            PLAN:  Ms. Yu Ortega will return in one week for her second euflexxa

## 2023-10-18 ENCOUNTER — OFFICE VISIT (OUTPATIENT)
Age: 58
End: 2023-10-18

## 2023-10-18 VITALS — TEMPERATURE: 97.5 F | BODY MASS INDEX: 43.77 KG/M2 | HEIGHT: 63 IN | WEIGHT: 247 LBS

## 2023-10-18 DIAGNOSIS — M17.11 PRIMARY OSTEOARTHRITIS OF RIGHT KNEE: ICD-10-CM

## 2023-10-18 DIAGNOSIS — M17.12 PRIMARY OSTEOARTHRITIS OF LEFT KNEE: Primary | ICD-10-CM

## 2023-10-18 RX ORDER — HYALURONATE SODIUM 10 MG/ML
20 SYRINGE (ML) INTRAARTICULAR ONCE
Status: COMPLETED | OUTPATIENT
Start: 2023-10-18 | End: 2023-10-18

## 2023-10-18 RX ADMIN — Medication 20 MG: at 15:44

## 2023-10-25 ENCOUNTER — OFFICE VISIT (OUTPATIENT)
Age: 58
End: 2023-10-25

## 2023-10-25 VITALS — HEIGHT: 63 IN | WEIGHT: 247 LBS | BODY MASS INDEX: 43.77 KG/M2

## 2023-10-25 DIAGNOSIS — M17.12 PRIMARY OSTEOARTHRITIS OF LEFT KNEE: Primary | ICD-10-CM

## 2023-10-25 DIAGNOSIS — M17.11 PRIMARY OSTEOARTHRITIS OF RIGHT KNEE: ICD-10-CM

## 2023-10-25 RX ORDER — HYALURONATE SODIUM 10 MG/ML
20 SYRINGE (ML) INTRAARTICULAR ONCE
Status: COMPLETED | OUTPATIENT
Start: 2023-10-25 | End: 2023-10-25

## 2023-10-25 RX ADMIN — Medication 20 MG: at 16:14

## 2023-11-29 ENCOUNTER — OFFICE VISIT (OUTPATIENT)
Age: 58
End: 2023-11-29

## 2023-11-29 VITALS — WEIGHT: 247 LBS | HEIGHT: 63 IN | BODY MASS INDEX: 43.77 KG/M2

## 2023-11-29 DIAGNOSIS — M65.331 TRIGGER MIDDLE FINGER OF RIGHT HAND: ICD-10-CM

## 2023-11-29 DIAGNOSIS — M18.11 ARTHRITIS OF CARPOMETACARPAL (CMC) JOINT OF RIGHT THUMB: Primary | ICD-10-CM

## 2023-11-29 DIAGNOSIS — G56.01 RIGHT CARPAL TUNNEL SYNDROME: ICD-10-CM

## 2023-11-30 NOTE — PROGRESS NOTES
Jose Blue is a 60 y.o. female right handed . Worker's Compensation and legal considerations: none    Chief Complaint   Patient presents with    Hand Pain     Right hand pain     Pain Score:   5    HPI: Patient presents today requesting an injection for her right thumb CMC arthritis, right carpal tunnel syndrome, and right middle trigger finger. She reports to be doing well on her surgical site on the left. 8/25/2023 HPI: Patient presents today about a month status post left thumb trapeziectomy and suspension arthroplasty and left carpal tunnel release. She reports to be doing well with no pain. She is also here for follow-up of her right thumb CMC arthritis and right middle trigger finger. Initial HPI: Patient comes in today with complaints of right thumb base pain. She also has a history of bilateral hand numbness and tingling especially at night. She says she was previously diagnosed with carpal tunnel in the past.    Date of onset: Chronic  Injury: No  Prior Treatment:  Yes: Comment: Left thumb trapeziectomy and suspension arthroplasty, left carpal tunnel release.   Bilateral thumb CMC joint and bilateral carpal tunnel injections    ROS: Review of Systems - General ROS: negative except HPI    Past Medical History:   Diagnosis Date    Arthritis 05/12/2010    Asthma     Bilateral hand pain     Carpal tunnel syndrome 09/01/2019    Hx of blood clots 07/2021    PE during hospital stay with COVID       Past Surgical History:   Procedure Laterality Date    HAND ARTHROPLASTY Left 7/25/2023    LEFT THUMB TRAPEZIECTOMY AND SUSPENSION ARTHROPLASTY; LEFT CARPAL TUNNEL RELEASE; PARTIAL TRAPEZIODAL EXCISION; [address] [place] performed by Margarette Mendez DO at [facility] (CERVIX STATUS UNKNOWN)      PELVIC LAPAROSCOPY      TONSILLECTOMY          Current Outpatient Medications   Medication Sig Dispense Refill    meloxicam (MOBIC) 7.5 MG tablet TAKE 1 TABLET BY MOUTH DAILY AS

## 2024-03-08 ENCOUNTER — OFFICE VISIT (OUTPATIENT)
Age: 59
End: 2024-03-08
Payer: COMMERCIAL

## 2024-03-08 VITALS — WEIGHT: 233 LBS | HEIGHT: 63 IN | BODY MASS INDEX: 41.29 KG/M2

## 2024-03-08 DIAGNOSIS — M17.12 PRIMARY OSTEOARTHRITIS OF LEFT KNEE: Primary | ICD-10-CM

## 2024-03-08 DIAGNOSIS — E66.01 OBESITY, MORBID (HCC): ICD-10-CM

## 2024-03-08 DIAGNOSIS — M17.11 PRIMARY OSTEOARTHRITIS OF RIGHT KNEE: ICD-10-CM

## 2024-03-08 PROCEDURE — 99213 OFFICE O/P EST LOW 20 MIN: CPT | Performed by: ORTHOPAEDIC SURGERY

## 2024-03-08 NOTE — PROGRESS NOTES
Patient: Liliana Arguelles                MRN: [id number]       SSN: [id number]  YOB: 1965        AGE: 58 y.o.        SEX: female  BMI: Body mass index is 41.27 kg/m².    PCP: Elissa Welch MD  03/08/24    Chief Complaint: Knee Pain (Pieter knee)      1. Primary osteoarthritis of left knee  -     Ambulatory Referral to Ortho Injection  2. Primary osteoarthritis of right knee  -     Ambulatory Referral to Ortho Injection  3. Obesity, morbid (HCC)        HPI:  Liliana Arguelles is a 58 y.o. female with chief complaint of   Chief Complaint   Patient presents with    Knee Pain     Pieter knee     -10/25/2023: Completed 3 shot series of Euflexxa to the bilateral knees  -9/8/2023: Bilateral knee cortisone injections    Bilateral knee pain for several years.  She used to get gel injections by Dr. Izaguirre.  Her right knee is usually the worst knee but the left knee is starting to equalize.  She uses Voltaren gel and Tylenol on occasion and her aching is usually worst at night.      IMAGING:  Imaging read by myself and interpreted as follows:    September 8, 2023:  4 view x-rays of the bilateral knees including AP, lateral, sunrise and notch view.  Right knee demonstrates bone-on-bone articulation of medial joint space with osteophytosis, subchondral sclerosis and subchondral cyst formation with lateral patellar tilt and 90% joint space narrowing.  Left knee demonstrates 90% joint space narrowing in the medial compartment with osteophytosis, subchondral sclerosis subchondral cyst formation.  There is also significant lateral patellar tilt with approximate 90% joint space narrowing in the patellofemoral joint.      PHYSICAL EXAMINATION:  Ht 1.6 m (5' 3\")   Wt 105.7 kg (233 lb)   BMI 41.27 kg/m²   Body mass index is 41.27 kg/m².  Wt Readings from Last 3 Encounters:   03/08/24 105.7 kg (233 lb)   11/29/23 112 kg (247 lb)   10/25/23 112 kg (247 lb)       GENERAL: Alert and oriented x3, in

## 2024-03-13 ENCOUNTER — OFFICE VISIT (OUTPATIENT)
Age: 59
End: 2024-03-13

## 2024-03-13 VITALS — WEIGHT: 219 LBS | HEIGHT: 63 IN | BODY MASS INDEX: 38.8 KG/M2

## 2024-03-13 DIAGNOSIS — M65.312 TRIGGER THUMB OF LEFT HAND: ICD-10-CM

## 2024-03-13 DIAGNOSIS — G56.01 RIGHT CARPAL TUNNEL SYNDROME: ICD-10-CM

## 2024-03-13 DIAGNOSIS — M18.11 ARTHRITIS OF CARPOMETACARPAL (CMC) JOINT OF RIGHT THUMB: Primary | ICD-10-CM

## 2024-03-13 RX ORDER — LIDOCAINE HYDROCHLORIDE 10 MG/ML
1.5 INJECTION, SOLUTION INFILTRATION; PERINEURAL ONCE
Status: COMPLETED | OUTPATIENT
Start: 2024-03-13 | End: 2024-03-13

## 2024-03-13 RX ADMIN — LIDOCAINE HYDROCHLORIDE 1.5 ML: 10 INJECTION, SOLUTION INFILTRATION; PERINEURAL at 09:00

## 2024-03-13 NOTE — PROGRESS NOTES
kg (219 lb)   BMI 38.79 kg/m²   Physical Exam  Vitals and nursing note reviewed.   Constitutional:       General: She is not in acute distress.     Appearance: Normal appearance. She is not ill-appearing.   Cardiovascular:      Pulses: Normal pulses.   Pulmonary:      Effort: Pulmonary effort is normal. No respiratory distress.   Musculoskeletal:         General: Tenderness present. No swelling, deformity or signs of injury. Normal range of motion.      Cervical back: Normal range of motion and neck supple.      Right lower leg: No edema.      Left lower leg: No edema.   Skin:     General: Skin is warm and dry.      Capillary Refill: Capillary refill takes less than 2 seconds.      Findings: No bruising or erythema.   Neurological:      General: No focal deficit present.      Mental Status: She is alert and oriented to person, place, and time.   Psychiatric:         Mood and Affect: Mood normal.         Behavior: Behavior normal.            NEUROVASCULAR    Examination L R Examination L R   Carpal Comp. - + Pronator Comp. - -   Carpal Tinel - + Pronator Tinel - -   Phalen's - - Pronator Stress - -   Cubital Comp. - - Guyon Comp. - -   Cubital Tinel - - Guyon Tinel - -   Elbow Hyperflexion - - Adson's - -   Spurling's - - SC Comp. - -   PCB Median abn - - SC Tinel - -   Radial Tinel - - IC Comp. - -   Digital Tinel - - IC Tinel - -   Radial 2-Pt WNL WNL Ulnar 2-Pt WNL WNL     Radial Pulse: 2+  Capillary Refill: < 2 sec  Michel: Not Performed  Digital Micehl: Not Performed       Hand:    Examination L Digit(s) R Digit(s)   1st CMC Tenderness -  +    1st CMC Grind -  +    Deven Nodes -  -    Heberden Nodes -  -    A1 Pulley Tenderness + Th - LF   Triggering +  +-    UCL Instability -  -    RCL Instability -  -    Lateral Stress Pain -  -    Palmar Cords -  -    Tabletop test -  -    Garrod's Pads -  -     Strength       Pinch Strength         ROM: Full         NCV & EMG Findings:  INTERPRETATION  This is an

## 2024-04-23 ENCOUNTER — TELEPHONE (OUTPATIENT)
Age: 59
End: 2024-04-23

## 2024-04-23 NOTE — TELEPHONE ENCOUNTER
Patient called and is requesting a call back states she  has been waiting over a month for her gel injection and was just calling to get an update and a status on the injection.          Please call and advise patient at  [phone]

## 2024-05-23 RX ORDER — MELOXICAM 7.5 MG/1
7.5 TABLET ORAL PRN
Qty: 90 TABLET | Refills: 2 | Status: SHIPPED | OUTPATIENT
Start: 2024-05-23 | End: 2024-06-07 | Stop reason: SDUPTHER

## 2024-06-07 RX ORDER — MELOXICAM 7.5 MG/1
7.5 TABLET ORAL DAILY PRN
Qty: 90 TABLET | Refills: 2 | Status: SHIPPED | OUTPATIENT
Start: 2024-06-07

## 2024-06-13 ENCOUNTER — OFFICE VISIT (OUTPATIENT)
Age: 59
End: 2024-06-13

## 2024-06-13 VITALS — WEIGHT: 215 LBS | BODY MASS INDEX: 38.09 KG/M2

## 2024-06-13 DIAGNOSIS — E66.01 OBESITY, MORBID (HCC): ICD-10-CM

## 2024-06-13 DIAGNOSIS — M17.12 PRIMARY OSTEOARTHRITIS OF LEFT KNEE: Primary | ICD-10-CM

## 2024-06-13 DIAGNOSIS — M17.11 PRIMARY OSTEOARTHRITIS OF RIGHT KNEE: ICD-10-CM

## 2024-06-13 RX ORDER — LIDOCAINE HYDROCHLORIDE 10 MG/ML
2 INJECTION, SOLUTION INFILTRATION; PERINEURAL ONCE
Status: COMPLETED | OUTPATIENT
Start: 2024-06-13 | End: 2024-06-13

## 2024-06-13 RX ORDER — HYALURONATE SODIUM 10 MG/ML
20 SYRINGE (ML) INTRAARTICULAR ONCE
Status: COMPLETED | OUTPATIENT
Start: 2024-06-13 | End: 2024-06-13

## 2024-06-13 RX ADMIN — LIDOCAINE HYDROCHLORIDE 2 ML: 10 INJECTION, SOLUTION INFILTRATION; PERINEURAL at 15:53

## 2024-06-13 RX ADMIN — Medication 20 MG: at 15:53

## 2024-06-13 RX ADMIN — Medication 20 MG: at 15:54

## 2024-06-13 NOTE — PROGRESS NOTES
PELVIC LAPAROSCOPY      TONSILLECTOMY         Social History     Socioeconomic History    Marital status:      Spouse name: Not on file    Number of children: Not on file    Years of education: Not on file    Highest education level: Not on file   Occupational History    Not on file   Tobacco Use    Smoking status: Never    Smokeless tobacco: Never   Vaping Use    Vaping Use: Never used   Substance and Sexual Activity    Alcohol use: No    Drug use: No    Sexual activity: Not Currently     Partners: Male   Other Topics Concern    Not on file   Social History Narrative    Not on file     Social Determinants of Health     Financial Resource Strain: Not on file   Food Insecurity: Not on file   Transportation Needs: Not on file   Physical Activity: Not on file   Stress: Not on file   Social Connections: Not on file   Intimate Partner Violence: Not on file   Housing Stability: Not on file       REVIEW OF SYSTEMS:      Negative except for that stated above.     [unfilled]      Prescription medication management discussed with patient.     Electronically signed by: Noman Venegas DO    Note: This note was completed using voice recognition software.  Any typographical/name errors or mistakes are unintentional.

## 2024-06-17 ENCOUNTER — OFFICE VISIT (OUTPATIENT)
Age: 59
End: 2024-06-17
Payer: COMMERCIAL

## 2024-06-17 VITALS — HEIGHT: 63 IN | WEIGHT: 215 LBS | BODY MASS INDEX: 38.09 KG/M2

## 2024-06-17 DIAGNOSIS — M18.11 ARTHRITIS OF CARPOMETACARPAL (CMC) JOINT OF RIGHT THUMB: ICD-10-CM

## 2024-06-17 DIAGNOSIS — M65.331 TRIGGER MIDDLE FINGER OF RIGHT HAND: Primary | ICD-10-CM

## 2024-06-17 PROCEDURE — 99213 OFFICE O/P EST LOW 20 MIN: CPT | Performed by: ORTHOPAEDIC SURGERY

## 2024-06-17 PROCEDURE — 20550 NJX 1 TENDON SHEATH/LIGAMENT: CPT | Performed by: ORTHOPAEDIC SURGERY

## 2024-06-17 PROCEDURE — 20600 DRAIN/INJ JOINT/BURSA W/O US: CPT | Performed by: ORTHOPAEDIC SURGERY

## 2024-06-17 RX ORDER — LIDOCAINE HYDROCHLORIDE 10 MG/ML
1 INJECTION, SOLUTION INFILTRATION; PERINEURAL ONCE
Status: COMPLETED | OUTPATIENT
Start: 2024-06-17 | End: 2024-06-17

## 2024-06-17 RX ADMIN — LIDOCAINE HYDROCHLORIDE 1 ML: 10 INJECTION, SOLUTION INFILTRATION; PERINEURAL at 10:02

## 2024-06-17 NOTE — PROGRESS NOTES
Liliana Arguelles is a 58 y.o. female right handed .  Worker's Compensation and legal considerations: none    Chief Complaint   Patient presents with    Follow-up     Right hand     Pain Score:   3    6/17/2024 HPI: Patient presents today due to recurrence of right thumb base pain.  She also reports recurrence of right middle finger pain and locking.  She says the previous injection did not last very long but she is not in a position to have surgery at this point.    3/13/2024 HPI: Patient presents today with a new complaint of left thumb pain and locking.  She is also here due to recurrence of right right thumb base pain continued right hand numbness and tingling.  She also reports that her right middle finger is much better trigger finger injection is still occasional.    Initial HPI: Patient comes in today with complaints of right thumb base pain.  She also has a history of bilateral hand numbness and tingling especially at night.  She says she was previously diagnosed with carpal tunnel in the past.    Date of onset: Chronic  Injury: No  Prior Treatment:  Yes: Comment: Left trigger thumb injection.  Right middle trigger finger injection.  Left thumb trapeziectomy and suspension arthroplasty, left carpal tunnel release.  Bilateral thumb CMC joint and bilateral carpal tunnel injections    ROS: Review of Systems - General ROS: negative except HPI    Past Medical History:   Diagnosis Date    Arthritis 05/12/2010    Asthma     Bilateral hand pain     Carpal tunnel syndrome 09/01/2019    Hx of blood clots 07/2021    PE during hospital stay with COVID       Past Surgical History:   Procedure Laterality Date    HAND ARTHROPLASTY Left 7/25/2023    LEFT THUMB TRAPEZIECTOMY AND SUSPENSION ARTHROPLASTY; LEFT CARPAL TUNNEL RELEASE; PARTIAL TRAPEZIODAL EXCISION; ARTH[place] performed by Navjot Arechiga DO at [facility] MAIN OR    HYSTERECTOMY (CERVIX STATUS UNKNOWN)      PELVIC LAPAROSCOPY      TONSILLECTOMY

## 2024-06-24 ENCOUNTER — OFFICE VISIT (OUTPATIENT)
Age: 59
End: 2024-06-24
Payer: COMMERCIAL

## 2024-06-24 VITALS — HEIGHT: 63 IN | BODY MASS INDEX: 38.09 KG/M2 | WEIGHT: 215 LBS

## 2024-06-24 DIAGNOSIS — M17.12 PRIMARY OSTEOARTHRITIS OF LEFT KNEE: ICD-10-CM

## 2024-06-24 DIAGNOSIS — M17.11 PRIMARY OSTEOARTHRITIS OF RIGHT KNEE: Primary | ICD-10-CM

## 2024-06-24 PROCEDURE — 20611 DRAIN/INJ JOINT/BURSA W/US: CPT

## 2024-06-24 RX ORDER — LIDOCAINE HYDROCHLORIDE 10 MG/ML
2 INJECTION, SOLUTION INFILTRATION; PERINEURAL ONCE
Status: COMPLETED | OUTPATIENT
Start: 2024-06-24 | End: 2024-06-24

## 2024-06-24 RX ORDER — HYALURONATE SODIUM 10 MG/ML
20 SYRINGE (ML) INTRAARTICULAR ONCE
Status: COMPLETED | OUTPATIENT
Start: 2024-06-24 | End: 2024-06-24

## 2024-06-24 RX ADMIN — LIDOCAINE HYDROCHLORIDE 2 ML: 10 INJECTION, SOLUTION INFILTRATION; PERINEURAL at 10:33

## 2024-06-24 RX ADMIN — Medication 20 MG: at 10:33

## 2024-06-24 RX ADMIN — LIDOCAINE HYDROCHLORIDE 2 ML: 10 INJECTION, SOLUTION INFILTRATION; PERINEURAL at 10:34

## 2024-06-24 RX ADMIN — Medication 20 MG: at 10:30

## 2024-06-24 NOTE — ASSESSMENT & PLAN NOTE
After explaining potential risk of infection, skin discoloration, hyperglycemia, or flushing, I obtained written consent the patient would like to move forward with a bilateral knee intra-articular injection the patient was prepped in normal sterile fashion with freeze spray and alcohol.  2mL Euflexxa (1% sodium Hyaluronate) was injected with ultrasound guidance, with permanent recording and reporting.  The needle was withdrawn, the area was cleansed and a sterile bandage was applied.  The patient tolerated the procedure well.

## 2024-06-24 NOTE — PROGRESS NOTES
Breath    Milk (Cow) Other (See Comments) and Anaphylaxis    Red Dye Swelling and Anaphylaxis    Sulfa Antibiotics Other (See Comments) and Shortness Of Breath     Other reaction(s): Other (See Comments)  Sinus swells up, wheezing states Pt.  Other reaction(s): Other (See Comments), Shortness Of Breath, unknown  Sinus swells up, wheezing states Pt.      Sulfur Dioxide      Other reaction(s): respiratory distress    Calcium Other (See Comments)     Other reaction(s): other/intolerance    Egg White (Egg Protein) Swelling       Past Surgical History:   Procedure Laterality Date    HAND ARTHROPLASTY Left 7/25/2023    LEFT THUMB TRAPEZIECTOMY AND SUSPENSION ARTHROPLASTY; LEFT CARPAL TUNNEL RELEASE; PARTIAL TRAPEZIODAL EXCISION; ARTHREX performed by Navjot Arechiga DO at [facility] MAIN OR    HYSTERECTOMY (CERVIX STATUS UNKNOWN)      PELVIC LAPAROSCOPY      TONSILLECTOMY         Social History     Socioeconomic History    Marital status:      Spouse name: Not on file    Number of children: Not on file    Years of education: Not on file    Highest education level: Not on file   Occupational History    Not on file   Tobacco Use    Smoking status: Never    Smokeless tobacco: Never   Vaping Use    Vaping Use: Never used   Substance and Sexual Activity    Alcohol use: No    Drug use: No    Sexual activity: Not Currently     Partners: Male   Other Topics Concern    Not on file   Social History Narrative    Not on file     Social Determinants of Health     Financial Resource Strain: Not on file   Food Insecurity: Not on file   Transportation Needs: Not on file   Physical Activity: Not on file   Stress: Not on file   Social Connections: Not on file   Intimate Partner Violence: Not on file   Housing Stability: Not on file       REVIEW OF SYSTEMS:      Negative except for that stated above.     [unfilled]      Prescription medication management discussed with patient.     Electronically signed by: Mini Cordero PA-C    Note: This

## 2024-07-01 ENCOUNTER — OFFICE VISIT (OUTPATIENT)
Age: 59
End: 2024-07-01
Payer: COMMERCIAL

## 2024-07-01 VITALS — BODY MASS INDEX: 38.09 KG/M2 | HEIGHT: 63 IN | WEIGHT: 215 LBS

## 2024-07-01 DIAGNOSIS — M17.11 PRIMARY OSTEOARTHRITIS OF RIGHT KNEE: Primary | ICD-10-CM

## 2024-07-01 DIAGNOSIS — M17.12 PRIMARY OSTEOARTHRITIS OF LEFT KNEE: ICD-10-CM

## 2024-07-01 DIAGNOSIS — E66.01 OBESITY, MORBID (HCC): ICD-10-CM

## 2024-07-01 PROCEDURE — 20611 DRAIN/INJ JOINT/BURSA W/US: CPT | Performed by: ORTHOPAEDIC SURGERY

## 2024-07-01 RX ORDER — HYALURONATE SODIUM 10 MG/ML
20 SYRINGE (ML) INTRAARTICULAR ONCE
Status: COMPLETED | OUTPATIENT
Start: 2024-07-01 | End: 2024-07-01

## 2024-07-01 RX ORDER — LIDOCAINE HYDROCHLORIDE 10 MG/ML
2 INJECTION, SOLUTION INFILTRATION; PERINEURAL ONCE
Status: COMPLETED | OUTPATIENT
Start: 2024-07-01 | End: 2024-07-01

## 2024-07-01 RX ADMIN — Medication 20 MG: at 08:14

## 2024-07-01 RX ADMIN — LIDOCAINE HYDROCHLORIDE 2 ML: 10 INJECTION, SOLUTION INFILTRATION; PERINEURAL at 08:12

## 2024-07-01 RX ADMIN — Medication 20 MG: at 08:13

## 2024-07-01 RX ADMIN — LIDOCAINE HYDROCHLORIDE 2 ML: 10 INJECTION, SOLUTION INFILTRATION; PERINEURAL at 08:13

## 2024-07-01 NOTE — PROGRESS NOTES
wheezing states Pt.  Other reaction(s): Other (See Comments), Shortness Of Breath, unknown  Sinus swells up, wheezing states Pt.      Sulfur Dioxide      Other reaction(s): respiratory distress    Calcium Other (See Comments)     Other reaction(s): other/intolerance    Egg White (Egg Protein) Swelling       Past Surgical History:   Procedure Laterality Date    HAND ARTHROPLASTY Left 7/25/2023    LEFT THUMB TRAPEZIECTOMY AND SUSPENSION ARTHROPLASTY; LEFT CARPAL TUNNEL RELEASE; PARTIAL TRAPEZIODAL EXCISION; ARTHREX performed by Navjot Arechiga DO at [facility] MAIN OR    HYSTERECTOMY (CERVIX STATUS UNKNOWN)      PELVIC LAPAROSCOPY      TONSILLECTOMY         Social History     Socioeconomic History    Marital status:      Spouse name: Not on file    Number of children: Not on file    Years of education: Not on file    Highest education level: Not on file   Occupational History    Not on file   Tobacco Use    Smoking status: Never    Smokeless tobacco: Never   Vaping Use    Vaping Use: Never used   Substance and Sexual Activity    Alcohol use: No    Drug use: No    Sexual activity: Not Currently     Partners: Male   Other Topics Concern    Not on file   Social History Narrative    Not on file     Social Determinants of Health     Financial Resource Strain: Not on file   Food Insecurity: Not on file   Transportation Needs: Not on file   Physical Activity: Not on file   Stress: Not on file   Social Connections: Not on file   Intimate Partner Violence: Not on file   Housing Stability: Not on file       REVIEW OF SYSTEMS:      Negative except for that stated above.     [unfilled]      Prescription medication management discussed with patient.     Electronically signed by: Noman Venegas DO    Note: This note was completed using voice recognition software.  Any typographical/name errors or mistakes are unintentional.

## 2024-07-01 NOTE — ASSESSMENT & PLAN NOTE
I explained the risks of injection/aspiration including but not limited to infection, pain, skin discoloration, and flushing. After obtaining written consent for bilateral knee intra-articular injection the patient was prepped in normal sterile fashion with freeze spray and alcohol.  2mL Euflexxa (1% sodium Hyaluronate) was injected with ultrasound guidance, with permanent recording and reporting.  The needle was withdrawn, the area was cleansed and a sterile bandage was applied.  The patient tolerated the procedure well.

## 2024-09-18 ENCOUNTER — OFFICE VISIT (OUTPATIENT)
Age: 59
End: 2024-09-18
Payer: COMMERCIAL

## 2024-09-18 VITALS — BODY MASS INDEX: 39.05 KG/M2 | WEIGHT: 220.4 LBS | HEIGHT: 63 IN

## 2024-09-18 DIAGNOSIS — M18.11 ARTHRITIS OF CARPOMETACARPAL (CMC) JOINT OF RIGHT THUMB: Primary | ICD-10-CM

## 2024-09-18 DIAGNOSIS — G56.01 RIGHT CARPAL TUNNEL SYNDROME: ICD-10-CM

## 2024-09-18 PROCEDURE — 20526 THER INJECTION CARP TUNNEL: CPT | Performed by: ORTHOPAEDIC SURGERY

## 2024-09-18 PROCEDURE — 20600 DRAIN/INJ JOINT/BURSA W/O US: CPT | Performed by: ORTHOPAEDIC SURGERY

## 2024-09-18 RX ORDER — LIDOCAINE HYDROCHLORIDE 10 MG/ML
1 INJECTION, SOLUTION INFILTRATION; PERINEURAL ONCE
Status: COMPLETED | OUTPATIENT
Start: 2024-09-18 | End: 2024-09-18

## 2024-09-18 RX ADMIN — LIDOCAINE HYDROCHLORIDE 1 ML: 10 INJECTION, SOLUTION INFILTRATION; PERINEURAL at 08:28

## 2024-11-21 ENCOUNTER — TELEPHONE (OUTPATIENT)
Age: 59
End: 2024-11-21

## 2024-11-21 NOTE — TELEPHONE ENCOUNTER
Patient called and said she placed a message on My Chart for .     Patient said she needs a New Updated note for Work , that will allow he to wear tennis shoes to work for support for her knees.     Patient said she is sorry to bother us, but she needs the note as soon as possible.    Will  the note from MY Chart.     Patient tel. [phone].

## 2024-12-11 ENCOUNTER — OFFICE VISIT (OUTPATIENT)
Age: 59
End: 2024-12-11

## 2024-12-11 VITALS — BODY MASS INDEX: 38.62 KG/M2 | HEIGHT: 63 IN | WEIGHT: 218 LBS

## 2024-12-11 DIAGNOSIS — M18.11 ARTHRITIS OF CARPOMETACARPAL (CMC) JOINT OF RIGHT THUMB: Primary | ICD-10-CM

## 2024-12-11 RX ORDER — LIDOCAINE HYDROCHLORIDE 10 MG/ML
0.5 INJECTION, SOLUTION INFILTRATION; PERINEURAL ONCE
Status: COMPLETED | OUTPATIENT
Start: 2024-12-11 | End: 2024-12-11

## 2024-12-11 RX ADMIN — LIDOCAINE HYDROCHLORIDE 0.5 ML: 10 INJECTION, SOLUTION INFILTRATION; PERINEURAL at 09:21

## 2024-12-11 NOTE — PROGRESS NOTES
tolerated.  The patient is asked to continue to rest the area for a few more days before resuming regular activities.  It may be more painful for the first 1-2 days.  Watch for fever, or increased swelling or persistent pain in the joint. Call or return to clinic prn if such symptoms occur or there is failure to improve as anticipated.    Note: This note was completed using voice recognition software.  Any typographical/name errors or mistakes are unintentional.

## 2025-04-01 ENCOUNTER — TELEPHONE (OUTPATIENT)
Age: 60
End: 2025-04-01

## 2025-04-01 DIAGNOSIS — M17.11 PRIMARY OSTEOARTHRITIS OF RIGHT KNEE: Primary | ICD-10-CM

## 2025-04-01 DIAGNOSIS — M17.12 PRIMARY OSTEOARTHRITIS OF LEFT KNEE: ICD-10-CM

## 2025-04-01 NOTE — TELEPHONE ENCOUNTER
Patient wants to know if we will please send for approval so she can get bilateral knee gel injections and patient is asking if we will please call once the gel injections are approved.    Patient tel [phone]

## 2025-04-09 ENCOUNTER — OFFICE VISIT (OUTPATIENT)
Age: 60
End: 2025-04-09

## 2025-04-09 VITALS — HEIGHT: 63 IN | BODY MASS INDEX: 38.09 KG/M2 | WEIGHT: 215 LBS

## 2025-04-09 DIAGNOSIS — G56.01 RIGHT CARPAL TUNNEL SYNDROME: ICD-10-CM

## 2025-04-09 DIAGNOSIS — M18.11 ARTHRITIS OF CARPOMETACARPAL (CMC) JOINT OF RIGHT THUMB: Primary | ICD-10-CM

## 2025-04-09 RX ORDER — LIDOCAINE HYDROCHLORIDE 10 MG/ML
1 INJECTION, SOLUTION INFILTRATION; PERINEURAL ONCE
Status: COMPLETED | OUTPATIENT
Start: 2025-04-09 | End: 2025-04-09

## 2025-04-09 RX ORDER — MELOXICAM 7.5 MG/1
7.5 TABLET ORAL DAILY PRN
Qty: 90 TABLET | Refills: 2 | Status: SHIPPED | OUTPATIENT
Start: 2025-04-09

## 2025-04-09 RX ADMIN — LIDOCAINE HYDROCHLORIDE 1 ML: 10 INJECTION, SOLUTION INFILTRATION; PERINEURAL at 08:38

## 2025-05-02 NOTE — PROGRESS NOTES
was given to starting her on a MDP, but deferred secondary to the patient having a true allergy to Fluticasone. If the patient would like to proceed with a lumbar spinal injection in the future, we would have to verify that she will not have an allergic reaction to the cortisone. The patient is Neurologically intact. I will see the patient back in 2 months or earlier if needed.       Written by katerin Bates scribe, as dictated by Mahin Kaur MD  I examined the patient, reviewed and agree with the note.

## 2025-05-05 ENCOUNTER — OFFICE VISIT (OUTPATIENT)
Age: 60
End: 2025-05-05
Payer: COMMERCIAL

## 2025-05-05 VITALS
TEMPERATURE: 98 F | HEART RATE: 64 BPM | BODY MASS INDEX: 38.09 KG/M2 | HEIGHT: 63 IN | WEIGHT: 215 LBS | OXYGEN SATURATION: 97 %

## 2025-05-05 DIAGNOSIS — M54.16 LUMBAR NEURITIS: ICD-10-CM

## 2025-05-05 DIAGNOSIS — M54.50 LUMBAR PAIN: Primary | ICD-10-CM

## 2025-05-05 PROCEDURE — 99204 OFFICE O/P NEW MOD 45 MIN: CPT | Performed by: PHYSICAL MEDICINE & REHABILITATION

## 2025-05-05 PROCEDURE — 72110 X-RAY EXAM L-2 SPINE 4/>VWS: CPT | Performed by: PHYSICAL MEDICINE & REHABILITATION

## 2025-05-05 RX ORDER — ALENDRONATE SODIUM 70 MG/1
70 TABLET ORAL
COMMUNITY
Start: 2025-04-18

## 2025-05-08 ENCOUNTER — OFFICE VISIT (OUTPATIENT)
Age: 60
End: 2025-05-08

## 2025-05-08 VITALS — BODY MASS INDEX: 38.09 KG/M2 | WEIGHT: 215 LBS | HEIGHT: 63 IN

## 2025-05-08 DIAGNOSIS — M17.11 PRIMARY OSTEOARTHRITIS OF RIGHT KNEE: Primary | ICD-10-CM

## 2025-05-08 DIAGNOSIS — M17.12 PRIMARY OSTEOARTHRITIS OF LEFT KNEE: ICD-10-CM

## 2025-05-08 RX ORDER — LIDOCAINE HYDROCHLORIDE 10 MG/ML
2 INJECTION, SOLUTION INFILTRATION; PERINEURAL ONCE
Status: COMPLETED | OUTPATIENT
Start: 2025-05-08 | End: 2025-05-08

## 2025-05-08 RX ADMIN — LIDOCAINE HYDROCHLORIDE 2 ML: 10 INJECTION, SOLUTION INFILTRATION; PERINEURAL at 10:47

## 2025-05-08 RX ADMIN — LIDOCAINE HYDROCHLORIDE 2 ML: 10 INJECTION, SOLUTION INFILTRATION; PERINEURAL at 10:46

## 2025-05-08 NOTE — PROGRESS NOTES
Euflexxa injections in the bilateral knees today.    July 1, 2024:  Bilateral knee osteoarthritis.  Patient received her third in a series of 3 Euflexxa injections today to the bilateral knees.  Will plan on seeing her in 6 months.    June 24, 2024:  Bilateral knee OA. Pt here today for her second of three euflexxa injections to her bilateral knees. She states that she is starting to feel a difference since her first injection. We will see her in 1 week for her 3rd and final shot.     June 13, 2024:  Bilateral knee osteoarthritis.  Patient had nearly 6 months of relief from her last round of injections of gel.  Her pain came back and so indicated her for repeat injections because of her good response previously.  The first shot was delivered today.  We will see her in 1 week for neck shot.      March 8, 2024:  Bilateral knee osteoarthritis.  Patient responded well to bilateral knee gel injections and brought her pain down to 2 out of 10.  They have started to ache at night. She would like to get her shots authorized so that she can get them again in April, 6 months after her last round. They have worn off after 6 months previously. She will use Tylenol extra strength and voltaren to help take the edge off.         5/5/2025     8:20 AM   AMB PAIN ASSESSMENT   Location of Pain Back   Severity of Pain 4   Quality of Pain Other (Comment)   Duration of Pain Persistent   Frequency of Pain Constant   Aggravating Factors Walking;Bending;Other (Comment)   Limiting Behavior Yes   Relieving Factors Other (Comment)   Result of Injury No   Work-Related Injury No   Are there other pain locations you wish to document? No     Tobacco Use: Medium Risk (5/5/2025)    Patient History     Smoking Tobacco Use: Former     Smokeless Tobacco Use: Never     Passive Exposure: Not on file         Past Medical History:   Diagnosis Date    Arthritis 05/12/2010    Asthma     Bilateral hand pain     Carpal tunnel syndrome 09/01/2019    Hx of blood

## 2025-05-16 ENCOUNTER — HOSPITAL ENCOUNTER (OUTPATIENT)
Facility: HOSPITAL | Age: 60
Setting detail: RECURRING SERIES
Discharge: HOME | End: 2025-05-19
Attending: PHYSICAL MEDICINE & REHABILITATION
Payer: COMMERCIAL

## 2025-05-16 PROCEDURE — 97162 PT EVAL MOD COMPLEX 30 MIN: CPT

## 2025-05-16 NOTE — PROGRESS NOTES
none to date other than xrays    Diagnostic Tests: [] Lab work [x] X-rays    [] CT [] MRI     [] Other:  Results:Lumbar spine plain films dated 5/5/2025. 4 views: AP, Lateral, Flexion, and Extension. revealed:  Mild scoliosis  L1 superior endplate compression deformity felt to be chronic  Disc space is well maintained  No motion on flexion or extension films     OBJECTIVE  Posture:  Lateral Shift: [] R    [] L     [] +  [x] -  Kyphosis: [] Increased [] Decreased   [x]  WNL  Lordosis:  [] Increased [] Decreased   [x] WNL  Pelvic symmetry: [x] WNL    [] Other:  LE Screen: [x] WNL    [] Other:  Incision/skin:  unremarkable    Gait:  [x] Normal     [] Abnormal:  Active Movements: [] N/A   [] Too acute   [] Other:  Lumbar Evaluation    AROM in %    Left Right   Ext 75%   Flex 80% fingertips to shins   Rot 85% 85%   % 100%       Strength   L(0-5) R (0-5) N/T   Hip Flexion (L1,2) 4+ 4+ []   Knee Extension (L3,4) 5 5 []   Ankle Dorsiflexion (L4) 5 5 []   Great Toe Extension (L5) 5 5 []   Ankle Plantarflexion (S1) 5 5 []   Knee Flexion (S1,2) 5 5 []   Upper Abdominals 3+ 3+ []   Lower Abdominals   [x]   Paraspinals   [x]   Back Rotators   [x]   Gluteus Papa 4 4 []   Gluteus Minimus  4- 4- []       Neuro Screen [x] WNL  Myotome/Dermatome/Reflexes: Deferred  Comments:    Dural Mobility:  SLR Sitting: [x] R    [x] L    [] +    [x] -  @ (degrees):           Supine: [x] R    [x] L    [] +    [x] -  @ (degrees):   Slump Test: [x] R    [x] L    [] +    [x] -  @ (degrees):       Palpation  [x] Min  [] Mod  [] Severe    Location: PSIS R and L side       Special Tests  Lumbar:  Lumb. Compression: [] Pos  [x] Neg               Lumbar Distraction:   [] Pos  [x] Neg    Quadrant:  [] Pos  [x] Neg   [] Flex  [] Ext    Sacroilliac:  Gaenslen's: [] R    [] L    [] +    [x] -     Compression: [] +    [x] -     Gapping:  [] +    [] -     Thigh Thrust: [] R    [] L    [] +    [x] -     Leg Length: [] +    [x] -     Position: supine to

## 2025-05-16 NOTE — THERAPY EVALUATION
[facility]  [address] [address] Ph: [phone] Fx: [phone]  Plan of Care / Statement of Necessity for Physical Therapy Services     Patient Name: Liliana Arguelles : 1965   Medical   Diagnosis: Lumbar pain  Lumbar neuritis Treatment Diagnosis:   M54.59, G89.29  CHRONIC LOWER BACK PAIN    Onset Date: 2024     Referral Source: Mahin Kaur MD Start of Care (SOC): 2025   Prior Hospitalization: See medical history Provider #: [id number]   Prior Level of Function: Walking a few miles at baseline at least 2.0 miles, lifting and picking up her grandchildren    Comorbidities:  Respiratory disorders, Musculoskeletal disorders, and Social determinants of health: Tobacco;Other: obesity, obstructive sleep apnea (CPAP), anxiety, depression, left CTS, stage 3 renal disease, osteoporosis (Fosamax), former smoker.      Assessment / key information:      Patient presents with signs and symptoms consistent with chronic low back pain for the last 6 months. She notes pain is located along SIJ region R and L specifically, reduced lumbar spine ROM, flexibility limitations in posterior chain aspects, core and hip girdle strength deficits.  No LLD noted with supine to sit test negative and storke test negative.    Patient will benefit from a program of skilled physical therapy to include therapeutic exercises to address ROM/flexibility and strength deficits, therapeutic activities to improve functional mobility to enhance QoL, neuromuscular reeducation to address motor control/coordination, balance, and proprioception input, gait training as needed to improve functional safe mechanics, manual therapy as needed to address joint and soft tissue restrictions to improve ROM and tissue extensibility. Modalities to be utilized as needed for pain modulation.  All questions were answered and addressed. Patient is agreeable to POC.         Not

## 2025-05-22 ENCOUNTER — OFFICE VISIT (OUTPATIENT)
Age: 60
End: 2025-05-22

## 2025-05-22 VITALS — WEIGHT: 215 LBS | BODY MASS INDEX: 38.09 KG/M2 | HEIGHT: 63 IN

## 2025-05-22 DIAGNOSIS — M17.11 PRIMARY OSTEOARTHRITIS OF RIGHT KNEE: Primary | ICD-10-CM

## 2025-05-22 DIAGNOSIS — M17.12 PRIMARY OSTEOARTHRITIS OF LEFT KNEE: ICD-10-CM

## 2025-05-22 RX ORDER — LIDOCAINE HYDROCHLORIDE 10 MG/ML
2 INJECTION, SOLUTION INFILTRATION; PERINEURAL ONCE
Status: COMPLETED | OUTPATIENT
Start: 2025-05-22 | End: 2025-05-22

## 2025-05-22 RX ADMIN — LIDOCAINE HYDROCHLORIDE 2 ML: 10 INJECTION, SOLUTION INFILTRATION; PERINEURAL at 08:10

## 2025-05-22 RX ADMIN — LIDOCAINE HYDROCHLORIDE 2 ML: 10 INJECTION, SOLUTION INFILTRATION; PERINEURAL at 08:11

## 2025-05-22 NOTE — PROGRESS NOTES
Patient: Liliana Arguelles                MRN: [id number]       SSN: [id number]  YOB: 1965        AGE: 59 y.o.        SEX: female  BMI: Body mass index is 38.09 kg/m².    PCP: Elissa Welch MD  05/22/25    Chief Complaint: Knee Pain (Pieter knee)      1. Primary osteoarthritis of right knee  Assessment & Plan:  I explained the risks of injection/aspiration including but not limited to infection, pain, skin discoloration, and flushing. After obtaining written consent for bilateral knee intra-articular injection the patient was prepped in normal sterile fashion with freeze spray and alcohol.  2mL Euflexxa (1% sodium Hyaluronate) was injected with ultrasound guidance, with permanent recording and reporting.  The needle was withdrawn, the area was cleansed and a sterile bandage was applied.  The patient tolerated the procedure well.     Orders:  -     AMB POC US DRAIN/INJECT LARGE JOINT/BURSA  -     lidocaine 1 % injection 2 mL; 2 mL, Intra-artICUlar, ONCE, 1 dose, On u 5/22/25 at 0830  -     sodium hyaluronate (EUFLEXXA, HYALGAN) injection 20 mg; 20 mg, Intra-artICUlar, ONCE, 1 dose, On u 5/22/25 at 0830Which brand are you ordering? Euflexxa  2. Primary osteoarthritis of left knee  -     AMB POC US DRAIN/INJECT LARGE JOINT/BURSA  -     lidocaine 1 % injection 2 mL; 2 mL, Intra-artICUlar, ONCE, 1 dose, On u 5/22/25 at 0830  -     sodium hyaluronate (EUFLEXXA, HYALGAN) injection 20 mg; 20 mg, Intra-artICUlar, ONCE, 1 dose, On u 5/22/25 at 0830Which brand are you ordering? Euflexxa          HPI:  Liliana Arguelles is a 59 y.o. female with chief complaint of   Chief Complaint   Patient presents with    Knee Pain     Pieter knee     -6/2/2025: 3 shot series of Euflexxa to the bilateral knees  -7/1/2024: Completed 3 shot series of Euflexxa to the bilateral knees, excellent relief for 6 months  -10/25/2023: Completed 3 shot series of Euflexxa to the bilateral knees, excellent

## 2025-06-02 ENCOUNTER — OFFICE VISIT (OUTPATIENT)
Age: 60
End: 2025-06-02
Payer: COMMERCIAL

## 2025-06-02 ENCOUNTER — HOSPITAL ENCOUNTER (OUTPATIENT)
Facility: HOSPITAL | Age: 60
Setting detail: RECURRING SERIES
Discharge: HOME | End: 2025-06-05
Attending: PHYSICAL MEDICINE & REHABILITATION
Payer: COMMERCIAL

## 2025-06-02 VITALS — HEIGHT: 63 IN | WEIGHT: 215 LBS | BODY MASS INDEX: 38.09 KG/M2

## 2025-06-02 DIAGNOSIS — M17.12 PRIMARY OSTEOARTHRITIS OF LEFT KNEE: ICD-10-CM

## 2025-06-02 DIAGNOSIS — M17.11 PRIMARY OSTEOARTHRITIS OF RIGHT KNEE: Primary | ICD-10-CM

## 2025-06-02 PROCEDURE — 97110 THERAPEUTIC EXERCISES: CPT

## 2025-06-02 PROCEDURE — 20611 DRAIN/INJ JOINT/BURSA W/US: CPT | Performed by: ORTHOPAEDIC SURGERY

## 2025-06-02 PROCEDURE — 97140 MANUAL THERAPY 1/> REGIONS: CPT

## 2025-06-02 PROCEDURE — 97112 NEUROMUSCULAR REEDUCATION: CPT

## 2025-06-02 RX ORDER — LIDOCAINE HYDROCHLORIDE 10 MG/ML
2 INJECTION, SOLUTION INFILTRATION; PERINEURAL ONCE
Status: COMPLETED | OUTPATIENT
Start: 2025-06-02 | End: 2025-06-02

## 2025-06-02 RX ADMIN — LIDOCAINE HYDROCHLORIDE 2 ML: 10 INJECTION, SOLUTION INFILTRATION; PERINEURAL at 15:31

## 2025-06-02 NOTE — PROGRESS NOTES
PHYSICAL / OCCUPATIONAL THERAPY - DAILY TREATMENT NOTE    Patient Name: Liliana Arguelles    Date: 2025    : 1965  Insurance: Payor: MELISSA / Plan: MELISSA POS / Product Type: *No Product type* /      Patient  verified Yes     Visit #   Current / Total 2 16   Time   In / Out 8:20 9:00   Pain   In / Out 5 0   Subjective Functional Status/Changes: Pt reports partial compliance with HEP.      TREATMENT AREA =  Lumbar pain  Lumbar neuritis  Chronic lower back pain    OBJECTIVE  Therapeutic Procedures:    Tx Min Billable or 1:1 Min (if diff from Tx Min) Procedure, Rationale, Specifics   12  12592 Therapeutic Exercise (timed):  increase ROM, strength, coordination, balance, and proprioception to improve patient's ability to progress to PLOF and address remaining functional goals. (see flow sheet as applicable)     Details if applicable:       18  45412 Neuromuscular Re-Education (timed):  improve balance, coordination, kinesthetic sense, posture, core stability and proprioception to improve patient's ability to develop conscious control of individual muscles and awareness of position of extremities in order to progress to PLOF and address remaining functional goals. (see flow sheet as applicable)     Details if applicable:     10  19218 Manual Therapy (timed):  decrease pain, increase ROM, increase tissue extensibility, and increase postural awareness to improve patient's ability to progress to PLOF and address remaining functional goals.  The manual therapy interventions were performed at a separate and distinct time from the therapeutic activities interventions . (see flow sheet as applicable)     Details if applicable:  In supine: pelvic alignment and leg length assessments, MET to correct right posterior/left anterior innominates, shotgun technique; in B s/l: MET to correct sacral torsion (all with consent for hand placement).    40  [facility] Totals Reminder: bill using total billable min of

## 2025-06-02 NOTE — PROGRESS NOTES
Patient: Liliana Arguelles                MRN: [id number]       SSN: [id number]  YOB: 1965        AGE: 59 y.o.        SEX: female  BMI: Body mass index is 38.09 kg/m².    PCP: Elissa Welch MD  06/02/25    Chief Complaint: Knee Pain (Pieter knee)      1. Primary osteoarthritis of right knee  Assessment & Plan:  I explained the risks of injection/aspiration including but not limited to infection, pain, skin discoloration, and flushing. After obtaining written consent for bilateral knee intra-articular injection the patient was prepped in normal sterile fashion with freeze spray and alcohol.  2mL Euflexxa (1% sodium Hyaluronate) was injected with ultrasound guidance, with permanent recording and reporting.  The needle was withdrawn, the area was cleansed and a sterile bandage was applied.  The patient tolerated the procedure well.     Orders:  -     AMB POC US DRAIN/INJECT LARGE JOINT/BURSA  -     lidocaine 1 % injection 2 mL; 2 mL, Intra-artICUlar, ONCE, 1 dose, On Mon 6/2/25 at 1400  -     sodium hyaluronate (EUFLEXXA, HYALGAN) injection 20 mg; 20 mg, Intra-artICUlar, ONCE, 1 dose, On Mon 6/2/25 at 1400Which brand are you ordering? Euflexxa  2. Primary osteoarthritis of left knee  -     AMB POC US DRAIN/INJECT LARGE JOINT/BURSA  -     lidocaine 1 % injection 2 mL; 2 mL, Intra-artICUlar, ONCE, 1 dose, On Mon 6/2/25 at 1400  -     sodium hyaluronate (EUFLEXXA, HYALGAN) injection 20 mg; 20 mg, Intra-artICUlar, ONCE, 1 dose, On Mon 6/2/25 at 1400Which brand are you ordering? Euflexxa          HPI:  Liliana Arguelles is a 59 y.o. female with chief complaint of   Chief Complaint   Patient presents with    Knee Pain     Pieter knee     -6/2/2025: 3 shot series of Euflexxa to the bilateral knees  -7/1/2024: Completed 3 shot series of Euflexxa to the bilateral knees, excellent relief for 6 months  -10/25/2023: Completed 3 shot series of Euflexxa to the bilateral knees, excellent

## 2025-06-06 ENCOUNTER — HOSPITAL ENCOUNTER (OUTPATIENT)
Facility: HOSPITAL | Age: 60
Setting detail: RECURRING SERIES
Discharge: HOME | End: 2025-06-09
Attending: PHYSICAL MEDICINE & REHABILITATION
Payer: COMMERCIAL

## 2025-06-06 PROCEDURE — 97530 THERAPEUTIC ACTIVITIES: CPT

## 2025-06-06 PROCEDURE — 97110 THERAPEUTIC EXERCISES: CPT

## 2025-06-06 PROCEDURE — 97535 SELF CARE MNGMENT TRAINING: CPT

## 2025-06-06 PROCEDURE — 97112 NEUROMUSCULAR REEDUCATION: CPT

## 2025-06-06 NOTE — PROGRESS NOTES
PHYSICAL / OCCUPATIONAL THERAPY - DAILY TREATMENT NOTE    Patient Name: Liliana Arguelles    Date: 2025    : 1965  Insurance: Payor: MELISSA / Plan: JONISHASHANK POS / Product Type: *No Product type* /      Patient  verified Yes     Visit #   Current / Total 3 16   Time   In / Out 8:18 8:57   Pain   In / Out 5 0   Subjective Functional Status/Changes: Patient notes she is not compliant with her home program.      TREATMENT AREA =  Lumbar pain  Lumbar neuritis  Chronic lower back pain    OBJECTIVE  Therapeutic Procedures:    36637 Self Care/Home Management (timed):  improve patient knowledge and understanding of positioning, posture/ergonomics, activity modification, and hep review   to improve patient's ability to progress to PLOF and address remaining functional goals.    Tx Min Billable or 1:1 Min   (if diff from Tx Min) Details:   9  See flow sheet as applicable and HEP REVIEW      Tx Min Billable or 1:1 Min (if diff from Tx Min) Procedure, Rationale, Specifics   10  60676 Therapeutic Exercise (timed):  increase ROM, strength, coordination, balance, and proprioception to improve patient's ability to progress to PLOF and address remaining functional goals. (see flow sheet as applicable)     Details if applicable:    SEE FLOW SHEET   10  72168 Neuromuscular Re-Education (timed):  improve balance, coordination, kinesthetic sense, posture, core stability and proprioception to improve patient's ability to develop conscious control of individual muscles and awareness of position of extremities in order to progress to PLOF and address remaining functional goals. (see flow sheet as applicable)     Details if applicable:  SEE FLOW SHEET   10  26775 Therapeutic Activity (timed):  use of dynamic activities replicating functional movements to increase ROM, strength, coordination, balance, and proprioception in order to improve patient's ability to progress to PLOF and address remaining functional goals.

## 2025-06-09 ENCOUNTER — HOSPITAL ENCOUNTER (OUTPATIENT)
Facility: HOSPITAL | Age: 60
Setting detail: RECURRING SERIES
Discharge: HOME | End: 2025-06-12
Attending: PHYSICAL MEDICINE & REHABILITATION
Payer: COMMERCIAL

## 2025-06-09 PROCEDURE — 97530 THERAPEUTIC ACTIVITIES: CPT

## 2025-06-09 PROCEDURE — 97535 SELF CARE MNGMENT TRAINING: CPT

## 2025-06-09 PROCEDURE — 97112 NEUROMUSCULAR REEDUCATION: CPT

## 2025-06-09 PROCEDURE — 97110 THERAPEUTIC EXERCISES: CPT

## 2025-06-09 NOTE — PROGRESS NOTES
PHYSICAL / OCCUPATIONAL THERAPY - DAILY TREATMENT NOTE    Patient Name: Liliana Arguelles    Date: 2025    : 1965  Insurance: Payor: MELISSA / Plan: MELISSA POS / Product Type: *No Product type* /      Patient  verified Yes     Visit #   Current / Total 4 16   Time   In / Out 7:40 8:19   Pain   In / Out 4 0   Subjective Functional Status/Changes: Patient notes she did some shopping this weekend with her dtg and she did not have any pain. She notes last week she was not too sore after her last session.       TREATMENT AREA =  Lumbar pain  Lumbar neuritis  Chronic lower back pain    OBJECTIVE  Therapeutic Procedures:    93465 Self Care/Home Management (timed):  improve patient knowledge and understanding of positioning, posture/ergonomics, activity modification, and hep review   to improve patient's ability to progress to PLOF and address remaining functional goals.    Tx Min Billable or 1:1 Min   (if diff from Tx Min) Details:   8  See flow sheet as applicable and HEP REVIEW      Tx Min Billable or 1:1 Min (if diff from Tx Min) Procedure, Rationale, Specifics   15  86565 Therapeutic Exercise (timed):  increase ROM, strength, coordination, balance, and proprioception to improve patient's ability to progress to PLOF and address remaining functional goals. (see flow sheet as applicable)     Details if applicable:    SEE FLOW SHEET   8  41989 Neuromuscular Re-Education (timed):  improve balance, coordination, kinesthetic sense, posture, core stability and proprioception to improve patient's ability to develop conscious control of individual muscles and awareness of position of extremities in order to progress to PLOF and address remaining functional goals. (see flow sheet as applicable)     Details if applicable:  SEE FLOW SHEET   8  89302 Therapeutic Activity (timed):  use of dynamic activities replicating functional movements to increase ROM, strength, coordination, balance, and proprioception

## 2025-06-09 NOTE — THERAPY RECERTIFICATION
WEEKS  1. Patient will tolerate standing tolerance to 15+ minutes to improve work related and household cleaning/cooking tasks.  At PN: Standing tolerance 10 minutes for cooking and cleaning with work related. Ongoing. 6/9/25.  2. Patient will be able to return back to walking routine of at least 1.0 miles or longer without limiting LBP.  At PN: walking tolerance is improving, able to shop this weekend noting she got 2500 steps while shopping. She has been able to work her way up to 1.0 mile at work with some pain present low level 4-5/10. But she notes she can walk 1.0 mile. Ongoing. 6/9/25.  3.  Patient will be able to properly complete bending/lifting to complete household and work tasks in painfree tolerance.  At PN: Reviewing, ongoing. 6/9/25.  4. Patient's pain level will be 2/10 with activity in order to improve patient's ability to perform normal ADLs.  At PN: 4-5/10 pre session and post session 0/10. Ongoing 6/9/2025  4. Patient will demonstrate AROM Lumbar Spine  to WNL to increase ease of ADLs.  At PN: AROM Lumbar Spine Flexion improved by 20%, Extension limited by 20%, Rotation remains limited by 15% on each side. Ongoing. 6/9/25.    Frequency / Duration:   Patient to be seen   2   times per week for   8    WEEKS    RECOMMENDATIONS  Patient would benefit from the continuation of skilled rehab interventions for functional progress to achieving above stated clinically significant goals.  Continue per initial Plan of Care.    If you have any questions/comments please contact us directly.  Thank you for allowing us to assist in the care of your patient.    Katlyn Gallagher, PT       6/9/2025       8:15 AM

## 2025-06-13 ENCOUNTER — HOSPITAL ENCOUNTER (OUTPATIENT)
Facility: HOSPITAL | Age: 60
Setting detail: RECURRING SERIES
Discharge: HOME | End: 2025-06-16
Attending: PHYSICAL MEDICINE & REHABILITATION
Payer: COMMERCIAL

## 2025-06-13 PROCEDURE — 97110 THERAPEUTIC EXERCISES: CPT

## 2025-06-13 PROCEDURE — 97140 MANUAL THERAPY 1/> REGIONS: CPT

## 2025-06-13 PROCEDURE — 97530 THERAPEUTIC ACTIVITIES: CPT

## 2025-06-13 PROCEDURE — 97535 SELF CARE MNGMENT TRAINING: CPT

## 2025-06-13 NOTE — PROGRESS NOTES
PHYSICAL / OCCUPATIONAL THERAPY - DAILY TREATMENT NOTE    Patient Name: Liliana Arguelels    Date: 2025    : 1965  Insurance: Payor: MELISSA / Plan: MELISSA POS / Product Type: *No Product type* /      Patient  verified Yes     Visit #   Current / Total 1 16   Time   In / Out 7:40 8:23   Pain   In / Out 4 0   Subjective Functional Status/Changes: Pt says she has back pain with carrying her computer bag or lunch bag on one side. Pt notices her back feels better if she bends forwards.      TREATMENT AREA =  Lumbar pain  Lumbar neuritis  Chronic lower back pain    OBJECTIVE    Therapeutic Procedures:    57988 Therapeutic Exercise (timed):  increase ROM, strength, coordination, balance, and proprioception to improve patient's ability to progress to PLOF and address remaining functional goals.   Tx Min Billable or 1:1 Min   (if diff from Tx Min) Details:   11  See flow sheet as applicable     11475 Manual Therapy (timed):  decrease pain, increase ROM, and increase tissue extensibility to improve patient's ability to progress to PLOF and address remaining functional goals.  The manual therapy interventions were performed at a separate and distinct time from the therapeutic activities interventions .   Tx Min Billable or 1:1 Min   (if diff from Tx Min) Details:   10  Lumbar traction. Pt is supine with legs propped up on a PB, holding ankles and using gait belt.     85071 Therapeutic Activity (timed):  use of dynamic activities replicating functional movements to increase ROM, strength, coordination, balance, and proprioception in order to improve patient's ability to progress to PLOF and address remaining functional goals.   Tx Min Billable or 1:1 Min   (if diff from Tx Min) Details:   11  See flow sheet as applicable     52117 Self Care/Home Management (timed):  improve patient knowledge and understanding of home injury/symptom/pain management and positioning  to improve patient's ability to

## 2025-06-16 ENCOUNTER — HOSPITAL ENCOUNTER (OUTPATIENT)
Facility: HOSPITAL | Age: 60
Setting detail: RECURRING SERIES
Discharge: HOME | End: 2025-06-19
Attending: PHYSICAL MEDICINE & REHABILITATION
Payer: COMMERCIAL

## 2025-06-16 PROCEDURE — 97110 THERAPEUTIC EXERCISES: CPT

## 2025-06-16 PROCEDURE — 97112 NEUROMUSCULAR REEDUCATION: CPT

## 2025-06-16 PROCEDURE — 97530 THERAPEUTIC ACTIVITIES: CPT

## 2025-06-16 PROCEDURE — 97535 SELF CARE MNGMENT TRAINING: CPT

## 2025-06-16 NOTE — PROGRESS NOTES
PHYSICAL / OCCUPATIONAL THERAPY - DAILY TREATMENT NOTE    Patient Name: Liliana Arguelles    Date: 2025    : 1965  Insurance: Payor: MELISSA / Plan: MELISSA POS / Product Type: *No Product type* /      Patient  verified Yes     Visit #   Current / Total 2 16   Time   In / Out 7:42 8:14   Pain   In / Out 0 0   Subjective Functional Status/Changes: Pt says she has back pain with carrying her computer bag or lunch bag on one side. Pt notices her back feels better if she bends forwards.      TREATMENT AREA =  Lumbar pain  Lumbar neuritis  Chronic lower back pain    OBJECTIVE    Therapeutic Procedures:    10756 Therapeutic Exercise (timed):  increase ROM, strength, coordination, balance, and proprioception to improve patient's ability to progress to PLOF and address remaining functional goals.   Tx Min Billable or 1:1 Min   (if diff from Tx Min) Details:   8  See flow sheet as applicable     04079 Neuromuscular Re-Education (timed):  improve balance, coordination, kinesthetic sense, posture, core stability and proprioception to improve patient's ability to develop conscious control of individual muscles and awareness of position of extremities in order to progress to PLOF and address remaining functional goals.   Tx Min Billable or 1:1 Min   (if diff from Tx Min) Details:   8  See flow sheet as applicable       21805 Therapeutic Activity (timed):  use of dynamic activities replicating functional movements to increase ROM, strength, coordination, balance, and proprioception in order to improve patient's ability to progress to PLOF and address remaining functional goals.   Tx Min Billable or 1:1 Min   (if diff from Tx Min) Details:   8  See flow sheet as applicable     26191 Self Care/Home Management (timed):  improve patient knowledge and understanding of home injury/symptom/pain management and positioning  to improve patient's ability to progress to PLOF and address remaining functional goals.

## 2025-06-20 ENCOUNTER — HOSPITAL ENCOUNTER (OUTPATIENT)
Facility: HOSPITAL | Age: 60
Setting detail: RECURRING SERIES
Discharge: HOME | End: 2025-06-23
Attending: PHYSICAL MEDICINE & REHABILITATION
Payer: COMMERCIAL

## 2025-06-20 PROCEDURE — 97112 NEUROMUSCULAR REEDUCATION: CPT

## 2025-06-20 PROCEDURE — 97530 THERAPEUTIC ACTIVITIES: CPT

## 2025-06-20 PROCEDURE — 97535 SELF CARE MNGMENT TRAINING: CPT

## 2025-06-20 PROCEDURE — 97110 THERAPEUTIC EXERCISES: CPT

## 2025-06-20 NOTE — PROGRESS NOTES
PHYSICAL / OCCUPATIONAL THERAPY - DAILY TREATMENT NOTE    Patient Name: Liliana Arguelles    Date: 2025    : 1965  Insurance: Payor: MELISSA / Plan: MELISSA POS / Product Type: *No Product type* /      Patient  verified Yes     Visit #   Current / Total 3 16   Time   In / Out 7:40 8:16   Pain   In / Out 0 0   Subjective Functional Status/Changes: Patient notes she was doing well overall and that she had a hiccup in her hip when she was walking this morning.      TREATMENT AREA =    Lumbar pain  Lumbar neuritis  Chronic lower back pain    OBJECTIVE    Therapeutic Procedures:    56001 Therapeutic Exercise (timed):  increase ROM, strength, coordination, balance, and proprioception to improve patient's ability to progress to PLOF and address remaining functional goals.   Tx Min Billable or 1:1 Min   (if diff from Tx Min) Details:   12  See flow sheet as applicable     63612 Neuromuscular Re-Education (timed):  improve balance, coordination, kinesthetic sense, posture, core stability and proprioception to improve patient's ability to develop conscious control of individual muscles and awareness of position of extremities in order to progress to PLOF and address remaining functional goals.   Tx Min Billable or 1:1 Min   (if diff from Tx Min) Details:   8  See flow sheet as applicable       68336 Therapeutic Activity (timed):  use of dynamic activities replicating functional movements to increase ROM, strength, coordination, balance, and proprioception in order to improve patient's ability to progress to PLOF and address remaining functional goals.   Tx Min Billable or 1:1 Min   (if diff from Tx Min) Details:   8  See flow sheet as applicable     60256 Self Care/Home Management (timed):  improve patient knowledge and understanding of home injury/symptom/pain management and positioning  to improve patient's ability to progress to PLOF and address remaining functional goals.    Tx Min Billable or 1:1

## 2025-06-23 ENCOUNTER — APPOINTMENT (OUTPATIENT)
Facility: HOSPITAL | Age: 60
End: 2025-06-23
Attending: PHYSICAL MEDICINE & REHABILITATION
Payer: COMMERCIAL

## 2025-06-27 ENCOUNTER — HOSPITAL ENCOUNTER (OUTPATIENT)
Facility: HOSPITAL | Age: 60
Setting detail: RECURRING SERIES
Discharge: HOME | End: 2025-06-30
Attending: PHYSICAL MEDICINE & REHABILITATION
Payer: COMMERCIAL

## 2025-06-27 PROCEDURE — 97530 THERAPEUTIC ACTIVITIES: CPT

## 2025-06-27 PROCEDURE — 97112 NEUROMUSCULAR REEDUCATION: CPT

## 2025-06-27 PROCEDURE — 97110 THERAPEUTIC EXERCISES: CPT

## 2025-06-27 PROCEDURE — 97535 SELF CARE MNGMENT TRAINING: CPT

## 2025-06-27 NOTE — PROGRESS NOTES
PHYSICAL / OCCUPATIONAL THERAPY - DAILY TREATMENT NOTE    Patient Name: Liliana Arguelles    Date: 2025    : 1965  Insurance: Payor: MELISSA / Plan: MELISSA POS / Product Type: *No Product type* /      Patient  verified Yes     Visit #   Current / Total 4 16   Time   In / Out 7:40 8:14   Pain   In / Out 0 0   Subjective Functional Status/Changes: Patient notes she is having improved tolerance with her activities overall. She is having improved standing and walking tolerance aiming for 3-4,000 steps.      TREATMENT AREA =    Lumbar pain  Lumbar neuritis  Chronic lower back pain    OBJECTIVE    Therapeutic Procedures:    31824 Therapeutic Exercise (timed):  increase ROM, strength, coordination, balance, and proprioception to improve patient's ability to progress to PLOF and address remaining functional goals.   Tx Min Billable or 1:1 Min   (if diff from Tx Min) Details:   10  See flow sheet as applicable     30524 Neuromuscular Re-Education (timed):  improve balance, coordination, kinesthetic sense, posture, core stability and proprioception to improve patient's ability to develop conscious control of individual muscles and awareness of position of extremities in order to progress to PLOF and address remaining functional goals.   Tx Min Billable or 1:1 Min   (if diff from Tx Min) Details:   8  See flow sheet as applicable       28558 Therapeutic Activity (timed):  use of dynamic activities replicating functional movements to increase ROM, strength, coordination, balance, and proprioception in order to improve patient's ability to progress to PLOF and address remaining functional goals.   Tx Min Billable or 1:1 Min   (if diff from Tx Min) Details:   8  See flow sheet as applicable     22945 Self Care/Home Management (timed):  improve patient knowledge and understanding of home injury/symptom/pain management and positioning  to improve patient's ability to progress to PLOF and address remaining

## 2025-06-27 NOTE — PROGRESS NOTES
[facility]  [address]  [place] [id number]  Tel: [phone]  Fax: [phone]          PROGRESS NOTE      HISTORY OF PRESENT ILLNESS:  The patient is a 59 y.o. female and was seen today for follow up of lower back pain located at the lumbosacral junction with intermittent symptoms into the bilateral hips, ongoing for 6 months with no specific injury. Patient says her pain is progressive in nature. She denies change in bowel or bladder habits. Her pain is exacerbated by standing and walking; alleviated with sitting. Positive shopping cart sign. She denies recent fevers, weight loss, rashes, or skin sores. She denies a hx of stomach ulcers or bleeding disorders. She denies a hx of history of spinal surgery or injections. She denies recent physical therapy or chiropractic care. She denies a hx of DM. Patient was unaware of her decreased renal function; GFR of 49.9 on 3/31/2025. She is currently utilizing GABAPENTIN 600 mg QHS; neck pain. PmHx of obesity, obstructive sleep apnea (CPAP), anxiety, depression, left CTS, stage 3 renal disease, osteoporosis (Fosamax), former smoker. Note from me dated 2/7/2022 indicating patient was seen for progressive neck pain into the left shoulder to the elbow. Follow up in 1 month's time or earlier if needed. At her last clinical appointment, I will refer her to physical therapy with an emphasis on HEP. I advised the pt to wait one week, if she is not contacted by the therapy center to schedule an initial evaluation, she should inform the office. Consideration was given to changing her neuropathic medication from 600 mg QHS to 200 mg TID, but deferred secondary to all doses of Gabapentin below 600 mg containing red dye #40 which the patient has a true allergy to. Additional consideration was given to starting her on a MDP, but deferred secondary to the patient having a true allergy to Fluticasone. If the patient would like

## 2025-06-30 ENCOUNTER — APPOINTMENT (OUTPATIENT)
Facility: HOSPITAL | Age: 60
End: 2025-06-30
Attending: PHYSICAL MEDICINE & REHABILITATION
Payer: COMMERCIAL

## 2025-06-30 ENCOUNTER — OFFICE VISIT (OUTPATIENT)
Age: 60
End: 2025-06-30
Payer: COMMERCIAL

## 2025-06-30 VITALS — TEMPERATURE: 97 F | HEIGHT: 63 IN | WEIGHT: 217 LBS | BODY MASS INDEX: 38.45 KG/M2

## 2025-06-30 DIAGNOSIS — M54.50 LUMBAR PAIN: Primary | ICD-10-CM

## 2025-06-30 DIAGNOSIS — M54.16 LUMBAR NEURITIS: ICD-10-CM

## 2025-06-30 PROCEDURE — 99214 OFFICE O/P EST MOD 30 MIN: CPT | Performed by: PHYSICAL MEDICINE & REHABILITATION

## 2025-07-03 ENCOUNTER — HOSPITAL ENCOUNTER (OUTPATIENT)
Facility: HOSPITAL | Age: 60
Setting detail: RECURRING SERIES
Discharge: HOME | End: 2025-07-06
Attending: PHYSICAL MEDICINE & REHABILITATION
Payer: COMMERCIAL

## 2025-07-03 PROCEDURE — 97112 NEUROMUSCULAR REEDUCATION: CPT

## 2025-07-03 PROCEDURE — 97110 THERAPEUTIC EXERCISES: CPT

## 2025-07-03 PROCEDURE — 97530 THERAPEUTIC ACTIVITIES: CPT

## 2025-07-03 PROCEDURE — 97535 SELF CARE MNGMENT TRAINING: CPT

## 2025-07-03 NOTE — PROGRESS NOTES
Billable or 1:1 Min   (if diff from Tx Min) Details:   8  See flow sheet as applicable       33  [facility] Totals Reminder: bill using total billable min of TIMED therapeutic procedures (example: do not include dry needle or estim unattended, both untimed codes, in totals to left)  8-22 min = 1 unit; 23-37 min = 2 units; 38-52 min = 3 units; 53-67 min = 4 units; 68-82 min = 5 units   Total Total     Charge Capture    [x]  Patient Education billed concurrently with other procedures   [x] Review HEP    [x] Progressed/Changed HEP, detail:  issued yellow resistance band today to facilitate hip strengthening at home program.  [] Other detail:       Objective Information/Functional Measures/Assessment    Updated HEP to integrate use of three way hip at home with yellow resistance loop issued. She is progressing well with overall mobility an strengthening, will continue to review proper bending and lifting. Improving eccentric lowering with STS as noted with control with exercise completion today.    Patient will continue to benefit from skilled PT / OT services to modify and progress therapeutic interventions, analyze and address functional mobility deficits, analyze and address ROM deficits, analyze and address strength deficits, analyze and address soft tissue restrictions, analyze and cue for proper movement patterns, and analyze and modify for postural abnormalities to address functional deficits and attain remaining goals.    Progress toward goals / Updated goals:  []  See Progress Note/Recertification    1. Patient will tolerate standing tolerance to 15+ minutes to improve work related and household cleaning/cooking tasks.  At PN: Standing tolerance 10 minutes for cooking and cleaning with work related. Ongoing. 6/9/25.  Current: Patient notes her standing tolerance is improving gradually, able to do about 10 minutes in one position before needing to change position. Ongoing. 6/20/25.  2. Patient will be able to return

## 2025-07-07 ENCOUNTER — HOSPITAL ENCOUNTER (OUTPATIENT)
Facility: HOSPITAL | Age: 60
Setting detail: RECURRING SERIES
Discharge: HOME | End: 2025-07-10
Attending: PHYSICAL MEDICINE & REHABILITATION
Payer: COMMERCIAL

## 2025-07-07 PROCEDURE — 97535 SELF CARE MNGMENT TRAINING: CPT

## 2025-07-07 PROCEDURE — 97110 THERAPEUTIC EXERCISES: CPT

## 2025-07-07 PROCEDURE — 97530 THERAPEUTIC ACTIVITIES: CPT

## 2025-07-07 PROCEDURE — 97112 NEUROMUSCULAR REEDUCATION: CPT

## 2025-07-07 NOTE — THERAPY RECERTIFICATION
[facility]  [address] [facility] [address] Ph: [phone] Fx: [phone]  PHYSICAL THERAPY PROGRESS NOTE  Patient Name: Liliana Arguelles : 1965   Medical/Treatment Diagnosis: Lumbar pain  Lumbar neuritis  Chronic lower back pain   Referral Source: Mahin Kaur MD     Date of Initial Visit: 25 Attended Visits: 10 Missed Visits: 2     SUMMARY OF TREATMENT  Patient has been attending therapy for treatment of low back pain, ongoing for the last 6 months. She has only attended 10 visits thus far and progressing well. She is now able to progress back to her walking routine, improving standing tolerance, improving bending and lifting tolerance. She is showing gradual improvement in the lumbar spine ROM, improving core and hip girdle strength as evident by improving repetitions, and improving posterior chain flexibility. She is able to tolerate longer duration for standing at least 15 minutes for cooking and cleaning. Walking tolerance is improving towards 1.0 mile with less pain overall and gradually progressing back to walking regiment.    Patient will benefit from a continued skilled program of physical therapy to include therapeutic exercises to address ROM/flexibility and strength deficits, therapeutic activities to improve functional mobility to enhance QoL, neuromuscular reeducation to address motor control/coordination, balance, and proprioception input, gait training as needed to improve functional safe mechanics, manual therapy as needed to address joint and soft tissue restrictions to improve ROM and tissue extensibility. Modalities to be utilized as needed for pain modulation    CURRENT STATUS  1. Patient will tolerate standing tolerance to 15+ minutes to improve work related and household cleaning/cooking tasks.  At PN: Standing tolerance 10 minutes for cooking and cleaning with work related. Ongoing. 25.  Current:

## 2025-07-07 NOTE — PROGRESS NOTES
PHYSICAL / OCCUPATIONAL THERAPY - DAILY TREATMENT NOTE    Patient Name: Liliana Arguelles    Date: 2025    : 1965  Insurance: Payor: MELISSA / Plan: MELISSA POS / Product Type: *No Product type* /      Patient  verified Yes     Visit #   Current / Total 6 16   Time   In / Out 7:40 8:19   Pain   In / Out 0 0   Subjective Functional Status/Changes: Patient states her back is doing good, she saw MD yesterday whom encouraged continuation of HEP and therapy to show results.     TREATMENT AREA =    Lumbar pain  Lumbar neuritis  Chronic lower back pain    OBJECTIVE    Therapeutic Procedures:    66250 Therapeutic Exercise (timed):  increase ROM, strength, coordination, balance, and proprioception to improve patient's ability to progress to PLOF and address remaining functional goals.   Tx Min Billable or 1:1 Min   (if diff from Tx Min) Details:   10  See flow sheet as applicable     49686 Neuromuscular Re-Education (timed):  improve balance, coordination, kinesthetic sense, posture, core stability and proprioception to improve patient's ability to develop conscious control of individual muscles and awareness of position of extremities in order to progress to PLOF and address remaining functional goals.   Tx Min Billable or 1:1 Min   (if diff from Tx Min) Details:   10  See flow sheet as applicable       49975 Therapeutic Activity (timed):  use of dynamic activities replicating functional movements to increase ROM, strength, coordination, balance, and proprioception in order to improve patient's ability to progress to PLOF and address remaining functional goals.   Tx Min Billable or 1:1 Min   (if diff from Tx Min) Details:   10  See flow sheet as applicable     44708 Self Care/Home Management (timed):  improve patient knowledge and understanding of home injury/symptom/pain management and positioning  to improve patient's ability to progress to PLOF and address remaining functional goals.    Tx Min

## 2025-07-09 ENCOUNTER — OFFICE VISIT (OUTPATIENT)
Age: 60
End: 2025-07-09

## 2025-07-09 VITALS — WEIGHT: 217 LBS | BODY MASS INDEX: 38.45 KG/M2 | HEIGHT: 63 IN

## 2025-07-09 DIAGNOSIS — M65.331 TRIGGER MIDDLE FINGER OF RIGHT HAND: ICD-10-CM

## 2025-07-09 DIAGNOSIS — G56.01 RIGHT CARPAL TUNNEL SYNDROME: ICD-10-CM

## 2025-07-09 DIAGNOSIS — M18.11 ARTHRITIS OF CARPOMETACARPAL (CMC) JOINT OF RIGHT THUMB: Primary | ICD-10-CM

## 2025-07-09 RX ORDER — LIDOCAINE HYDROCHLORIDE 10 MG/ML
0.5 INJECTION, SOLUTION INFILTRATION; PERINEURAL ONCE
Status: COMPLETED | OUTPATIENT
Start: 2025-07-09 | End: 2025-07-09

## 2025-07-09 RX ORDER — MELOXICAM 7.5 MG/1
7.5 TABLET ORAL DAILY PRN
Qty: 90 TABLET | Refills: 2 | Status: SHIPPED | OUTPATIENT
Start: 2025-07-09

## 2025-07-09 RX ADMIN — LIDOCAINE HYDROCHLORIDE 0.5 ML: 10 INJECTION, SOLUTION INFILTRATION; PERINEURAL at 08:46

## 2025-07-09 NOTE — PROGRESS NOTES
Liliana Arguelles is a 59 y.o. female right handed .  Worker's Compensation and legal considerations: none    Chief Complaint   Patient presents with    Follow-up     Right thumb and right middle finger     Pain Score:   4    7/9/2025 HPI: Patient presents today for follow-up regarding her right thumb base pain and right middle trigger finger.  She has had at least 2 possibly 3 injections in her right middle trigger finger as well as multiple injections in her right thumb base.  She reports her numbness and tingling in the right hand has not been bothering her.    4/9/2025 HPI: Patient presents today for follow-up due to recurrence of pain at the right thumb base as well as numbness and tingling in the right hand.    12/11/2024 HPI: Patient presents today for follow-up of her right thumb base pain.  She reports recurrence of symptoms.  She denies any of her numbness and tingling to be bothering her currently.    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  She is requesting an injection today as they have significantly helped in the past.  Previously she received a right middle trigger finger injection as well as right thumb CMC joint injection.  She reports the middle finger to be doing well.    Initial HPI: Patient comes in today with complaints of right thumb base pain.  She also has a history of bilateral hand numbness and tingling especially at night.  She says she was previously diagnosed with carpal tunnel in the past.    Date of onset: Chronic  Injury: No  Prior Treatment:  Yes: Comment: Left trigger thumb injection.  Right middle trigger finger injection.  Left thumb trapeziectomy and suspension arthroplasty, left carpal tunnel release.  Bilateral thumb CMC joint and bilateral carpal tunnel injections    ROS: Review of Systems - General ROS: negative except HPI    Past Medical History:   Diagnosis Date

## 2025-07-11 ENCOUNTER — HOSPITAL ENCOUNTER (OUTPATIENT)
Facility: HOSPITAL | Age: 60
Setting detail: RECURRING SERIES
Discharge: HOME | End: 2025-07-14
Attending: PHYSICAL MEDICINE & REHABILITATION
Payer: COMMERCIAL

## 2025-07-11 PROCEDURE — 97112 NEUROMUSCULAR REEDUCATION: CPT

## 2025-07-11 PROCEDURE — 97530 THERAPEUTIC ACTIVITIES: CPT

## 2025-07-11 PROCEDURE — 97110 THERAPEUTIC EXERCISES: CPT

## 2025-07-11 PROCEDURE — 97535 SELF CARE MNGMENT TRAINING: CPT

## 2025-07-11 NOTE — PROGRESS NOTES
PHYSICAL / OCCUPATIONAL THERAPY - DAILY TREATMENT NOTE    Patient Name: Liliana Arguelles    Date: 2025    : 1965  Insurance: Payor: MELISSA / Plan: MELISSA POS / Product Type: *No Product type* /      Patient  verified Yes     Visit #   Current / Total 1 16   Time   In / Out 7:40 8:12   Pain   In / Out 0 0   Subjective Functional Status/Changes: Patient notes no pain upon arrival and has been compliant  with program thus far.      TREATMENT AREA =    Lumbar pain  Lumbar neuritis  Chronic lower back pain    OBJECTIVE    Therapeutic Procedures:  56144 Therapeutic Exercise (timed):  increase ROM, strength, coordination, balance, and proprioception to improve patient's ability to progress to PLOF and address remaining functional goals.   Tx Min Billable or 1:1 Min   (if diff from Tx Min) Details:   8  See flow sheet as applicable     17282 Neuromuscular Re-Education (timed):  improve balance, coordination, kinesthetic sense, posture, core stability and proprioception to improve patient's ability to develop conscious control of individual muscles and awareness of position of extremities in order to progress to PLOF and address remaining functional goals.   Tx Min Billable or 1:1 Min   (if diff from Tx Min) Details:   8  See flow sheet as applicable       11377 Therapeutic Activity (timed):  use of dynamic activities replicating functional movements to increase ROM, strength, coordination, balance, and proprioception in order to improve patient's ability to progress to PLOF and address remaining functional goals.   Tx Min Billable or 1:1 Min   (if diff from Tx Min) Details:   8  See flow sheet as applicable     09124 Self Care/Home Management (timed):  improve patient knowledge and understanding of home injury/symptom/pain management and positioning  to improve patient's ability to progress to PLOF and address remaining functional goals.    Tx Min Billable or 1:1 Min   (if diff from Tx Min)

## 2025-07-14 ENCOUNTER — APPOINTMENT (OUTPATIENT)
Facility: HOSPITAL | Age: 60
End: 2025-07-14
Attending: PHYSICAL MEDICINE & REHABILITATION
Payer: COMMERCIAL

## 2025-07-18 ENCOUNTER — HOSPITAL ENCOUNTER (OUTPATIENT)
Facility: HOSPITAL | Age: 60
Setting detail: RECURRING SERIES
Discharge: HOME | End: 2025-07-21
Attending: PHYSICAL MEDICINE & REHABILITATION
Payer: COMMERCIAL

## 2025-07-18 PROCEDURE — 97110 THERAPEUTIC EXERCISES: CPT

## 2025-07-18 PROCEDURE — 97535 SELF CARE MNGMENT TRAINING: CPT

## 2025-07-18 PROCEDURE — 97530 THERAPEUTIC ACTIVITIES: CPT

## 2025-07-18 PROCEDURE — 97112 NEUROMUSCULAR REEDUCATION: CPT

## 2025-07-18 NOTE — PROGRESS NOTES
PHYSICAL / OCCUPATIONAL THERAPY - DAILY TREATMENT NOTE    Patient Name: Liliana Arguelles    Date: 2025    : 1965  Insurance: Payor: MELISSA / Plan: MELISSA POS / Product Type: *No Product type* /      Patient  verified Yes     Visit #   Current / Total 2 16   Time   In / Out 7:38 8:18   Pain   In / Out 0 0   Subjective Functional Status/Changes: Patient notes some pain in the R hip lower back region yesterday but did the exercise and it subsided the pain overall. Has been working on her band exercises consistently at home.      TREATMENT AREA =    Lumbar pain  Lumbar neuritis  Chronic lower back pain    OBJECTIVE    Therapeutic Procedures:  83181 Therapeutic Exercise (timed):  increase ROM, strength, coordination, balance, and proprioception to improve patient's ability to progress to PLOF and address remaining functional goals.   Tx Min Billable or 1:1 Min   (if diff from Tx Min) Details:   10  See flow sheet as applicable     37991 Neuromuscular Re-Education (timed):  improve balance, coordination, kinesthetic sense, posture, core stability and proprioception to improve patient's ability to develop conscious control of individual muscles and awareness of position of extremities in order to progress to PLOF and address remaining functional goals.   Tx Min Billable or 1:1 Min   (if diff from Tx Min) Details:   10  See flow sheet as applicable       75807 Therapeutic Activity (timed):  use of dynamic activities replicating functional movements to increase ROM, strength, coordination, balance, and proprioception in order to improve patient's ability to progress to PLOF and address remaining functional goals.   Tx Min Billable or 1:1 Min   (if diff from Tx Min) Details:   10  See flow sheet as applicable     20332 Self Care/Home Management (timed):  improve patient knowledge and understanding of home injury/symptom/pain management and positioning  to improve patient's ability to progress to

## 2025-07-21 ENCOUNTER — APPOINTMENT (OUTPATIENT)
Facility: HOSPITAL | Age: 60
End: 2025-07-21
Attending: PHYSICAL MEDICINE & REHABILITATION
Payer: COMMERCIAL

## 2025-07-25 ENCOUNTER — HOSPITAL ENCOUNTER (OUTPATIENT)
Facility: HOSPITAL | Age: 60
Setting detail: RECURRING SERIES
Discharge: HOME | End: 2025-07-28
Attending: PHYSICAL MEDICINE & REHABILITATION
Payer: COMMERCIAL

## 2025-07-25 PROCEDURE — 97112 NEUROMUSCULAR REEDUCATION: CPT

## 2025-07-25 PROCEDURE — 97530 THERAPEUTIC ACTIVITIES: CPT

## 2025-07-25 PROCEDURE — 97535 SELF CARE MNGMENT TRAINING: CPT

## 2025-07-25 PROCEDURE — 97110 THERAPEUTIC EXERCISES: CPT

## 2025-07-25 NOTE — PROGRESS NOTES
PHYSICAL / OCCUPATIONAL THERAPY - DAILY TREATMENT NOTE    Patient Name: Liliana Arguelles    Date: 2025    : 1965  Insurance: Payor: MELISSA / Plan: MELISSA POS / Product Type: *No Product type* /      Patient  verified Yes     Visit #   Current / Total 3 16   Time   In / Out 7:39 8:15   Pain   In / Out 0 0   Subjective Functional Status/Changes: Patient notes her back is doing good overall. She has been diligent with her home program.      TREATMENT AREA =    Lumbar pain  Lumbar neuritis  Chronic lower back pain    OBJECTIVE    Therapeutic Procedures:  63205 Therapeutic Exercise (timed):  increase ROM, strength, coordination, balance, and proprioception to improve patient's ability to progress to PLOF and address remaining functional goals.   Tx Min Billable or 1:1 Min   (if diff from Tx Min) Details:   10  See flow sheet as applicable     24140 Neuromuscular Re-Education (timed):  improve balance, coordination, kinesthetic sense, posture, core stability and proprioception to improve patient's ability to develop conscious control of individual muscles and awareness of position of extremities in order to progress to PLOF and address remaining functional goals.   Tx Min Billable or 1:1 Min   (if diff from Tx Min) Details:   8  See flow sheet as applicable       90601 Therapeutic Activity (timed):  use of dynamic activities replicating functional movements to increase ROM, strength, coordination, balance, and proprioception in order to improve patient's ability to progress to PLOF and address remaining functional goals.   Tx Min Billable or 1:1 Min   (if diff from Tx Min) Details:   10  See flow sheet as applicable     14187 Self Care/Home Management (timed):  improve patient knowledge and understanding of home injury/symptom/pain management and positioning  to improve patient's ability to progress to PLOF and address remaining functional goals.    Tx Min Billable or 1:1 Min   (if diff from Tx

## 2025-08-05 ENCOUNTER — TELEPHONE (OUTPATIENT)
Facility: HOSPITAL | Age: 60
End: 2025-08-05

## 2025-08-20 ENCOUNTER — TELEPHONE (OUTPATIENT)
Facility: HOSPITAL | Age: 60
End: 2025-08-20

## (undated) DEVICE — PAD N ADH W3XL4IN POLY COT SFT PERF FLM EASILY CUT ABSRB

## (undated) DEVICE — BLADE OPHTH GRN ROUNDED TIP 1 SIDE SHRP GRINDLESS MINI-BLDE

## (undated) DEVICE — BANDAGE COMPR W1INXL5YD WHT COT E TAPE RUB BASE ADH CURAD

## (undated) DEVICE — BANDAGE,ELASTIC,ESMARK,STERILE,4"X9',LF: Brand: MEDLINE

## (undated) DEVICE — APPLICATOR MEDICATED 26 CC SOLUTION HI LT ORNG CHLORAPREP

## (undated) DEVICE — SUTURE MCRYL SZ 3-0 L27IN ABSRB UD L19MM PS-2 3/8 CIR PRIM Y427H

## (undated) DEVICE — TOOL TRAPEZIECTOMY CRPL W HNDL FOR BNE EXCISION

## (undated) DEVICE — GOWN,SIRUS,FABRNF,2XL,18/CS: Brand: MEDLINE

## (undated) DEVICE — CURITY NON-ADHERENT STRIPS: Brand: CURITY

## (undated) DEVICE — ADHESIVE SKIN CLSR 0.7ML TOP DERMBND ADV

## (undated) DEVICE — SYRINGE MED 30ML STD CLR PLAS LUERLOCK TIP N CTRL DISP

## (undated) DEVICE — PACK PROCEDURE SURG MAJ W/ BASIN LF

## (undated) DEVICE — CORD ES L12FT BPLR FRCP

## (undated) DEVICE — SUTURE MCRYL SZ 3-0 L27IN ABSRB UD L26MM SH 1/2 CIR Y416H

## (undated) DEVICE — SUTURE CHROMIC GUT SZ 4-0 L18IN ABSRB BRN L19MM PS-2 3/8 1637G

## (undated) DEVICE — DRAPE,REIN 53X77,STERILE: Brand: MEDLINE

## (undated) DEVICE — MAT ANTISLIP W16INXL10YD BLU BULK RL DYCEM

## (undated) DEVICE — GLOVE SURG SZ 8 L12IN FNGR THK79MIL GRN LTX FREE

## (undated) DEVICE — GLOVE SURG SZ 8 CRM LTX FREE POLYISOPRENE POLYMER BEAD ANTI

## (undated) DEVICE — KIT OR TURNOVER

## (undated) DEVICE — SUTURE MCRYL SZ 4-0 L18IN ABSRB UD L19MM PS-2 3/8 CIR PRIM Y496G

## (undated) DEVICE — CANNULA PERF L2IN BLNT TIP 2MM VES CLR RADPQ BODY FEM LUER

## (undated) DEVICE — DRAPE,HAND,STERILE: Brand: MEDLINE